# Patient Record
Sex: FEMALE | Race: WHITE | NOT HISPANIC OR LATINO | Employment: OTHER | URBAN - METROPOLITAN AREA
[De-identification: names, ages, dates, MRNs, and addresses within clinical notes are randomized per-mention and may not be internally consistent; named-entity substitution may affect disease eponyms.]

---

## 2017-02-01 DIAGNOSIS — I26.09 OTHER PULMONARY EMBOLISM WITH ACUTE COR PULMONALE, UNSPECIFIED CHRONICITY (HCC): ICD-10-CM

## 2017-02-24 ENCOUNTER — APPOINTMENT (OUTPATIENT)
Dept: VASCULAR LAB | Facility: MEDICAL CENTER | Age: 67
End: 2017-02-24
Attending: INTERNAL MEDICINE
Payer: MEDICARE

## 2017-03-01 DIAGNOSIS — Z86.711 HISTORY OF PULMONARY EMBOLISM: ICD-10-CM

## 2017-03-01 RX ORDER — WARFARIN SODIUM 5 MG/1
TABLET ORAL
Qty: 90 TAB | Refills: 1 | Status: SHIPPED | OUTPATIENT
Start: 2017-03-01 | End: 2017-09-05 | Stop reason: SDUPTHER

## 2017-03-03 ENCOUNTER — ANTICOAGULATION VISIT (OUTPATIENT)
Dept: VASCULAR LAB | Facility: MEDICAL CENTER | Age: 67
End: 2017-03-03
Attending: INTERNAL MEDICINE
Payer: MEDICARE

## 2017-03-03 DIAGNOSIS — I26.99 PULMONARY EMBOLISM, OTHER: ICD-10-CM

## 2017-03-03 LAB — INR PPP: 1.5 (ref 2–3.5)

## 2017-03-03 PROCEDURE — 85610 PROTHROMBIN TIME: CPT

## 2017-03-03 PROCEDURE — 99212 OFFICE O/P EST SF 10 MIN: CPT

## 2017-03-03 NOTE — PROGRESS NOTES
Anticoagulation Summary as of 3/3/2017     INR goal 2.0-3.0   Selected INR 1.5! (3/3/2017)   Maintenance plan 5 mg (5 mg x 1) every day   Weekly total 35 mg   Plan last modified Fabio Cabrera, PHARMD (5/15/2015)   Next INR check 3/24/2017   Target end date Indefinite    Indications   Pulmonary embolism (CMS-HCC) [I26.99]         Anticoagulation Episode Summary     [facility]    Preferred lab     Send INR reminders to     Comments indef per PCP       Anticoagulation Care Providers     Provider Role Specialty Phone number    Tom Vega D.O. Referring Internal Medicine [phone]    [facility] Responsible  [phone]        Anticoagulation Patient Findings    Patient's INR was SUB  therapeutic today in clinic.     States she recently missed a dose.  Pt denies any unusual s/s of bleeding, bruising, clotting or any changes to diet or medications.  Confirmed dosing regimen.  Bolus today then Pt is to continue with current warfarin dosing regimen.    Please extend f/u to 12 weeks if therapeutic at next visit.  Follow up in 3 weeks.    Beny Sheikh, PHARMD

## 2017-03-03 NOTE — MR AVS SNAPSHOT
Lawanda Eliza Latasha   3/3/2017 9:45 AM   Anticoagulation Visit   MRN: [id number]    Department:  Vascular Medicine   Dept Phone:  [phone]    Description:  Female : 1950   Provider:  [facility] EXAM 4           Allergies as of 3/3/2017     Allergen Noted Reactions    Castile [Soap] 2014       Rash      Vital Signs     Smoking Status                   Never Smoker            Basic Information     Date Of Birth Sex Race Ethnicity Preferred Language    1950 Female White Non- English      Your appointments     Mar 24, 2017 10:15 AM   Established Patient with [facility] EXAM 5   [facility]  (--)    [address] [facility] [address]   [phone]              Problem List              ICD-10-CM Priority Class Noted - Resolved    Pulmonary embolism (CMS-HCC) I26.99   2014 - Present    MEDICAL HOME    2014 - Present    Iron deficiency anemia due to chronic blood loss D50.0   8/10/2015 - Present    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   8/10/2015 - Present    Vitamin D deficiency E55.9   2015 - Present      Health Maintenance        Date Due Completion Dates    IMM DTaP/Tdap/Td Vaccine (1 - Tdap) 1967 ---    PAP SMEAR 1969 ---    IMM ZOSTER VACCINE 2008 ---    IMM PNEUMOCOCCAL 65+ (ADULT) LOW/MEDIUM RISK SERIES (2 of 2 - PCV13) 2015    MAMMOGRAM 2015    IMM INFLUENZA (1) 2016 ---    BONE DENSITY 2019    COLONOSCOPY 2/3/2025 2/3/2015            Results     POCT Protime      Component    INR    1.5                        Current Immunizations     Pneumococcal polysaccharide vaccine (PPSV-23) 2014  8:30 PM      Below and/or attached are the medications your provider expects you to take. Review all of your home medications and newly ordered medications with your provider and/or pharmacist. Follow medication instructions as directed by your provider and/or pharmacist. Please  keep your medication list with you and share with your provider. Update the information when medications are discontinued, doses are changed, or new medications (including over-the-counter products) are added; and carry medication information at all times in the event of emergency situations     Allergies:  CASTILE - (reactions not documented)               Medications  Valid as of: March 03, 2017 -  9:45 AM    Generic Name Brand Name Tablet Size Instructions for use    Cholecalciferol (Cap) Vitamin D3 33895 UNITS Take 1 Cap by mouth every 7 days.        Cholecalciferol (Cap) Vitamin D 1000 UNIT Take 2 Caps by mouth every day.        Omeprazole (Tablet Delayed Response) PRILOSEC 20 MG Take 20 mg by mouth every day.        Warfarin Sodium (Tab) COUMADIN 5 MG TAKE ONE TABLET BY MOUTH ONCE DAILY AS DIRECTED BY COUMADIN CLINIC        .                 Medicines prescribed today were sent to:     [facility] [address] [place] [address] (), [place] - [address]    [address] () [place] [address]    Phone: [phone] Fax: [phone]    Open 24 Hours?: No      Medication refill instructions:       If your prescription bottle indicates you have medication refills left, it is not necessary to call your provider’s office. Please contact your pharmacy and they will refill your medication.    If your prescription bottle indicates you do not have any refills left, you may request refills at any time through one of the following ways: The online SystematicBytes system (except Urgent Care), by calling your provider’s office, or by asking your pharmacy to contact your provider’s office with a refill request. Medication refills are processed only during regular business hours and may not be available until the next business day. Your provider may request additional information or to have a follow-up visit with you prior to refilling your medication.   *Please Note: Medication refills are assigned a new Rx number when refilled  electronically. Your pharmacy may indicate that no refills were authorized even though a new prescription for the same medication is available at the pharmacy. Please request the medicine by name with the pharmacy before contacting your provider for a refill.        Other Notes About Your Plan     Patient is enrolled in Medical Home with Dr. Vega        Warfarin Dosing Calendar   March 2017 Details    Sun Mon Tue Wed Thu Fri Sat        1               2               3   1.5   7.5 mg   See details      4      5 mg           5      5 mg         6      5 mg         7      5 mg         8      5 mg         9      5 mg         10      5 mg         11      5 mg           12      5 mg         13      5 mg         14      5 mg         15      5 mg         16      5 mg         17      5 mg         18      5 mg           19      5 mg         20      5 mg         21      5 mg         22      5 mg         23      5 mg         24      5 mg         25                 26               27               28               29               30               31                 Date Details   03/03 This INR check   INR: 1.5       Date of next INR:  3/24/2017         How to take your warfarin dose     To take:  5 mg Take 1 of the 5 mg tablets.    To take:  7.5 mg Take 1.5 of the 5 mg tablets.              MyVR Access Code: [email or web address]  Expires: 3/7/2017  9:58 AM    MyVR  A secure, online tool to manage your health information     [facility]’s MyVR® is a secure, online tool that connects you to your personalized health information from the privacy of your home -- day or night - making it very easy for you to manage your healthcare. Once the activation process is completed, you can even access your medical information using the MyVR dimitri, which is available for free in the Apple Dimitri store or Google Play store.     MyVR provides the following levels of access (as shown below):   My Chart Features   [facility]  Care Doctor [facility]  Specialists [facility]  Urgent  Care Non-[facility]  Primary Care  Doctor   Email your healthcare team securely and privately 24/7 X X X    Manage appointments: schedule your next appointment; view details of past/upcoming appointments X      Request prescription refills. X      View recent personal medical records, including lab and immunizations X X X X   View health record, including health history, allergies, medications X X X X   Read reports about your outpatient visits, procedures, consult and ER notes X X X X   See your discharge summary, which is a recap of your hospital and/or ER visit that includes your diagnosis, lab results, and care plan. X X       How to register for ISVS:  1. Go to  [email or web address].  2. Click on the Sign Up Now box, which takes you to the New Member Sign Up page. You will need to provide the following information:  a. Enter your ISVS Access Code exactly as it appears at the top of this page. (You will not need to use this code after you’ve completed the sign-up process. If you do not sign up before the expiration date, you must request a new code.)   b. Enter your date of birth.   c. Enter your home email address.   d. Click Submit, and follow the next screen’s instructions.  3. Create a ISVS ID. This will be your ISVS login ID and cannot be changed, so think of one that is secure and easy to remember.  4. Create a ISVS password. You can change your password at any time.  5. Enter your Password Reset Question and Answer. This can be used at a later time if you forget your password.   6. Enter your e-mail address. This allows you to receive e-mail notifications when new information is available in ISVS.  7. Click Sign Up. You can now view your health information.    For assistance activating your ISVS account, call [phone]

## 2017-03-07 LAB — INR BLD: 1.5 (ref 0.9–1.2)

## 2017-03-24 ENCOUNTER — ANTICOAGULATION VISIT (OUTPATIENT)
Dept: VASCULAR LAB | Facility: MEDICAL CENTER | Age: 67
End: 2017-03-24
Attending: INTERNAL MEDICINE
Payer: MEDICARE

## 2017-03-24 DIAGNOSIS — I26.99 PULMONARY EMBOLISM, OTHER: ICD-10-CM

## 2017-03-24 LAB
INR BLD: 3.3 (ref 0.9–1.2)
INR PPP: 3.3 (ref 2–3.5)

## 2017-03-24 PROCEDURE — 85610 PROTHROMBIN TIME: CPT

## 2017-03-24 PROCEDURE — 99212 OFFICE O/P EST SF 10 MIN: CPT

## 2017-03-24 NOTE — MR AVS SNAPSHOT
Lawanda Wymanchings   3/24/2017 10:15 AM   Anticoagulation Visit   MRN: [id number]    Department:  Vascular Medicine   Dept Phone:  [phone]    Description:  Female : 1950   Provider:  [facility]AM [facility]           Allergies as of 3/24/2017     Allergen Noted Reactions    Castile [Soap] 2014       Rash      You were diagnosed with     Pulmonary embolism, other (CMS-HCC)   [[id number]]         Vital Signs     Smoking Status                   Never Smoker            Basic Information     Date Of Birth Sex Race Ethnicity Preferred Language    1950 Female White Non- English      Your appointments     Mar 24, 2017 10:15 AM   Established Patient with [facility] EX[facility] 5   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   8/10/2015 - Present    Vitamin D deficiency E55.9   2015 - Present      Health Maintenance        Date Due Completion Dates    IMM DTaP/Tdap/Td Vaccine (1 - Tdap) 1967 ---    PAP SMEAR 1969 ---    IMM ZOSTER VACCINE 2008 ---    IMM PNEUMOCOCCAL 65+ (ADULT) LOW/MEDIUM RISK SERIES (2 of 2 - PCV13) 2015    MAMMOGRAM 2015    IMM INFLUENZA (1) 2016 ---    BONE DENSITY 2019    COLONOSCOPY 2/3/2025 2/3/2015            Results     POCT Protime      Component    INR    3.3                        Current Immunizations     Pneumococcal polysaccharide vaccine (PPSV-23)  7/14/2014  8:30 PM      Below and/or attached are the medications your provider expects you to take. Review all of your home medications and newly ordered medications with your provider and/or pharmacist. Follow medication instructions as directed by your provider and/or pharmacist. Please keep your medication list with you and share with your provider. Update the information when medications are discontinued, doses are changed, or new medications (including over-the-counter products) are added; and carry medication information at all times in the event of emergency situations     Allergies:  CASTILE - (reactions not documented)               Medications  Valid as of: March 24, 2017 - 10:10 AM    Generic Name Brand Name Tablet Size Instructions for use    Cholecalciferol (Cap) Vitamin D3 72003 UNITS Take 1 Cap by mouth every 7 days.        Cholecalciferol (Cap) Vitamin D 1000 UNIT Take 2 Caps by mouth every day.        Omeprazole (Tablet Delayed Response) PRILOSEC 20 MG Take 20 mg by mouth every day.        Warfarin Sodium (Tab) COUMADIN 5 MG TAKE ONE TABLET BY MOUTH ONCE DAILY AS DIRECTED BY COUMADIN CLINIC        .                 Medicines prescribed today were sent to:     [facility] [address] [place] [address] (), [place] - [id number] () [place] [address]    Phone: [phone] Fax: [phone]    Open 24 Hours?: No      Medication refill instructions:       If your prescription bottle indicates you have medication refills left, it is not necessary to call your provider’s office. Please contact your pharmacy and they will refill your medication.    If your prescription bottle indicates you do not have any refills left, you may request refills at any time through one of the following ways: The online Lif[employer] system (except Urgent Care), by calling your provider’s office, or by asking your pharmacy to contact your provider’s office with a refill request. Medication refills are processed only  during regular business hours and may not be available until the next business day. Your provider may request additional information or to have a follow-up visit with you prior to refilling your medication.   *Please Note: Medication refills are assigned a new Rx number when refilled electronically. Your pharmacy may indicate that no refills were authorized even though a new prescription for the same medication is available at the pharmacy. Please request the medicine by name with the pharmacy before contacting your provider for a refill.        Other Notes About Your Plan     Patient is enrolled in Medical Home with Dr. Vega        Warfarin Dosing Calendar   March 2017 Details    Sun Mon Tue Wed Thu Fri Sat        1               2               3               4                 5               6               7               8               9               10               11                 12               13               14               15               16               17               18                 19               20               21               22               23               24   3.3   2.5 mg   See details      25      5 mg           26      5 mg         27      5 mg         28      5 mg         29      5 mg         30      5 mg         31      5 mg           Date Details   03/24 This INR check   INR: 3.3               How to take your warfarin dose     To take:  2.5 mg Take 0.5 of a 5 mg tablet.    To take:  5 mg Take 1 of the 5 mg tablets.           Warfarin Dosing Calendar   April 2017 Details    Sun Mon Tue Wed Thu Fri Sat           1      5 mg           2      5 mg         3      5 mg         4      5 mg         5      5 mg         6      5 mg         7      5 mg         8      5 mg           9      5 mg         10      5 mg         11      5 mg         12      5 mg         13      5 mg         14      5 mg         15      5 mg           16      5 mg         17      5 mg         18        5 mg         19      5 mg         20      5 mg         21      5 mg         22                 23               24               25               26               27               28               29                 30                      Date Details   No additional details    Date of next INR:  4/21/2017         How to take your warfarin dose     To take:  5 mg Take 1 of the 5 mg tablets.              Wingz Access Code: [email or web address]  Expires: 4/4/2017 11:36 AM    Wingz  A secure, online tool to manage your health information     [employer]’s Wingz® is a secure, online tool that connects you to your personalized health information from the privacy of your home -- day or night - making it very easy for you to manage your healthcare. Once the activation process is completed, you can even access your medical information using the Wingz dimitri, which is available for free in the Apple Dimitri store or Google Play store.     Wingz provides the following levels of access (as shown below):   My Chart Features   [facility] Primary Care Doctor [facility]  Specialists [facility]  [facility]  [facility]  Primary Care  Doctor   Email your healthcare team securely and privately 24/7 X X X    Manage appointments: schedule your next appointment; view details of past/upcoming appointments X      Request prescription refills. X      View recent personal medical records, including lab and immunizations X X X X   View health record, including health history, allergies, medications X X X X   Read reports about your outpatient visits, procedures, consult and ER notes X X X X   See your discharge summary, which is a recap of your hospital and/or ER visit that includes your diagnosis, lab results, and care plan. X X       How to register for Wingz:  1. Go to  [email or web address].  2. Click on the Sign Up Now box, which takes you to the New Member Sign Up page. You will need to provide the following information:  a. Enter your  RMDMgroup Access Code exactly as it appears at the top of this page. (You will not need to use this code after you’ve completed the sign-up process. If you do not sign up before the expiration date, you must request a new code.)   b. Enter your date of birth.   c. Enter your home email address.   d. Click Submit, and follow the next screen’s instructions.  3. Create a RMDMgroup ID. This will be your RMDMgroup login ID and cannot be changed, so think of one that is secure and easy to remember.  4. Create a Unigot password. You can change your password at any time.  5. Enter your Password Reset Question and Answer. This can be used at a later time if you forget your password.   6. Enter your e-mail address. This allows you to receive e-mail notifications when new information is available in RMDMgroup.  7. Click Sign Up. You can now view your health information.    For assistance activating your RMDMgroup account, call [phone]

## 2017-03-24 NOTE — PROGRESS NOTES
Anticoagulation Summary as of 3/24/2017     INR goal 2.0-3.0   Selected INR 3.3! (3/24/2017)   Maintenance plan 5 mg (5 mg x 1) every day   Weekly total 35 mg   Plan last modified Fabio Cabrera, PHARMD (5/15/2015)   Next INR check 4/21/2017   Target end date Indefinite    Indications   Pulmonary embolism (CMS-HCC) [I26.99]         Anticoagulation Episode Summary     [facility]    Preferred lab     Send INR reminders to     Comments indef per PCP       Anticoagulation Care Providers     Provider Role Specialty Phone number    Tom Vega D.O. Referring Internal Medicine [phone]    [facility] Responsible  [phone]        Anticoagulation Patient Findings    Saw patient in clinic today regarding supratherapeutic INR of 3.3.  Patient denies taking any extra this week.  She also states her diet has been very consistent as she eats the same thing every day.    Will have patient take 2.5 mg today and then continue current dosing regimen of 5 mg daily.  She seems to be relatively sensitive to overt changes in her warfarin dosing, which is why I would not recommend her to hold her entire dose tonight.    Follow up in 4 weeks.  If she is therapeutic at her next INR visit, please increase her visit frequency to 12 weeks.    Augusta Garcia, PharmD.  PGY-1 Resident  [phone]

## 2017-04-21 ENCOUNTER — ANTICOAGULATION VISIT (OUTPATIENT)
Dept: VASCULAR LAB | Facility: MEDICAL CENTER | Age: 67
End: 2017-04-21
Attending: INTERNAL MEDICINE
Payer: MEDICARE

## 2017-04-21 DIAGNOSIS — I26.99 PULMONARY EMBOLISM, OTHER: ICD-10-CM

## 2017-04-21 LAB — INR PPP: 2.5 (ref 2–3.5)

## 2017-04-21 PROCEDURE — 99211 OFF/OP EST MAY X REQ PHY/QHP: CPT | Performed by: NURSE PRACTITIONER

## 2017-04-21 PROCEDURE — 85610 PROTHROMBIN TIME: CPT

## 2017-04-21 NOTE — MR AVS SNAPSHOT
Lawandanikia Kay Latasha   2017 10:15 AM   Anticoagulation Visit   MRN: [id number]    Department:  Vascular Medicine   Dept Phone:  [phone]    Description:  Female : 1950   Provider:  V EXAM 4           Allergies as of 2017     Allergen Noted Reactions    Castile [Soap] 2014       Rash      You were diagnosed with     Pulmonary embolism, other (CMS-HCC)   [[id number]]         Vital Signs     Smoking Status                   Never Smoker            Basic Information     Date Of Birth Sex Race Ethnicity Preferred Language    1950 Female White Non- English      Your appointments     2017 10:15 AM   Established Patient with [facility] EXAM 4   [facility]  (--)    [address] [id number] [phone]            May 26, 2017 10:15 AM   Established Patient with [facility] EXAM 5   [facility]  (--)    [address] [id number] [phone]              Problem List              ICD-10-CM Priority Class Noted - Resolved    Pulmonary embolism (CMS-[facility]) I26.99   2014 - Present    MEDICAL HOME    2014 - Present    Iron deficiency anemia due to chronic blood loss D50.0   8/10/2015 - Present    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   8/10/2015 - Present    Vitamin D deficiency E55.9   2015 - Present      Health Maintenance        Date Due Completion Dates    IMM DTaP/Tdap/Td Vaccine (1 - Tdap) 1967 ---    PAP SMEAR 1969 ---    IMM ZOSTER VACCINE 2008 ---    IMM PNEUMOCOCCAL 65+ (ADULT) LOW/MEDIUM RISK SERIES (2 of 2 - PCV13) 2015    MAMMOGRAM 2015    BONE DENSITY 2019    COLONOSCOPY 2/3/2025 2/3/2015            Results     POCT Protime      Component    INR    2.5                        Current Immunizations     Pneumococcal polysaccharide vaccine (PPSV-23) 2014  8:30 PM      Below and/or  attached are the medications your provider expects you to take. Review all of your home medications and newly ordered medications with your provider and/or pharmacist. Follow medication instructions as directed by your provider and/or pharmacist. Please keep your medication list with you and share with your provider. Update the information when medications are discontinued, doses are changed, or new medications (including over-the-counter products) are added; and carry medication information at all times in the event of emergency situations     Allergies:  CASTILE - (reactions not documented)               Medications  Valid as of: April 21, 2017 - 10:06 AM    Generic Name Brand Name Tablet Size Instructions for use    Cholecalciferol (Cap) Vitamin D3 03338 UNITS Take 1 Cap by mouth every 7 days.        Cholecalciferol (Cap) Vitamin D 1000 UNIT Take 2 Caps by mouth every day.        Omeprazole (Tablet Delayed Response) PRILOSEC 20 MG Take 20 mg by mouth every day.        Warfarin Sodium (Tab) COUMADIN 5 MG TAKE ONE TABLET BY MOUTH ONCE DAILY AS DIRECTED BY COUMADIN CLINIC        .                 Medicines prescribed today were sent to:     [facility] [address] [place] [address] (), [place] [id number] [address] () [place] [address]    Phone: [phone] Fax: [phone]    Open 24 Hours?: No      Medication refill instructions:       If your prescription bottle indicates you have medication refills left, it is not necessary to call your provider’s office. Please contact your pharmacy and they will refill your medication.    If your prescription bottle indicates you do not have any refills left, you may request refills at any time through one of the following ways: The online KAICORE system (except Urgent Care), by calling your provider’s office, or by asking your pharmacy to contact your provider’s office with a refill request. Medication refills are processed only during regular business hours and may  not be available until the next business day. Your provider may request additional information or to have a follow-up visit with you prior to refilling your medication.   *Please Note: Medication refills are assigned a new Rx number when refilled electronically. Your pharmacy may indicate that no refills were authorized even though a new prescription for the same medication is available at the pharmacy. Please request the medicine by name with the pharmacy before contacting your provider for a refill.        Other Notes About Your Plan     Patient is enrolled in Medical Home with Dr. Vega        Warfarin Dosing Calendar   April 2017 Details    Sun Mon Tue Wed Thu Fri Sat           1                 2               3               4               5               6               7               8                 9               10               11               12               13               14               15                 16               17               18               19               20               21   2.5   5 mg   See details      22      5 mg           23      5 mg         24      5 mg         25      5 mg         26      5 mg         27      5 mg         28      5 mg         29      5 mg           30      5 mg                Date Details   04/21 This INR check   INR: 2.5               How to take your warfarin dose     To take:  5 mg Take 1 of the 5 mg tablets.           Warfarin Dosing Calendar   May 2017 Details    Sun Mon Tue Wed Thu Fri Sat      1      5 mg         2      5 mg         3      5 mg         4      5 mg         5      5 mg         6      5 mg           7      5 mg         8      5 mg         9      5 mg         10      5 mg         11      5 mg         12      5 mg         13      5 mg           14      5 mg         15      5 mg         16      5 mg         17      5 mg         18      5 mg         19      5 mg         20      5 mg           21      5 mg         22      5 mg          23      5 mg         24      5 mg         25      5 mg         26      5 mg         27                 28               29               30               31                   Date Details   No additional details    Date of next INR:  5/26/2017         How to take your warfarin dose     To take:  5 mg Take 1 of the 5 mg tablets.              NanoPotential Access Code: [email or web address]  Expires: 5/5/2017 11:54 AM    NanoPotential  A secure, online tool to manage your health information     [employer]’s NanoPotential® is a secure, online tool that connects you to your personalized health information from the privacy of your home -- day or night - making it very easy for you to manage your healthcare. Once the activation process is completed, you can even access your medical information using the NanoPotential dimitri, which is available for free in the Apple Dimitri store or Google Play store.     NanoPotential provides the following levels of access (as shown below):   My Chart Features   [facility] Primary Care Doctor [facility]  Specialists [facility]  Urgent  Care Non-[facility]  Primary Care  Doctor   Email your healthcare team securely and privately 24/7 X X X    Manage appointments: schedule your next appointment; view details of past/upcoming appointments X      Request prescription refills. X      View recent personal medical records, including lab and immunizations X X X X   View health record, including health history, allergies, medications X X X X   Read reports about your outpatient visits, procedures, consult and ER notes X X X X   See your discharge summary, which is a recap of your hospital and/or ER visit that includes your diagnosis, lab results, and care plan. X X       How to register for NanoPotential:  1. Go to  [email or web address].  2. Click on the Sign Up Now box, which takes you to the New Member Sign Up page. You will need to provide the following information:  a. Enter your NanoPotential Access Code exactly as it appears at the top of this  page. (You will not need to use this code after you’ve completed the sign-up process. If you do not sign up before the expiration date, you must request a new code.)   b. Enter your date of birth.   c. Enter your home email address.   d. Click Submit, and follow the next screen’s instructions.  3. Create a Project WBS ID. This will be your Project WBS login ID and cannot be changed, so think of one that is secure and easy to remember.  4. Create a Project WBS password. You can change your password at any time.  5. Enter your Password Reset Question and Answer. This can be used at a later time if you forget your password.   6. Enter your e-mail address. This allows you to receive e-mail notifications when new information is available in Project WBS.  7. Click Sign Up. You can now view your health information.    For assistance activating your Project WBS account, call [phone]

## 2017-04-21 NOTE — PROGRESS NOTES
Anticoagulation Summary as of 4/21/2017     INR goal 2.0-3.0   Selected INR 2.5 (4/21/2017)   Maintenance plan 5 mg (5 mg x 1) every day   Weekly total 35 mg   Plan last modified Fabio Cabrera, PHARMD (5/15/2015)   Next INR check 5/26/2017   Target end date Indefinite    Indications   Pulmonary embolism (CMS-HCC) [I26.99]         Anticoagulation Episode Summary     [facility]    Preferred lab     Send INR reminders to     Comments indef per PCP       Anticoagulation Care Providers     Provider Role Specialty Phone number    Tom Vega D.O. Referring Internal Medicine [phone]    [facility] Responsible  [phone]        Patient is therapeutic today. Denies any medication or diet changes. No current symptoms of bleeding or thrombosis reported. Continue current regimen. Follow up in 5 weeks per pt's preference.    Next Appointment: Friday , May 26, 2017 at 10:15 am.     Deann ADAM

## 2017-04-27 LAB — INR BLD: 2.5 (ref 0.9–1.2)

## 2017-05-23 ENCOUNTER — PATIENT OUTREACH (OUTPATIENT)
Dept: HEALTH INFORMATION MANAGEMENT | Facility: OTHER | Age: 67
End: 2017-05-23

## 2017-05-23 NOTE — PROGRESS NOTES
Outcome: REQUESTED A CALL BACK AROUND 4:00 TODAY    WebIZ Checked & Epic Updated:  yes    HealthConnect Verified: no    Attempt # 1ST

## 2017-05-23 NOTE — PROGRESS NOTES
Attempt #:2    WebIZ Checked & Epic Updated: yes  HealthConnect Verified: no  Verify PCP: yes    Communication Preference Obtained: yes     Review Care Team: yes    Annual Wellness Visit Scheduling  1. Scheduling Status:Scheduled    Care Gap Scheduling      Health Maintenance Due   Topic Date Due   • IMM DTaP/Tdap/Td Vaccine (1 - Tdap) WILL HAVE DONE AT PHARMACY   • PAP SMEAR  DECLINED   • IMM ZOSTER VACCINE  WILL HAVE DONE AT PHARMACY   • IMM PNEUMOCOCCAL 65+ (ADULT) LOW/MEDIUM RISK SERIES (2 of 2 - PCV13) WILL HAVE DONE AT PHARMACY   • MAMMOGRAM  WILL CALL BACK TO SCHEDULE WHEN HER DAUGHTER HAS HERS   • Annual Wellness Visit  SCHEDULED         MyChart Activation: declined  MyChart Dimitri: no  Virtual Visits: no  Opt In to Text Messages: no

## 2017-05-26 ENCOUNTER — ANTICOAGULATION VISIT (OUTPATIENT)
Dept: VASCULAR LAB | Facility: MEDICAL CENTER | Age: 67
End: 2017-05-26
Attending: INTERNAL MEDICINE
Payer: MEDICARE

## 2017-05-26 DIAGNOSIS — I26.99 PULMONARY EMBOLISM, OTHER: ICD-10-CM

## 2017-05-26 LAB
INR BLD: 2.3 (ref 0.9–1.2)
INR PPP: 2.3 (ref 2–3.5)

## 2017-05-26 PROCEDURE — 99211 OFF/OP EST MAY X REQ PHY/QHP: CPT | Performed by: NURSE PRACTITIONER

## 2017-05-26 PROCEDURE — 85610 PROTHROMBIN TIME: CPT

## 2017-05-26 NOTE — MR AVS SNAPSHOT
Lawanda Wymanchings   2017 10:15 AM   Anticoagulation Visit   MRN: [id number]    Department:  Vascular Medicine   Dept Phone:  [phone]    Description:  Female : 1950   Provider:  [facility] EXAM 5           Allergies as of 2017     Allergen Noted Reactions    Castile [Soap] 2014       Rash      You were diagnosed with     Pulmonary embolism, other   [[id number]]         Vital Signs     Smoking Status                   Never Smoker            Basic Information     Date Of Birth Sex Race Ethnicity Preferred Language    1950 Female White Non- English      Your appointments     May 26, 2017 10:15 AM   Established Patient with [facility] EXAM 5   [facility]  (--)    [address]  [place] [address]   [phone]            2017  9:00 AM   ANNUAL EXAM PREVENTATIVE with ZARA Walker   [facility])    [address]  [place] [address]   [phone]            2017 10:00 AM   Established Patient with [facility] EXAM 5   [facility]  (--)    [address]  [place] [address]   [phone]              Problem List              ICD-10-CM Priority Class Noted - Resolved    Pulmonary embolism (CMS-HCC) I26.99   2014 - Present    MEDICAL HOME    2014 - Present    Iron deficiency anemia due to chronic blood loss D50.0   8/10/2015 - Present    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   8/10/2015 - Present    Vitamin D deficiency E55.9   2015 - Present      Health Maintenance        Date Due Completion Dates    IMM DTaP/Tdap/Td Vaccine (1 - Tdap) 1967 ---    PAP SMEAR 1969 ---    IMM ZOSTER VACCINE 2008 ---    IMM PNEUMOCOCCAL 65+ (ADULT) LOW/MEDIUM RISK SERIES (2 of 2 - PCV13) 2015    MAMMOGRAM 2015    BONE DENSITY 2019    COLONOSCOPY 2/3/2025 2/3/2015            Results     POCT Protime      Component    INR    2.3                        Current Immunizations     Pneumococcal polysaccharide vaccine (PPSV-23) 7/14/2014  8:30 PM      Below and/or attached are the medications your provider expects you to take. Review all of your home medications and newly ordered medications with your provider and/or pharmacist. Follow medication instructions as directed by your provider and/or pharmacist. Please keep your medication list with you and share with your provider. Update the information when medications are discontinued, doses are changed, or new medications (including over-the-counter products) are added; and carry medication information at all times in the event of emergency situations     Allergies:  CASTILE - (reactions not documented)               Medications  Valid as of: May 26, 2017 -  9:35 AM    Generic Name Brand Name Tablet Size Instructions for use    Cholecalciferol (Cap) Vitamin D3 87757 UNITS Take 1 Cap by mouth every 7 days.        Cholecalciferol (Cap) Vitamin D 1000 UNIT Take 2 Caps by mouth every day.        Omeprazole (Tablet Delayed Response) PRILOSEC 20 MG Take 20 mg by mouth every day.        Warfarin Sodium (Tab) COUMADIN 5 MG TAKE ONE TABLET BY MOUTH ONCE DAILY AS DIRECTED BY COUMADIN CLINIC        .                 Medicines prescribed today were sent to:     [facility] [address] [place] [address] (), [place] - [phone]    [address] () [place] [address]    Phone: [phone] Fax: [phone]    Open 24 Hours?: No      Medication refill instructions:       If your prescription bottle indicates you have medication refills left, it is not necessary to call your provider’s office. Please contact your pharmacy and they will refill your medication.    If your prescription bottle indicates you do not have any refills left, you may request refills at any time through one of the following ways: The online BiometryCloud system (except Urgent Care), by calling your  provider’s office, or by asking your pharmacy to contact your provider’s office with a refill request. Medication refills are processed only during regular business hours and may not be available until the next business day. Your provider may request additional information or to have a follow-up visit with you prior to refilling your medication.   *Please Note: Medication refills are assigned a new Rx number when refilled electronically. Your pharmacy may indicate that no refills were authorized even though a new prescription for the same medication is available at the pharmacy. Please request the medicine by name with the pharmacy before contacting your provider for a refill.        Other Notes About Your Plan     Patient is enrolled in Medical Home with Dr. Vega        Warfarin Dosing Calendar   May 2017 Details    Sun Mon Tue Wed Thu Fri Sat      1               2               3               4               5               6                 7               8               9               10               11               12               13                 14               15               16               17               18               19               20                 21               22               23               24               25               26   2.3   5 mg   See details      27      5 mg           28      5 mg         29      5 mg         30      5 mg         31      5 mg             Date Details   05/26 This INR check   INR: 2.3               How to take your warfarin dose     To take:  5 mg Take 1 of the 5 mg tablets.           Warfarin Dosing Calendar   June 2017 Details    Sun Mon Tue Wed Thu Fri Sat         1      5 mg         2      5 mg         3      5 mg           4      5 mg         5      5 mg         6      5 mg         7      5 mg         8      5 mg         9      5 mg         10      5 mg           11      5 mg         12      5 mg         13      5 mg         14      5 mg           15      5 mg         16      5 mg         17      5 mg           18      5 mg         19      5 mg         20      5 mg         21      5 mg         22      5 mg         23      5 mg         24      5 mg           25      5 mg         26      5 mg         27      5 mg         28      5 mg         29      5 mg         30      5 mg           Date Details   No additional details            How to take your warfarin dose     To take:  5 mg Take 1 of the 5 mg tablets.           Warfarin Dosing Calendar   July 2017 Details    Sun Mon Tue Wed Thu Fri Sat           1      5 mg           2      5 mg         3      5 mg         4      5 mg         5      5 mg         6      5 mg         7      5 mg         8                 9               10               11               12               13               14               15                 16               17               18               19               20               21               22                 23               24               25               26               27               28               29                 30               31                     Date Details   No additional details    Date of next INR:  7/7/2017         How to take your warfarin dose     To take:  5 mg Take 1 of the 5 mg tablets.              Wazoo Sports Access Code: [email or web address]  Expires: 6/4/2017 10:39 AM    Wazoo Sports  A secure, online tool to manage your health information     [employer]’s Wazoo Sports® is a secure, online tool that connects you to your personalized health information from the privacy of your home -- day or night - making it very easy for you to manage your healthcare. Once the activation process is completed, you can even access your medical information using the Wazoo Sports dimitri, which is available for free in the Apple Dimitri store or Google Play store.     Wazoo Sports provides the following levels of access (as shown below):   My Chart Features   [facility] Express [facility]  Doctor [facility]  Specialists [facility]  Urgent  Care Non-[facility]  Primary Care  Doctor   Email your healthcare team securely and privately 24/7 X X X    Manage appointments: schedule your next appointment; view details of past/upcoming appointments X      Request prescription refills. X      View recent personal medical records, including lab and immunizations X X X X   View health record, including health history, allergies, medications X X X X   Read reports about your outpatient visits, procedures, consult and ER notes X X X X   See your discharge summary, which is a recap of your hospital and/or ER visit that includes your diagnosis, lab results, and care plan. X X       How to register for Keen Home:  1. Go to  [email or web address].  2. Click on the Sign Up Now box, which takes you to the New Member Sign Up page. You will need to provide the following information:  a. Enter your Keen Home Access Code exactly as it appears at the top of this page. (You will not need to use this code after you’ve completed the sign-up process. If you do not sign up before the expiration date, you must request a new code.)   b. Enter your date of birth.   c. Enter your home email address.   d. Click Submit, and follow the next screen’s instructions.  3. Create a Keen Home ID. This will be your Keen Home login ID and cannot be changed, so think of one that is secure and easy to remember.  4. Create a Keen Home password. You can change your password at any time.  5. Enter your Password Reset Question and Answer. This can be used at a later time if you forget your password.   6. Enter your e-mail address. This allows you to receive e-mail notifications when new information is available in Keen Home.  7. Click Sign Up. You can now view your health information.    For assistance activating your Keen Home account, call [phone]

## 2017-05-26 NOTE — PROGRESS NOTES
Anticoagulation Summary as of 5/26/2017     INR goal 2.0-3.0   Selected INR 2.3 (5/26/2017)   Maintenance plan 5 mg (5 mg x 1) every day   Weekly total 35 mg   No change documented Deann Holliday, A.P.N.   Plan last modified Fabio Cabrera, PHARMD (5/15/2015)   Next INR check 7/7/2017   Target end date Indefinite    Indications   Pulmonary embolism (CMS-HCC) [I26.99]         Anticoagulation Episode Summary     [facility]    Preferred lab     Send INR reminders to     Comments indef per PCP       Anticoagulation Care Providers     Provider Role Specialty Phone number    Tom Vega D.O. Referring Internal Medicine [phone]    [facility] Responsible  [phone]        Patient is therapeutic today. Denies any medication or diet changes. No current symptoms of bleeding or thrombosis reported. Continue current regimen. Follow up in 6 weeks.    Next Appointment: Friday , July 7, 2017 at 10:00 am.    Deann ADAM

## 2017-06-23 ENCOUNTER — OFFICE VISIT (OUTPATIENT)
Dept: MEDICAL GROUP | Facility: CLINIC | Age: 67
End: 2017-06-23
Payer: MEDICARE

## 2017-06-23 VITALS
HEIGHT: 64 IN | WEIGHT: 241 LBS | HEART RATE: 76 BPM | TEMPERATURE: 97.5 F | DIASTOLIC BLOOD PRESSURE: 82 MMHG | OXYGEN SATURATION: 93 % | BODY MASS INDEX: 41.15 KG/M2 | SYSTOLIC BLOOD PRESSURE: 134 MMHG

## 2017-06-23 DIAGNOSIS — Z00.00 MEDICARE ANNUAL WELLNESS VISIT, SUBSEQUENT: Primary | ICD-10-CM

## 2017-06-23 DIAGNOSIS — Z12.31 ENCOUNTER FOR SCREENING MAMMOGRAM FOR BREAST CANCER: ICD-10-CM

## 2017-06-23 DIAGNOSIS — I26.99 PULMONARY EMBOLISM, OTHER: ICD-10-CM

## 2017-06-23 DIAGNOSIS — E55.9 VITAMIN D DEFICIENCY: ICD-10-CM

## 2017-06-23 DIAGNOSIS — D50.0 IRON DEFICIENCY ANEMIA DUE TO CHRONIC BLOOD LOSS: ICD-10-CM

## 2017-06-23 DIAGNOSIS — Z23 NEED FOR PROPHYLACTIC VACCINATION WITH STREPTOCOCCUS PNEUMONIAE (PNEUMOCOCCUS) AND INFLUENZA VACCINES: ICD-10-CM

## 2017-06-23 PROCEDURE — G0439 PPPS, SUBSEQ VISIT: HCPCS | Mod: 25 | Performed by: NURSE PRACTITIONER

## 2017-06-23 PROCEDURE — 90670 PCV13 VACCINE IM: CPT | Performed by: NURSE PRACTITIONER

## 2017-06-23 PROCEDURE — G0009 ADMIN PNEUMOCOCCAL VACCINE: HCPCS | Performed by: NURSE PRACTITIONER

## 2017-06-23 ASSESSMENT — PATIENT HEALTH QUESTIONNAIRE - PHQ9: CLINICAL INTERPRETATION OF PHQ2 SCORE: 0

## 2017-06-23 NOTE — ASSESSMENT & PLAN NOTE
Dx made 2 yrs ago, pt was seen for difficulty swallowing  Endoscopy was done, dx of Kumar's and started treatment with prilosec, sxs resolved  Pt will be contacting GI to determine was f/u needed   Continue with current meds  Followed by GI

## 2017-06-23 NOTE — MR AVS SNAPSHOT
"Lawanda Pagan   2017 9:00 AM   Office Visit   MRN: [id number]    Department:  [facility]   Dept Phone:  [phone]    Description:  Female : 1950   Provider:  ZARA Walker           Reason for Visit     Annual Exam subsequent      Allergies as of 2017     Allergen Noted Reactions    Castile [Soap] 2014       Rash      You were diagnosed with     Medicare annual wellness visit, subsequent   [970707]  -  Primary     Need for prophylactic vaccination with Streptococcus pneumoniae (Pneumococcus) and Influenza vaccines   [023161]       Iron deficiency anemia due to chronic blood loss   [942699]       Pulmonary embolism, other   [4358896]       Vitamin D deficiency   [5970868]       Encounter for screening mammogram for breast cancer   [3002654]         Vital Signs     Blood Pressure Pulse Temperature Height Weight Body Mass Index    134/82 mmHg 76 36.4 °C (97.5 °F) 1.626 m (5' 4.02\") 109.317 kg (241 lb) 41.35 kg/m2    Oxygen Saturation Smoking Status                93% Never Smoker           Basic Information     Date Of Birth Sex Race Ethnicity Preferred Language    1950 Female White Non- English      Your appointments     2017 10:00 AM   Established Patient with [facility] EXAM 5   [facility]  (--)    [address] [place] [id number]   [phone]              Problem List              ICD-10-CM Priority Class Noted - Resolved    Pulmonary embolism (CMS-HCC) I26.99   2014 - Present    MEDICAL HOME    2014 - Present    Iron deficiency anemia due to chronic blood loss D50.0   8/10/2015 - Present    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 Review all of your home medications and newly ordered medications with your provider and/or pharmacist. Follow medication instructions as directed by your provider and/or pharmacist. Please keep your medication list with you and share with your provider. Update the information when medications are discontinued, doses are changed, or new medications (including over-the-counter products) are added; and carry medication information at all times in the event of emergency situations     Allergies:  CASTILE - (reactions not documented)               Medications  Valid as of: June 23, 2017 -  9:38 AM    Generic Name Brand Name Tablet Size Instructions for use    B Complex Vitamins   Take  by mouth.        Cholecalciferol (Cap) Vitamin D 1000 UNIT Take 2 Caps by mouth every day.        Omeprazole (Tablet Delayed Response) PRILOSEC 20 MG Take 20 mg by mouth every day.        Warfarin Sodium (Tab) COUMADIN 5 MG TAKE ONE TABLET BY MOUTH ONCE DAILY AS DIRECTED BY COUMADIN CLINIC        .                 Medicines prescribed today were sent to:     [facility] [address] [place] [address] (), [place] - [id number] () [place] [address]    Phone: [phone] Fax: [phone]    Open 24 Hours?: No      Medication refill instructions:       If your prescription bottle indicates you have medication refills left, it is not necessary to call your provider’s office. Please contact your pharmacy and they will refill your medication.    If your prescription bottle indicates you do not have any refills left, you may request refills at any time through one of the following ways: The online Gigzolo system (except Urgent Care), by calling  your provider’s office, or by asking your pharmacy to contact your provider’s office with a refill request. Medication refills are processed only during regular business hours and may not be available until the next business day. Your provider may request additional information or to have a follow-up visit with you prior to refilling your medication.   *Please Note: Medication refills are assigned a new Rx number when refilled electronically. Your pharmacy may indicate that no refills were authorized even though a new prescription for the same medication is available at the pharmacy. Please request the medicine by name with the pharmacy before contacting your provider for a refill.        Your To Do List     Future Labs/Procedures Complete By Expires    MA-SCREEN MAMMO W/CAD-BILAT  As directed 6/23/2018      Instructions    Continue with care through Tom Vega D.O..  Next Medicare Annual Wellness Visit is due in one year.    Continue care with specialists you are seeing for your chronic problems.    Attached is some preventative information for you to read.          Fall Prevention and Home Safety  Falls cause injuries and can affect all age groups. It is possible to prevent falls.   HOW TO PREVENT FALLS  · Wear shoes with rubber soles that do not have an opening for your toes.   · Keep the inside and outside of your house well lit.   · Use night lights throughout your home.   · Remove clutter from floors.   · Clean up floor spills.   · Remove throw rugs or fasten them to the floor with carpet tape.   · Do not place electrical cords across pathways.   · Put grab bars by your tub, shower, and toilet. Do not use towel bars as grab bars.   · Put handrails on both sides of the stairway. Fix loose handrails.   · Do not climb on stools or stepladders, if possible.   · Do not wax your floors.   · Repair uneven or unsafe sidewalks, walkways, or stairs.   · Keep items you use a lot within reach.   · Be aware of  pets.   · Keep emergency numbers next to the telephone.   · Put smoke detectors in your home and near bedrooms.   Ask your doctor what other things you can do to prevent falls.  Document Released: 10/14/2010 Document Revised: 06/18/2013 Document Reviewed: 03/19/2013  ExitCare® Patient Information ©2013 [employer].    Exercise to Stay Healthy      Exercise helps you become and stay healthy.  EXERCISE IDEAS AND TIPS  Choose exercises that:  · You enjoy.   · Fit into your day.   You do not need to exercise really hard to be healthy. You can do exercises at a slow or medium level and stay healthy. You can:  · Stretch before and after working out.   · Try yoga, Pilates, or erma chi.   · Lift weights.   · Walk fast, swim, jog, run, climb stairs, bicycle, dance, or rollerskate.   · Take aerobic classes.   Exercises that burn about 150 calories:  · Running 1 ½ miles in 15 minutes.   · Playing volleyball for 45 to 60 minutes.   · Washing and waxing a car for 45 to 60 minutes.   · Playing touch football for 45 minutes.   · Walking 1 ¾ miles in 35 minutes.   · Pushing a stroller 1 ½ miles in 30 minutes.   · Playing basketball for 30 minutes.   · Raking leaves for 30 minutes.   · Bicycling 5 miles in 30 minutes.   · Walking 2 miles in 30 minutes.   · Dancing for 30 minutes.   · Shoveling snow for 15 minutes.   · Swimming laps for 20 minutes.   · Walking up stairs for 15 minutes.   · Bicycling 4 miles in 15 minutes.   · Gardening for 30 to 45 minutes.   · Jumping rope for 15 minutes.   · Washing windows or floors for 45 to 60 minutes.     One suggestion is to start walking for 10 minutes after each meal.  This will help with digestion and help you to metabolize your meal.       For Weight Loss -   Recommend portion sizes with more fruits/vegetables/high fiber foods.  Stay away from white bread/pastas/white rice/white potatoes.  Increase Fluid intake to 6-8 glasses of water daily.   Eliminate soda's (diet and regular) from your  fluid intake.      Document Released: 01/20/2012 Document Revised: 03/11/2013 Document Reviewed: 01/20/2012  CrowdmarkCare® Patient Information ©2013 [employer].    Fat and Cholesterol Control Diet  Cholesterol is a wax-like substance. It comes from your liver and is found in certain foods. There is good (HDL) and bad (LDL) cholesterol. Too much cholesterol in your blood can affect your heart. Certain foods can lower or raise your cholesterol. Eat foods that are low in cholesterol.  Saturated and trans fats are bad fats found in foods that will raise your cholesterol. Do not eat foods that are high in saturated and trans fats.  FOODS HIGHER IN SATURATED AND TRANS FATS  · Dairy products, such as whole milk, eggs, cheese, cream, and butter.   · Fatty meats, such as hot dogs, sausage, and salami.   · Fried foods.   · Trans fats which are found in margarine and pre-made cookies, crackers, and baked goods.   · Tropical oils, such as coconut and palm oils.   Read package labels at the store. Do not buy products that use saturated or trans fats or hydrogenated oils. Find foods labeled:  · Low-fat.   · Low-saturated fat.   · Trans-fat-free.   · Low-cholesterol.   FOODS LOWER IN CHOLESTEROL  ·  Fruit.   · Vegetables.   · Beans, peas, and lentils.   · Fish.   · Lean meat, such as chicken (without skin) or ground turkey.   · Grains, such as barley, rice, couscous, bulgur wheat, and pasta.   · Heart-healthy tub margarine.   PREPARING YOUR FOOD  · Broil, bake, steam, or roast foods. Do not tejada food.   · Use non-stick cooking sprays.   · Use lemon or herbs to flavor food instead of using butter or stick margarine.   · Use nonfat yogurt, salsa, or low-fat dressings for salads.   Document Released: 06/18/2013 Document Reviewed: 06/18/2013  CrowdmarkCare® Patient Information ©2013 [employer].    Recommend annual flu vaccine.  Next due in Fall, 2015.  If you decide not to have the flu vaccine, recommend good handwashing and staying out  roxanne gallo during flu season.               Other Notes About Your Plan     Patient is enrolled in Medical Home with Dr. Gary Escobar Access Code: [email or web address]  Expires: 7/5/2017  3:57 AM    ScreenMedix  A secure, online tool to manage your health information     [employer] ScreenMedix® is a secure, online tool that connects you to your personalized health information from the privacy of your home -- day or night - making it very easy for you to manage your healthcare. Once the activation process is completed, you can even access your medical information using the ScreenMedix dimitri, which is available for free in the Apple Dimitri store or Google Play store.     ScreenMedix provides the following levels of access (as shown below):   My Chart Features   [facility] Doctor [facility]  Urgent  Care Non-[facility]  Primary Care  Doctor   Email your healthcare team securely and privately 24/7 X X X    Manage appointments: schedule your next appointment; view details of past/upcoming appointments X      Request prescription refills. X      View recent personal medical records, including lab and immunizations X X X X   View health record, including health history, allergies, medications X X X X   Read reports about your outpatient visits, procedures, consult and ER notes X X X X   See your discharge summary, which is a recap of your hospital and/or ER visit that includes your diagnosis, lab results, and care plan. X X       How to register for ScreenMedix:  1. Go to  [email or web address].  2. Click on the Sign Up Now box, which takes you to the New Member Sign Up page. You will need to provide the following information:  a. Enter your ScreenMedix Access Code exactly as it appears at the top of this page. (You will not need to use this code after you’ve completed the sign-up process. If you do not sign up before the expiration date, you must request a new code.)   b. Enter your date of birth.   c. Enter  your home email address.   d. Click Submit, and follow the next screen’s instructions.  3. Create a Moblyt ID. This will be your Diaferon login ID and cannot be changed, so think of one that is secure and easy to remember.  4. Create a Moblyt password. You can change your password at any time.  5. Enter your Password Reset Question and Answer. This can be used at a later time if you forget your password.   6. Enter your e-mail address. This allows you to receive e-mail notifications when new information is available in Diaferon.  7. Click Sign Up. You can now view your health information.    For assistance activating your Diaferon account, call [phone]

## 2017-06-23 NOTE — PROGRESS NOTES
CC:   Medicare Annual Wellness Visit    HPI:  Lawanda is a 67 y.o. 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     • warfarin (COUMADIN) 5 MG Tab TAKE ONE TABLET BY MOUTH ONCE DAILY AS DIRECTED BY COUMADIN CLINIC 90 Tab 1   • Cholecalciferol (VITAMIN D) 1000 UNIT Cap Take 2 Caps by mouth every day. 60 Cap 6   • omeprazole (PRILOSEC) 20 MG TBEC delayed-release tablet Take 20 mg by mouth every day.       No current facility-administered medications for this visit.      Current supplements: no  Chronic narcotic pain medicines: no  Allergies: Castile  Exercise: yes  Current social contact/activities: Has support of family and friends      Screening:  Depression Screening    Little interest or pleasure in doing things?  0 - not at all  Feeling down, depressed , or hopeless? 0 - not at all  Trouble falling or staying asleep, or sleeping too much?     Feeling tired or having little energy?     Poor appetite or overeating?     Feeling bad about yourself - or that you are a failure or have let yourself or your family down?    Trouble concentrating on things, such as reading the newspaper or watching television?    Moving or speaking so slowly that other people could have noticed.  Or the opposite - being so fidgety or restless that you have been moving around a lot more than usual?     Thoughts that you would be better off dead, or of hurting yourself?     Patient Health Questionnaire Score:    If depressive symptoms identified deferred to follow up visit unless specifically addressed in assessment and plan.    Interpretation of PHQ-9 Total Score    Score Severity   1-4 Minimal Depression   5-9 Mild Depression   10-14 Moderate Depression   15-19 Moderately Severe Depression   20-27 Severe Depression    Screening for Cognitive Impairment    Three Minute Recall (banana, sunrise, fence)  3/3    Draw clock face with all 12 numbers set to the hand to show 10 minures past 11 o'clock  1 5  If cognitive concerns identified deferred to follow up visit unless specifically addressed in assessment and plan.    Fall Risk Assessment    Has the patient had two or more falls in the last year or any fall with injury in the last year?  No  If Fall Risk identified deferred to follow up visit unless specifically addressed in assessment and plan.    Safety Assessment    Throw rugs on floor.  Yes  Handrails on all stairs.  Yes  Good lighting in all hallways.  Yes  Difficulty hearing.  No  Patient counseled about all safety risks that were identified.    Functional Assessment ADLs    Are there any barriers preventing you from cooking for yourself or meeting nutritional needs?  No.    Are there any barriers preventing you from driving safely or obtaining transportation?  Yes. Pt does not have a vehicle.  Family provide transportation and the patient occasionally takes public transportation.   Are there any barriers preventing you from using a telephone or calling for help?  No.    Are there any barriers preventing you from shopping?  No.    Are there any barriers preventing you from taking care of your own finances?  No.    Are there any barriers preventing you from managing your medications?  No.    Are currently engaing any exercise or physical activity?  No.       Health Maintenance Summary                IMM DTaP/Tdap/Td Vaccine Overdue 6/7/1967     IMM ZOSTER VACCINE Overdue 6/7/2008     IMM PNEUMOCOCCAL 65+ (ADULT) LOW/MEDIUM RISK SERIES Overdue 7/14/2015      Done 7/14/2014 Imm Admin: Pneumococcal polysaccharide vaccine (PPSV-23)    MAMMOGRAM Overdue 8/27/2015      Done  "8/27/2014 MA-SCREENING MAMMOGRAM W/ CAD    Annual Wellness Visit Overdue 8/10/2016      Done 8/10/2015 Visit Dx: Welcome to Medicare preventive visit    BONE DENSITY Next Due 8/27/2019      Done 8/27/2014 DS-BONE DENSITY STUDY (DEXA)    COLONOSCOPY Next Due 2/3/2025      Done 2/3/2015 AMB REFERRAL TO GI FOR COLONOSCOPY          Patient Care Team:  Tom Vega D.O. as PCP - General (Internal Medicine)  Nuvia Ortiz R.N. as Medical Home Care Manager  [facility]  Ba Salinas M.D. as Consulting Physician (Gastroenterology)        Social History   Substance Use Topics   • Smoking status: Never Smoker    • Smokeless tobacco: Never Used   • Alcohol Use: No       Family History   Problem Relation Age of Onset   • Cancer Brother    • Stroke Brother    • Cancer Brother    • Other Mother      parkinsons   • Cancer Father      stomach   • Cancer Maternal Grandmother      stomach     She  has a past medical history of Measles; Fall; Hernia, hiatal; Arthritis; Kumar esophagus (8/10/2015); Allergy; and Blood transfusion without reported diagnosis.   Past Surgical History   Procedure Laterality Date   • Other abdominal surgery  2004     hernia repair   • Tonsillectomy         ROS:    Ostomy or other tubes or amputations: no  Chronic oxygen use no  Last eye exam 11/2016  : Denies incontinence.   Gait: Uses no assistive device   Hearing excellent.    Dentition poor    Exam: Blood pressure 134/82, pulse 76, temperature 36.4 °C (97.5 °F), height 1.626 m (5' 4.02\"), weight 109.317 kg (241 lb), SpO2 93 %. Body mass index is 41.35 kg/(m^2).  Alert, oriented in no acute distress.  Eye contact is good, speech goal directed, affect calm      Assessment and Plan. The following treatment and monitoring plan is recommended for all problems as listed below:   Kumar esophagus  Dx made 2 yrs ago, pt was seen for difficulty swallowing  Endoscopy was done, dx of Kumar's and started treatment with prilosec, " sxs resolved  Pt will be contacting GI to determine was f/u needed   Continue with current meds  Followed by GI        Iron deficiency anemia due to chronic blood loss  Followed by Tom Vega D.O..   Labs reviewed     MEDICAL HOME  Managed by med  home    Pulmonary embolism  No recurrence   Continue with current meds  INR q monthly    Vitamin D deficiency  Chronic condition managed with current medical regimen   Continue with current meds  Followed by Tom Vega D.O..            Health Care Screening recommendations reviewed with patient today: per Patient Instructions  DPA/Advanced directive: .has NO advanced directive - not interested in additional information    Next office visit for recheck of chronic medical conditions is due with Tom Vega D.O. in 4-6 months  mammo order placed

## 2017-06-23 NOTE — ASSESSMENT & PLAN NOTE
Chronic condition managed with current medical regimen   Continue with current meds  Followed by Tom Vega D.O..

## 2017-06-23 NOTE — PATIENT INSTRUCTIONS
Continue with care through Tom Vega D.O..  Next Medicare Annual Wellness Visit is due in one year.    Continue care with specialists you are seeing for your chronic problems.    Attached is some preventative information for you to read.          Fall Prevention and Home Safety  Falls cause injuries and can affect all age groups. It is possible to prevent falls.   HOW TO PREVENT FALLS  · Wear shoes with rubber soles that do not have an opening for your toes.   · Keep the inside and outside of your house well lit.   · Use night lights throughout your home.   · Remove clutter from floors.   · Clean up floor spills.   · Remove throw rugs or fasten them to the floor with carpet tape.   · Do not place electrical cords across pathways.   · Put grab bars by your tub, shower, and toilet. Do not use towel bars as grab bars.   · Put handrails on both sides of the stairway. Fix loose handrails.   · Do not climb on stools or stepladders, if possible.   · Do not wax your floors.   · Repair uneven or unsafe sidewalks, walkways, or stairs.   · Keep items you use a lot within reach.   · Be aware of pets.   · Keep emergency numbers next to the telephone.   · Put smoke detectors in your home and near bedrooms.   Ask your doctor what other things you can do to prevent falls.  Document Released: 10/14/2010 Document Revised: 06/18/2013 Document Reviewed: 03/19/2013  ExitCare® Patient Information ©2013 [employer].    Exercise to Stay Healthy      Exercise helps you become and stay healthy.  EXERCISE IDEAS AND TIPS  Choose exercises that:  · You enjoy.   · Fit into your day.   You do not need to exercise really hard to be healthy. You can do exercises at a slow or medium level and stay healthy. You can:  · Stretch before and after working out.   · Try yoga, Pilates, or erma chi.   · Lift weights.   · Walk fast, swim, jog, run, climb stairs, bicycle, dance, or rollerskate.   · Take aerobic classes.   Exercises that burn about  150 calories:  · Running 1 ½ miles in 15 minutes.   · Playing volleyball for 45 to 60 minutes.   · Washing and waxing a car for 45 to 60 minutes.   · Playing touch football for 45 minutes.   · Walking 1 ¾ miles in 35 minutes.   · Pushing a stroller 1 ½ miles in 30 minutes.   · Playing basketball for 30 minutes.   · Raking leaves for 30 minutes.   · Bicycling 5 miles in 30 minutes.   · Walking 2 miles in 30 minutes.   · Dancing for 30 minutes.   · Shoveling snow for 15 minutes.   · Swimming laps for 20 minutes.   · Walking up stairs for 15 minutes.   · Bicycling 4 miles in 15 minutes.   · Gardening for 30 to 45 minutes.   · Jumping rope for 15 minutes.   · Washing windows or floors for 45 to 60 minutes.     One suggestion is to start walking for 10 minutes after each meal.  This will help with digestion and help you to metabolize your meal.       For Weight Loss -   Recommend portion sizes with more fruits/vegetables/high fiber foods.  Stay away from white bread/pastas/white rice/white potatoes.  Increase Fluid intake to 6-8 glasses of water daily.   Eliminate soda's (diet and regular) from your fluid intake.      Document Released: 01/20/2012 Document Revised: 03/11/2013 Document Reviewed: 01/20/2012  ExitCare® Patient Information ©2013 [employer].    Fat and Cholesterol Control Diet  Cholesterol is a wax-like substance. It comes from your liver and is found in certain foods. There is good (HDL) and bad (LDL) cholesterol. Too much cholesterol in your blood can affect your heart. Certain foods can lower or raise your cholesterol. Eat foods that are low in cholesterol.  Saturated and trans fats are bad fats found in foods that will raise your cholesterol. Do not eat foods that are high in saturated and trans fats.  FOODS HIGHER IN SATURATED AND TRANS FATS  · Dairy products, such as whole milk, eggs, cheese, cream, and butter.   · Fatty meats, such as hot dogs, sausage, and salami.   · Fried foods.   · Trans fats  which are found in margarine and pre-made cookies, crackers, and baked goods.   · Tropical oils, such as coconut and palm oils.   Read package labels at the store. Do not buy products that use saturated or trans fats or hydrogenated oils. Find foods labeled:  · Low-fat.   · Low-saturated fat.   · Trans-fat-free.   · Low-cholesterol.   FOODS LOWER IN CHOLESTEROL  ·  Fruit.   · Vegetables.   · Beans, peas, and lentils.   · Fish.   · Lean meat, such as chicken (without skin) or ground turkey.   · Grains, such as barley, rice, couscous, bulgur wheat, and pasta.   · Heart-healthy tub margarine.   PREPARING YOUR FOOD  · Broil, bake, steam, or roast foods. Do not tejada food.   · Use non-stick cooking sprays.   · Use lemon or herbs to flavor food instead of using butter or stick margarine.   · Use nonfat yogurt, salsa, or low-fat dressings for salads.   Document Released: 06/18/2013 Document Reviewed: 06/18/2013  ExitCare® Patient Information ©2013 [employer].    Recommend annual flu vaccine.  Next due in Fall, 2015.  If you decide not to have the flu vaccine, recommend good handwashing and staying out of crowds during flu season.

## 2017-07-07 ENCOUNTER — ANTICOAGULATION VISIT (OUTPATIENT)
Dept: VASCULAR LAB | Facility: MEDICAL CENTER | Age: 67
End: 2017-07-07
Attending: INTERNAL MEDICINE
Payer: MEDICARE

## 2017-07-07 DIAGNOSIS — I26.99 PULMONARY EMBOLISM, OTHER: ICD-10-CM

## 2017-07-07 LAB
INR BLD: 3.3 (ref 0.9–1.2)
INR PPP: 3.3 (ref 2–3.5)

## 2017-07-07 PROCEDURE — 99212 OFFICE O/P EST SF 10 MIN: CPT | Performed by: NURSE PRACTITIONER

## 2017-07-07 PROCEDURE — 85610 PROTHROMBIN TIME: CPT

## 2017-07-07 NOTE — MR AVS SNAPSHOT
Lawanda Pagan   2017 10:00 AM   Anticoagulation Visit   MRN: [id number]    Department:  Vascular Medicine   Dept Phone:  [phone]    Description:  Female : 1950   Provider:  [facility] EXAM 5           Allergies as of 2017     Allergen Noted Reactions    Castile [Soap] 2014       Rash      You were diagnosed with     Pulmonary embolism, other   [[id number]]         Vital Signs     Smoking Status                   Never Smoker            Basic Information     Date Of Birth Sex Race Ethnicity Preferred Language    1950 Female White Non- English      Your appointments     2017 10:00 AM   Established Patient with [facility] EXAM 5   [facility]  (--)    [address] [facility] [place] [address] [phone]            Aug 18, 2017 10:00 AM   Established Patient with [facility] EXAM 5   [facility]  (--)    [address] [facility] [address]   [phone]              Problem List              ICD-10-CM Priority Class Noted - Resolved    Pulmonary embolism (CMS-HCC) I26.99   2014 - Present    MEDICAL HOME    2014 - Present    Iron deficiency anemia due to chronic blood loss D50.0   8/10/2015 - Present    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   8/10/2015 - Present    Vitamin D deficiency E55.9   2015 - Present      Health Maintenance        Date Due Completion Dates    IMM DTaP/Tdap/Td Vaccine (1 - Tdap) 1967 ---    IMM ZOSTER VACCINE 2008 ---    MAMMOGRAM 2015    IMM INFLUENZA (1) 2017 ---    BONE DENSITY 2019    COLONOSCOPY 2/3/2025 2/3/2015            Results     POCT Protime      Component    INR    3.3                        Current Immunizations     13-VALENT PCV PREVNAR 2017  9:10 AM    Pneumococcal polysaccharide vaccine (PPSV-23) 2014  8:30 PM      Below and/or attached are the medications your provider expects you  to take. Review all of your home medications and newly ordered medications with your provider and/or pharmacist. Follow medication instructions as directed by your provider and/or pharmacist. Please keep your medication list with you and share with your provider. Update the information when medications are discontinued, doses are changed, or new medications (including over-the-counter products) are added; and carry medication information at all times in the event of emergency situations     Allergies:  CASTILE - (reactions not documented)               Medications  Valid as of: July 07, 2017 -  9:48 AM    Generic Name Brand Name Tablet Size Instructions for use    B Complex Vitamins   Take  by mouth.        Cholecalciferol (Cap) Vitamin D 1000 UNIT Take 2 Caps by mouth every day.        Omeprazole (Tablet Delayed Response) PRILOSEC 20 MG Take 20 mg by mouth every day.        Warfarin Sodium (Tab) COUMADIN 5 MG TAKE ONE TABLET BY MOUTH ONCE DAILY AS DIRECTED BY COUMADIN CLINIC        .                 Medicines prescribed today were sent to:     [facility] [address] [place] [address] (), [place] - [id number] [address] () [place] [address]    Phone: [phone] Fax: [phone]    Open 24 Hours?: No      Medication refill instructions:       If your prescription bottle indicates you have medication refills left, it is not necessary to call your provider’s office. Please contact your pharmacy and they will refill your medication.    If your prescription bottle indicates you do not have any refills left, you may request refills at any time through one of the following ways: The online [employer] system (except Urgent Care), by calling your provider’s office, or by asking your pharmacy to contact your provider’s office with a refill request. Medication refills are processed only during regular business hours and may not be available until the next business day. Your provider may request additional information or  to have a follow-up visit with you prior to refilling your medication.   *Please Note: Medication refills are assigned a new Rx number when refilled electronically. Your pharmacy may indicate that no refills were authorized even though a new prescription for the same medication is available at the pharmacy. Please request the medicine by name with the pharmacy before contacting your provider for a refill.        Other Notes About Your Plan     Patient is enrolled in Medical Home with Dr. Vega        Warfarin Dosing Calendar   July 2017 Details    Sun Mon Tue Wed Thu Fri Sat           1                 2               3               4               5               6               7   3.3   2.5 mg   See details      8      5 mg           9      5 mg         10      5 mg         11      5 mg         12      5 mg         13      5 mg         14      5 mg         15      5 mg           16      5 mg         17      5 mg         18      5 mg         19      5 mg         20      5 mg         21      5 mg         22      5 mg           23      5 mg         24      5 mg         25      5 mg         26      5 mg         27      5 mg         28      5 mg         29      5 mg           30      5 mg         31      5 mg               Date Details   07/07 This INR check   INR: 3.3               How to take your warfarin dose     To take:  2.5 mg Take 0.5 of a 5 mg tablet.    To take:  5 mg Take 1 of the 5 mg tablets.           Warfarin Dosing Calendar   August 2017 Details    Sun Mon Tue Wed Thu Fri Sat       1      5 mg         2      5 mg         3      5 mg         4      5 mg         5      5 mg           6      5 mg         7      5 mg         8      5 mg         9      5 mg         10      5 mg         11      5 mg         12      5 mg           13      5 mg         14      5 mg         15      5 mg         16      5 mg         17      5 mg         18      5 mg         19                 20               21                22               23               24               25               26                 27               28               29               30               31                  Date Details   No additional details    Date of next INR:  8/18/2017         How to take your warfarin dose     To take:  5 mg Take 1 of the 5 mg tablets.              SmartDocs (Teknowmics) Access Code: [email or web address]  Expires: 8/4/2017  3:58 AM    SmartDocs (Teknowmics)  A secure, online tool to manage your health information     [employer]’s SmartDocs (Teknowmics)® is a secure, online tool that connects you to your personalized health information from the privacy of your home -- day or night - making it very easy for you to manage your healthcare. Once the activation process is completed, you can even access your medical information using the SmartDocs (Teknowmics) dimitri, which is available for free in the Apple Dimitri store or Google Play store.     SmartDocs (Teknowmics) provides the following levels of access (as shown below):   My Chart Features   [facility] Doctor [facility]  Specialists [facility]  Urgent  Care [facility]  Primary Care  Doctor   Email your healthcare team securely and privately 24/7 X X X    Manage appointments: schedule your next appointment; view details of past/upcoming appointments X      Request prescription refills. X      View recent personal medical records, including lab and immunizations X X X X   View health record, including health history, allergies, medications X X X X   Read reports about your outpatient visits, procedures, consult and ER notes X X X X   See your discharge summary, which is a recap of your hospital and/or ER visit that includes your diagnosis, lab results, and care plan. X X       How to register for SmartDocs (Teknowmics):  1. Go to  [email or web address].  2. Click on the Sign Up Now box, which takes you to the New Member Sign Up page. You will need to provide the following information:  a. Enter your SmartDocs (Teknowmics) Access Code exactly as it appears at the top of this  page. (You will not need to use this code after you’ve completed the sign-up process. If you do not sign up before the expiration date, you must request a new code.)   b. Enter your date of birth.   c. Enter your home email address.   d. Click Submit, and follow the next screen’s instructions.  3. Create a [employer] ID. This will be your [employer] login ID and cannot be changed, so think of one that is secure and easy to remember.  4. Create a [employer] password. You can change your password at any time.  5. Enter your Password Reset Question and Answer. This can be used at a later time if you forget your password.   6. Enter your e-mail address. This allows you to receive e-mail notifications when new information is available in [employer].  7. Click Sign Up. You can now view your health information.    For assistance activating your [employer] account, call [phone]

## 2017-07-07 NOTE — PROGRESS NOTES
Anticoagulation Summary as of 7/7/2017     INR goal 2.0-3.0   Selected INR 3.3! (7/7/2017)   Maintenance plan 5 mg (5 mg x 1) every day   Weekly total 35 mg   Plan last modified Fabio Cabrera, PHARMD (5/15/2015)   Next INR check 8/18/2017   Target end date Indefinite    Indications   Pulmonary embolism (CMS-HCC) [I26.99]         Anticoagulation Episode Summary     INR check location [facility]    Preferred lab     Send INR reminders to     Comments indef per PCP       Anticoagulation Care Providers     Provider Role Specialty Phone number    Tom Vega D.O. Referring Internal Medicine [phone]    [facility] Responsible  [phone]        Patient is supratherapeutic today. Denies any medication or diet changes. No current symptoms of bleeding or thrombosis reported. Decrease tonight then continue current regimen. Follow up in 6 weeks per pt's preference.    Next Appointment: Friday , August 18, 2017 at 10:00 am.    Deann ADAM

## 2017-08-21 ENCOUNTER — TELEPHONE (OUTPATIENT)
Dept: MEDICAL GROUP | Facility: CLINIC | Age: 67
End: 2017-08-21

## 2017-08-21 DIAGNOSIS — I26.99 PULMONARY EMBOLISM, OTHER: ICD-10-CM

## 2017-08-22 NOTE — TELEPHONE ENCOUNTER
Telephone call the patient, previously was able to stop warfarin and restart without any clotting issues without a Lovenox bridge. We'll cancel Lovenox patient's to stop warfarin 5 days prior to her endoscopy.

## 2017-08-22 NOTE — TELEPHONE ENCOUNTER
VOICEMAIL  1. Caller Name: Kashmir from [facility]                       Call Back Number: [phone] ext [phone]    2. Message: Requesting medical clearance to hold Coumadin 5 days prior to Colonoscopy 09/01/17. Please review and advise, thank you.     3. Patient approves office to leave a detailed voicemail/MyChart message: N\A

## 2017-08-22 NOTE — TELEPHONE ENCOUNTER
Spoke with patient and she is not comfortable with administering Lovenox injections for herself. Patient stated that the injections are also expensive and she does not think she can afford them. Patient also stated that she had a colonoscopy 3 years ago and was only taken off the pill and she did fine without injections. Patient would prefer to avoid injections if possible.     Please review and advise, thank you.

## 2017-08-22 NOTE — TELEPHONE ENCOUNTER
Please call patient, ask her if she is able to give herself shots with Lovenox, she is at risk with DVTs. She is to take her last shot on the night before her procedure. Is to restart her warfarin afterwards. Patient is to have Lovenox shots afterwards for 5 days.

## 2017-08-23 NOTE — TELEPHONE ENCOUNTER
Patient notified and agrees to stop warfarin 5 days prior to colonoscopy. Patient will stop taking medication on Sunday 08/27/17

## 2017-08-23 NOTE — TELEPHONE ENCOUNTER
Left voice message for Kashmir stating Dr Vega approved hold on coumadin 5 days prior to the colonoscopy.

## 2017-08-25 ENCOUNTER — APPOINTMENT (OUTPATIENT)
Dept: ADMISSIONS | Facility: MEDICAL CENTER | Age: 67
End: 2017-08-25
Attending: INTERNAL MEDICINE
Payer: MEDICARE

## 2017-08-25 RX ORDER — IBUPROFEN 200 MG
200 TABLET ORAL PRN
COMMUNITY
End: 2018-01-26

## 2017-09-01 ENCOUNTER — HOSPITAL ENCOUNTER (OUTPATIENT)
Facility: MEDICAL CENTER | Age: 67
End: 2017-09-01
Attending: INTERNAL MEDICINE | Admitting: INTERNAL MEDICINE
Payer: MEDICARE

## 2017-09-01 VITALS
HEIGHT: 64 IN | BODY MASS INDEX: 40.35 KG/M2 | SYSTOLIC BLOOD PRESSURE: 139 MMHG | RESPIRATION RATE: 14 BRPM | HEART RATE: 61 BPM | DIASTOLIC BLOOD PRESSURE: 88 MMHG | WEIGHT: 236.33 LBS | OXYGEN SATURATION: 91 % | TEMPERATURE: 97.2 F

## 2017-09-01 PROBLEM — K22.70 BARRETT'S ESOPHAGUS: Status: ACTIVE | Noted: 2017-09-01

## 2017-09-01 LAB
INR PPP: 1.14 (ref 0.87–1.13)
PROTHROMBIN TIME: 15 SEC (ref 12–14.6)

## 2017-09-01 PROCEDURE — 500066 HCHG BITE BLOCK, ECT: Performed by: INTERNAL MEDICINE

## 2017-09-01 PROCEDURE — 160203 HCHG ENDO MINUTES - 1ST 30 MINS LEVEL 4: Performed by: INTERNAL MEDICINE

## 2017-09-01 PROCEDURE — 160009 HCHG ANES TIME/MIN: Performed by: INTERNAL MEDICINE

## 2017-09-01 PROCEDURE — 160048 HCHG OR STATISTICAL LEVEL 1-5: Performed by: INTERNAL MEDICINE

## 2017-09-01 PROCEDURE — 700101 HCHG RX REV CODE 250

## 2017-09-01 PROCEDURE — 88305 TISSUE EXAM BY PATHOLOGIST: CPT | Mod: 59

## 2017-09-01 PROCEDURE — 160035 HCHG PACU - 1ST 60 MINS PHASE I: Performed by: INTERNAL MEDICINE

## 2017-09-01 PROCEDURE — 85610 PROTHROMBIN TIME: CPT

## 2017-09-01 PROCEDURE — 160002 HCHG RECOVERY MINUTES (STAT): Performed by: INTERNAL MEDICINE

## 2017-09-01 PROCEDURE — 700111 HCHG RX REV CODE 636 W/ 250 OVERRIDE (IP)

## 2017-09-01 PROCEDURE — 502240 HCHG MISC OR SUPPLY RC 0272: Performed by: INTERNAL MEDICINE

## 2017-09-01 RX ORDER — LIDOCAINE HYDROCHLORIDE 10 MG/ML
INJECTION, SOLUTION INFILTRATION; PERINEURAL
Status: COMPLETED
Start: 2017-09-01 | End: 2017-09-01

## 2017-09-01 RX ORDER — SODIUM CHLORIDE, SODIUM LACTATE, POTASSIUM CHLORIDE, CALCIUM CHLORIDE 600; 310; 30; 20 MG/100ML; MG/100ML; MG/100ML; MG/100ML
INJECTION, SOLUTION INTRAVENOUS CONTINUOUS
Status: DISCONTINUED | OUTPATIENT
Start: 2017-09-01 | End: 2017-09-01 | Stop reason: HOSPADM

## 2017-09-01 RX ADMIN — LIDOCAINE HYDROCHLORIDE 0.2 ML: 10 INJECTION, SOLUTION INFILTRATION; PERINEURAL at 11:30

## 2017-09-01 RX ADMIN — SODIUM CHLORIDE, SODIUM LACTATE, POTASSIUM CHLORIDE, CALCIUM CHLORIDE: 600; 310; 30; 20 INJECTION, SOLUTION INTRAVENOUS at 11:30

## 2017-09-01 ASSESSMENT — PAIN SCALES - GENERAL
PAINLEVEL_OUTOF10: 0

## 2017-09-01 NOTE — OR SURGEON
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    Anesthesiologist/Type of Anesthesia:  Anesthesiologist: Harsh Ewing M.D./General    Surgical Staff:  Circulator: Mignon Gaona R.N.  Endoscopy Technician: Dilan Cuba    Specimens:  Jar a 29 cm, jar b 27 cm, jar c 25 cm, jar d 23cm, jar e 21 cm    Estimated Blood Loss: 5-10cc  Findings:   Kumar's 21-29 cm, no nodules or ulcers noted  Hiatal herina 29-34 cm    Complications: none      9/1/2017 2:10 PM Stephanie Reyes

## 2017-09-01 NOTE — OR NURSING
1412 Received from OR, report from Dr. Ewing.      1419 Pt daughter brought to bedside.      1450 Dc instructions completed with Pt and her daughter.      1455 Dc to car via wheel chair.  Pt has all belongings.

## 2017-09-01 NOTE — PROCEDURES
DATE OF SERVICE:  09/01/2017    PROCEDURE PERFORMED:  This is a flexible esophagogastroduodenoscopy with   biopsies.    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    POSTPROCEDURE DIAGNOSES:  Kumar's esophagus, hiatal hernia.    Risks, benefits, alternatives explained to patient.  Patient gave consent to   proceed.  Monitored anesthesia care with Dr. Harsh Ewing.    DESCRIPTION OF PROCEDURE:  Patient was in the left lateral decubitus position.    Monitoring was in place.  The Olympus adult flexible endoscope was inserted   into the esophagus under direct visualization.  At 21 cm, the squamocolumnar   junction was appreciated.  The endoscope was advanced to 29 cm which is the   end of her proximal gastric folds giving an 8 cm long segment Kumar's   esophagus.  She had a hiatal hernia from 29-34 cm.  The endoscope was advanced   further into the stomach and air was insufflated.  There was minimal erythema   without erosions and without ulceration in the antrum.  Retroflexion was   performed and the hiatal hernia was appreciated.  Retroflexion was taken down   and the endoscope was advanced easily through the pylorus into the first and   second portions of the duodenum.  There were no mucosal abnormalities.  The   endoscope was then pulled back into the stomach.  Air was removed and was   pulled back to 29 cm.  Four quadrant biopsies were taken every 2 cm from 29-21   cm.  Air was removed and the endoscope was removed.  The patient tolerated   procedure well with no complications.       ____________________________________     MD AJAY MAURER / BEVERLY    DD:  09/01/2017 14:15:03  DT:  09/01/2017 14:22:58    D#:  [id number]  Job#:  [id number]

## 2017-09-01 NOTE — DISCHARGE INSTRUCTIONS
ACTIVITY: Rest and take it easy for the first 24 hours.  A responsible adult is recommended to remain with you during that time.  It is normal to feel sleepy.  We encourage you to not do anything that requires balance, judgment or coordination.    MILD FLU-LIKE SYMPTOMS ARE NORMAL. YOU MAY EXPERIENCE GENERALIZED MUSCLE ACHES, THROAT IRRITATION, HEADACHE AND/OR SOME NAUSEA.    FOR 24 HOURS DO NOT:  Drive, operate machinery or run household appliances.  Drink beer or alcoholic beverages.   Make important decisions or sign legal documents.    SPECIAL INSTRUCTIONS: May resume warfarin and all other medications this evening   We will contact her with pathology results in 7-10 days    DIET: To avoid nausea, slowly advance diet as tolerated, avoiding spicy or greasy foods for the first day.  Add more substantial food to your diet according to your physician's instructions.  Babies can be fed formula or breast milk as soon as they are hungry.  INCREASE FLUIDS AND FIBER TO AVOID CONSTIPATION.    SURGICAL DRESSING/BATHING: May shower tomorrow.      FOLLOW-UP APPOINTMENT:  A follow-up appointment should be arranged with your doctor,  call to schedule.    You should CALL YOUR PHYSICIAN if you develop:  Fever greater than 101 degrees F.  Pain not relieved by medication, or persistent nausea or vomiting.  Excessive bleeding (blood soaking through dressing) or unexpected drainage from the wound.  Extreme redness or swelling around the incision site, drainage of pus or foul smelling drainage.  Inability to urinate or empty your bladder within 8 hours.  Problems with breathing or chest pain.    You should call 911 if you develop problems with breathing or chest pain.  If you are unable to contact your doctor or surgical center, you should go to the nearest emergency room or urgent care center.  Physician's telephone #: Dr. Reyes [phone]    If any questions arise, call your doctor.  If your doctor is not available, please feel  free to call the Surgical Center at [phone].  The Center is open Monday through Friday from 7AM to 7PM.  You can also call the HEALTH HOTLINE open 24 hours/day, 7 days/week and speak to a nurse at [phone], or toll free at [phone].    A registered nurse may call you a few days after your surgery to see how you are doing after your procedure.    MEDICATIONS: Resume taking daily medication.  Take prescribed pain medication with food.  If no medication is prescribed, you may take non-aspirin pain medication if needed.  PAIN MEDICATION CAN BE VERY CONSTIPATING.  Take a stool softener or laxative such as senokot, pericolace, or milk of magnesia if needed.    Prescription given for NONE.  Last pain medication given at *NONE**.    If your physician has prescribed pain medication that includes Acetaminophen (Tylenol), do not take additional Acetaminophen (Tylenol) while taking the prescribed medication.    Depression / Suicide Risk    As you are discharged from this [facility] facility, it is important to learn how to keep safe from harming yourself.    Recognize the warning signs:  · Abrupt changes in personality, positive or negative- including increase in energy   · Giving away possessions  · Change in eating patterns- significant weight changes-  positive or negative  · Change in sleeping patterns- unable to sleep or sleeping all the time   · Unwillingness or inability to communicate  · Depression  · Unusual sadness, discouragement and loneliness  · Talk of wanting to die  · Neglect of personal appearance   · Rebelliousness- reckless behavior  · Withdrawal from people/activities they love  · Confusion- inability to concentrate     If you or a loved one observes any of these behaviors or has concerns about self-harm, here's what you can do:  · Talk about it- your feelings and reasons for harming yourself  · Remove any means that you might use to hurt yourself (examples: pills, rope, extension cords,  firearm)  · Get professional help from the community (Mental Health, Substance Abuse, psychological counseling)  · Do not be alone:Call your Safe Contact- someone whom you trust who will be there for you.  · Call your local CRISIS HOTLINE 092-9740 or 659-384-5450  · Call your local Children's Mobile Crisis Response Team [place] (855) 817-1504 or www.[employer]  · Call the toll free National Suicide Prevention Hotlines   · National Suicide Prevention Lifeline 084-511-TSWX (4130)  · National Hope Line Network 800-SUICIDE (842-1184)

## 2017-09-05 DIAGNOSIS — Z86.711 HISTORY OF PULMONARY EMBOLISM: ICD-10-CM

## 2017-09-05 RX ORDER — WARFARIN SODIUM 5 MG/1
TABLET ORAL
Qty: 30 TAB | Refills: 0 | Status: SHIPPED | OUTPATIENT
Start: 2017-09-05 | End: 2017-12-22

## 2017-09-15 ENCOUNTER — ANTICOAGULATION VISIT (OUTPATIENT)
Dept: VASCULAR LAB | Facility: MEDICAL CENTER | Age: 67
End: 2017-09-15
Attending: INTERNAL MEDICINE
Payer: MEDICARE

## 2017-09-15 VITALS — DIASTOLIC BLOOD PRESSURE: 83 MMHG | HEART RATE: 68 BPM | SYSTOLIC BLOOD PRESSURE: 132 MMHG

## 2017-09-15 DIAGNOSIS — I26.99 PULMONARY EMBOLISM, OTHER: ICD-10-CM

## 2017-09-15 LAB
INR BLD: 2.4 (ref 0.9–1.2)
INR PPP: 2.4 (ref 2–3.5)

## 2017-09-15 PROCEDURE — 85610 PROTHROMBIN TIME: CPT

## 2017-09-15 PROCEDURE — 99211 OFF/OP EST MAY X REQ PHY/QHP: CPT | Performed by: NURSE PRACTITIONER

## 2017-09-15 NOTE — PROGRESS NOTES
Anticoagulation Summary  As of 9/15/2017    INR goal:   2.0-3.0   TTR:   79.9 % (2.3 y)   Today's INR:   2.4   Maintenance plan:   5 mg (5 mg x 1) every day   Weekly total:   35 mg   Plan last modified:   Fabio Cabrera, PharmD (5/15/2015)   Next INR check:   10/27/2017   Target end date:   Indefinite    Indications    Pulmonary embolism (CMS-HCC) [I26.99]             Anticoagulation Episode Summary     INR check location:   [facility]    Preferred lab:       Send INR reminders to:       Comments:   indef per PCP       Anticoagulation Care Providers     Provider Role Specialty Phone number    Tom Vega D.O. Referring Internal Medicine [phone]    [facility] Responsible  [phone]        Anticoagulation Patient Findings      HPI:  Lawanda Wymanchings seen in clinic today for follow up on anticoagulation therapy in the presence of chronic anticoagulation. She was her warfarin 2 weeks ago for and esophagogastroduodenoscopy with biopsies. Denies any medication or diet changes. No current symptoms of bleeding or thrombosis reported.    A/P:   INR therapeutic. Continue current regimen. BP recorded in vitals.    Follow up appointment in 6 week(s) per pt's request.    Next Appointment: Thursday, October 26, 2017 at 10:00 am.    Deann ADAM

## 2017-10-13 ENCOUNTER — OFFICE VISIT (OUTPATIENT)
Dept: MEDICAL GROUP | Facility: CLINIC | Age: 67
End: 2017-10-13
Payer: MEDICARE

## 2017-10-13 VITALS
DIASTOLIC BLOOD PRESSURE: 80 MMHG | RESPIRATION RATE: 14 BRPM | TEMPERATURE: 97.5 F | HEART RATE: 79 BPM | SYSTOLIC BLOOD PRESSURE: 142 MMHG | BODY MASS INDEX: 40.63 KG/M2 | HEIGHT: 64 IN | OXYGEN SATURATION: 94 % | WEIGHT: 238 LBS

## 2017-10-13 DIAGNOSIS — Z23 NEED FOR DIPHTHERIA-TETANUS-PERTUSSIS (TDAP) VACCINE: ICD-10-CM

## 2017-10-13 DIAGNOSIS — E66.09 OBESITY DUE TO EXCESS CALORIES WITHOUT SERIOUS COMORBIDITY, UNSPECIFIED CLASSIFICATION: ICD-10-CM

## 2017-10-13 DIAGNOSIS — Z12.39 SCREENING FOR BREAST CANCER: ICD-10-CM

## 2017-10-13 DIAGNOSIS — Z23 NEED FOR IMMUNIZATION AGAINST INFLUENZA: ICD-10-CM

## 2017-10-13 PROCEDURE — G0008 ADMIN INFLUENZA VIRUS VAC: HCPCS | Performed by: INTERNAL MEDICINE

## 2017-10-13 PROCEDURE — 90715 TDAP VACCINE 7 YRS/> IM: CPT | Performed by: INTERNAL MEDICINE

## 2017-10-13 PROCEDURE — 99214 OFFICE O/P EST MOD 30 MIN: CPT | Mod: 25 | Performed by: INTERNAL MEDICINE

## 2017-10-13 PROCEDURE — 90662 IIV NO PRSV INCREASED AG IM: CPT | Performed by: INTERNAL MEDICINE

## 2017-10-13 PROCEDURE — 90472 IMMUNIZATION ADMIN EACH ADD: CPT | Performed by: INTERNAL MEDICINE

## 2017-10-13 ASSESSMENT — PAIN SCALES - GENERAL: PAINLEVEL: NO PAIN

## 2017-10-13 NOTE — PROGRESS NOTES
CC: Lawanda Pagan is a 67 y.o. female is suffering from   Chief Complaint   Patient presents with   • Follow-Up         SUBJECTIVE:  1. Need for immunization against influenza  Lawanda is here for follow-up is needed of immunization for influenza which was given in the office.    2. Screening for breast cancer  Patient's in need of a screening mammogram, orders written    3. Obesity due to excess calories without serious comorbidity, unspecified classification  Patient also suffers from obesity have discussed the importance for watching her diet and exercising with emphasis on diet    4. Need for diphtheria-tetanus-pertussis (Tdap) vaccine  Patient with need for repeat Vaccination which was given in the office        Past social, family, history: Single  Social History   Substance Use Topics   • Smoking status: Former Smoker     Packs/day: 0.20     Years: 1.00     Types: Cigarettes     Quit date: 8/25/1973   • Smokeless tobacco: Never Used   • Alcohol use No         MEDICATIONS:    Current Outpatient Prescriptions:   •  warfarin (COUMADIN) 5 MG Tab, Take 1-1.5 tabs po q day or as directed by clinic, Disp: 45 Tab, Rfl: 1  •  warfarin (COUMADIN) 5 MG Tab, TAKE ONE TABLET BY MOUTH ONCE DAILY AS DIRECTED BY [facility] CLINIC, Disp: 30 Tab, Rfl: 0  •  ibuprofen (MOTRIN) 200 MG Tab, Take 200 mg by mouth as needed., Disp: , Rfl:   •  B Complex Vitamins (VITAMIN B COMPLEX PO), Take  by mouth., Disp: , Rfl:   •  Cholecalciferol (VITAMIN D) 1000 UNIT Cap, Take 2 Caps by mouth every day., Disp: 60 Cap, Rfl: 6  •  omeprazole (PRILOSEC) 20 MG TBEC delayed-release tablet, Take 20 mg by mouth every day., Disp: , Rfl:     PROBLEMS:  Patient Active Problem List    Diagnosis Date Noted   • Kumar's esophagus 09/01/2017   • Vitamin D deficiency 11/12/2015   • Iron deficiency anemia due to chronic blood loss 08/10/2015   • Kumar esophagus 08/10/2015   • MEDICAL HOME 07/21/2014   • Pulmonary embolism (CMS-HCC) 07/14/2014  "      REVIEW OF SYSTEMS:  Gen.:  No Nausea, Vomiting, fever, Chills.  Heart: No chest pain.  Lungs:  No shortness of Breath.  Psychological: Figueroa unusual Anxiety depression     PHYSICAL EXAM   Constitutional: Alert, cooperative, not in acute distress.  Cardiovascular:  Rate Rhythm is regular without murmurs rubs clicks.     Thorax & Lungs: Clear to auscultation, no wheezing, rhonchi, or rales  HENT: Normocephalic, Atraumatic.  Eyes: PERRLA, EOMI, Conjunctiva normal.   Neck: Trachia is midline no swelling of the thyroid.   Lymphatic: No lymphadenopathy noted.   Neurologic: Alert & oriented x 3, cranial nerves II through XII are intact, Normal motor function, Normal sensory function, No focal deficits noted.   Psychiatric: Affect normal, Judgment normal, Mood normal.     VITAL SIGNS:/80   Pulse 79   Temp 36.4 °C (97.5 °F)   Resp 14   Ht 1.626 m (5' 4\")   Wt 108 kg (238 lb)   SpO2 94%   Breastfeeding? No   BMI 40.85 kg/m²     Labs: Reviewed    Assessment:                                                     Plan:    1. Need for immunization against influenza  Vaccination given  - INFLUENZA VACCINE, HIGH DOSE (65+ ONLY)    2. Screening for breast cancer  Orders written for screening mammogram  - MA-SCREEN MAMMO W/CAD-BILAT    3. Obesity due to excess calories without serious comorbidity, unspecified classification  Patient will work on diet and exercise will check her thyroid recheck comprehensive metabolic panel  - Patient identified as having weight management issue.  Appropriate orders and counseling given.  - TSH; Future  - COMP METABOLIC PANEL; Future  - CBC WITH DIFFERENTIAL; Future  - LIPID PROFILE; Future  - FREE THYROXINE; Future    4. Need for diphtheria-tetanus-pertussis (Tdap) vaccine  Vaccination given  - Tdap =>6yo IM        "

## 2017-10-26 ENCOUNTER — ANTICOAGULATION VISIT (OUTPATIENT)
Dept: VASCULAR LAB | Facility: MEDICAL CENTER | Age: 67
End: 2017-10-26
Attending: INTERNAL MEDICINE
Payer: MEDICARE

## 2017-10-26 VITALS — DIASTOLIC BLOOD PRESSURE: 97 MMHG | HEART RATE: 66 BPM | SYSTOLIC BLOOD PRESSURE: 145 MMHG

## 2017-10-26 DIAGNOSIS — I26.99 OTHER PULMONARY EMBOLISM WITHOUT ACUTE COR PULMONALE, UNSPECIFIED CHRONICITY (HCC): ICD-10-CM

## 2017-10-26 LAB — INR PPP: 2.4 (ref 2–3.5)

## 2017-10-26 PROCEDURE — 85610 PROTHROMBIN TIME: CPT

## 2017-10-26 PROCEDURE — 99211 OFF/OP EST MAY X REQ PHY/QHP: CPT | Performed by: NURSE PRACTITIONER

## 2017-10-26 NOTE — PROGRESS NOTES
Anticoagulation Summary  As of 10/26/2017    INR goal:   2.0-3.0   TTR:   80.9 % (2.4 y)   Today's INR:   2.4   Maintenance plan:   5 mg (5 mg x 1) every day   Weekly total:   35 mg   Plan last modified:   Fabio Cabrera, PharmD (5/15/2015)   Next INR check:   12/14/2017   Target end date:   Indefinite    Indications    Pulmonary embolism (CMS-HCC) [I26.99]             Anticoagulation Episode Summary     [facility]R check location:   [facility]    Preferred lab:       Send INR reminders to:       Comments:   indef per PCP       Anticoagulation Care Providers     Provider Role Specialty Phone number    Tom Vega D.O. Referring Internal Medicine [phone]    [facility] Responsible  [phone]        Anticoagulation Patient Findings      HPI:  Lawanda Pagan seen in clinic today for follow up on anticoagulation therapy in the presence of PE hx. Denies any changes to current medical/health status since last appointment. Denies any medication or diet changes. No current symptoms of bleeding or thrombosis reported.    A/P:   INR therapeutic. Continue current regimen. BP recorded in vitals.    Follow up appointment in 7 week(s).    Next Appointment: Thursday, December 14, 2017 at 10:00 am.      Deann ADAM

## 2017-10-31 LAB — INR BLD: 2.4 (ref 0.9–1.2)

## 2017-11-22 ENCOUNTER — HOSPITAL ENCOUNTER (OUTPATIENT)
Dept: LAB | Facility: MEDICAL CENTER | Age: 67
End: 2017-11-22
Attending: INTERNAL MEDICINE
Payer: MEDICARE

## 2017-11-22 DIAGNOSIS — E66.09 OBESITY DUE TO EXCESS CALORIES WITHOUT SERIOUS COMORBIDITY, UNSPECIFIED CLASSIFICATION: ICD-10-CM

## 2017-11-22 LAB
ALBUMIN SERPL BCP-MCNC: 4.1 G/DL (ref 3.2–4.9)
ALBUMIN/GLOB SERPL: 1.2 G/DL
ALP SERPL-CCNC: 124 U/L (ref 30–99)
ALT SERPL-CCNC: 51 U/L (ref 2–50)
ANION GAP SERPL CALC-SCNC: 8 MMOL/L (ref 0–11.9)
AST SERPL-CCNC: 54 U/L (ref 12–45)
BASOPHILS # BLD AUTO: 0.8 % (ref 0–1.8)
BASOPHILS # BLD: 0.06 K/UL (ref 0–0.12)
BILIRUB SERPL-MCNC: 0.6 MG/DL (ref 0.1–1.5)
BUN SERPL-MCNC: 16 MG/DL (ref 8–22)
CALCIUM SERPL-MCNC: 9.6 MG/DL (ref 8.5–10.5)
CHLORIDE SERPL-SCNC: 106 MMOL/L (ref 96–112)
CHOLEST SERPL-MCNC: 176 MG/DL (ref 100–199)
CO2 SERPL-SCNC: 23 MMOL/L (ref 20–33)
CREAT SERPL-MCNC: 0.71 MG/DL (ref 0.5–1.4)
EOSINOPHIL # BLD AUTO: 0.05 K/UL (ref 0–0.51)
EOSINOPHIL NFR BLD: 0.7 % (ref 0–6.9)
ERYTHROCYTE [DISTWIDTH] IN BLOOD BY AUTOMATED COUNT: 46.1 FL (ref 35.9–50)
GFR SERPL CREATININE-BSD FRML MDRD: >60 ML/MIN/1.73 M 2
GLOBULIN SER CALC-MCNC: 3.4 G/DL (ref 1.9–3.5)
GLUCOSE SERPL-MCNC: 88 MG/DL (ref 65–99)
HCT VFR BLD AUTO: 48.6 % (ref 37–47)
HDLC SERPL-MCNC: 36 MG/DL
HGB BLD-MCNC: 16.3 G/DL (ref 12–16)
IMM GRANULOCYTES # BLD AUTO: 0.03 K/UL (ref 0–0.11)
IMM GRANULOCYTES NFR BLD AUTO: 0.4 % (ref 0–0.9)
LDLC SERPL CALC-MCNC: 118 MG/DL
LYMPHOCYTES # BLD AUTO: 2.44 K/UL (ref 1–4.8)
LYMPHOCYTES NFR BLD: 34.5 % (ref 22–41)
MCH RBC QN AUTO: 30.7 PG (ref 27–33)
MCHC RBC AUTO-ENTMCNC: 33.5 G/DL (ref 33.6–35)
MCV RBC AUTO: 91.5 FL (ref 81.4–97.8)
MONOCYTES # BLD AUTO: 0.45 K/UL (ref 0–0.85)
MONOCYTES NFR BLD AUTO: 6.4 % (ref 0–13.4)
NEUTROPHILS # BLD AUTO: 4.04 K/UL (ref 2–7.15)
NEUTROPHILS NFR BLD: 57.2 % (ref 44–72)
NRBC # BLD AUTO: 0 K/UL
NRBC BLD AUTO-RTO: 0 /100 WBC
PLATELET # BLD AUTO: 209 K/UL (ref 164–446)
PMV BLD AUTO: 10.2 FL (ref 9–12.9)
POTASSIUM SERPL-SCNC: 4.1 MMOL/L (ref 3.6–5.5)
PROT SERPL-MCNC: 7.5 G/DL (ref 6–8.2)
RBC # BLD AUTO: 5.31 M/UL (ref 4.2–5.4)
SODIUM SERPL-SCNC: 137 MMOL/L (ref 135–145)
TRIGL SERPL-MCNC: 112 MG/DL (ref 0–149)
WBC # BLD AUTO: 7.1 K/UL (ref 4.8–10.8)

## 2017-11-22 PROCEDURE — 80061 LIPID PANEL: CPT

## 2017-11-22 PROCEDURE — 84439 ASSAY OF FREE THYROXINE: CPT | Mod: GZ

## 2017-11-22 PROCEDURE — 84443 ASSAY THYROID STIM HORMONE: CPT | Mod: GZ

## 2017-11-22 PROCEDURE — 80053 COMPREHEN METABOLIC PANEL: CPT

## 2017-11-22 PROCEDURE — 85025 COMPLETE CBC W/AUTO DIFF WBC: CPT

## 2017-11-22 PROCEDURE — 36415 COLL VENOUS BLD VENIPUNCTURE: CPT | Mod: GZ

## 2017-11-23 LAB
T4 FREE SERPL-MCNC: 0.78 NG/DL (ref 0.53–1.43)
TSH SERPL DL<=0.005 MIU/L-ACNC: 0.87 UIU/ML (ref 0.3–3.7)

## 2017-12-08 ENCOUNTER — HOSPITAL ENCOUNTER (OUTPATIENT)
Dept: RADIOLOGY | Facility: MEDICAL CENTER | Age: 67
End: 2017-12-08
Attending: INTERNAL MEDICINE
Payer: MEDICARE

## 2017-12-08 PROCEDURE — G0202 SCR MAMMO BI INCL CAD: HCPCS

## 2017-12-15 ENCOUNTER — ANTICOAGULATION VISIT (OUTPATIENT)
Dept: VASCULAR LAB | Facility: MEDICAL CENTER | Age: 67
End: 2017-12-15
Attending: INTERNAL MEDICINE
Payer: MEDICARE

## 2017-12-15 VITALS
SYSTOLIC BLOOD PRESSURE: 143 MMHG | WEIGHT: 238.1 LBS | HEIGHT: 64 IN | DIASTOLIC BLOOD PRESSURE: 60 MMHG | HEART RATE: 69 BPM | BODY MASS INDEX: 40.65 KG/M2

## 2017-12-15 DIAGNOSIS — I26.99 OTHER PULMONARY EMBOLISM WITHOUT ACUTE COR PULMONALE, UNSPECIFIED CHRONICITY (HCC): ICD-10-CM

## 2017-12-15 LAB — INR PPP: 2.4 (ref 2–3.5)

## 2017-12-15 PROCEDURE — 85610 PROTHROMBIN TIME: CPT

## 2017-12-15 PROCEDURE — 99211 OFF/OP EST MAY X REQ PHY/QHP: CPT

## 2017-12-15 NOTE — PROGRESS NOTES
Anticoagulation Summary  As of 12/15/2017    INR goal:   2.0-3.0   TTR:   81.9 % (2.6 y)   Today's INR:   2.4   Maintenance plan:   5 mg (5 mg x 1) every day   Weekly total:   35 mg   Plan last modified:   Fabio Cabrera, PharmD (5/15/2015)   Next INR check:   2/9/2018   Target end date:   Indefinite    Indications    Pulmonary embolism (CMS-HCC) [I26.99]             Anticoagulation Episode Summary     INR check location:   [facility]    Preferred lab:       Send INR reminders to:       Comments:   indef per PCP       Anticoagulation Care Providers     Provider Role Specialty Phone number    Tom Vega D.O. Referring Internal Medicine [phone]    [facility] Responsible  [phone]        Anticoagulation Patient Findings      HPI:  Lawanda Pagan seen in clinic today for follow up on anticoagulation therapy in the presence of PE. Denies any changes to current medical/health status since last appointment. Denies any medication or diet changes. No current symptoms of bleeding or thrombosis reported.    A/P:   INR is therapeutic at 2.4. Continue current regimen. BP recorded in vitals.    Follow up appointment in 8 week(s).    Next Appointment: Friday , 02/08/2017 at 100AM     Kirsten Thompson.D

## 2017-12-20 LAB — INR BLD: 2.4 (ref 0.9–1.2)

## 2017-12-22 DIAGNOSIS — Z86.711 HISTORY OF PULMONARY EMBOLISM: ICD-10-CM

## 2017-12-22 RX ORDER — WARFARIN SODIUM 5 MG/1
TABLET ORAL
Qty: 90 TAB | Refills: 1 | Status: SHIPPED | OUTPATIENT
Start: 2017-12-22 | End: 2018-07-13 | Stop reason: SDUPTHER

## 2018-01-19 DIAGNOSIS — Z79.01 CHRONIC ANTICOAGULATION: ICD-10-CM

## 2018-01-26 ENCOUNTER — OFFICE VISIT (OUTPATIENT)
Dept: MEDICAL GROUP | Facility: CLINIC | Age: 68
End: 2018-01-26
Payer: MEDICARE

## 2018-01-26 VITALS
TEMPERATURE: 98.3 F | RESPIRATION RATE: 16 BRPM | WEIGHT: 238.9 LBS | HEIGHT: 64 IN | DIASTOLIC BLOOD PRESSURE: 90 MMHG | BODY MASS INDEX: 40.78 KG/M2 | HEART RATE: 75 BPM | SYSTOLIC BLOOD PRESSURE: 130 MMHG | OXYGEN SATURATION: 98 %

## 2018-01-26 DIAGNOSIS — M16.10 ARTHRITIS, HIP: ICD-10-CM

## 2018-01-26 DIAGNOSIS — R79.89 ELEVATED LFTS: ICD-10-CM

## 2018-01-26 PROCEDURE — 99213 OFFICE O/P EST LOW 20 MIN: CPT | Performed by: INTERNAL MEDICINE

## 2018-01-26 RX ORDER — CELECOXIB 200 MG/1
200 CAPSULE ORAL 2 TIMES DAILY
Qty: 60 CAP | Refills: 3 | Status: SHIPPED | OUTPATIENT
Start: 2018-01-26 | End: 2023-10-26

## 2018-01-26 ASSESSMENT — PAIN SCALES - GENERAL: PAINLEVEL: NO PAIN

## 2018-01-26 NOTE — PROGRESS NOTES
CC: Lawanda Pagan is a 67 y.o. female is suffering from   Chief Complaint   Patient presents with   • Results     lab results         SUBJECTIVE:  1. Elevated LFTs  Lawanda is here for follow-up, continues to have problems with elevated liver function tests. Denies any history of alcoholism or alcohol usage, denies any exposure to hepatitis.     2. Arthritis, hip  Patient with arthritis associated with her hips, I recommend she start Celebrex        Past social, family, history: Single  Social History   Substance Use Topics   • Smoking status: Former Smoker     Packs/day: 0.20     Years: 1.00     Types: Cigarettes     Quit date: 8/25/1973   • Smokeless tobacco: Never Used   • Alcohol use No         MEDICATIONS:    Current Outpatient Prescriptions:   •  celecoxib (CELEBREX) 200 MG Cap, Take 1 Cap by mouth 2 times a day., Disp: 60 Cap, Rfl: 3  •  warfarin (COUMADIN) 5 MG Tab, Take one tablet daily as directed by [facility], Disp: 90 Tab, Rfl: 1  •  B Complex Vitamins (VITAMIN B COMPLEX PO), Take  by mouth., Disp: , Rfl:   •  Cholecalciferol (VITAMIN D) 1000 UNIT Cap, Take 2 Caps by mouth every day., Disp: 60 Cap, Rfl: 6  •  omeprazole (PRILOSEC) 20 MG TBEC delayed-release tablet, Take 20 mg by mouth every day., Disp: , Rfl:     PROBLEMS:  Patient Active Problem List    Diagnosis Date Noted   • Elevated LFTs 01/26/2018   • Kumar's esophagus 09/01/2017   • Vitamin D deficiency 11/12/2015   • Iron deficiency anemia due to chronic blood loss 08/10/2015   • Kumar esophagus 08/10/2015   • MEDICAL HOME 07/21/2014   • Pulmonary embolism (CMS-HCC) 07/14/2014       REVIEW OF SYSTEMS:  Gen.:  No Nausea, Vomiting, fever, Chills.  Heart: No chest pain.  Lungs:  No shortness of Breath.  Psychological: Figueroa unusual Anxiety depression     PHYSICAL EXAM   Constitutional: Alert, cooperative, not in acute distress.  Cardiovascular:  Rate Rhythm is regular without murmurs rubs clicks.     Thorax & Lungs:  "Clear to auscultation, no wheezing, rhonchi, or rales  HENT: Normocephalic, Atraumatic.  Eyes: PERRLA, EOMI, Conjunctiva normal.   Neck: Trachia is midline no swelling of the thyroid.   Lymphatic: No lymphadenopathy noted.   Neurologic: Alert & oriented x 3, cranial nerves II through XII are intact, Normal motor function, Normal sensory function, No focal deficits noted.   Psychiatric: Affect normal, Judgment normal, Mood normal.     VITAL SIGNS:/90   Pulse 75   Temp 36.8 °C (98.3 °F)   Resp 16   Ht 1.626 m (5' 4\")   Wt 108.4 kg (238 lb 14.4 oz)   SpO2 98%   BMI 41.01 kg/m²     Labs: Reviewed    Assessment:                                                     Plan:    1. Elevated LFTs  Elevated liver function tests I have asked the patient to complete a hepatitis panel today recheck liver function tests in 2 months  - HEPATITIS PANEL ACUTE(4 COMPONENTS); Future  - COMP METABOLIC PANEL; Future    2. Arthritis, hip  Arthritis hip of I have asked the patient start Celebrex twice a day to see if this is effective in treating her pain  - celecoxib (CELEBREX) 200 MG Cap; Take 1 Cap by mouth 2 times a day.  Dispense: 60 Cap; Refill: 3        "

## 2018-02-09 ENCOUNTER — ANTICOAGULATION VISIT (OUTPATIENT)
Dept: VASCULAR LAB | Facility: MEDICAL CENTER | Age: 68
End: 2018-02-09
Attending: INTERNAL MEDICINE
Payer: MEDICARE

## 2018-02-09 VITALS
DIASTOLIC BLOOD PRESSURE: 55 MMHG | SYSTOLIC BLOOD PRESSURE: 149 MMHG | WEIGHT: 238.98 LBS | BODY MASS INDEX: 40.8 KG/M2 | HEIGHT: 64 IN | HEART RATE: 65 BPM

## 2018-02-09 DIAGNOSIS — Z79.01 CHRONIC ANTICOAGULATION: ICD-10-CM

## 2018-02-09 LAB
INR BLD: 3.4 (ref 0.9–1.2)
INR PPP: 3.4 (ref 2–3.5)

## 2018-02-09 PROCEDURE — 99212 OFFICE O/P EST SF 10 MIN: CPT

## 2018-02-09 PROCEDURE — 85610 PROTHROMBIN TIME: CPT

## 2018-02-09 NOTE — PROGRESS NOTES
Anticoagulation Summary  As of 2/9/2018    INR goal:   2.0-3.0   TTR:   80.6 % (2.7 y)   Today's INR:   3.4!   Maintenance plan:   5 mg (5 mg x 1) every day   Weekly total:   35 mg   Plan last modified:   Fabio Cabrera, PharmD (5/15/2015)   Next INR check:   4/6/2018   Target end date:   Indefinite    Indications    Pulmonary embolism (CMS-HCC) [I26.99]             Anticoagulation Episode Summary     [facility]R check location:   [facility]    Preferred lab:       Send INR reminders to:       Comments:   indef per PCP       Anticoagulation Care Providers     Provider Role Specialty Phone number    Tom Vega D.O. Referring Internal Medicine [phone]    [facility] Responsible  [phone]        Anticoagulation Patient Findings      HPI:  Lawanda Pagan seen in clinic today for follow up on anticoagulation therapy in the presence of PE. Denies any changes to current medical/health status since last appointment. Patient was recently prescribed celebrex for arthritis, she only takes this as needed  . No current symptoms of bleeding or thrombosis reported.    A/P:   INR is therapeutic at 3.4. Continue current regimen. BP recorded in vitals.    Follow up appointment in 8 week(s). Per patient preference    Next Appointment: Friday , 04/06/2018 at 10am     Kirsten Thompson.D

## 2018-03-06 ENCOUNTER — HOSPITAL ENCOUNTER (OUTPATIENT)
Dept: LAB | Facility: MEDICAL CENTER | Age: 68
End: 2018-03-06
Attending: INTERNAL MEDICINE
Payer: MEDICARE

## 2018-03-06 DIAGNOSIS — R79.89 ELEVATED LFTS: ICD-10-CM

## 2018-03-06 LAB
HAV IGM SERPL QL IA: NEGATIVE
HBV CORE IGM SER QL: NEGATIVE
HBV SURFACE AG SER QL: NEGATIVE
HCV AB SER QL: NEGATIVE

## 2018-03-06 PROCEDURE — 80074 ACUTE HEPATITIS PANEL: CPT | Mod: GA

## 2018-03-06 PROCEDURE — 36415 COLL VENOUS BLD VENIPUNCTURE: CPT | Mod: GA

## 2018-04-06 ENCOUNTER — ANTICOAGULATION VISIT (OUTPATIENT)
Dept: VASCULAR LAB | Facility: MEDICAL CENTER | Age: 68
End: 2018-04-06
Attending: INTERNAL MEDICINE
Payer: MEDICARE

## 2018-04-06 VITALS — HEART RATE: 78 BPM | DIASTOLIC BLOOD PRESSURE: 81 MMHG | HEIGHT: 64 IN | SYSTOLIC BLOOD PRESSURE: 130 MMHG

## 2018-04-06 DIAGNOSIS — Z86.711 HISTORY OF PULMONARY EMBOLUS (PE): ICD-10-CM

## 2018-04-06 LAB
INR BLD: 3.1 (ref 0.9–1.2)
INR PPP: 3.1 (ref 2–3.5)

## 2018-04-06 PROCEDURE — 99212 OFFICE O/P EST SF 10 MIN: CPT | Performed by: PHARMACIST

## 2018-04-06 PROCEDURE — 85610 PROTHROMBIN TIME: CPT

## 2018-04-06 NOTE — PROGRESS NOTES
Anticoagulation Summary  As of 4/6/2018    INR goal:   2.0-3.0   TTR:   76.3 % (2.9 y)   Today's INR:   3.1!   Maintenance plan:   2.5 mg (5 mg x 0.5) on Fri; 5 mg (5 mg x 1) all other days   Weekly total:   32.5 mg   Plan last modified:   Kari Bergeron, Kitty (4/6/2018)   Next INR check:   5/4/2018   Target end date:   Indefinite    Indications    Pulmonary embolism (CMS-HCC) [I26.99]             Anticoagulation Episode Summary     INR check location:   [facility]    Preferred lab:       Send INR reminders to:       Comments:   indef per PCP       Anticoagulation Care Providers     Provider Role Specialty Phone number    Tom Vega D.O. Referring Internal Medicine [phone]    [facility] Responsible  [phone]        Anticoagulation Patient Findings      HPI:  Lawanda Elizatiffanie Pagan seen in clinic today, on anticoagulation therapy with warfarin for PE  Changes to current medical/health status since last appt: only thing different except Celebrex  Denies signs/symptoms of bleeding and/or thrombosis since the last appt.    Denies any interval changes to diet  Denies any interval changes to medications since last appt.   Denies any complications or cost restrictions with current therapy.   BP recorded in vitals.      A/P   INR  is supra-therapeutic.   Will have pt start a decreased weekly warfarin dose.     Follow up appointment in 4 week(s).    Kari Bergeron, Kitty

## 2018-05-04 ENCOUNTER — ANTICOAGULATION VISIT (OUTPATIENT)
Dept: VASCULAR LAB | Facility: MEDICAL CENTER | Age: 68
End: 2018-05-04
Attending: INTERNAL MEDICINE
Payer: MEDICARE

## 2018-05-04 VITALS — HEIGHT: 64 IN | DIASTOLIC BLOOD PRESSURE: 79 MMHG | SYSTOLIC BLOOD PRESSURE: 124 MMHG | HEART RATE: 87 BPM

## 2018-05-04 DIAGNOSIS — Z86.711 HISTORY OF PULMONARY EMBOLUS (PE): ICD-10-CM

## 2018-05-04 LAB
INR BLD: 2.2 (ref 0.9–1.2)
INR PPP: 2.2 (ref 2–3.5)

## 2018-05-04 PROCEDURE — 85610 PROTHROMBIN TIME: CPT

## 2018-05-04 PROCEDURE — 99211 OFF/OP EST MAY X REQ PHY/QHP: CPT | Performed by: PHARMACIST

## 2018-05-04 NOTE — PROGRESS NOTES
Anticoagulation Summary  As of 5/4/2018    INR goal:   2.0-3.0   TTR:   76.7 % (2.9 y)   Today's INR:   2.2   Warfarin maintenance plan:   2.5 mg (5 mg x 0.5) on Fri; 5 mg (5 mg x 1) all other days   Weekly warfarin total:   32.5 mg   Plan last modified:   Kari Bergeron PharmD (4/6/2018)   Next INR check:   6/15/2018   Target end date:   Indefinite    Indications    Pulmonary embolism (HCC) [I26.99]             Anticoagulation Episode Summary     INR check location:   [facility]    Preferred lab:       Send INR reminders to:       Comments:   indef per PCP       Anticoagulation Care Providers     Provider Role Specialty Phone number    Tom Vega D.O. Referring Internal Medicine [phone]    [facility] Responsible  [phone]        Anticoagulation Patient Findings      HPI:  Lawanda Pagan seen in clinic today, on anticoagulation therapy with warfarin for Hx PE  Changes to current medical/health status since last appt: denies  Denies signs/symptoms of bleeding and/or thrombosis since the last appt.    Denies any interval changes to diet  Denies any interval changes to medications since last appt.   Denies any complications or cost restrictions with current therapy.   BP recorded in vitals.      A/P   INR  is-therapeutic.   Will continue with the same warfarin dosing.     Follow up appointment in 6 week(s).    Kari Bergeron, Kitty

## 2018-06-29 ENCOUNTER — ANTICOAGULATION VISIT (OUTPATIENT)
Dept: VASCULAR LAB | Facility: MEDICAL CENTER | Age: 68
End: 2018-06-29
Attending: INTERNAL MEDICINE
Payer: MEDICARE

## 2018-06-29 DIAGNOSIS — Z86.711 HISTORY OF PULMONARY EMBOLUS (PE): ICD-10-CM

## 2018-06-29 LAB
INR BLD: 3.6 (ref 0.9–1.2)
INR PPP: 3.6 (ref 2–3.5)

## 2018-06-29 PROCEDURE — 85610 PROTHROMBIN TIME: CPT

## 2018-06-29 PROCEDURE — 99212 OFFICE O/P EST SF 10 MIN: CPT

## 2018-06-29 NOTE — PROGRESS NOTES
Anticoagulation Summary  As of 6/29/2018    INR goal:   2.0-3.0   TTR:   75.7 % (3.1 y)   Today's INR:   3.6!   Warfarin maintenance plan:   2.5 mg (5 mg x 0.5) on Fri; 5 mg (5 mg x 1) all other days   Weekly warfarin total:   32.5 mg   Plan last modified:   Kirsten DeleonD (4/6/2018)   Next INR check:   7/13/2018   Target end date:   Indefinite    Indications    Pulmonary embolism (HCC) [I26.99]             Anticoagulation Episode Summary     INR check location:   [facility]    Preferred lab:       Send INR reminders to:       Comments:   indef per PCP       Anticoagulation Care Providers     Provider Role Specialty Phone number    Tom Vega D.O. Referring Internal Medicine [phone]    [facility] Responsible  [phone]        Anticoagulation Patient Findings      HPI:  Lawanda Pagan seen in clinic today for follow up on anticoagulation therapy in the presence of PE. Denies any changes to current medical/health status since last appointment. Denies any medication or diet changes. No current symptoms of bleeding or thrombosis reported.    A/P:   INR is supra-therapeutic at 3.6, patient instructed to HOLD warfarin tonight, then continue on current regimen.     Follow up appointment in 2 week(s).    Next Appointment: Friday , 07/13/2018  At 11.45AM     Kirsten Thompson.D

## 2018-07-13 ENCOUNTER — ANTICOAGULATION VISIT (OUTPATIENT)
Dept: VASCULAR LAB | Facility: MEDICAL CENTER | Age: 68
End: 2018-07-13
Attending: INTERNAL MEDICINE
Payer: MEDICARE

## 2018-07-13 VITALS — HEIGHT: 64 IN | SYSTOLIC BLOOD PRESSURE: 124 MMHG | HEART RATE: 69 BPM | DIASTOLIC BLOOD PRESSURE: 81 MMHG

## 2018-07-13 DIAGNOSIS — Z86.711 HISTORY OF PULMONARY EMBOLISM: ICD-10-CM

## 2018-07-13 LAB
INR BLD: 1.9 (ref 0.9–1.2)
INR PPP: 1.9 (ref 2–3.5)

## 2018-07-13 PROCEDURE — 85610 PROTHROMBIN TIME: CPT

## 2018-07-13 PROCEDURE — 99212 OFFICE O/P EST SF 10 MIN: CPT | Performed by: PHARMACIST

## 2018-07-13 RX ORDER — WARFARIN SODIUM 5 MG/1
TABLET ORAL
Qty: 90 TAB | Refills: 3 | Status: SHIPPED | OUTPATIENT
Start: 2018-07-13 | End: 2019-08-07 | Stop reason: SDUPTHER

## 2018-07-13 NOTE — PROGRESS NOTES
Anticoagulation Summary  As of 7/13/2018    INR goal:   2.0-3.0   TTR:   75.5 % (3.1 y)   Today's INR:   1.9!   Warfarin maintenance plan:   2.5 mg (5 mg x 0.5) on Fri; 5 mg (5 mg x 1) all other days   Weekly warfarin total:   32.5 mg   Plan last modified:   Kari Bergeron, Kitty (4/6/2018)   Next INR check:      Target end date:   Indefinite    Indications    Pulmonary embolism (HCC) [I26.99]             Anticoagulation Episode Summary     [facility]R check location:   [facility]    Preferred lab:       Send INR reminders to:       Comments:   indef per PCP       Anticoagulation Care Providers     Provider Role Specialty Phone number    Tom Vega D.O. Referring Internal Medicine [phone]    [facility] Responsible  [phone]        Anticoagulation Patient Findings      HPI:  Lawandanikia Kay Latasha seen in clinic today, on anticoagulation therapy with warfarin for Hx PE  Changes to current medical/health status since last appt: denies  Denies signs/symptoms of bleeding and/or thrombosis since the last appt.    Denies any interval changes to diet  Denies any interval changes to medications since last appt.   Denies any complications or cost restrictions with current therapy.   BP recorded in vitals.      A/P   INR  is slightly SUB-therapeutic.   Will have pt resume back to 5mg daily    Follow up appointment in 3 week(s).    Kari Bergeron, Kitty

## 2018-08-03 ENCOUNTER — ANTICOAGULATION VISIT (OUTPATIENT)
Dept: VASCULAR LAB | Facility: MEDICAL CENTER | Age: 68
End: 2018-08-03
Attending: INTERNAL MEDICINE
Payer: MEDICARE

## 2018-08-03 DIAGNOSIS — Z86.711 HISTORY OF PULMONARY EMBOLISM: ICD-10-CM

## 2018-08-03 LAB
INR BLD: 4.7 (ref 0.9–1.2)
INR PPP: 4.7 (ref 2–3.5)

## 2018-08-03 PROCEDURE — 85610 PROTHROMBIN TIME: CPT

## 2018-08-03 PROCEDURE — 99212 OFFICE O/P EST SF 10 MIN: CPT

## 2018-08-03 NOTE — PROGRESS NOTES
Anticoagulation Summary  As of 8/3/2018    INR goal:   2.0-3.0   TTR:   74.8 % (3.2 y)   Today's INR:   4.7!   Warfarin maintenance plan:   2.5 mg (5 mg x 0.5) on Sat; 5 mg (5 mg x 1) all other days   Weekly warfarin total:   32.5 mg   Plan last modified:   Fabio Cabrera, Kitty (8/3/2018)   Next INR check:   8/10/2018   Target end date:   Indefinite    Indications    Pulmonary embolism (HCC) [I26.99]             Anticoagulation Episode Summary     INR check location:   [facility]    Preferred lab:       Send INR reminders to:       Comments:   indef per PCP       Anticoagulation Care Providers     Provider Role Specialty Phone number    Tom Vega D.O. Referring Internal Medicine [phone]    [facility] Responsible  [phone]        Anticoagulation Patient Findings    INR  supra-therapeutic.   Denies signs/symptoms of bleeding and/or thrombosis.   Started Celebrex   Follow up appointment in 1 week(s).  Declined vitals today     Hold today then decrease weekly warfarin dose as noted      Fabio Cabrera, PharmD

## 2018-08-10 ENCOUNTER — ANTICOAGULATION VISIT (OUTPATIENT)
Dept: VASCULAR LAB | Facility: MEDICAL CENTER | Age: 68
End: 2018-08-10
Attending: INTERNAL MEDICINE
Payer: MEDICARE

## 2018-08-10 VITALS — HEART RATE: 70 BPM | SYSTOLIC BLOOD PRESSURE: 138 MMHG | DIASTOLIC BLOOD PRESSURE: 85 MMHG

## 2018-08-10 DIAGNOSIS — Z86.711 HISTORY OF PULMONARY EMBOLISM: ICD-10-CM

## 2018-08-10 LAB — INR PPP: 2.9 (ref 2–3.5)

## 2018-08-10 PROCEDURE — 99211 OFF/OP EST MAY X REQ PHY/QHP: CPT | Performed by: PHARMACIST

## 2018-08-10 PROCEDURE — 85610 PROTHROMBIN TIME: CPT

## 2018-08-10 NOTE — PROGRESS NOTES
Anticoagulation Summary  As of 8/10/2018    INR goal:   2.0-3.0   TTR:   74.4 % (3.2 y)   Today's INR:   2.9   Warfarin maintenance plan:   2.5 mg (5 mg x 0.5) on Fri; 5 mg (5 mg x 1) all other days   Weekly warfarin total:   32.5 mg   Plan last modified:   Kari Bergeron PharmD (8/10/2018)   Next INR check:   8/24/2018   Target end date:   Indefinite    Indications    Pulmonary embolism (HCC) [I26.99]             Anticoagulation Episode Summary     INR check location:   [facility]    Preferred lab:       Send INR reminders to:       Comments:   indef per PCP       Anticoagulation Care Providers     Provider Role Specialty Phone number    Tom Vega D.O. Referring Internal Medicine [phone]    [facility] Responsible  [phone]        Anticoagulation Patient Findings      HPI:  Lawanda Pagan seen in clinic today, on anticoagulation therapy with warfarin for Hx PE  Changes to current medical/health status since last appt: denies  Denies signs/symptoms of bleeding and/or thrombosis since the last appt.    Denies any interval changes to diet  Denies any interval changes to medications since last appt.   Denies any complications or cost restrictions with current therapy.   BP recorded in vitals.      A/P   INR  is-therapeutic.   Will continue with the same warfarin dosing.     Follow up appointment in 2 week(s).    Kari Bergeron, Kitty

## 2018-08-15 LAB — INR BLD: 2.9 (ref 0.9–1.2)

## 2018-08-31 ENCOUNTER — OFFICE VISIT (OUTPATIENT)
Dept: MEDICAL GROUP | Facility: MEDICAL CENTER | Age: 68
End: 2018-08-31
Payer: MEDICARE

## 2018-08-31 ENCOUNTER — ANTICOAGULATION VISIT (OUTPATIENT)
Dept: VASCULAR LAB | Facility: MEDICAL CENTER | Age: 68
End: 2018-08-31
Attending: INTERNAL MEDICINE
Payer: MEDICARE

## 2018-08-31 VITALS
WEIGHT: 239.2 LBS | OXYGEN SATURATION: 93 % | DIASTOLIC BLOOD PRESSURE: 86 MMHG | TEMPERATURE: 97.6 F | HEART RATE: 75 BPM | BODY MASS INDEX: 40.84 KG/M2 | RESPIRATION RATE: 16 BRPM | HEIGHT: 64 IN | SYSTOLIC BLOOD PRESSURE: 140 MMHG

## 2018-08-31 DIAGNOSIS — E78.2 MIXED HYPERLIPIDEMIA: ICD-10-CM

## 2018-08-31 DIAGNOSIS — Z86.711 HISTORY OF PULMONARY EMBOLISM: ICD-10-CM

## 2018-08-31 DIAGNOSIS — Z00.00 HEALTHCARE MAINTENANCE: ICD-10-CM

## 2018-08-31 DIAGNOSIS — R79.89 ELEVATED LFTS: ICD-10-CM

## 2018-08-31 DIAGNOSIS — K22.70 BARRETT'S ESOPHAGUS WITHOUT DYSPLASIA: ICD-10-CM

## 2018-08-31 DIAGNOSIS — E66.01 MORBID OBESITY WITH BMI OF 40.0-44.9, ADULT (HCC): ICD-10-CM

## 2018-08-31 LAB — INR PPP: 2.9 (ref 2–3.5)

## 2018-08-31 PROCEDURE — 85610 PROTHROMBIN TIME: CPT

## 2018-08-31 PROCEDURE — 99211 OFF/OP EST MAY X REQ PHY/QHP: CPT

## 2018-08-31 PROCEDURE — 99204 OFFICE O/P NEW MOD 45 MIN: CPT | Performed by: FAMILY MEDICINE

## 2018-08-31 ASSESSMENT — PATIENT HEALTH QUESTIONNAIRE - PHQ9: CLINICAL INTERPRETATION OF PHQ2 SCORE: 0

## 2018-08-31 NOTE — PROGRESS NOTES
Anticoagulation Summary  As of 8/31/2018    INR goal:   2.0-3.0   TTR:   74.8 % (3.3 y)   Today's INR:   2.9   Warfarin maintenance plan:   2.5 mg (5 mg x 0.5) on Fri; 5 mg (5 mg x 1) all other days   Weekly warfarin total:   32.5 mg   Plan last modified:   Kirsten DeleonD (8/10/2018)   Next INR check:   10/12/2018   Target end date:   Indefinite    Indications    Pulmonary embolism (HCC) [I26.99]             Anticoagulation Episode Summary     INR check location:   [facility]    Preferred lab:       Send INR reminders to:       Comments:   indef per PCP       Anticoagulation Care Providers     Provider Role Specialty Phone number    Tom Vega D.O. Referring Internal Medicine [phone]    [facility] Responsible  [phone]        Anticoagulation Patient Findings    INR  therapeutic.   Denies signs/symptoms of bleeding and/or thrombosis.   Denies changes to diet or medications.   Follow up appointment in 6 week(s).    Continue weekly warfarin dose as noted      Fabio Cabrera, PharmD

## 2018-08-31 NOTE — PROGRESS NOTES
CC: New patient ( h/o pulmonary embolism, GERD, h/o high LFTs, HLD)    HPI:  Lawanda presents today to establish a new PCP.    Patient has been active, and independent. She has the following medical issues    History of pulmonary embolism  She has h/o bilateral PE.However she has been asymptomatic. Currently on Coumadin, and she follows up with coumadin clinic..    Kumar's esophagus without dysplasia  Patient has been suffering from periodic heart burn and epigastric pain. She has had EGD, and was found to have Kumar's esophagaus. However she is recently asymptomatic. She has been on Proilosec.She follows up with GI , and EGD every 2 yrs.    Elevated LFTs  She has h/o high liver enzymes in 11/2017, was not rechecked .However she has been asymptomatic.    Mixed hyperlipidemia  Her last lipid panel showed high LDL.However she has been on diet control. No h/o CAD, DM, or stroke.  Patient is counseled about life style modification.    Morbid obesity with BMI of 40.0-44.9, adult ([facility])  BMI is 41,Patient is counseled about the medical rationale for weight loss in obese individuals is that obesity is associated with a significant increase in mortality, and many health risks including type 2 diabetes mellitus, hypertension, dyslipidemia, and coronary heart disease. The benefits of weight loss include a reduction in the rate of progression from impaired glucose tolerance to diabetes, blood pressure in hypertensive patients, and lipid levels in higher risk patients. Other noncardiac benefits of weight loss include reductions in urinary incontinence, sleep apnea, and depression, and improvements in quality of life, physical functioning, and mobility.Recommend lifestyle modification: exercise 30 minutes per day 5 days per week. Recommend also portion control.    Due for Shingrix.Pneumonia vaccine and colonoscopy are UTD.          Patient Active Problem List    Diagnosis Date Noted   • Morbid obesity with BMI of 40.0-44.9,  adult (HCC) 08/31/2018   • Elevated LFTs 01/26/2018   • Kumar's esophagus 09/01/2017   • Kumar esophagus 08/10/2015   • MEDICAL HOME 07/21/2014   • Pulmonary embolism (HCC) 07/14/2014       Current Outpatient Prescriptions   Medication Sig Dispense Refill   • Zoster Vac Recomb Adjuvanted (SHINGRIX) 50 MCG Recon Susp 0.5 mL by Intramuscular route Once for 1 dose. 0.5 mL 0   • warfarin (COUMADIN) 5 MG Tab Take one tablet daily as directed by [facility] 90 Tab 3   • celecoxib (CELEBREX) 200 MG Cap Take 1 Cap by mouth 2 times a day. 60 Cap 3   • B Complex Vitamins (VITAMIN B COMPLEX PO) Take  by mouth.     • Cholecalciferol (VITAMIN D) 1000 UNIT Cap Take 2 Caps by mouth every day. 60 Cap 6   • omeprazole (PRILOSEC) 20 MG TBEC delayed-release tablet Take 20 mg by mouth every day.       No current facility-administered medications for this visit.          Allergies as of 08/31/2018 - Reviewed 08/31/2018   Allergen Reaction Noted   • Other environmental  08/25/2017        Social History     Social History   • Marital status: Single     Spouse name: N/A   • Number of children: N/A   • Years of education: N/A     Occupational History   • Not on file.     Social History Main Topics   • Smoking status: Former Smoker     Packs/day: 0.20     Years: 1.00     Types: Cigarettes     Quit date: 8/25/1973   • Smokeless tobacco: Never Used   • Alcohol use No   • Drug use: No   • Sexual activity: No     Other Topics Concern   • Not on file     Social History Narrative   • No narrative on file       Family History   Problem Relation Age of Onset   • Cancer Brother    • Stroke Brother    • Cancer Brother    • Other Mother         parkinsons   • Cancer Father         stomach   • Cancer Maternal Grandmother         stomach       Past Surgical History:   Procedure Laterality Date   • GASTROSCOPY N/A 9/1/2017    Procedure: GASTROSCOPY;  Surgeon: Stephanie Reyes M.D.;  Location: SURGERY SAME DAY [facility];   "Service: Gastroenterology   • OTHER ABDOMINAL SURGERY  2004    hernia repair   • GYN SURGERY  1980    c sec lap tubal   • TONSILLECTOMY         ROS:  Denies any Headache, Blurred Vision, Confusion Chest pain,  Shortness of breath,  Abdominal pain, Changes of bowel or bladder, Lower ext edema, Fevers, Nights sweats, Weight Changes, Focal weakness or numbness.  All other systems are negative.    /86   Pulse 75   Temp 36.4 °C (97.6 °F)   Resp 16   Ht 1.626 m (5' 4\")   Wt 108.5 kg (239 lb 3.2 oz)   SpO2 93%   BMI 41.06 kg/m²     Physical Exam:  Gen:         Alert and oriented, No apparent distress.  HEENT:   Perrla, TM clear,  Oralpharynx no erythema or exudates.  Neck:       No Jugular venous distension, Lymphadenopathy, Thyromegaly, Bruits.  Lungs:     Clear to auscultation bilaterally  CV:          Regular rate and rhythm. No murmurs, rubs or gallops.  Abd:         Soft non tender, non distended. Normal active bowel sounds. No                                        Hepatosplenomegaly, No pulsatile masses.  Ext:          No clubbing, cyanosis, edema.      Assessment and Plan.   68 y.o. female     1. History of pulmonary embolism  Asymptomatic.Has h/o bilateral PE.  Continue on Coumadin.    2. Kumar's esophagus without dysplasia  Has been doing fine on Proilosec.  Continue follow up with GI , and EGD every 2 yrs.    - CBC WITH DIFFERENTIAL; Future    3. Elevated LFTs  Has h/o high liver enzymes in 11/2017, was not rechecked .However she has been asymptomatic.    - COMP METABOLIC PANEL; Future    4. Healthcare maintenance  Due for Shingrix.  Pneumonia vaccine and colonoscopy are UTD.    - Zoster Vac Recomb Adjuvanted (SHINGRIX) 50 MCG Recon Susp; 0.5 mL by Intramuscular route Once for 1 dose.  Dispense: 0.5 mL; Refill: 0    5. Mixed hyperlipidemia  Last lipid panel showed high LDL.  Patient is counseled about life style modification.    - LIPID PANEL  - TSH; Future    6. Morbid obesity with BMI of " 40.0-44.9, adult (HCC)  BMI is 41,  Patient is counseled about life style modification.    - Patient identified as having weight management issue.  Appropriate orders and counseling given.

## 2018-09-04 LAB — INR BLD: 2.9 (ref 0.9–1.2)

## 2018-10-02 ENCOUNTER — HOSPITAL ENCOUNTER (OUTPATIENT)
Dept: LAB | Facility: MEDICAL CENTER | Age: 68
End: 2018-10-02
Attending: FAMILY MEDICINE
Payer: MEDICARE

## 2018-10-02 DIAGNOSIS — E78.2 MIXED HYPERLIPIDEMIA: ICD-10-CM

## 2018-10-02 DIAGNOSIS — R79.89 ELEVATED LFTS: ICD-10-CM

## 2018-10-02 DIAGNOSIS — K22.70 BARRETT'S ESOPHAGUS WITHOUT DYSPLASIA: ICD-10-CM

## 2018-10-02 LAB
ALBUMIN SERPL BCP-MCNC: 4 G/DL (ref 3.2–4.9)
ALBUMIN/GLOB SERPL: 1.1 G/DL
ALP SERPL-CCNC: 133 U/L (ref 30–99)
ALT SERPL-CCNC: 42 U/L (ref 2–50)
ANION GAP SERPL CALC-SCNC: 9 MMOL/L (ref 0–11.9)
AST SERPL-CCNC: 39 U/L (ref 12–45)
BASOPHILS # BLD AUTO: 0.8 % (ref 0–1.8)
BASOPHILS # BLD: 0.05 K/UL (ref 0–0.12)
BILIRUB SERPL-MCNC: 0.7 MG/DL (ref 0.1–1.5)
BUN SERPL-MCNC: 16 MG/DL (ref 8–22)
CALCIUM SERPL-MCNC: 9.3 MG/DL (ref 8.5–10.5)
CHLORIDE SERPL-SCNC: 108 MMOL/L (ref 96–112)
CHOLEST SERPL-MCNC: 182 MG/DL (ref 100–199)
CO2 SERPL-SCNC: 26 MMOL/L (ref 20–33)
CREAT SERPL-MCNC: 0.79 MG/DL (ref 0.5–1.4)
EOSINOPHIL # BLD AUTO: 0.07 K/UL (ref 0–0.51)
EOSINOPHIL NFR BLD: 1.1 % (ref 0–6.9)
ERYTHROCYTE [DISTWIDTH] IN BLOOD BY AUTOMATED COUNT: 48.8 FL (ref 35.9–50)
FASTING STATUS PATIENT QL REPORTED: NORMAL
GLOBULIN SER CALC-MCNC: 3.6 G/DL (ref 1.9–3.5)
GLUCOSE SERPL-MCNC: 125 MG/DL (ref 65–99)
HCT VFR BLD AUTO: 48.2 % (ref 37–47)
HDLC SERPL-MCNC: 38 MG/DL
HGB BLD-MCNC: 16.3 G/DL (ref 12–16)
IMM GRANULOCYTES # BLD AUTO: 0.04 K/UL (ref 0–0.11)
IMM GRANULOCYTES NFR BLD AUTO: 0.6 % (ref 0–0.9)
LDLC SERPL CALC-MCNC: 117 MG/DL
LYMPHOCYTES # BLD AUTO: 2.14 K/UL (ref 1–4.8)
LYMPHOCYTES NFR BLD: 33.4 % (ref 22–41)
MCH RBC QN AUTO: 31.4 PG (ref 27–33)
MCHC RBC AUTO-ENTMCNC: 33.8 G/DL (ref 33.6–35)
MCV RBC AUTO: 92.9 FL (ref 81.4–97.8)
MONOCYTES # BLD AUTO: 0.46 K/UL (ref 0–0.85)
MONOCYTES NFR BLD AUTO: 7.2 % (ref 0–13.4)
NEUTROPHILS # BLD AUTO: 3.64 K/UL (ref 2–7.15)
NEUTROPHILS NFR BLD: 56.9 % (ref 44–72)
NRBC # BLD AUTO: 0 K/UL
NRBC BLD-RTO: 0 /100 WBC
PLATELET # BLD AUTO: 204 K/UL (ref 164–446)
PMV BLD AUTO: 10.5 FL (ref 9–12.9)
POTASSIUM SERPL-SCNC: 4 MMOL/L (ref 3.6–5.5)
PROT SERPL-MCNC: 7.6 G/DL (ref 6–8.2)
RBC # BLD AUTO: 5.19 M/UL (ref 4.2–5.4)
SODIUM SERPL-SCNC: 143 MMOL/L (ref 135–145)
TRIGL SERPL-MCNC: 135 MG/DL (ref 0–149)
TSH SERPL DL<=0.005 MIU/L-ACNC: 1.84 UIU/ML (ref 0.38–5.33)
WBC # BLD AUTO: 6.4 K/UL (ref 4.8–10.8)

## 2018-10-02 PROCEDURE — 80053 COMPREHEN METABOLIC PANEL: CPT

## 2018-10-02 PROCEDURE — 84443 ASSAY THYROID STIM HORMONE: CPT

## 2018-10-02 PROCEDURE — 36415 COLL VENOUS BLD VENIPUNCTURE: CPT

## 2018-10-02 PROCEDURE — 80061 LIPID PANEL: CPT

## 2018-10-02 PROCEDURE — 85025 COMPLETE CBC W/AUTO DIFF WBC: CPT

## 2018-10-12 ENCOUNTER — ANTICOAGULATION VISIT (OUTPATIENT)
Dept: VASCULAR LAB | Facility: MEDICAL CENTER | Age: 68
End: 2018-10-12
Attending: INTERNAL MEDICINE
Payer: MEDICARE

## 2018-10-12 DIAGNOSIS — Z86.711 HISTORY OF PULMONARY EMBOLISM: ICD-10-CM

## 2018-10-12 LAB
INR BLD: 3.4 (ref 0.9–1.2)
INR PPP: 3.4 (ref 2–3.5)

## 2018-10-12 PROCEDURE — 85610 PROTHROMBIN TIME: CPT

## 2018-10-12 PROCEDURE — 99212 OFFICE O/P EST SF 10 MIN: CPT

## 2018-10-12 NOTE — PROGRESS NOTES
Anticoagulation Summary  As of 10/12/2018    INR goal:   2.0-3.0   TTR:   72.9 % (3.4 y)   Today's INR:   3.4!   Warfarin maintenance plan:   2.5 mg (5 mg x 0.5) on Fri; 5 mg (5 mg x 1) all other days   Weekly warfarin total:   32.5 mg   Plan last modified:   Kirsten DeleonD (8/10/2018)   Next INR check:   11/2/2018   Target end date:   Indefinite    Indications    Pulmonary embolism (HCC) [I26.99]             Anticoagulation Episode Summary     [facility]   [facility]    Preferred lab:       Send INR reminders to:       Comments:   indef per PCP       Anticoagulation Care Providers     Provider Role Specialty Phone number    Tom Vega D.O. Referring Internal Medicine [phone]    [facility] Responsible  [phone]        Anticoagulation Patient Findings      HPI:  Lawanda Pagan seen in clinic today for follow up on anticoagulation therapy in the presence of PE. Denies any changes to current medical/health status since last appointment. Denies any medication or diet changes. No current symptoms of bleeding or thrombosis reported.    A/P:   INR is supra-therapeutic at 3.4, pt to HOLD 2.5 mg dose tonight, then continue current regimen.     Follow up appointment in 3 week(s).    Next Appointment: Friday , 11/02/2018 at 09.45AM     Kirsten Thompson.D

## 2018-10-23 DIAGNOSIS — Z86.711 HISTORY OF PULMONARY EMBOLISM: ICD-10-CM

## 2018-10-23 RX ORDER — WARFARIN SODIUM 5 MG/1
TABLET ORAL
Qty: 90 TAB | Refills: 3 | OUTPATIENT
Start: 2018-10-23

## 2018-11-02 ENCOUNTER — ANTICOAGULATION VISIT (OUTPATIENT)
Dept: VASCULAR LAB | Facility: MEDICAL CENTER | Age: 68
End: 2018-11-02
Attending: INTERNAL MEDICINE
Payer: MEDICARE

## 2018-11-02 DIAGNOSIS — I26.99 OTHER PULMONARY EMBOLISM WITHOUT ACUTE COR PULMONALE, UNSPECIFIED CHRONICITY (HCC): ICD-10-CM

## 2018-11-02 LAB — INR PPP: 2.2 (ref 2–3.5)

## 2018-11-02 PROCEDURE — 85610 PROTHROMBIN TIME: CPT

## 2018-11-02 PROCEDURE — 99211 OFF/OP EST MAY X REQ PHY/QHP: CPT

## 2018-11-02 NOTE — PROGRESS NOTES
Anticoagulation Summary  As of 11/2/2018    INR goal:   2.0-3.0   TTR:   72.8 % (3.4 y)   Today's INR:   2.2   Warfarin maintenance plan:   2.5 mg (5 mg x 0.5) on Fri; 5 mg (5 mg x 1) all other days   Weekly warfarin total:   32.5 mg   Plan last modified:   Kari Bergeron, PharmD (8/10/2018)   Next INR check:   11/30/2018   Target end date:   Indefinite    Indications    Pulmonary embolism (HCC) [I26.99]             Anticoagulation Episode Summary     INR [facility]   [facility]    Preferred lab:       Send INR reminders to:       Comments:   indef per PCP       Anticoagulation Care Providers     Provider Role Specialty Phone number    Tom Vega D.O. Referring Internal Medicine [phone]    [facility] Responsible  [phone]        Anticoagulation Patient Findings  Patient Findings     Negatives:   Signs/symptoms of thrombosis, Signs/symptoms of bleeding, Laboratory test error suspected, Change in health, Change in alcohol use, Change in activity, Upcoming invasive procedure, Emergency department visit, Upcoming dental procedure, Missed doses, Extra doses, Change in medications, Change in diet/appetite, Hospital admission, Bruising, Other complaints          HPI:  Lawanda Pagan seen in clinic today, on anticoagulation therapy with warfarin for hx of PE.  Changes to current medical/health status since last appt: none  Denies signs/symptoms of bleeding and/or thrombosis since the last appt.    Denies any interval changes to diet  Denies any interval changes to medications since last appt.   Denies any complications or cost restrictions with current therapy.   BP declined today.    Patient's previous INR was supratherapeutic at 3.4 on 10/12/18, at which time patient was instructed to HOLD, then continue with current warfarin regimen. She returns to clinic today to recheck INR to ensure it is therapeutic and thus preventing possible clotting and/or bleeding/bruising  complications.    A/P   INR is therapeutic today at 2.2.  Patient instructed to continue with the current warfarin dosing regimen, and asked to follow up again in 4 weeks (per pt preference).    Next Appt: Friday Nov 30, 2018 10:30am    Sarath Dooley PharmD

## 2018-11-05 LAB — INR BLD: 2.2 (ref 0.9–1.2)

## 2018-12-03 ENCOUNTER — ANTICOAGULATION VISIT (OUTPATIENT)
Dept: VASCULAR LAB | Facility: MEDICAL CENTER | Age: 68
End: 2018-12-03
Attending: INTERNAL MEDICINE
Payer: MEDICARE

## 2018-12-03 DIAGNOSIS — I26.99 OTHER PULMONARY EMBOLISM WITHOUT ACUTE COR PULMONALE, UNSPECIFIED CHRONICITY (HCC): ICD-10-CM

## 2018-12-03 LAB
INR BLD: 2.9 (ref 0.9–1.2)
INR PPP: 2.9 (ref 2–3.5)

## 2018-12-03 PROCEDURE — 85610 PROTHROMBIN TIME: CPT

## 2018-12-03 PROCEDURE — 99211 OFF/OP EST MAY X REQ PHY/QHP: CPT | Performed by: NURSE PRACTITIONER

## 2018-12-03 NOTE — PROGRESS NOTES
Anticoagulation Summary  As of 12/3/2018    INR goal:   2.0-3.0   TTR:   73.5 % (3.5 y)   Today's INR:   2.9   Warfarin maintenance plan:   2.5 mg (5 mg x 0.5) on Fri; 5 mg (5 mg x 1) all other days   Weekly warfarin total:   32.5 mg   Plan last modified:   Kari Bergeron, PharmD (8/10/2018)   Next INR check:   1/14/2019   Target end date:   Indefinite    Indications    Pulmonary embolism (HCC) [I26.99]             Anticoagulation Episode Summary     INR check [facility]   [facility]    Preferred lab:       Send INR reminders to:       Comments:   indef per PCP       Anticoagulation Care Providers     Provider Role Specialty Phone number    Tom Vega D.O. Referring Internal Medicine [phone]    [facility] Responsible  [phone]        Anticoagulation Patient Findings    Pt is therapeutic today. Denies any changes in medications or diet. Med rec completed and reviewed. Patient denies any s/sx of bleeding or clotting. Will continue dosing as outlined in calendar. Will follow-up with pt in 6 weeks.    Yael ADAM  [place] [facility]

## 2018-12-24 ENCOUNTER — OFFICE VISIT (OUTPATIENT)
Dept: MEDICAL GROUP | Facility: MEDICAL CENTER | Age: 68
End: 2018-12-24
Payer: MEDICARE

## 2018-12-24 VITALS
HEART RATE: 71 BPM | BODY MASS INDEX: 41.15 KG/M2 | DIASTOLIC BLOOD PRESSURE: 80 MMHG | TEMPERATURE: 98.2 F | RESPIRATION RATE: 16 BRPM | SYSTOLIC BLOOD PRESSURE: 138 MMHG | OXYGEN SATURATION: 96 % | HEIGHT: 64 IN | WEIGHT: 241 LBS

## 2018-12-24 DIAGNOSIS — K22.70 BARRETT'S ESOPHAGUS WITHOUT DYSPLASIA: ICD-10-CM

## 2018-12-24 DIAGNOSIS — I27.20 PULMONARY HYPERTENSION (HCC): ICD-10-CM

## 2018-12-24 DIAGNOSIS — R79.89 ELEVATED LFTS: ICD-10-CM

## 2018-12-24 DIAGNOSIS — Z86.711 HISTORY OF PULMONARY EMBOLISM: ICD-10-CM

## 2018-12-24 DIAGNOSIS — E78.2 MIXED HYPERLIPIDEMIA: ICD-10-CM

## 2018-12-24 PROCEDURE — 99214 OFFICE O/P EST MOD 30 MIN: CPT | Performed by: FAMILY MEDICINE

## 2018-12-24 NOTE — PROGRESS NOTES
CC: Kumar's esophagus, history of pulmonary embolism, elevated LFTs, hyperlipidemia.    HPI:   Lawanda presents today to discuss the following medical issues:    Kumar's esophagus without dysplasia  Has been mostly asymptomatic, denies heartburn, epigastric pain, nausea, vomiting.  Prilosec 20 mg daily has been working for him, no side effects.  He follows up with GI for EGD every 2 years.    History of pulmonary embolism  She has h/o bilateral PE.However she has been asymptomatic. Currently on Coumadin, and she follows up with coumadin clinic..     Elevated LFTs  AST and ALT are back to normal, has slightly high alk phos.  However patient has been asymptomatic.    Mixed hyperlipidemia  Her most recent lipid panel showed high LDL(117).However she has been on diet control. No h/o CAD, DM, or stroke.       Patient Active Problem List    Diagnosis Date Noted   • Morbid obesity with BMI of 40.0-44.9, adult ([facility]) 08/31/2018   • Elevated LFTs 01/26/2018   • Kumar's esophagus 09/01/2017   • Kumar esophagus 08/10/2015   • MEDICAL HOME 07/21/2014   • Pulmonary embolism ([facility]) 07/14/2014       Current Outpatient Prescriptions   Medication Sig Dispense Refill   • warfarin (COUMADIN) 5 MG Tab Take one tablet daily as directed by [facility] 90 Tab 3   • celecoxib (CELEBREX) 200 MG Cap Take 1 Cap by mouth 2 times a day. 60 Cap 3   • B Complex Vitamins (VITAMIN B COMPLEX PO) Take  by mouth.     • Cholecalciferol (VITAMIN D) 1000 UNIT Cap Take 2 Caps by mouth every day. 60 Cap 6   • omeprazole (PRILOSEC) 20 MG TBEC delayed-release tablet Take 20 mg by mouth every day.       No current facility-administered medications for this visit.          Allergies as of 12/24/2018 - Reviewed 12/24/2018   Allergen Reaction Noted   • Other environmental  08/25/2017        ROS: Denies any chest pain, Shortness of breath, Changes bowel or bladder, Lower extremity edema.    Physical Exam:  /80 (BP Location: Right  "arm, Patient Position: Sitting, BP Cuff Size: Adult)   Pulse 71   Temp 36.8 °C (98.2 °F) (Temporal)   Resp 16   Ht 1.626 m (5' 4\")   Wt 109.3 kg (241 lb)   SpO2 96%   BMI 41.37 kg/m²   Gen.: Well-developed, well-nourished, no apparent distress,pleasant and cooperative with the examination  Skin:  Warm and dry with good turgor. No rashes or suspicious lesions in visible areas  HEENT:Sinuses nontender with palpation, TMs clear, nares patent with pink mucosa and clear rhinorrhea,no septal deviation ,polyps or lesions. lips without lesions, oropharynx clear.  Neck: Trachea midline,no masses or adenopathy. No JVD.  Cor: Regular rate and rhythm without murmur, gallop or rub.  Lungs: Respirations unlabored.Clear to auscultation with equal breath sounds bilaterally. No wheezes, rhonchi.  Extremities: No cyanosis, clubbing or edema.        Assessment and Plan.   68 y.o. female     1. Kumar's esophagus without dysplasia  Asymptomatic.  Continue on omeprazole.  Continue follow-up with GI for EGD every 2 years.    2. History of pulmonary embolism  Asymptomatic.  Continue on Coumadin for life.  Continue follow-up with Coumadin clinic.  Last INR was 2.9.    3. Elevated LFTs  Almost resolved.  However has slightly high alk phos.  We will continue watch.    4. Mixed hyperlipidemia  Most recent lipid panel showed a slightly high LDL, 117.  Patient is counseled about lifestyle modification.      "

## 2019-01-10 ENCOUNTER — TELEPHONE (OUTPATIENT)
Dept: VASCULAR LAB | Facility: MEDICAL CENTER | Age: 69
End: 2019-01-10

## 2019-01-10 DIAGNOSIS — Z79.01 CHRONIC ANTICOAGULATION: ICD-10-CM

## 2019-01-10 NOTE — TELEPHONE ENCOUNTER
Called patient to inform that insurance is out of network and we would like to cancel their appointment and have them go to a lab to have INR drawn and we can call her with results. LM instructing patient to call back to inform of what lab they would like to go to.    Andrzej Ring, Med. [facility]  [place] [facility]

## 2019-01-14 ENCOUNTER — TELEPHONE (OUTPATIENT)
Dept: VASCULAR LAB | Facility: MEDICAL CENTER | Age: 69
End: 2019-01-14

## 2019-01-14 NOTE — TELEPHONE ENCOUNTER
Called patient regarding appointment in coumadin clinic this morning. LM informing that their insurance is out of network and would like to them to go to a lab to get INR drawn today and instructed to call back with the name and location of lab they would like to go to. Also informed of the possibility of getting an authorization from insurance to come into the clinic for future visits.     Andrzej Ring, Med. Ass'  [place] [facility]

## 2019-01-15 DIAGNOSIS — Z86.711 HISTORY OF PULMONARY EMBOLISM: ICD-10-CM

## 2019-02-21 ENCOUNTER — TELEPHONE (OUTPATIENT)
Dept: VASCULAR LAB | Facility: MEDICAL CENTER | Age: 69
End: 2019-02-21

## 2019-02-21 NOTE — TELEPHONE ENCOUNTER
Called pt regarding INR.   Pt states that she hasn't been able to go to lab.   She is having difficultly with [employer] and getting things covered.   She watches her young grandchildren M-F and every other Saturday.   She is going to look into going to lab on a Saturday.     Kari Bergeron, PharmD

## 2019-03-13 ENCOUNTER — TELEPHONE (OUTPATIENT)
Dept: VASCULAR LAB | Facility: MEDICAL CENTER | Age: 69
End: 2019-03-13

## 2019-04-09 ENCOUNTER — TELEPHONE (OUTPATIENT)
Dept: VASCULAR LAB | Facility: MEDICAL CENTER | Age: 69
End: 2019-04-09

## 2019-04-09 DIAGNOSIS — I26.99 OTHER PULMONARY EMBOLISM WITHOUT ACUTE COR PULMONALE, UNSPECIFIED CHRONICITY (HCC): ICD-10-CM

## 2019-04-09 NOTE — TELEPHONE ENCOUNTER
[facility] [place] [facility]    Patient returned call to clinic in regards to overdue INR.  Patient claims that her insurance changed and clinic visits are no longer covered via her insurance. I explained that we will still need to somehow monitor her INRs while she is on warfarin, and suggested she try a local lab near her as the patient has medicare & medicaid (limited). Sent SO to [facility] on [address] (off [address]) and patient will call after she goes there to let us know if she was able to get an INR draw.    Sarath Dooley  PharmD

## 2019-04-11 ENCOUNTER — ANTICOAGULATION MONITORING (OUTPATIENT)
Dept: VASCULAR LAB | Facility: MEDICAL CENTER | Age: 69
End: 2019-04-11

## 2019-04-11 LAB
INR PPP: 2 (ref 0.8–1.2)
PROTHROMBIN TIME: 21.4 SEC (ref 9.1–12)

## 2019-04-11 NOTE — PROGRESS NOTES
Anticoagulation Summary  As of 2019    INR goal:   2.0-3.0   TTR:   75.9 % (3.9 y)   INR used for dosin.0 (4/10/2019)   Warfarin maintenance plan:   5 mg (5 mg x 1) every day   Weekly warfarin total:   35 mg   Plan last modified:   Kari Bergeron, PharmD (2019)   Next INR check:   2019   Target end date:   Indefinite    Indications    Pulmonary embolism (HCC) [I26.99]             Anticoagulation Episode Summary     INR check location[facility]   [facility]    Preferred lab:       Send INR reminders to:       Comments:   indef per PCP       Anticoagulation Care Providers     Provider Role Specialty Phone number    Tom Vega D.O. Referring Internal Medicine [phone]    [facility] Responsible  [phone]        Anticoagulation Patient Findings      Spoke with patient.  INR is therapeutic.   Pt denies any unusual s/s of bleeding, bruising, clotting or any changes to diet or medications. Denies any etoh, cranberries, supplements, or illness.   Pt verifies warfarin weekly dosing.     Pt feels umcomfortable with her INR in the low end of INR range, as such she would like to go back to 5mg warfarin daily.     Repeat INR in 3 week(s).     Kari Bergeron, PharmD

## 2019-05-04 LAB
INR PPP: 2.3 (ref 0.8–1.2)
PROTHROMBIN TIME: 24 SEC (ref 9.1–12)

## 2019-05-07 ENCOUNTER — ANTICOAGULATION MONITORING (OUTPATIENT)
Dept: VASCULAR LAB | Facility: MEDICAL CENTER | Age: 69
End: 2019-05-07

## 2019-05-07 DIAGNOSIS — I26.99 OTHER PULMONARY EMBOLISM WITHOUT ACUTE COR PULMONALE, UNSPECIFIED CHRONICITY (HCC): ICD-10-CM

## 2019-05-07 NOTE — PROGRESS NOTES
OP Anticoagulation Service Note    Date: 2019  Anticoagulation Summary  As of 2019    INR goal:   2.0-3.0   TTR:   76.3 % (3.9 y)   INR used for dosin.3 (5/3/2019)   Warfarin maintenance plan:   5 mg (5 mg x 1) every day   Weekly warfarin total:   35 mg   No change documented:   Олег Matos Ass't   Plan last modified:   Kari Bergeron, PharmD (2019)   Next INR check:   2019   Target end date:   Indefinite    Indications    Pulmonary embolism (HCC) [I26.99]             Anticoagulation Episode Summary     INR check location:   [facility]    Preferred lab:       Send INR reminders to:       Comments:   indef per PCP       Anticoagulation Care Providers     Provider Role Specialty Phone number    Tom Vega D.O. Referring Internal Medicine [phone]    [facility] Responsible  [phone]        Anticoagulation Patient Findings  Patient Findings     Negatives:   Signs/symptoms of thrombosis, Signs/symptoms of bleeding, Laboratory test error suspected, Change in health, Change in alcohol use, Change in activity, Upcoming invasive procedure, Emergency department visit, Upcoming dental procedure, Missed doses, Extra doses, Change in medications, Change in diet/appetite, Hospital admission, Bruising, Other complaints        Plan: Left patient a message. Patient is therapeutic and will remain on the same dose. Patient was instructed to call back if needed to report any unusual bleeding or bruising or any changes to medication or diet. Patient is to be checked again in 6 weeks.    Олег Matos. Ass't  [place] [facility]    I have reviewed and am in agreement with the above stated plan on 19.  Torres Ramos, KirstenD

## 2019-06-29 LAB
INR PPP: 1.6 (ref 0.8–1.2)
PROTHROMBIN TIME: 16.8 SEC (ref 9.1–12)

## 2019-07-01 ENCOUNTER — ANTICOAGULATION MONITORING (OUTPATIENT)
Dept: VASCULAR LAB | Facility: MEDICAL CENTER | Age: 69
End: 2019-07-01

## 2019-07-01 DIAGNOSIS — I26.99 OTHER PULMONARY EMBOLISM WITHOUT ACUTE COR PULMONALE, UNSPECIFIED CHRONICITY (HCC): ICD-10-CM

## 2019-07-01 NOTE — PROGRESS NOTES
Anticoagulation Summary  As of 2019    INR goal:   2.0-3.0   TTR:   75.0 % (4.1 y)   INR used for dosin.6! (2019)   Warfarin maintenance plan:   5 mg (5 mg x 1) every day   Weekly warfarin total:   35 mg   Plan last modified:   Kari Bergeron, PharmD (2019)   Next INR check:      Target end date:   Indefinite    Indications    Pulmonary embolism (HCC) [I26.99]             Anticoagulation Episode Summary     INR check location:   [facility]    Preferred lab:       Send INR reminders to:       Comments:   indef per PCP       Anticoagulation Care Providers     Provider Role Specialty Phone number    Tom Vega D.O. Referring Internal Medicine [phone]    [facility] Responsible  [phone]        Anticoagulation Patient Findings    Spoke with patient.  INR is SUB therapeutic.   Pt has been taking a lower weekly dose of 5mg daily except 2.5mg on .  Pt denies any unusual s/s of bleeding, bruising, clotting or any changes to diet or medications. Denies any etoh, cranberries, supplements, or illness.   Pt verifies warfarin weekly dosing.       Will have pt increase back to warfarin 5mg daily    Repeat INR in 2 week(s).     Kari Bergeron, PharmD

## 2019-07-19 LAB
INR PPP: 2.2 (ref 0.8–1.2)
PROTHROMBIN TIME: 22.8 SEC (ref 9.1–12)

## 2019-07-24 ENCOUNTER — ANTICOAGULATION MONITORING (OUTPATIENT)
Dept: VASCULAR LAB | Facility: MEDICAL CENTER | Age: 69
End: 2019-07-24

## 2019-07-24 DIAGNOSIS — I26.99 OTHER PULMONARY EMBOLISM WITHOUT ACUTE COR PULMONALE, UNSPECIFIED CHRONICITY (HCC): ICD-10-CM

## 2019-07-24 NOTE — PROGRESS NOTES
OP Anticoagulation Service Note    Date: 2019  Anticoagulation Summary  As of 2019    INR goal:   2.0-3.0   TTR:   74.5 % (4.2 y)   INR used for dosin.2 (2019)   Warfarin maintenance plan:   5 mg (5 mg x 1) every day   Weekly warfarin total:   35 mg   No change documented:   Andrzej iRng Med Ass't   Plan last modified:   Kirsten DeleonD (2019)   Next INR check:   2019   Target end date:   Indefinite    Indications    Pulmonary embolism (HCC) [I26.99]             Anticoagulation Episode Summary     INR check location:   [facility]    Preferred lab:       Send INR reminders to:       Comments:   indef per PCP       Anticoagulation Care Providers     Provider Role Specialty Phone number    Tom Vega D.O. Referring Internal Medicine [phone]    [facility] Responsible  [phone]        Anticoagulation Patient Findings  Patient Findings     Negatives:   Signs/symptoms of thrombosis, Signs/symptoms of bleeding, Laboratory test error suspected, Change in health, Change in alcohol use, Change in activity, Upcoming invasive procedure, Emergency department visit, Upcoming dental procedure, Missed doses, Extra doses, Change in medications, Change in diet/appetite, Hospital admission, Bruising, Other complaints        Plan: Spoke to patient. Patient is therapeutic and will remain on the same dose. Patient reports no unusual bleeding or bruising and no changes to medication or diet. Patient is to be checked again in 4 weeks.    Andrzej Ring Med. Ass't  [place] [facility]    I have reviewed and am in agreement with the above stated plan on 19.  Torres Ramos, PharmD, BCACP

## 2019-08-07 DIAGNOSIS — Z86.711 HISTORY OF PULMONARY EMBOLISM: ICD-10-CM

## 2019-08-07 RX ORDER — WARFARIN SODIUM 5 MG/1
TABLET ORAL
Qty: 90 TAB | Refills: 1 | Status: SHIPPED | OUTPATIENT
Start: 2019-08-07 | End: 2020-02-17

## 2019-08-28 ENCOUNTER — ANTICOAGULATION MONITORING (OUTPATIENT)
Dept: VASCULAR LAB | Facility: MEDICAL CENTER | Age: 69
End: 2019-08-28

## 2019-08-28 DIAGNOSIS — I26.99 OTHER PULMONARY EMBOLISM WITHOUT ACUTE COR PULMONALE, UNSPECIFIED CHRONICITY (HCC): ICD-10-CM

## 2019-08-28 LAB
INR PPP: 3.8 (ref 0.8–1.2)
PROTHROMBIN TIME: 34.5 SEC (ref 9.1–12)

## 2019-08-28 NOTE — PROGRESS NOTES
Anticoagulation Summary  As of 8/28/2019    INR goal:   2.0-3.0   TTR:   73.9 % (4.3 y)   INR used for dosing:   3.8! (8/27/2019)   Warfarin maintenance plan:   5 mg (5 mg x 1) every day   Weekly warfarin total:   35 mg   Plan last modified:   Kari Bergeron, PharmD (4/11/2019)   Next INR check:   9/11/2019   Target end date:   Indefinite    Indications    Pulmonary embolism (HCC) [I26.99]             Anticoagulation Episode Summary     INR check location:   [facility]    Preferred lab:       Send INR reminders to:       Comments:   indef per PCP       Anticoagulation Care Providers     Provider Role Specialty Phone number    Tom Vega D.O. Referring Internal Medicine [phone]    [facility] Responsible  [phone]        Anticoagulation Patient Findings    Spoke to patient on the phone to report a Supratherapeutic INR of 3.8.  Pt to hold tonights dose then resume current regimen. Pt reports that she has been sick for 2 weeks and has been taking Nyquil which could elevate INR but she is no longer taking Nyquil. Reports no change in diet. Reports no S/S of bleeding or bruising. Follow up in 2 weeks.    Discussed plan with Kari Bergeron.     Archie Cui, Pharmacy Intern

## 2019-09-13 ENCOUNTER — ANTICOAGULATION MONITORING (OUTPATIENT)
Dept: VASCULAR LAB | Facility: MEDICAL CENTER | Age: 69
End: 2019-09-13

## 2019-09-13 DIAGNOSIS — I26.99 OTHER PULMONARY EMBOLISM WITHOUT ACUTE COR PULMONALE, UNSPECIFIED CHRONICITY (HCC): ICD-10-CM

## 2019-09-13 LAB
INR PPP: 3.3 (ref 0.8–1.2)
PROTHROMBIN TIME: 30.9 SEC (ref 9.1–12)

## 2019-09-13 NOTE — PROGRESS NOTES
Anticoagulation Summary  As of 9/13/2019    INR goal:   2.0-3.0   TTR:   73.1 % (4.3 y)   INR used for dosing:   3.3! (9/12/2019)   Warfarin maintenance plan:   2.5 mg (5 mg x 0.5) every Fri; 5 mg (5 mg x 1) all other days   Weekly warfarin total:   32.5 mg   Plan last modified:   Gloria Luz (9/13/2019)   Next INR check:   9/26/2019   Target end date:   Indefinite    Indications    Pulmonary embolism (HCC) [I26.99]             Anticoagulation Episode Summary     INR check location:   Anticoagulation Clinic    Preferred lab:       Send INR reminders to:       Comments:   indef per PCP       Anticoagulation Care Providers     Provider Role Specialty Phone number    Tom Vega D.O. Referring Internal Medicine [phone]    [facility] Anticoagulation Services Responsible  [phone]        Anticoagulation Patient Findings          Spoke with Lawanda to report a supra therapeutic INR of 3.3.  Will reduce weekly dose by 7%. Follow up in 2 weeks, to reduce the risk of adverse events related to this high risk medication, warfarin.    Gloria Luz, Clinical Pharmacist

## 2019-09-26 ENCOUNTER — ANTICOAGULATION MONITORING (OUTPATIENT)
Dept: VASCULAR LAB | Facility: MEDICAL CENTER | Age: 69
End: 2019-09-26

## 2019-09-26 DIAGNOSIS — I26.99 OTHER PULMONARY EMBOLISM WITHOUT ACUTE COR PULMONALE, UNSPECIFIED CHRONICITY (HCC): ICD-10-CM

## 2019-09-26 LAB
INR PPP: 2.5 (ref 0.8–1.2)
PROTHROMBIN TIME: 23.8 SEC (ref 9.1–12)

## 2019-09-26 NOTE — PROGRESS NOTES
OP Telephone Anticoagulation Service Note    Date: 2019      Anticoagulation Summary  As of 2019    INR goal:   2.0-3.0   TTR:   73.0 % (4.3 y)   INR used for dosin.5 (2019)   Warfarin maintenance plan:   2.5 mg (5 mg x 0.5) every Fri; 5 mg (5 mg x 1) all other days   Weekly warfarin total:   32.5 mg   Plan last modified:   Gloria Luz (2019)   Next INR check:   10/10/2019   Target end date:   Indefinite    Indications    Pulmonary embolism (HCC) [I26.99]             Anticoagulation Episode Summary     INR check location:   Anticoagulation Clinic    Preferred lab:       Send INR reminders to:       Comments:   indef per PCP       Anticoagulation Care Providers     Provider Role Specialty Phone number    Tom Vega D.O. Referring Internal Medicine [phone]    [facility] Anticoagulation Services Responsible  [phone]        Anticoagulation Patient Findings        Plan: Spoke with patient on the phone. Patient is  therapeutic today. Confirmed dosing. No missed tablets in the last week. Patient denies any changes in medications or diet. Patient denies any signs or symptoms of bleeding or clotting. Instructed patient to call clinic if any unusual bleeding or bruising occurs. Will continue dosing as outlined. Will follow-up with patient in 2 week(s).        Yuliana Meneses, KirstenD

## 2019-10-22 DIAGNOSIS — Z79.01 CHRONIC ANTICOAGULATION: ICD-10-CM

## 2019-10-24 ENCOUNTER — ANTICOAGULATION MONITORING (OUTPATIENT)
Dept: VASCULAR LAB | Facility: MEDICAL CENTER | Age: 69
End: 2019-10-24

## 2019-10-24 DIAGNOSIS — I26.99 OTHER PULMONARY EMBOLISM WITHOUT ACUTE COR PULMONALE, UNSPECIFIED CHRONICITY (HCC): ICD-10-CM

## 2019-10-24 LAB
INR PPP: 2.6 (ref 0.8–1.2)
PROTHROMBIN TIME: 24.6 SEC (ref 9.1–12)

## 2019-10-24 NOTE — PROGRESS NOTES
Anticoagulation Summary  As of 10/24/2019    INR goal:   2.0-3.0   TTR:   73.5 % (4.4 y)   INR used for dosin.6 (10/23/2019)   Warfarin maintenance plan:   2.5 mg (5 mg x 0.5) every Fri; 5 mg (5 mg x 1) all other days   Weekly warfarin total:   32.5 mg   No change documented:   Олег Kim Ass't   Plan last modified:   Gloria Luz (2019)   Next INR check:   2019   Target end date:   Indefinite    Indications    Pulmonary embolism (HCC) [I26.99]             Anticoagulation Episode Summary     INR check location:   Anticoagulation Clinic    Preferred lab:       Send INR reminders to:       Comments:   indef per PCP       Anticoagulation Care Providers     Provider Role Specialty Phone number    Tom Vega D.O. Referring Internal Medicine [phone]    [facility] Anticoagulation Services Responsible  [phone]        Anticoagulation Patient Findings  Patient Findings     Negatives:   Signs/symptoms of thrombosis, Signs/symptoms of bleeding, Laboratory test error suspected, Change in health, Change in alcohol use, Change in activity, Upcoming invasive procedure, Emergency department visit, Upcoming dental procedure, Missed doses, Extra doses, Change in medications, Change in diet/appetite, Hospital admission, Bruising, Other complaints      Left voicemail message to report 2.6 therapeutic INR of 2.0-3.0.  Patient to continue with current warfarin dosing regimen. Requested pt contact the clinic for any change to diet or medication, and to report any signs or symptoms of bleeding, bruising or clotting.  Pt to follow up in 4 weeks.  Олег Kim Ass't      I have reviewed and concur with the above plan on 10/24/2019.  Gloria Luz, Clinical Pharmacist, CDE, CACP

## 2019-11-21 ENCOUNTER — ANTICOAGULATION MONITORING (OUTPATIENT)
Dept: VASCULAR LAB | Facility: MEDICAL CENTER | Age: 69
End: 2019-11-21

## 2019-11-21 DIAGNOSIS — I26.99 OTHER PULMONARY EMBOLISM WITHOUT ACUTE COR PULMONALE, UNSPECIFIED CHRONICITY (HCC): ICD-10-CM

## 2019-11-21 LAB
INR PPP: 2.2 (ref 0.8–1.2)
PROTHROMBIN TIME: 21.4 SEC (ref 9.1–12)

## 2019-11-21 NOTE — PROGRESS NOTES
OP Anticoagulation Telephone Note    Date: 2019       Plan:  Spoke with pt on the phone. Pt is therapeutic today. Denies any changes in medications or diet. Denies any s/sx of bleeding or clotting. Will continue warfarin dosing as outlined below and follow-up with pt in 6wks.      Anticoagulation Summary  As of 2019    INR goal:   2.0-3.0   TTR:   73.9 % (4.5 y)   INR used for dosin.2 (2019)   Warfarin maintenance plan:   2.5 mg (5 mg x 0.5) every Fri; 5 mg (5 mg x 1) all other days   Weekly warfarin total:   32.5 mg   Plan last modified:   Gloria Luz (2019)   Next INR check:   2020   Target end date:   Indefinite    Indications    Pulmonary embolism (HCC) [I26.99]             Anticoagulation Episode Summary     INR check location:   Anticoagulation Clinic    Preferred lab:       Send INR reminders to:       Comments:   indef per PCP       Anticoagulation Care Providers     Provider Role Specialty Phone number    Tom Vega D.O. Referring Internal Medicine [phone]    [facility] Anticoagulation Services Responsible  [phone]            JAIR Stringer  [facility]

## 2020-01-04 LAB
INR PPP: 2.1 (ref 0.8–1.2)
PROTHROMBIN TIME: 21.6 SEC (ref 9.1–12)

## 2020-01-06 ENCOUNTER — ANTICOAGULATION MONITORING (OUTPATIENT)
Dept: VASCULAR LAB | Facility: MEDICAL CENTER | Age: 70
End: 2020-01-06

## 2020-01-06 DIAGNOSIS — I26.99 OTHER PULMONARY EMBOLISM WITHOUT ACUTE COR PULMONALE, UNSPECIFIED CHRONICITY (HCC): ICD-10-CM

## 2020-01-06 NOTE — PROGRESS NOTES
OP Telephone Anticoagulation Service Note    Date: 2020      Anticoagulation Summary  As of 2020    INR goal:   2.0-3.0   TTR:   74.6 % (4.6 y)   INR used for dosin.1 (1/3/2020)   Warfarin maintenance plan:   2.5 mg (5 mg x 0.5) every Fri; 5 mg (5 mg x 1) all other days   Weekly warfarin total:   32.5 mg   Plan last modified:   Gloria Luz (2019)   Next INR check:   2020   Target end date:   Indefinite    Indications    Pulmonary embolism (HCC) [I26.99]             Anticoagulation Episode Summary     INR check location:   Anticoagulation Clinic    Preferred lab:       Send INR reminders to:       Comments:   indef per PCP       Anticoagulation Care Providers     Provider Role Specialty Phone number    Tom Vega D.O. Referring Internal Medicine [phone]    [facility] Anticoagulation Services Responsible  [phone]        Anticoagulation Patient Findings        Plan: Spoke with patient on the phone. Patient is  therapeutic today. Confirmed dosing. No missed tablets in the last week. Patient denies any changes in medications or diet. Patient denies any signs or symptoms of bleeding or clotting. Instructed patient to call clinic if any unusual bleeding or bruising occurs. Will continue dosing as outlined. Will follow-up with patient in 6 week(s).      Yuliana Meneses, PharmD      Addendum:  Address celebrex with warfarin combination at next visit.     Beny Sheikh, PharmD

## 2020-03-03 ENCOUNTER — ANTICOAGULATION MONITORING (OUTPATIENT)
Dept: VASCULAR LAB | Facility: MEDICAL CENTER | Age: 70
End: 2020-03-03

## 2020-03-03 DIAGNOSIS — I26.99 OTHER PULMONARY EMBOLISM WITHOUT ACUTE COR PULMONALE, UNSPECIFIED CHRONICITY (HCC): ICD-10-CM

## 2020-03-03 LAB
INR PPP: 2.6 (ref 0.8–1.2)
PROTHROMBIN TIME: 26.1 SEC (ref 9.1–12)

## 2020-03-03 NOTE — PROGRESS NOTES
OP Telephone Anticoagulation Service Note    Date: 3/3/2020      Anticoagulation Summary  As of 3/3/2020    INR goal:   2.0-3.0   TTR:   75.5 % (4.8 y)   INR used for dosin.6 (3/2/2020)   Warfarin maintenance plan:   2.5 mg (5 mg x 0.5) every Fri; 5 mg (5 mg x 1) all other days   Weekly warfarin total:   32.5 mg   Plan last modified:   Gloria Luz (2019)   Next INR check:   2020   Target end date:   Indefinite    Indications    Pulmonary embolism (HCC) [I26.99]             Anticoagulation Episode Summary     INR check location:   Anticoagulation Clinic    Preferred lab:       Send INR reminders to:       Comments:   indef per PCP       Anticoagulation Care Providers     Provider Role Specialty Phone number    Tom Vega D.O. Referring Internal Medicine [phone]    [facility] Anticoagulation Services Responsible  [phone]        Anticoagulation Patient Findings  Patient Findings     Negatives:   Signs/symptoms of thrombosis, Signs/symptoms of bleeding, Laboratory test error suspected, Change in health, Change in alcohol use, Change in activity, Upcoming invasive procedure, Emergency department visit, Upcoming dental procedure, Missed doses, Extra doses, Change in medications, Change in diet/appetite, Hospital admission, Bruising, Other complaints          Plan: Patient is therapeutic today. Confirmed dosing regimen. Patient denies any changes in medications or diet. Patient denies any signs or symptoms of bleeding or clotting. Instructed patient to call clinic if any unusual bleeding or bruising occurs. Will continue dosing as outlined. Will follow-up with patient in 6 week(s).      Eli Lopez, Pharmacy Intern

## 2020-04-28 ENCOUNTER — TELEPHONE (OUTPATIENT)
Dept: VASCULAR LAB | Facility: MEDICAL CENTER | Age: 70
End: 2020-04-28

## 2020-04-28 DIAGNOSIS — Z79.01 CHRONIC ANTICOAGULATION: ICD-10-CM

## 2020-04-28 NOTE — TELEPHONE ENCOUNTER
Pt no showed her last anticoagulation services appointment.   Left message for pt to have INR checked  Gloria Luz, Clinical Pharmacist, CDE, CACP

## 2020-04-28 NOTE — TELEPHONE ENCOUNTER
Called and spoke to patient to advise I faxed her INR Order to [facility] [address] per her request

## 2020-05-01 ENCOUNTER — ANTICOAGULATION MONITORING (OUTPATIENT)
Dept: VASCULAR LAB | Facility: MEDICAL CENTER | Age: 70
End: 2020-05-01

## 2020-05-01 DIAGNOSIS — I26.99 OTHER PULMONARY EMBOLISM WITHOUT ACUTE COR PULMONALE, UNSPECIFIED CHRONICITY (HCC): ICD-10-CM

## 2020-05-01 LAB
INR PPP: 1.9 (ref 0.8–1.2)
PROTHROMBIN TIME: 19.3 SEC (ref 9.1–12)

## 2020-05-01 NOTE — PROGRESS NOTES
Anticoagulation Summary  As of 2020    INR goal:   2.0-3.0   TTR:   75.8 % (4.9 y)   INR used for dosin.9! (2020)   Warfarin maintenance plan:   2.5 mg (5 mg x 0.5) every Fri; 5 mg (5 mg x 1) all other days   Weekly warfarin total:   32.5 mg   Plan last modified:   Gloria VICTOR Filter (2019)   Next INR check:   2020   Target end date:   Indefinite    Indications    Pulmonary embolism (HCC) [I26.99]             Anticoagulation Episode Summary     INR check location:   Anticoagulation Clinic    Preferred lab:       Send INR reminders to:       Comments:   indef per PCP       Anticoagulation Care Providers     Provider Role Specialty Phone number    Tom Vgea D.O. Referring Internal Medicine [phone]    [facility] Anticoagulation Services Responsible  [phone]        Anticoagulation Patient Findings  Patient Findings     Negatives:   Signs/symptoms of thrombosis, Signs/symptoms of bleeding, Laboratory test error suspected, Change in health, Change in alcohol use, Change in activity, Upcoming invasive procedure, Emergency department visit, Upcoming dental procedure, Missed doses, Extra doses, Change in medications, Change in diet/appetite, Hospital admission, Bruising, Other complaints        Spoke with patient today regarding subtherapeutic INR of 1.9.  Patient denies any signs/symptoms of bruising or bleeding or any changes in diet and medications.  Instructed patient to call clinic with any questions or concerns.  Instructed patient to bolus with 5mg X 1 then resume current warfarin regimen.  Follow up in 4 weeks, to reduce risk of adverse events related to this high risk medication,  Warfarin.    Torres Ramos, PharmD, BCACP

## 2020-06-04 ENCOUNTER — ANTICOAGULATION MONITORING (OUTPATIENT)
Dept: VASCULAR LAB | Facility: MEDICAL CENTER | Age: 70
End: 2020-06-04

## 2020-06-04 DIAGNOSIS — I26.99 OTHER PULMONARY EMBOLISM WITHOUT ACUTE COR PULMONALE, UNSPECIFIED CHRONICITY (HCC): ICD-10-CM

## 2020-06-04 LAB
INR PPP: 3.2 (ref 0.8–1.2)
PROTHROMBIN TIME: 31.8 SEC (ref 9.1–12)

## 2020-06-04 NOTE — PROGRESS NOTES
OP   Telephone Anticoagulation Service Note      Anticoagulation Summary  As of 6/4/2020    INR goal:   2.0-3.0   TTR:   75.8 % (5 y)   INR used for dosing:   3.2! (6/3/2020)   Warfarin maintenance plan:   2.5 mg (5 mg x 0.5) every Fri; 5 mg (5 mg x 1) all other days   Weekly warfarin total:   32.5 mg   Plan last modified:   Gloria VICTOR Filter (9/13/2019)   Next INR check:   7/2/2020   Target end date:   Indefinite    Indications    Pulmonary embolism (HCC) [I26.99]             Anticoagulation Episode Summary     INR check location:   Anticoagulation Clinic    Preferred lab:       Send INR reminders to:       Comments:   indef per PCP       Anticoagulation Care Providers     Provider Role Specialty Phone number    Tom Vega D.O. Referring Internal Medicine [phone]    [facility] Anticoagulation Services Responsible  [phone]        Anticoagulation Patient Findings  Patient Findings     Negatives:   Signs/symptoms of thrombosis, Signs/symptoms of bleeding, Laboratory test error suspected, Change in health, Change in alcohol use, Change in activity, Upcoming invasive procedure, Emergency department visit, Upcoming dental procedure, Missed doses, Extra doses, Change in medications, Change in diet/appetite, Hospital admission, Bruising, Other complaints         Spoke with the patient on the phone today, reporting a SUPRAd-therapeutic INR of 3.2. Confirmed the current warfarin dosing regimen and patient compliance. Patient denies any cranberries, EtOH or illness. Patient denies any interval changes to diet as she hardly eats greens, and denies any medications changes. Patient denies any signs/symptoms of bleeding or clotting.  Patient instructed to reduce dose to 2.5mg TONIGHT, as a one time reduction, then to resume her current dosing regimen. Patient asked to retest again in 2 weeks, but pt prefers 4 weeks.     Sarath Dooley  PharmD

## 2020-07-17 ENCOUNTER — TELEPHONE (OUTPATIENT)
Dept: VASCULAR LAB | Facility: MEDICAL CENTER | Age: 70
End: 2020-07-17

## 2020-07-21 ENCOUNTER — ANTICOAGULATION MONITORING (OUTPATIENT)
Dept: VASCULAR LAB | Facility: MEDICAL CENTER | Age: 70
End: 2020-07-21

## 2020-07-21 DIAGNOSIS — I26.99 OTHER PULMONARY EMBOLISM WITHOUT ACUTE COR PULMONALE, UNSPECIFIED CHRONICITY (HCC): ICD-10-CM

## 2020-07-21 LAB
INR PPP: 3.2 (ref 0.8–1.2)
PROTHROMBIN TIME: 33.3 SEC (ref 9.1–12)

## 2020-07-21 NOTE — PROGRESS NOTES
Anticoagulation Summary  As of 7/21/2020    INR goal:   2.0-3.0   TTR:   73.9 % (5.2 y)   INR used for dosing:   3.2! (7/21/2020)   Warfarin maintenance plan:   2.5 mg (5 mg x 0.5) every Fri; 5 mg (5 mg x 1) all other days   Weekly warfarin total:   32.5 mg   Plan last modified:   Gloria Luz (9/13/2019)   Next INR check:   8/4/2020   Target end date:   Indefinite    Indications    Pulmonary embolism (HCC) [I26.99]             Anticoagulation Episode Summary     INR check location:   Anticoagulation Clinic    Preferred lab:       Send INR reminders to:       Comments:   indef per PCP       Anticoagulation Care Providers     Provider Role Specialty Phone number    Tom Vega D.O. Referring Internal Medicine [phone]    [facility] Anticoagulation Services Responsible  [phone]        Anticoagulation Patient Findings      Left voicemail message to report a supra-therapeutic INR of 3.2.    Will have pt take 2.5 mg (one-half tablet) of warfarin today and then continue with regular weekly regimen. Requested pt contact the clinic for any s/s of unusual bleeding, bruising, clotting or any changes to diet or medication.    FU INR in 2 week(s).    Junior Granados, Pharmacy Intern

## 2020-08-10 ENCOUNTER — ANTICOAGULATION MONITORING (OUTPATIENT)
Dept: MEDICAL GROUP | Facility: PHYSICIAN GROUP | Age: 70
End: 2020-08-10

## 2020-08-10 DIAGNOSIS — I26.99 OTHER PULMONARY EMBOLISM WITHOUT ACUTE COR PULMONALE, UNSPECIFIED CHRONICITY (HCC): ICD-10-CM

## 2020-08-10 LAB
INR PPP: 1.7 (ref 0.8–1.2)
PLEASE NOTE   199999: NORMAL
PROTHROMBIN TIME: 17.2 SEC (ref 9.1–12)

## 2020-08-10 NOTE — PROGRESS NOTES
Anticoagulation Summary  As of 8/10/2020    INR goal:  2.0-3.0   TTR:  73.8 % (5.2 y)   INR used for dosin.7 (2020)   Warfarin maintenance plan:  2.5 mg (5 mg x 0.5) every Fri; 5 mg (5 mg x 1) all other days   Weekly warfarin total:  32.5 mg   Plan last modified:  Gloria VICTOR Filter (2019)   Next INR check:  2020   Target end date:  Indefinite    Indications    Pulmonary embolism (HCC) [I26.99]             Anticoagulation Episode Summary     INR check location:  Anticoagulation Clinic    Preferred lab:      Send INR reminders to:      Comments:  indef per PCP       Anticoagulation Care Providers     Provider Role Specialty Phone number    Tom Vega D.O. Referring Internal Medicine [phone]    [facility] Anticoagulation Services Responsible  [phone]        Anticoagulation Patient Findings    Spoke to patient on the phone.   INR  sub-therapeutic.   Denies signs/symptoms of bleeding and/or thrombosis.   Denies changes to diet or medications.   Follow up appointment in 3 week(s) per pt     7.5mg today then continue weekly warfarin dose as noted      Fabio Cabrera, PharmD, MS, BCACP, LCC    This note was created using voice recognition software (Dragon). The accuracy of the dictation is limited by the abilities of the software. I have reviewed the note prior to signing, however some errors in grammar and context are still possible. If you have any questions related to this note please do not hesitate to contact our office.

## 2020-08-18 DIAGNOSIS — Z86.711 HISTORY OF PULMONARY EMBOLISM: ICD-10-CM

## 2020-08-18 RX ORDER — WARFARIN SODIUM 5 MG/1
2.5-5 TABLET ORAL DAILY
Qty: 90 TAB | Refills: 1 | Status: SHIPPED | OUTPATIENT
Start: 2020-08-18 | End: 2021-03-03

## 2020-08-28 ENCOUNTER — ANTICOAGULATION MONITORING (OUTPATIENT)
Dept: VASCULAR LAB | Facility: MEDICAL CENTER | Age: 70
End: 2020-08-28

## 2020-08-28 DIAGNOSIS — I26.99 OTHER PULMONARY EMBOLISM WITHOUT ACUTE COR PULMONALE, UNSPECIFIED CHRONICITY (HCC): ICD-10-CM

## 2020-08-28 LAB
INR PPP: 2 (ref 0.8–1.2)
PROTHROMBIN TIME: 20.3 SEC (ref 9.1–12)

## 2020-08-28 NOTE — PROGRESS NOTES
Anticoagulation Summary  As of 2020    INR goal:  2.0-3.0   TTR:  73.1 % (5.3 y)   INR used for dosin.0 (2020)   Warfarin maintenance plan:  2.5 mg (5 mg x 0.5) every Fri; 5 mg (5 mg x 1) all other days   Weekly warfarin total:  32.5 mg   Plan last modified:  Gloria VICTOR Filter (2019)   Next INR check:  2020   Target end date:  Indefinite    Indications    Pulmonary embolism (HCC) [I26.99]             Anticoagulation Episode Summary     INR check location:  Anticoagulation Clinic    Preferred lab:      Send INR reminders to:      Comments:  indef per PCP       Anticoagulation Care Providers     Provider Role Specialty Phone number    Tom Vega D.O. Referring Internal Medicine [phone]    [facility] Anticoagulation Services Responsible  [phone]        Anticoagulation Patient Findings      INR  therapeutic.   Left a voice message for the patient, asked patient to please call the anticoagulation clinic if they have any signs/symptoms of bleeding and/or thrombosis or any changes to diet or medications.    Follow up appointment in 4 week(s)    Continue weekly warfarin dose as noted      Fabio Cabrera, PharmD, MS, BCACP, LCC    This note was created using voice recognition software (Dragon). The accuracy of the dictation is limited by the abilities of the software. I have reviewed the note prior to signing, however some errors in grammar and context are still possible. If you have any questions related to this note please do not hesitate to contact our office.

## 2020-09-21 ENCOUNTER — ANTICOAGULATION MONITORING (OUTPATIENT)
Dept: VASCULAR LAB | Facility: MEDICAL CENTER | Age: 70
End: 2020-09-21

## 2020-09-21 DIAGNOSIS — I26.99 OTHER PULMONARY EMBOLISM WITHOUT ACUTE COR PULMONALE, UNSPECIFIED CHRONICITY (HCC): ICD-10-CM

## 2020-09-21 LAB
INR PPP: 3.5 (ref 0.8–1.2)
PROTHROMBIN TIME: 36.4 SEC (ref 9.1–12)

## 2020-09-21 NOTE — PROGRESS NOTES
OP   Telephone Anticoagulation Service Note      Anticoagulation Summary  As of 9/21/2020    INR goal:  2.0-3.0   TTR:  73.0 % (5.3 y)   INR used for dosing:  3.5 (9/21/2020)   Warfarin maintenance plan:  2.5 mg (5 mg x 0.5) every Fri; 5 mg (5 mg x 1) all other days   Weekly warfarin total:  32.5 mg   Plan last modified:  Kirsten GormanD (8/28/2020)   Next INR check:  10/12/2020   Target end date:  Indefinite    Indications    Pulmonary embolism (HCC) [I26.99]             Anticoagulation Episode Summary     INR check location:  Anticoagulation Clinic    Preferred lab:      Send INR reminders to:      Comments:  indef per PCP       Anticoagulation Care Providers     Provider Role Specialty Phone number    Tom Vega D.O. Referring Internal Medicine [phone]    [facility] Responsible  [phone]        Anticoagulation Patient Findings  Patient Findings     Negatives:  Signs/symptoms of thrombosis, Signs/symptoms of bleeding, Laboratory test error suspected, Change in health, Change in alcohol use, Change in activity, Upcoming invasive procedure, Emergency department visit, Upcoming dental procedure, Missed doses, Extra doses, Change in medications, Change in diet/appetite, Hospital admission, Bruising, Other complaints        Spoke with the patient on the phone today, reporting a SUPRA-therapeutic INR of 3.5. Confirmed the current warfarin dosing regimen and patient compliance. No know reason for elevated INR today.  Patient denies any interval changes to diet and/or medications. Patient denies any signs/symptoms of bleeding or clotting.  Patient instructed to reduce dose to 2.5mg TONIGHT, then to resume her current dosing regimen. Patient will retest again in 3 weeks (per pt preference as she will be out of town).     Sarath CurtisD

## 2020-10-16 ENCOUNTER — ANTICOAGULATION MONITORING (OUTPATIENT)
Dept: VASCULAR LAB | Facility: MEDICAL CENTER | Age: 70
End: 2020-10-16

## 2020-10-16 DIAGNOSIS — I26.99 OTHER PULMONARY EMBOLISM WITHOUT ACUTE COR PULMONALE, UNSPECIFIED CHRONICITY (HCC): ICD-10-CM

## 2020-10-16 LAB
INR PPP: 2.1 (ref 0.9–1.2)
PROTHROMBIN TIME: 21.7 SEC (ref 9.1–12)

## 2020-11-09 ENCOUNTER — TELEPHONE (OUTPATIENT)
Dept: MEDICAL GROUP | Facility: MEDICAL CENTER | Age: 70
End: 2020-11-09

## 2020-11-09 DIAGNOSIS — I26.99 OTHER PULMONARY EMBOLISM WITHOUT ACUTE COR PULMONALE, UNSPECIFIED CHRONICITY (HCC): ICD-10-CM

## 2020-11-09 NOTE — TELEPHONE ENCOUNTER
[facility].   ----- Message from Sheri Shah sent at 11/9/2020  2:01 PM PST -----  Regarding: Patient Call - Standing Order  Pt needs standing order put in and [facility].

## 2020-11-17 ENCOUNTER — ANTICOAGULATION MONITORING (OUTPATIENT)
Dept: VASCULAR LAB | Facility: MEDICAL CENTER | Age: 70
End: 2020-11-17

## 2020-11-17 LAB
INR PPP: 2.6 (ref 0.9–1.2)
PROTHROMBIN TIME: 26.7 SEC (ref 9.1–12)

## 2020-11-18 NOTE — PROGRESS NOTES
Anticoagulation Summary  As of 2020    INR goal:  2.0-3.0   TTR:  73.3 % (5.5 y)   INR used for dosin.6 (2020)   Warfarin maintenance plan:  2.5 mg (5 mg x 0.5) every Fri; 5 mg (5 mg x 1) all other days   Weekly warfarin total:  32.5 mg   Plan last modified:  Fabio Cabrera, PharmD (2020)   Next INR check:  2020   Target end date:  Indefinite    Indications    Pulmonary embolism (HCC) [I26.99]             Anticoagulation Episode Summary     INR check location:  Anticoagulation Clinic    Preferred lab:      Send INR reminders to:      Comments:  indef per PCP       Anticoagulation Care Providers     Provider Role Specialty Phone number    Tom Vega D.O. Referring Internal Medicine [phone]    [facility] Anticoagulation Services Responsible  [phone]        Anticoagulation Patient Findings      Spoke with patient to report a therapeutic INR. Pt confirms dosing, and denies bleeding, or any changes to medication or diet. Pt instructed to continue current dosing regimen.     Will follow up in 5 week(s).     Lane Curtis, Pharmacy Intern

## 2020-12-22 ENCOUNTER — ANTICOAGULATION MONITORING (OUTPATIENT)
Dept: VASCULAR LAB | Facility: MEDICAL CENTER | Age: 70
End: 2020-12-22

## 2020-12-22 LAB
INR PPP: 2.8 (ref 0.9–1.2)
PROTHROMBIN TIME: 29.1 SEC (ref 9.1–12)

## 2020-12-22 NOTE — PROGRESS NOTES
Anticoagulation Summary  As of 2020    INR goal:  2.0-3.0   TTR:  73.8 % (5.6 y)   INR used for dosin.8 (2020)   Warfarin maintenance plan:  2.5 mg (5 mg x 0.5) every Fri; 5 mg (5 mg x 1) all other days   Weekly warfarin total:  32.5 mg   Plan last modified:  Kirsten GormanD (2020)   Next INR check:  2021   Target end date:  Indefinite    Indications    Pulmonary embolism (HCC) [I26.99]             Anticoagulation Episode Summary     INR check location:  Anticoagulation Clinic    Preferred lab:      Send INR reminders to:      Comments:  indef per PCP       Anticoagulation Care Providers     Provider Role Specialty Phone number    Tom Vega D.O. Referring Internal Medicine [phone]    [facility] Anticoagulation Services Responsible  [phone]        Anticoagulation Patient Findings  Patient Findings     Negatives:  Signs/symptoms of thrombosis, Signs/symptoms of bleeding, Laboratory test error suspected, Change in health, Change in alcohol use, Change in activity, Upcoming invasive procedure, Emergency department visit, Upcoming dental procedure, Missed doses, Extra doses, Change in medications, Change in diet/appetite, Hospital admission, Bruising, Other complaints         Spoke with patient today regarding therapeutic INR of 2.8.  Patient denies any signs/symptoms of bruising or bleeding or any changes in diet and medications.  Instructed patient to call clinic with any questions or concerns.  Pt is to continue with current warfarin dosing regimen.  Follow up in 7 weeks, to reduce risk of adverse events related to this high risk medication,  Warfarin.    Torres Ramos, PharmD, BCACP

## 2021-01-15 DIAGNOSIS — Z23 NEED FOR VACCINATION: ICD-10-CM

## 2021-02-08 LAB
INR PPP: 2 (ref 0.9–1.2)
PROTHROMBIN TIME: 21 SEC (ref 9.1–12)

## 2021-02-09 ENCOUNTER — ANTICOAGULATION MONITORING (OUTPATIENT)
Dept: MEDICAL GROUP | Facility: PHYSICIAN GROUP | Age: 71
End: 2021-02-09

## 2021-02-09 NOTE — PROGRESS NOTES
Anticoagulation Summary  As of 2021    INR goal:  2.0-3.0   TTR:  74.4 % (5.7 y)   INR used for dosin.0 (2021)   Warfarin maintenance plan:  2.5 mg (5 mg x 0.5) every Fri; 5 mg (5 mg x 1) all other days   Weekly warfarin total:  32.5 mg   Plan last modified:  Fabio Cabrera, PharmD (2020)   Next INR check:  3/2/2021   Target end date:  Indefinite    Indications    Pulmonary embolism (HCC) [I26.99]             Anticoagulation Episode Summary     INR check location:  Anticoagulation Clinic    Preferred lab:      Send INR reminders to:      Comments:  indef per PCP       Anticoagulation Care Providers     Provider Role Specialty Phone number    Tom Vega D.O. Referring Internal Medicine [phone]    [facility] Anticoagulation Services Responsible  [phone]        Anticoagulation Patient Findings      Spoke with patient on the phone.  Patient is therapeutic today.  Denies any changes in medications or diet.  Patient denies any signs or symptoms of bleeding or clotting.  Will continue dosing as outlined above.  Will follow-up with patient in 4 weeks.      Kiarra Lara, Pharmacy Intern

## 2021-03-03 DIAGNOSIS — Z86.711 HISTORY OF PULMONARY EMBOLISM: ICD-10-CM

## 2021-03-03 RX ORDER — WARFARIN SODIUM 5 MG/1
TABLET ORAL
Qty: 90 TABLET | Refills: 1 | Status: SHIPPED
Start: 2021-03-03 | End: 2021-09-20 | Stop reason: SDUPTHER

## 2021-03-08 ENCOUNTER — ANTICOAGULATION MONITORING (OUTPATIENT)
Dept: VASCULAR LAB | Facility: MEDICAL CENTER | Age: 71
End: 2021-03-08

## 2021-03-08 DIAGNOSIS — I26.99 PULMONARY EMBOLISM, UNSPECIFIED CHRONICITY, UNSPECIFIED PULMONARY EMBOLISM TYPE, UNSPECIFIED WHETHER ACUTE COR PULMONALE PRESENT (HCC): ICD-10-CM

## 2021-03-08 LAB
INR PPP: 3.2 (ref 0.9–1.2)
INR PPP: 3.2 (ref 2–3.5)
PROTHROMBIN TIME: 32.7 SEC (ref 9.1–12)

## 2021-04-07 ENCOUNTER — ANTICOAGULATION MONITORING (OUTPATIENT)
Dept: VASCULAR LAB | Facility: MEDICAL CENTER | Age: 71
End: 2021-04-07

## 2021-04-07 DIAGNOSIS — I26.99 PULMONARY EMBOLISM, UNSPECIFIED CHRONICITY, UNSPECIFIED PULMONARY EMBOLISM TYPE, UNSPECIFIED WHETHER ACUTE COR PULMONALE PRESENT (HCC): ICD-10-CM

## 2021-04-07 LAB
INR PPP: 2.4 (ref 0.9–1.2)
PROTHROMBIN TIME: 25.2 SEC (ref 9.1–12)

## 2021-04-08 NOTE — PROGRESS NOTES
Anticoagulation Summary  As of 2021    INR goal:  2.0-3.0   TTR:  74.5 % (5.9 y)   INR used for dosin.4 (2021)   Warfarin maintenance plan:  2.5 mg (5 mg x 0.5) every Fri; 5 mg (5 mg x 1) all other days   Weekly warfarin total:  32.5 mg   Plan last modified:  Kirsten GormanD (2020)   Next INR check:  2021   Target end date:  Indefinite    Indications    Pulmonary embolism (HCC) [I26.99]             Anticoagulation Episode Summary     INR check location:  Anticoagulation Clinic    Preferred lab:      Send INR reminders to:      Comments:  indef per PCP       Anticoagulation Care Providers     Provider Role Specialty Phone number    Tom Vega D.O. Referring Internal Medicine [phone]    [facility] Anticoagulation Services Responsible  [phone]        Anticoagulation Patient Findings      Left voicemail message to report a therapeutic INR of 2.4.      Pt to continue with current warfarin dosing regimen. Requested pt contact the clinic for any s/s of unusual bleeding, bruising, clotting or any changes to diet or medication.    FU INR in 4 week(s).    Mallorie Barriga, PharmD

## 2021-05-03 ENCOUNTER — ANTICOAGULATION MONITORING (OUTPATIENT)
Dept: VASCULAR LAB | Facility: MEDICAL CENTER | Age: 71
End: 2021-05-03

## 2021-05-03 DIAGNOSIS — I26.99 PULMONARY EMBOLISM, UNSPECIFIED CHRONICITY, UNSPECIFIED PULMONARY EMBOLISM TYPE, UNSPECIFIED WHETHER ACUTE COR PULMONALE PRESENT (HCC): ICD-10-CM

## 2021-05-03 LAB
INR PPP: 3.5 (ref 0.9–1.2)
PROTHROMBIN TIME: 36.8 SEC (ref 9.1–12)

## 2021-05-03 NOTE — PROGRESS NOTES
OP Telephone Anticoagulation Service Note    Date: 5/3/2021      Anticoagulation Summary  As of 5/3/2021    INR goal:  2.0-3.0   TTR:  74.3 % (5.9 y)   INR used for dosing:  3.5 (5/3/2021)   Warfarin maintenance plan:  2.5 mg (5 mg x 0.5) every Fri; 5 mg (5 mg x 1) all other days   Weekly warfarin total:  32.5 mg   Plan last modified:  Fabio Cabrera, PharmD (8/28/2020)   Next INR check:  5/17/2021   Target end date:  Indefinite    Indications    Pulmonary embolism (HCC) [I26.99]             Anticoagulation Episode Summary     INR check location:  Anticoagulation Clinic    Preferred lab:      Send INR reminders to:      Comments:  indef per PCP       Anticoagulation Care Providers     Provider Role Specialty Phone number    Tom Vega D.O. Referring Internal Medicine [phone]    [facility] Anticoagulation Services Responsible  [phone]        Anticoagulation Patient Findings      INR SUPRA-therapeutic at 3.5.  Spoke w/ pt on phone.  Verified regimen w/ pt.  Instructed pt to hold x 1 dose and to then continue on w/ her current regimen.  NO s/s bleeding reported per pt.  NO changes in diet reported per pt.  NO changes in medications reported per pt.  Check INR in 2 week(s).  Instructed pt to call clinic at [phone] if there are any questions.  Pt stated understanding.    Pt is not on antiplatelet therapy.    David Pedro, KirstenD

## 2021-05-07 DIAGNOSIS — Z79.01 CHRONIC ANTICOAGULATION: ICD-10-CM

## 2021-06-22 ENCOUNTER — TELEPHONE (OUTPATIENT)
Dept: VASCULAR LAB | Facility: MEDICAL CENTER | Age: 71
End: 2021-06-22

## 2021-06-24 ENCOUNTER — ANTICOAGULATION MONITORING (OUTPATIENT)
Dept: VASCULAR LAB | Facility: MEDICAL CENTER | Age: 71
End: 2021-06-24

## 2021-06-24 LAB
INR PPP: 2.1 (ref 0.9–1.2)
PROTHROMBIN TIME: 22.3 SEC (ref 9.1–12)

## 2021-06-24 NOTE — PROGRESS NOTES
OP Anticoagulation Service Note    Date: 2021    Anticoagulation Summary  As of 2021    INR goal:  2.0-3.0   TTR:  74.0 % (6.1 y)   INR used for dosin.1 (2021)   Warfarin maintenance plan:  2.5 mg (5 mg x 0.5) every Fri; 5 mg (5 mg x 1) all other days   Weekly warfarin total:  32.5 mg   Plan last modified:  Fabio Cabrera, PharmD (2020)   Next INR check:  2021   Target end date:  Indefinite    Indications    Pulmonary embolism (HCC) [I26.99]             Anticoagulation Episode Summary     INR check location:  Anticoagulation Clinic    Preferred lab:      Send INR reminders to:      Comments:  indef per PCP       Anticoagulation Care Providers     Provider Role Specialty Phone number    Tom Vega D.O. Referring Internal Medicine [phone]    [facility] Anticoagulation Services Responsible  [phone]        Anticoagulation Patient Findings      Voice message for patient regarding their anticoagulant.     HPI:   The reason for today's call is to prevent morbidity and mortality from a blood clot and/or stroke and to reduce the risk of bleeding while on a anticoagulant.     PCP:  Gala Ward M.D.  [address] [place] [phone]    Assessment:     • INR  therapeutic.       Current Outpatient Medications:   •  warfarin, Take one-half to one tablet by mouth daily or as directed by anticoagulation clinic  •  celecoxib, 200 mg, Oral, BID  •  B Complex Vitamins (VITAMIN B COMPLEX PO), Take  by mouth.  •  Vitamin D, 2,000 Units, Oral, DAILY  •  omeprazole, 20 mg, Oral, DAILY      Plan:     • Continue the same warfarin dose, as noted above.       Follow-up:     • Our protocol suggests we test in 8 weeks.        Additional information discussed with patient:     • Asked patient to please call the anticoagulation clinic if they have any signs/symptoms of bleeding and/or thrombosis or any changes to diet or medications.      National recommendations regarding  anticoagulation therapy:     The CHEST guidelines recommends frequent INR monitoring at regular intervals (a few days up to a max of 12 weeks) to ensure patients are on the proper dose of warfarin, and patients are not having any complications from therapy.  INRs can dramatically change over a short time period due to diet, medications, and medical conditions.       Fabio Cabrera, PharmD, MS, BCACP, [facility]  Phone [phone] fax [phone]    This note was created using voice recognition software (Dragon). The accuracy of the dictation is limited by the abilities of the software. I have reviewed the note prior to signing, however some errors in grammar and context are still possible. If you have any questions related to this note please do not hesitate to contact our office.

## 2021-08-23 LAB — INR PPP: 2.2 (ref 2–3.5)

## 2021-08-26 ENCOUNTER — ANTICOAGULATION MONITORING (OUTPATIENT)
Dept: VASCULAR LAB | Facility: MEDICAL CENTER | Age: 71
End: 2021-08-26

## 2021-08-26 ENCOUNTER — DOCUMENTATION (OUTPATIENT)
Dept: VASCULAR LAB | Facility: MEDICAL CENTER | Age: 71
End: 2021-08-26

## 2021-08-26 DIAGNOSIS — I26.99 PULMONARY EMBOLISM, UNSPECIFIED CHRONICITY, UNSPECIFIED PULMONARY EMBOLISM TYPE, UNSPECIFIED WHETHER ACUTE COR PULMONALE PRESENT (HCC): ICD-10-CM

## 2021-08-26 NOTE — PROGRESS NOTES
OP   Telephone Anticoagulation Service Note      Anticoagulation Summary  As of 2021    INR goal:  2.0-3.0   TTR:  74.7 % (6.3 y)   INR used for dosin.20 (2021)   Warfarin maintenance plan:  2.5 mg (5 mg x 0.5) every Fri; 5 mg (5 mg x 1) all other days   Weekly warfarin total:  32.5 mg   Plan last modified:  Fabio Cabrera PharmD (2020)   Next INR check:  10/21/2021   Target end date:  Indefinite    Indications    Pulmonary embolism (HCC) [I26.99]             Anticoagulation Episode Summary     INR check location:  Anticoagulation Clinic    Preferred lab:      Send INR reminders to:      Comments:  indef per PCP       Anticoagulation Care Providers     Provider Role Specialty Phone number    Tom Vega D.O. Referring Internal Medicine [phone]    [facility] Anticoagulation Services Responsible  [phone]        Anticoagulation Patient Findings  Patient Findings     Negatives:  Signs/symptoms of thrombosis, Signs/symptoms of bleeding, Laboratory test error suspected, Change in health, Change in alcohol use, Change in activity, Upcoming invasive procedure, Emergency department visit, Upcoming dental procedure, Missed doses, Extra doses, Change in medications, Change in diet/appetite, Hospital admission, Bruising, Other complaints         Spoke with the patient on the phone today, reporting a therapeutic INR of 2.2.   Confirmed the current warfarin dosing regimen and patient compliance.  Patient denies any interval changes to diet and/or medications. Patient denies any signs/symptoms of bleeding or clotting.  Patient instructed to continue with the current warfarin dosing regimen, and asked to follow up again in 8 weeks.       Sarath CurtisD

## 2021-08-26 NOTE — PROGRESS NOTES
[facility]    Patient called the clinic this morning to inquire as to why she has not received a call regarding her INR drawn this past Monday, 8/23/21 @ [facility] [address].   Does not appear that we have received the results yet. Called [facility] and requested they send the results.   Will call patient back once we receive it.       Sarath CurtisD

## 2021-09-20 DIAGNOSIS — Z86.711 HISTORY OF PULMONARY EMBOLISM: ICD-10-CM

## 2021-09-20 RX ORDER — WARFARIN SODIUM 5 MG/1
TABLET ORAL
Qty: 90 TABLET | Refills: 1 | Status: SHIPPED | OUTPATIENT
Start: 2021-09-20 | End: 2021-09-21 | Stop reason: SDUPTHER

## 2021-09-21 ENCOUNTER — ANTICOAGULATION MONITORING (OUTPATIENT)
Dept: VASCULAR LAB | Facility: MEDICAL CENTER | Age: 71
End: 2021-09-21

## 2021-09-21 DIAGNOSIS — Z86.711 HISTORY OF PULMONARY EMBOLISM: ICD-10-CM

## 2021-09-21 RX ORDER — WARFARIN SODIUM 5 MG/1
TABLET ORAL
Qty: 90 TABLET | Refills: 1 | Status: SHIPPED | OUTPATIENT
Start: 2021-09-21 | End: 2022-10-06 | Stop reason: SDUPTHER

## 2021-10-20 LAB — INR PPP: 2.2 (ref 2–3.5)

## 2021-10-21 ENCOUNTER — ANTICOAGULATION MONITORING (OUTPATIENT)
Dept: VASCULAR LAB | Facility: MEDICAL CENTER | Age: 71
End: 2021-10-21

## 2021-10-21 LAB
INR PPP: 2.2 (ref 0.9–1.2)
PROTHROMBIN TIME: 22 SEC (ref 9.1–12)

## 2021-10-21 NOTE — PROGRESS NOTES
Anticoagulation Summary  As of 10/21/2021    INR goal:  2.0-3.0   TTR:  75.3 % (6.4 y)   INR used for dosin.20 (10/20/2021)   Warfarin maintenance plan:  2.5 mg (5 mg x 0.5) every Fri; 5 mg (5 mg x 1) all other days   Weekly warfarin total:  32.5 mg   Plan last modified:  Fabio Cabrera, PharmD (2020)   Next INR check:  2021   Target end date:  Indefinite    Indications    Pulmonary embolism (HCC) [I26.99]             Anticoagulation Episode Summary     INR check location:  Anticoagulation Clinic    Preferred lab:      Send INR reminders to:      Comments:  indef per PCP       Anticoagulation Care Providers     Provider Role Specialty Phone number    Tom Vega D.O. Referring Internal Medicine [phone]    [facility] Anticoagulation Services Responsible  [phone]        Anticoagulation Patient Findings          Spoke with Lawanda to report a therapeutic INR of 2.2. Continue current dosing regimen.  Follow up in 8 weeks, to reduce the risk of adverse events related to this high risk medication, warfarin.    Gloria Luz, Clinical Pharmacist

## 2021-12-17 ENCOUNTER — ANTICOAGULATION MONITORING (OUTPATIENT)
Dept: MEDICAL GROUP | Facility: PHYSICIAN GROUP | Age: 71
End: 2021-12-17

## 2021-12-17 DIAGNOSIS — Z79.01 CHRONIC ANTICOAGULATION: ICD-10-CM

## 2021-12-17 DIAGNOSIS — Z86.711 HISTORY OF PULMONARY EMBOLISM: ICD-10-CM

## 2021-12-17 LAB
INR PPP: 3.5 (ref 0.9–1.2)
PROTHROMBIN TIME: 34 SEC (ref 9.1–12)

## 2021-12-18 NOTE — PROGRESS NOTES
Anticoagulation Summary  As of 12/17/2021    INR goal:  2.0-3.0   TTR:  75.0 % (6.6 y)   INR used for dosing:  3.5 (12/17/2021)   Warfarin maintenance plan:  2.5 mg (5 mg x 0.5) every Fri; 5 mg (5 mg x 1) all other days   Weekly warfarin total:  32.5 mg   Plan last modified:  Kirsten GormanD (8/28/2020)   Next INR check:  12/30/2021   Target end date:  Indefinite    Indications    Pulmonary embolism (HCC) [I26.99]             Anticoagulation Episode Summary     INR check location:  Anticoagulation Clinic    Preferred lab:      Send INR reminders to:      Comments:  indef per PCP       Anticoagulation Care Providers     Provider Role Specialty Phone number    Tom Vega D.O. Referring Internal Medicine [phone]    [facility] Anticoagulation Services Responsible  [phone]          Refer to Anticoagulation Patient Findings for HPI  Patient Findings     Positives:  Change in diet/appetite (Pt eating more cranberry and spinach lately)    Negatives:  Signs/symptoms of thrombosis, Signs/symptoms of bleeding, Laboratory test error suspected, Change in health, Change in alcohol use, Change in activity, Upcoming invasive procedure, Emergency department visit, Upcoming dental procedure, Missed doses, Extra doses, Change in medications, Hospital admission, Bruising, Other complaints          Spoke with pt.  INR is SUPRA therapeutic.     Pt verifies warfarin weekly dosing.     Pt is NOT on antiplatelet therapy.    Will have pt HOLD x 1 dose and then continue on w/ her current regimen.    Repeat INR in 2 week(s).     David Pedro, PharmD, BCACP

## 2022-01-03 ENCOUNTER — DOCUMENTATION (OUTPATIENT)
Dept: VASCULAR LAB | Facility: MEDICAL CENTER | Age: 72
End: 2022-01-03

## 2022-01-03 NOTE — PROGRESS NOTES
[facility]    Received a call from Lawanda stating that [facility] did not receive INR order. Refaxed order to [phone]    José Manuel Mukherjee, KirstenD

## 2022-01-04 ENCOUNTER — ANTICOAGULATION MONITORING (OUTPATIENT)
Dept: CARDIOLOGY | Facility: MEDICAL CENTER | Age: 72
End: 2022-01-04

## 2022-01-04 LAB
INR PPP: 2.9 (ref 0.9–1.2)
PROTHROMBIN TIME: 28.2 SEC (ref 9.1–12)

## 2022-01-05 NOTE — PROGRESS NOTES
Anticoagulation Summary  As of 2022    INR goal:  2.0-3.0   TTR:  74.6 % (6.6 y)   INR used for dosin.9 (2022)   Warfarin maintenance plan:  2.5 mg (5 mg x 0.5) every Fri; 5 mg (5 mg x 1) all other days   Weekly warfarin total:  32.5 mg   Plan last modified:  Fabio Cabrera, PharmD (2020)   Next INR check:  2022   Target end date:  Indefinite    Indications    Pulmonary embolism (HCC) [I26.99]             Anticoagulation Episode Summary     INR check location:  Anticoagulation Clinic    Preferred lab:      Send INR reminders to:      Comments:  indef per PCP       Anticoagulation Care Providers     Provider Role Specialty Phone number    Tom Vega D.O. Referring Internal Medicine [phone]    [facility] Anticoagulation Services Responsible  [phone]          Refer to Anticoagulation Patient Findings for HPI  Patient Findings     Negatives:  Signs/symptoms of thrombosis, Signs/symptoms of bleeding, Laboratory test error suspected, Change in health, Change in alcohol use, Change in activity, Upcoming invasive procedure, Emergency department visit, Upcoming dental procedure, Missed doses, Extra doses, Change in medications, Change in diet/appetite (cut back on cranberries), Hospital admission, Bruising, Other complaints          Spoke with patient on the phone to report a therapeutic INR.      Pt is NOT on antiplatelet therapy.    Pt instructed to continue with current warfarin dosing regimen, confirms dosing.   Will follow up in 2 week(s).    Chelsea Bell, Pharmacy Intern

## 2022-01-24 ENCOUNTER — ANTICOAGULATION MONITORING (OUTPATIENT)
Dept: VASCULAR LAB | Facility: MEDICAL CENTER | Age: 72
End: 2022-01-24

## 2022-01-24 LAB
INR PPP: 2.4 (ref 0.9–1.2)
PROTHROMBIN TIME: 24 SEC (ref 9.1–12)

## 2022-01-25 NOTE — PROGRESS NOTES
ADDENDUM 1/25/22: Received vm asking about dosing regimen. S/w pt - she received our vm with dosing instructions and has no additional questions.    F/u in 4 weeks    Mallorie Galloway, KirstenD

## 2022-01-25 NOTE — PROGRESS NOTES
Anticoagulation Summary  As of 2022    INR goal:  2.0-3.0   TTR:  74.8 % (6.7 y)   INR used for dosin.4 (2022)   Warfarin maintenance plan:  2.5 mg (5 mg x 0.5) every Fri; 5 mg (5 mg x 1) all other days   Weekly warfarin total:  32.5 mg   Plan last modified:  Fabio Cabrera PharmD (2020)   Next INR check:  2022   Target end date:  Indefinite    Indications    Pulmonary embolism (HCC) [I26.99]             Anticoagulation Episode Summary     INR check location:  Anticoagulation Clinic    Preferred lab:      Send INR reminders to:      Comments:  indef per PCP       Anticoagulation Care Providers     Provider Role Specialty Phone number    Tom Vega D.O. Referring Internal Medicine [phone]    [facility] Anticoagulation Services Responsible  [phone]        Anticoagulation Patient Findings      Left voicemail message to report a therapeutic INR of 2.4.    Will have pt continue on with current warfarin dosing regimen.   Requested pt contact the clinic for any s/s of unusual bleeding, bruising, clotting or any changes to diet or medication.    FU INR in 4 week(s).    David Pedro, PharmD, BCACP

## 2022-02-24 LAB
INR PPP: 3 (ref 0.9–1.2)
PROTHROMBIN TIME: 29 SEC (ref 9.1–12)

## 2022-02-25 ENCOUNTER — ANTICOAGULATION MONITORING (OUTPATIENT)
Dept: MEDICAL GROUP | Facility: PHYSICIAN GROUP | Age: 72
End: 2022-02-25
Payer: MEDICARE

## 2022-02-26 NOTE — PROGRESS NOTES
Anticoagulation Summary  As of 2/25/2022    INR goal:  2.0-3.0   TTR:  75.1 % (6.8 y)   INR used for dosing:  3.0 (2/24/2022)   Warfarin maintenance plan:  2.5 mg (5 mg x 0.5) every Fri; 5 mg (5 mg x 1) all other days   Weekly warfarin total:  32.5 mg   Plan last modified:  Fabio Cabrera, PharmD (8/28/2020)   Next INR check:  3/24/2022   Target end date:  Indefinite    Indications    Pulmonary embolism (HCC) [I26.99]             Anticoagulation Episode Summary     INR check location:  Anticoagulation Clinic    Preferred lab:      Send INR reminders to:      Comments:  indef per PCP       Anticoagulation Care Providers     Provider Role Specialty Phone number    Tom Vega D.O. Referring Internal Medicine [phone]    [facility] Anticoagulation Services Responsible  [phone]        Anticoagulation Patient Findings      Left voicemail message to report a therapeutic INR of 3.0.        Pt to continue with current warfarin dosing regimen.     Requested pt contact the clinic for any s/s of unusual bleeding, bruising, clotting or any changes to diet or medication.    FU INR in 4 week(s).    Jose Crouch, Pharmacy Intern

## 2022-02-28 ENCOUNTER — TELEPHONE (OUTPATIENT)
Dept: VASCULAR LAB | Facility: MEDICAL CENTER | Age: 72
End: 2022-02-28
Payer: MEDICARE

## 2022-02-28 NOTE — TELEPHONE ENCOUNTER
Received VM from pt that she needed help resetting her MyChart acct    LM that pt can call [phone] for MyChart support    Mallorie Galloway, KirstenD

## 2022-03-23 ENCOUNTER — ANTICOAGULATION MONITORING (OUTPATIENT)
Dept: VASCULAR LAB | Facility: MEDICAL CENTER | Age: 72
End: 2022-03-23
Payer: MEDICARE

## 2022-03-23 LAB
INR PPP: 2 (ref 0.9–1.2)
PROTHROMBIN TIME: 20 SEC (ref 9.1–12)

## 2022-03-23 NOTE — PROGRESS NOTES
Anticoagulation Summary  As of 3/23/2022    INR goal:  2.0-3.0   TTR:  75.3 % (6.8 y)   INR used for dosin.0 (3/22/2022)   Warfarin maintenance plan:  2.5 mg (5 mg x 0.5) every Fri; 5 mg (5 mg x 1) all other days   Weekly warfarin total:  32.5 mg   Plan last modified:  Fabio Cabrera, PharmD (2020)   Next INR check:  2022   Target end date:  Indefinite    Indications    Pulmonary embolism (HCC) [I26.99]             Anticoagulation Episode Summary     INR check location:  Anticoagulation Clinic    Preferred lab:      Send INR reminders to:      Comments:  indef per PCP       Anticoagulation Care Providers     Provider Role Specialty Phone number    Tom Vega D.O. Referring Internal Medicine [phone]    [facility] Anticoagulation Services Responsible  [phone]        Anticoagulation Patient Findings  Patient Findings     Negatives:  Signs/symptoms of thrombosis, Signs/symptoms of bleeding, Laboratory test error suspected, Change in health, Change in alcohol use, Change in activity, Upcoming invasive procedure, Emergency department visit, Upcoming dental procedure, Missed doses, Extra doses, Change in medications, Change in diet/appetite, Hospital admission, Bruising, Other complaints        Spoke with patient today regarding therapeutic INR of 2.0.  Patient denies any signs/symptoms of bruising or bleeding or any changes in diet and medications.  Instructed patient to call clinic with any questions or concerns.    Pt is not on antiplatelet therapy    Pt is to continue with current warfarin dosing regimen.  Follow up in 4 weeks, to reduce risk of adverse events related to this high risk medication,  Warfarin.    Tommie Denise, Pharmacy Intern

## 2022-04-26 ENCOUNTER — ANTICOAGULATION MONITORING (OUTPATIENT)
Dept: VASCULAR LAB | Facility: MEDICAL CENTER | Age: 72
End: 2022-04-26
Payer: MEDICARE

## 2022-04-26 LAB
INR PPP: 1.9 (ref 0.9–1.2)
PROTHROMBIN TIME: 18.4 SEC (ref 9.1–12)

## 2022-04-26 NOTE — PROGRESS NOTES
OP Anticoagulation Service Note    Date: 2022    Anticoagulation Summary  As of 2022    INR goal:  2.0-3.0   TTR:  74.3 % (6.9 y)   INR used for dosin.9 (2022)   Warfarin maintenance plan:  5 mg (5 mg x 1) every day   Weekly warfarin total:  35 mg   Plan last modified:  Fabio Cabrera, PharmD (2022)   Next INR check:  2022   Target end date:  Indefinite    Indications    Pulmonary embolism (HCC) [I26.99]             Anticoagulation Episode Summary     INR check location:  Anticoagulation Clinic    Preferred lab:      Send INR reminders to:      Comments:  indef per PCP       Anticoagulation Care Providers     Provider Role Specialty Phone number    Tom Vega D.O. Referring Internal Medicine [phone]    [facility] Anticoagulation Services Responsible  [phone]        Anticoagulation Patient Findings        HPI:   The reason for today's call is to prevent morbidity and mortality from a blood clot and/or stroke and to reduce the risk of bleeding while on a anticoagulant.     PCP:  Gala Ward M.D.  [address] [place] [phone]    Assessment:     • INR  sub-therapeutic.       Current Outpatient Medications:   •  warfarin, Take one-half to one tablet by mouth daily or as directed by anticoagulation clinic  •  celecoxib, 200 mg, Oral, BID  •  B Complex Vitamins (VITAMIN B COMPLEX PO), Take  by mouth.  •  Vitamin D, 2,000 Units, Oral, DAILY  •  omeprazole, 20 mg, Oral, DAILY      Plan:     • Increase weekly warfarin dose as noted      Follow-up:     • Our protocol suggests we test in 3 weeks.        Additional information discussed with patient:     • Asked patient to please call the anticoagulation clinic if they have any signs/symptoms of bleeding and/or thrombosis or any changes to diet or medications.      National recommendations regarding anticoagulation therapy:     The CHEST guidelines recommends frequent INR monitoring at regular intervals  (a few days up to a max of 12 weeks) to ensure patients are on the proper dose of warfarin, and patients are not having any complications from therapy.  INRs can dramatically change over a short time period due to diet, medications, and medical conditions.     [facility]  Phone [phone] fax [phone]    This note was created using voice recognition software (Dragon). The accuracy of the dictation is limited by the abilities of the software. I have reviewed the note prior to signing, however some errors in grammar and context are still possible. If you have any questions related to this note please do not hesitate to contact our office.

## 2022-05-26 ENCOUNTER — ANTICOAGULATION MONITORING (OUTPATIENT)
Dept: VASCULAR LAB | Facility: MEDICAL CENTER | Age: 72
End: 2022-05-26
Payer: MEDICARE

## 2022-05-26 LAB
INR PPP: 2.3 (ref 0.9–1.2)
PROTHROMBIN TIME: 23.4 SEC (ref 9.1–12)

## 2022-05-26 NOTE — PROGRESS NOTES
Anticoagulation Summary  As of 2022    INR goal:  2.0-3.0   TTR:  74.2 % (7 y)   INR used for dosin.3 (2022)   Warfarin maintenance plan:  2.5 mg (5 mg x 0.5) every Fri; 5 mg (5 mg x 1) all other days   Weekly warfarin total:  32.5 mg   Plan last modified:  David Pedro PharmD (2022)   Next INR check:  2022   Target end date:  Indefinite    Indications    Pulmonary embolism (HCC) [I26.99]             Anticoagulation Episode Summary     INR check location:  Anticoagulation Clinic    Preferred lab:      Send INR reminders to:      Comments:  indef per PCP       Anticoagulation Care Providers     Provider Role Specialty Phone number    Tom Vega D.O. Referring Internal Medicine [phone]    [facility] Anticoagulation Services Responsible  [phone]          Refer to Anticoagulation Patient Findings for HPI  Patient Findings     Negatives:  Signs/symptoms of thrombosis, Signs/symptoms of bleeding, Laboratory test error suspected, Change in health, Change in alcohol use, Change in activity, Upcoming invasive procedure, Emergency department visit, Upcoming dental procedure, Missed doses, Extra doses, Change in medications, Change in diet/appetite, Hospital admission, Bruising, Other complaints          Spoke with patient to report a therapeutic INR.      Pt instructed to continue with current warfarin dosing regimen, confirms dosing - pt's been taking 2.5 mg Fri and 5 mg ROW. Updated maintenance plan accordingly.     Will follow up in 4 week(s).     David Pedro, PharmD, BCACP

## 2022-06-24 ENCOUNTER — ANTICOAGULATION MONITORING (OUTPATIENT)
Dept: MEDICAL GROUP | Facility: PHYSICIAN GROUP | Age: 72
End: 2022-06-24
Payer: MEDICAID

## 2022-06-24 LAB
INR PPP: 2.5 (ref 0.9–1.2)
PROTHROMBIN TIME: 24.6 SEC (ref 9.1–12)

## 2022-06-24 NOTE — PROGRESS NOTES
OP Anticoagulation Service Note    Date: 2022    Anticoagulation Summary  As of 2022    INR goal:  2.0-3.0   TTR:  74.5 % (7.1 y)   INR used for dosin.5 (2022)   Warfarin maintenance plan:  2.5 mg (5 mg x 0.5) every Fri; 5 mg (5 mg x 1) all other days   Weekly warfarin total:  32.5 mg   Plan last modified:  David Pedro, PharmD (2022)   Next INR check:  2022   Target end date:  Indefinite    Indications    Pulmonary embolism (HCC) [I26.99]             Anticoagulation Episode Summary     INR check location:  Anticoagulation Clinic    Preferred lab:      Send INR reminders to:      Comments:  indef per PCP       Anticoagulation Care Providers     Provider Role Specialty Phone number    Tom Vega D.O. Referring Internal Medicine [phone]    [facility] Anticoagulation Services Responsible  [phone]        Anticoagulation Patient Findings            HPI:   The reason for today's call is to prevent morbidity and mortality from a blood clot and/or stroke and to reduce the risk of bleeding while on a anticoagulant.     PCP:  Gala Ward M.D.  [address] [place] [phone]    Assessment:     • INR  therapeutic.     Lab Results   Component Value Date/Time    BUN 16 10/02/2018 08:55 AM    CREATININE 0.79 10/02/2018 08:55 AM     Lab Results   Component Value Date/Time    HEMOGLOBIN 16.3 (H) 10/02/2018 08:55 AM    HEMATOCRIT 48.2 (H) 10/02/2018 08:55 AM    PLATELETCT 204 10/02/2018 08:55 AM    ALKPHOSPHAT 133 (H) 10/02/2018 08:55 AM    ASTSGOT 39 10/02/2018 08:55 AM    ALTSGPT 42 10/02/2018 08:55 AM          Current Outpatient Medications:   •  warfarin, Take one-half to one tablet by mouth daily or as directed by anticoagulation clinic  •  celecoxib, 200 mg, Oral, BID  •  B Complex Vitamins (VITAMIN B COMPLEX PO), Take  by mouth.  •  Vitamin D, 2,000 Units, Oral, DAILY  •  omeprazole, 20 mg, Oral, DAILY      Plan:     • Continue the same warfarin dose, as  noted above.       Follow-up:     • test in 4-6 weeks.        Additional information discussed with patient:     • Asked patient to please call the anticoagulation clinic if they have any signs/symptoms of bleeding and/or thrombosis or any changes to diet or medications.      National recommendations regarding anticoagulation therapy:     The CHEST guidelines recommends frequent INR monitoring at regular intervals (a few days up to a max of 12 weeks) to ensure patients are on the proper dose of warfarin, and patients are not having any complications from therapy.  INRs can dramatically change over a short time period due to diet, medications, and medical conditions.     [facility]  Phone [phone] fax [phone]    This note was created using voice recognition software (Dragon). The accuracy of the dictation is limited by the abilities of the software. I have reviewed the note prior to signing, however some errors in grammar and context are still possible. If you have any questions related to this note please do not hesitate to contact our office.

## 2022-08-04 ENCOUNTER — ANTICOAGULATION MONITORING (OUTPATIENT)
Dept: VASCULAR LAB | Facility: MEDICAL CENTER | Age: 72
End: 2022-08-04
Payer: MEDICARE

## 2022-08-04 LAB
INR PPP: 2.6 (ref 0.9–1.2)
PROTHROMBIN TIME: 26.1 SEC (ref 9.1–12)

## 2022-08-04 NOTE — PROGRESS NOTES
OP Anticoagulation Service Note    Date: 2022    Anticoagulation Summary  As of 2022    INR goal:  2.0-3.0   TTR:  74.9 % (7.2 y)   INR used for dosin.6 (8/3/2022)   Warfarin maintenance plan:  2.5 mg (5 mg x 0.5) every Fri; 5 mg (5 mg x 1) all other days   Weekly warfarin total:  32.5 mg   Plan last modified:  David Pedro, PharmD (2022)   Next INR check:  9/15/2022   Target end date:  Indefinite    Indications    Pulmonary embolism (HCC) [I26.99]             Anticoagulation Episode Summary     INR check location:  Anticoagulation Clinic    Preferred lab:      Send INR reminders to:      Comments:  indef per PCP       Anticoagulation Care Providers     Provider Role Specialty Phone number    Tom Vega D.O. Referring Internal Medicine [phone]    [facility] Anticoagulation Services Responsible  [phone]        Anticoagulation Patient Findings        Voice message for patient regarding their anticoagulant.     HPI:   The reason for today's call is to prevent morbidity and mortality from a blood clot and/or stroke and to reduce the risk of bleeding while on a anticoagulant.     PCP:  Gala Ward M.D.  [address] [place] [id number]    Assessment:     • INR  therapeutic.     Lab Results   Component Value Date/Time    BUN 16 10/02/2018 08:55 AM    CREATININE 0.79 10/02/2018 08:55 AM     Lab Results   Component Value Date/Time    HEMOGLOBIN 16.3 (H) 10/02/2018 08:55 AM    HEMATOCRIT 48.2 (H) 10/02/2018 08:55 AM    PLATELETCT 204 10/02/2018 08:55 AM    ALKPHOSPHAT 133 (H) 10/02/2018 08:55 AM    ASTSGOT 39 10/02/2018 08:55 AM    ALTSGPT 42 10/02/2018 08:55 AM          Current Outpatient Medications:   •  warfarin, Take one-half to one tablet by mouth daily or as directed by anticoagulation clinic  •  celecoxib, 200 mg, Oral, BID  •  B Complex Vitamins (VITAMIN B COMPLEX PO), Take  by mouth.  •  Vitamin D, 2,000 Units, Oral, DAILY  •  omeprazole, 20 mg, Oral,  DAILY      Plan:     • Continue the same warfarin dose, as noted above.       Follow-up:     • test in 6 weeks.        Additional information discussed with patient:     • Asked patient to please call the anticoagulation clinic if they have any signs/symptoms of bleeding and/or thrombosis or any changes to diet or medications.      National recommendations regarding anticoagulation therapy:     The CHEST guidelines recommends frequent INR monitoring at regular intervals (a few days up to a max of 12 weeks) to ensure patients are on the proper dose of warfarin, and patients are not having any complications from therapy.  INRs can dramatically change over a short time period due to diet, medications, and medical conditions.       Fabio Cabrera, PharmD, MS, BCACP, C  [facility]  Phone: [phone]  Fax: [phone]  On call: [phone]  General scheduling/information [phone]  For emergencies please dial [phone]  Please do not use [facility] for urgent matters, call the phone numbers listed above.    This note was created using voice recognition software (Dragon). The accuracy of the dictation is limited by the abilities of the software. I have reviewed the note prior to signing, however some errors in grammar and context are still possible. If you have any questions related to this note please do not hesitate to contact our office.

## 2022-09-15 ENCOUNTER — ANTICOAGULATION MONITORING (OUTPATIENT)
Dept: VASCULAR LAB | Facility: MEDICAL CENTER | Age: 72
End: 2022-09-15
Payer: MEDICARE

## 2022-09-15 DIAGNOSIS — I26.99 PULMONARY EMBOLISM, UNSPECIFIED CHRONICITY, UNSPECIFIED PULMONARY EMBOLISM TYPE, UNSPECIFIED WHETHER ACUTE COR PULMONALE PRESENT (HCC): ICD-10-CM

## 2022-09-15 LAB
INR PPP: 3.1 (ref 0.9–1.2)
PROTHROMBIN TIME: 30.2 SEC (ref 9.1–12)

## 2022-10-06 DIAGNOSIS — Z86.711 HISTORY OF PULMONARY EMBOLISM: ICD-10-CM

## 2022-10-06 RX ORDER — WARFARIN SODIUM 5 MG/1
TABLET ORAL
Qty: 90 TABLET | Refills: 1 | Status: SHIPPED | OUTPATIENT
Start: 2022-10-06 | End: 2023-05-11

## 2022-10-28 LAB
INR PPP: 2.8 (ref 0.9–1.2)
PROTHROMBIN TIME: 27.9 SEC (ref 9.1–12)

## 2022-10-31 ENCOUNTER — ANTICOAGULATION MONITORING (OUTPATIENT)
Dept: VASCULAR LAB | Facility: MEDICAL CENTER | Age: 72
End: 2022-10-31
Payer: MEDICARE

## 2022-10-31 DIAGNOSIS — I26.99 PULMONARY EMBOLISM, UNSPECIFIED CHRONICITY, UNSPECIFIED PULMONARY EMBOLISM TYPE, UNSPECIFIED WHETHER ACUTE COR PULMONALE PRESENT (HCC): ICD-10-CM

## 2022-10-31 NOTE — PROGRESS NOTES
OP   Telephone Anticoagulation Service Note      Anticoagulation Summary  As of 10/31/2022      INR goal:  2.0-3.0   TTR:  74.9 % (7.4 y)   INR used for dosin.8 (10/27/2022)   Warfarin maintenance plan:  2.5 mg (5 mg x 0.5) every Fri; 5 mg (5 mg x 1) all other days   Weekly warfarin total:  32.5 mg   Plan last modified:  David Pedro PharmD (2022)   Next INR check:  11/10/2022   Target end date:  Indefinite    Indications    Pulmonary embolism (HCC) [I26.99]                 Anticoagulation Episode Summary       INR check location:  Anticoagulation Clinic    Preferred lab:      Send INR reminders to:      Comments:  indef per PCP           Anticoagulation Care Providers       Provider Role Specialty Phone number    Tom Vega D.O. Referring Internal Medicine [phone]    [facility] Anticoagulation Services Responsible  [phone]          Anticoagulation Patient Findings    Left a message on the patient's voicemail today, informing the patient of a therapeutic INR of 2.8.  Instructed the patient to call the clinic with any changes to diet or medications, with any signs/sx of bleeding or clotting or with any questions or concerns.     Patient instructed to continue with the current warfarin dosing regimen, and asked to follow up again in 2 weeks.     Sarath CurtsiD

## 2022-11-17 LAB
INR PPP: 2.1 (ref 0.9–1.2)
PROTHROMBIN TIME: 20.9 SEC (ref 9.1–12)

## 2022-11-18 ENCOUNTER — ANTICOAGULATION MONITORING (OUTPATIENT)
Dept: VASCULAR LAB | Facility: MEDICAL CENTER | Age: 72
End: 2022-11-18
Payer: MEDICARE

## 2022-11-18 DIAGNOSIS — I26.99 PULMONARY EMBOLISM, UNSPECIFIED CHRONICITY, UNSPECIFIED PULMONARY EMBOLISM TYPE, UNSPECIFIED WHETHER ACUTE COR PULMONALE PRESENT (HCC): ICD-10-CM

## 2022-11-18 NOTE — PROGRESS NOTES
Anticoagulation Summary  As of 2022      INR goal:  2.0-3.0   TTR:  75.0 % (7.5 y)   INR used for dosin.1 (2022)   Warfarin maintenance plan:  2.5 mg (5 mg x 0.5) every Fri; 5 mg (5 mg x 1) all other days; Starting 2022   Weekly warfarin total:  32.5 mg   Plan last modified:  David Pedro PharmD (2022)   Next INR check:  2022   Target end date:  Indefinite    Indications    Pulmonary embolism (HCC) [I26.99]                 Anticoagulation Episode Summary       INR check location:  Anticoagulation Clinic    Preferred lab:      Send INR reminders to:      Comments:  indef per PCP           Anticoagulation Care Providers       Provider Role Specialty Phone number    Tom Vega D.O. Referring Internal Medicine [phone]    [facility] Anticoagulation Services Responsible  [phone]            Refer to Anticoagulation Patient Findings for HPI  Patient Findings       Negatives:  Signs/symptoms of thrombosis, Signs/symptoms of bleeding, Laboratory test error suspected, Change in health, Change in alcohol use, Change in activity, Upcoming invasive procedure, Emergency department visit, Upcoming dental procedure, Missed doses, Extra doses, Change in medications, Change in diet/appetite, Hospital admission, Bruising, Other complaints            Spoke with patient to report a therapeutic INR.      Pt is NOT on antiplatelet therapy     Pt instructed to continue with current warfarin dosing regimen, confirms dosing.   Will follow up in 4 week(s).     Boni Edward

## 2022-12-16 ENCOUNTER — ANTICOAGULATION MONITORING (OUTPATIENT)
Dept: VASCULAR LAB | Facility: MEDICAL CENTER | Age: 72
End: 2022-12-16
Payer: MEDICARE

## 2022-12-16 DIAGNOSIS — I26.99 PULMONARY EMBOLISM, UNSPECIFIED CHRONICITY, UNSPECIFIED PULMONARY EMBOLISM TYPE, UNSPECIFIED WHETHER ACUTE COR PULMONALE PRESENT (HCC): ICD-10-CM

## 2022-12-16 LAB
INR PPP: 3.3 (ref 0.9–1.2)
PROTHROMBIN TIME: 32.1 SEC (ref 9.1–12)

## 2022-12-17 NOTE — PROGRESS NOTES
Anticoagulation Summary  As of 12/16/2022      INR goal:  2.0-3.0   TTR:  75.0 % (7.6 y)   INR used for dosing:  3.3 (12/16/2022)   Warfarin maintenance plan:  2.5 mg (5 mg x 0.5) every Fri; 5 mg (5 mg x 1) all other days; Starting 12/16/2022   Weekly warfarin total:  32.5 mg   Plan last modified:  David Pedro PharmD (5/26/2022)   Next INR check:  12/28/2022   Target end date:  Indefinite    Indications    Pulmonary embolism (HCC) [I26.99]                 Anticoagulation Episode Summary       INR check location:  Anticoagulation Clinic    Preferred lab:      Send INR reminders to:      Comments:  indef per PCP           Anticoagulation Care Providers       Provider Role Specialty Phone number    Tom Vega D.O. Referring Internal Medicine [phone]    [facility] Anticoagulation Services Responsible  [phone]            Refer to Anticoagulation Patient Findings for HPI  Patient Findings       Positives:  Change in diet/appetite (Patient had cranberry and change of diet w/ thanksgiving holiday)    Negatives:  Signs/symptoms of thrombosis, Signs/symptoms of bleeding, Laboratory test error suspected, Change in health, Change in alcohol use, Change in activity, Upcoming invasive procedure, Emergency department visit, Upcoming dental procedure, Missed doses, Extra doses, Change in medications, Hospital admission, Bruising, Other complaints            Spoke with patient.  INR is SUPRA-therapeutic. Patient plans to return normal diet.      Pt verifies warfarin weekly dosing.     Pt is NOT on antiplatelet therapy     Will have pt continue current warfarin regimen.     Repeat INR in 1.5 week(s).     Rin Amado, KirstenD

## 2023-01-04 ENCOUNTER — ANTICOAGULATION MONITORING (OUTPATIENT)
Dept: VASCULAR LAB | Facility: MEDICAL CENTER | Age: 73
End: 2023-01-04
Payer: MEDICARE

## 2023-01-04 DIAGNOSIS — I26.99 PULMONARY EMBOLISM, UNSPECIFIED CHRONICITY, UNSPECIFIED PULMONARY EMBOLISM TYPE, UNSPECIFIED WHETHER ACUTE COR PULMONALE PRESENT (HCC): ICD-10-CM

## 2023-01-06 LAB
INR PPP: 2.8 (ref 0.9–1.2)
PROTHROMBIN TIME: 28 SEC (ref 9.1–12)

## 2023-02-16 LAB
INR PPP: 2.1 (ref 0.9–1.2)
PROTHROMBIN TIME: 20.7 SEC (ref 9.1–12)

## 2023-02-17 ENCOUNTER — ANTICOAGULATION MONITORING (OUTPATIENT)
Dept: VASCULAR LAB | Facility: MEDICAL CENTER | Age: 73
End: 2023-02-17
Payer: MEDICARE

## 2023-02-17 DIAGNOSIS — I26.99 PULMONARY EMBOLISM, UNSPECIFIED CHRONICITY, UNSPECIFIED PULMONARY EMBOLISM TYPE, UNSPECIFIED WHETHER ACUTE COR PULMONALE PRESENT (HCC): ICD-10-CM

## 2023-02-18 NOTE — PROGRESS NOTES
OP   Telephone Anticoagulation Service Note      Anticoagulation Summary  As of 2023      INR goal:  2.0-3.0   TTR:  75.1 % (7.7 y)   INR used for dosin.1 (2023)   Warfarin maintenance plan:  2.5 mg (5 mg x 0.5) every Fri; 5 mg (5 mg x 1) all other days   Weekly warfarin total:  32.5 mg   No change documented:  Savanna Dooley   Plan last modified:  David Pedro PharmD (2022)   Next INR check:  3/17/2023   Target end date:  Indefinite    Indications    Pulmonary embolism (HCC) [I26.99]                 Anticoagulation Episode Summary       INR check location:  Anticoagulation Clinic    Preferred lab:      Send INR reminders to:      Comments:  indef per PCP           Anticoagulation Care Providers       Provider Role Specialty Phone number    Tom Vega D.O. Referring Internal Medicine [phone]    [facility] Anticoagulation Services Responsible  [phone]          Anticoagulation Patient Findings  Patient Findings       Negatives:  Signs/symptoms of thrombosis, Signs/symptoms of bleeding, Laboratory test error suspected, Change in health, Change in alcohol use, Change in activity, Upcoming invasive procedure, Emergency department visit, Upcoming dental procedure, Missed doses, Extra doses, Change in medications, Change in diet/appetite, Hospital admission, Bruising, Other complaints          Spoke with the patient on the phone today, reporting a therapeutic INR of 2.1.   Confirmed the current warfarin dosing regimen and patient compliance.  Patient denies any interval changes to diet and/or medications. Patient denies any signs/symptoms of bleeding or clotting.  Patient continue with the current dosing regimen and will follow up again in 4 weeks.     Sarath CurtisD

## 2023-03-16 ENCOUNTER — ANTICOAGULATION MONITORING (OUTPATIENT)
Dept: VASCULAR LAB | Facility: MEDICAL CENTER | Age: 73
End: 2023-03-16
Payer: MEDICARE

## 2023-03-16 DIAGNOSIS — I26.99 PULMONARY EMBOLISM, UNSPECIFIED CHRONICITY, UNSPECIFIED PULMONARY EMBOLISM TYPE, UNSPECIFIED WHETHER ACUTE COR PULMONALE PRESENT (HCC): ICD-10-CM

## 2023-03-16 LAB
INR PPP: 2.6 (ref 0.9–1.2)
PROTHROMBIN TIME: 26.2 SEC (ref 9.1–12)

## 2023-04-21 ENCOUNTER — ANTICOAGULATION MONITORING (OUTPATIENT)
Dept: MEDICAL GROUP | Facility: PHYSICIAN GROUP | Age: 73
End: 2023-04-21
Payer: MEDICARE

## 2023-04-21 DIAGNOSIS — I26.99 PULMONARY EMBOLISM, UNSPECIFIED CHRONICITY, UNSPECIFIED PULMONARY EMBOLISM TYPE, UNSPECIFIED WHETHER ACUTE COR PULMONALE PRESENT (HCC): ICD-10-CM

## 2023-04-21 LAB
INR PPP: 3.3 (ref 0.9–1.2)
PROTHROMBIN TIME: 33.6 SEC (ref 9.1–12)

## 2023-04-21 NOTE — PROGRESS NOTES
OP Anticoagulation Service Note    Date: 4/21/2023    Anticoagulation Summary  As of 4/21/2023      INR goal:  2.0-3.0   TTR:  75.2 % (7.9 y)   INR used for dosing:  3.3 (4/20/2023)   Warfarin maintenance plan:  2.5 mg (5 mg x 0.5) every Fri; 5 mg (5 mg x 1) all other days   Weekly warfarin total:  32.5 mg   Plan last modified:  David Pedro, PharmD (5/26/2022)   Next INR check:  5/19/2023   Target end date:  Indefinite    Indications    Pulmonary embolism (HCC) [I26.99]                 Anticoagulation Episode Summary       INR check location:  Anticoagulation Clinic    Preferred lab:      Send INR reminders to:      Comments:  indef per PCP           Anticoagulation Care Providers       Provider Role Specialty Phone number    Tom Vega D.O. Referring Internal Medicine [phone]    [facility] Anticoagulation Services Responsible  [phone]          Anticoagulation Patient Findings        Patient's preferred phone number:  [phone]        HPI:   The reason for today's call is to prevent morbidity and mortality from a blood clot and/or stroke and to reduce the risk of bleeding while on a anticoagulant.     PCP:  Gala Ward M.D.  [address] [place] [phone]    Assessment:     INR supra-therapeutic.     Lab Results   Component Value Date/Time    BUN 16 10/02/2018 08:55 AM    CREATININE 0.79 10/02/2018 08:55 AM     Lab Results   Component Value Date/Time    HEMOGLOBIN 16.3 (H) 10/02/2018 08:55 AM    HEMATOCRIT 48.2 (H) 10/02/2018 08:55 AM    PLATELETCT 204 10/02/2018 08:55 AM    ALKPHOSPHAT 133 (H) 10/02/2018 08:55 AM    ASTSGOT 39 10/02/2018 08:55 AM    ALTSGPT 42 10/02/2018 08:55 AM          Current Outpatient Medications:     warfarin, Take one-half to one tablet by mouth daily or as directed by anticoagulation clinic    celecoxib, 200 mg, Oral, BID    B Complex Vitamins (VITAMIN B COMPLEX PO), Take  by mouth.    Vitamin D, 2,000 Units, Oral, DAILY    omeprazole, 20 mg,  Oral, DAILY      Plan:     Hold warfarin today then resume    Follow-up:     On date seen above    Additional information discussed with patient:     Asked patient to please call the anticoagulation clinic if they have any signs/symptoms of bleeding and/or thrombosis or any changes to diet or medications.      National recommendations regarding anticoagulation therapy:     The CHEST guidelines recommends frequent INR monitoring at regular intervals (a few days up to a max of 12 weeks) to ensure patients are on the proper dose of warfarin, and patients are not having any complications from therapy.  INRs can dramatically change over a short time period due to diet, medications, and medical conditions.       [facility]  Phone: [phone]  Fax: [phone]  On call: [phone]  General scheduling/information [phone]  For emergencies please dial [phone]  Please do not use [facility] for urgent matters, call the phone numbers listed above.    This note was created using voice recognition software (Dragon). The accuracy of the dictation is limited by the abilities of the software. I have reviewed the note prior to signing, however some errors in grammar and context are still possible. If you have any questions related to this note please do not hesitate to contact our office.

## 2023-05-25 ENCOUNTER — ANTICOAGULATION MONITORING (OUTPATIENT)
Dept: MEDICAL GROUP | Facility: PHYSICIAN GROUP | Age: 73
End: 2023-05-25
Payer: MEDICARE

## 2023-05-25 DIAGNOSIS — I26.99 PULMONARY EMBOLISM, UNSPECIFIED CHRONICITY, UNSPECIFIED PULMONARY EMBOLISM TYPE, UNSPECIFIED WHETHER ACUTE COR PULMONALE PRESENT (HCC): ICD-10-CM

## 2023-05-25 LAB
INR PPP: 4.3 (ref 0.9–1.2)
PROTHROMBIN TIME: 41.4 SEC (ref 9.1–12)

## 2023-05-25 NOTE — PROGRESS NOTES
Anticoagulation Summary  As of 2023      INR goal:  2.0-3.0   TTR:  74.3 % (8 y)   INR used for dosin.3 (2023)   Warfarin maintenance plan:  2.5 mg (5 mg x 0.5) every Fri; 5 mg (5 mg x 1) all other days   Weekly warfarin total:  32.5 mg   Plan last modified:  Kirsten DobbinsD (2022)   Next INR check:  2023   Target end date:  Indefinite    Indications    Pulmonary embolism (HCC) [I26.99]                 Anticoagulation Episode Summary       INR check location:  Anticoagulation Clinic    Preferred lab:      Send INR reminders to:      Comments:  indef per PCP          Anticoagulation Care Providers       Provider Role Specialty Phone number    Tom Vega D.O. Referring Internal Medicine [phone]    [facility] Anticoagulation Services Responsible  [phone]          Anticoagulation Patient Findings          HPI:  Lawadna Hernándezaida Pagan, on anticoagulation therapy with warfarin for PE.   Changes to current medical/health status since last appt: none  Denies signs/symptoms of bleeding and/or thrombosis since the last appt.    Denies any interval changes to diet  Denies any interval changes to medications since last appt.   Denies any complications or cost restrictions with current therapy.     A/P   INR  SUPRA-therapeutic.   Hold warfarin x2 days then Pt is to continue with current warfarin dosing regimen.     Next INR in 1 week(s).    Beny Sheikh, PharmD

## 2023-06-06 ENCOUNTER — ANTICOAGULATION MONITORING (OUTPATIENT)
Dept: VASCULAR LAB | Facility: MEDICAL CENTER | Age: 73
End: 2023-06-06
Payer: MEDICARE

## 2023-06-06 DIAGNOSIS — I26.99 PULMONARY EMBOLISM, UNSPECIFIED CHRONICITY, UNSPECIFIED PULMONARY EMBOLISM TYPE, UNSPECIFIED WHETHER ACUTE COR PULMONALE PRESENT (HCC): ICD-10-CM

## 2023-06-06 LAB
INR PPP: 2.3 (ref 0.9–1.2)
PROTHROMBIN TIME: 22.6 SEC (ref 9.1–12)

## 2023-06-06 NOTE — PROGRESS NOTES
Anticoagulation Summary  As of 2023      INR goal:  2.0-3.0   TTR:  74.1 % (8 y)   INR used for dosin.3 (2023)   Warfarin maintenance plan:  2.5 mg (5 mg x 0.5) every Fri; 5 mg (5 mg x 1) all other days   Weekly warfarin total:  32.5 mg   Plan last modified:  David Pedro PharmD (2022)   Next INR check:  2023   Target end date:  Indefinite    Indications    Pulmonary embolism (HCC) [I26.99]                 Anticoagulation Episode Summary       INR check location:  Anticoagulation Clinic    Preferred lab:      Send INR reminders to:      Comments:  indef per PCP          Anticoagulation Care Providers       Provider Role Specialty Phone number    Tom Vega D.O. Referring Internal Medicine [phone]    [facility] Anticoagulation Services Responsible  [phone]            Refer to Anticoagulation Patient Findings for HPI  Patient Findings       Negatives:  Signs/symptoms of thrombosis, Signs/symptoms of bleeding, Laboratory test error suspected, Change in health, Change in alcohol use, Change in activity, Upcoming invasive procedure, Emergency department visit, Upcoming dental procedure, Missed doses, Extra doses, Change in medications, Change in diet/appetite, Hospital admission, Bruising, Other complaints            Spoke with patient to report a therapeutic INR.      Pt is NOT on antiplatelet therapy.    Pt instructed to continue with current warfarin dosing regimen, confirms dosing.   Will follow up in 2 week(s).    Treatment regimen was discussed with Mallorie Galloway, KirstenD.    Lovely Ayoub PhT

## 2023-06-20 ENCOUNTER — ANTICOAGULATION MONITORING (OUTPATIENT)
Dept: VASCULAR LAB | Facility: MEDICAL CENTER | Age: 73
End: 2023-06-20
Payer: MEDICARE

## 2023-06-20 DIAGNOSIS — I26.99 PULMONARY EMBOLISM, UNSPECIFIED CHRONICITY, UNSPECIFIED PULMONARY EMBOLISM TYPE, UNSPECIFIED WHETHER ACUTE COR PULMONALE PRESENT (HCC): ICD-10-CM

## 2023-06-20 LAB
INR PPP: 3.5 (ref 0.9–1.2)
PROTHROMBIN TIME: 34.5 SEC (ref 9.1–12)

## 2023-06-20 NOTE — PROGRESS NOTES
Anticoagulation Summary  As of 6/20/2023      INR goal:  2.0-3.0   TTR:  74.0 % (8.1 y)   INR used for dosing:  3.5 (6/20/2023)   Warfarin maintenance plan:  2.5 mg (5 mg x 0.5) every Fri; 5 mg (5 mg x 1) all other days   Weekly warfarin total:  32.5 mg   Plan last modified:  David Pedro PharmD (5/26/2022)   Next INR check:  6/27/2023   Target end date:  Indefinite    Indications    Pulmonary embolism (HCC) [I26.99]                 Anticoagulation Episode Summary       INR check location:  Anticoagulation Clinic    Preferred lab:      Send INR reminders to:      Comments:  indef per PCP          Anticoagulation Care Providers       Provider Role Specialty Phone number    Tom Vega D.O. Referring Internal Medicine [phone]    [facility] Anticoagulation Services Responsible  [phone]          Anticoagulation Patient Findings  HPI:  Lawanda Pagan, on anticoagulation therapy with warfarin for PE  Changes to current medical/health status since last appt: none  Denies signs/symptoms of bleeding and/or thrombosis since the last appt.    Denies any interval changes to diet  Denies any interval changes to medications since last appt.   Denies any complications or cost restrictions with current therapy.     A/P   INR  SUPRA-therapeutic.   Hold today then Pt is to continue with current warfarin dosing regimen.  She is interested in switching to DOAC.     Next INR in 1 week(s).    Beny Sheikh, PharmD

## 2023-06-26 ENCOUNTER — DOCUMENTATION (OUTPATIENT)
Dept: VASCULAR LAB | Facility: MEDICAL CENTER | Age: 73
End: 2023-06-26
Payer: MEDICARE

## 2023-06-26 NOTE — PROGRESS NOTES
[facility]      Was informed that the patient has insurance that is OON and cannot be seen for her appt in anticoagulation clinic tomorrow.   This appt was made to try and switch her to Pradaxa due to labile INRs but she is unable to be seen.   Encouraged the patient to establish care with new PCP that is in network to address this moving forward.   She will continue with her current dosing regimen and reports she is unable to get to the lab to retest INR again until next week 7/5/23.   Will follow up with the patient at that time.       Sarath CurtisD

## 2023-06-27 ENCOUNTER — APPOINTMENT (OUTPATIENT)
Dept: VASCULAR LAB | Facility: MEDICAL CENTER | Age: 73
End: 2023-06-27
Payer: MEDICARE

## 2023-07-07 ENCOUNTER — ANTICOAGULATION MONITORING (OUTPATIENT)
Dept: VASCULAR LAB | Facility: MEDICAL CENTER | Age: 73
End: 2023-07-07
Payer: MEDICARE

## 2023-07-07 DIAGNOSIS — I26.99 PULMONARY EMBOLISM, UNSPECIFIED CHRONICITY, UNSPECIFIED PULMONARY EMBOLISM TYPE, UNSPECIFIED WHETHER ACUTE COR PULMONALE PRESENT (HCC): ICD-10-CM

## 2023-07-07 DIAGNOSIS — Z79.01 CHRONIC ANTICOAGULATION: ICD-10-CM

## 2023-07-07 LAB
INR PPP: 2.3 (ref 0.9–1.2)
PROTHROMBIN TIME: 23.4 SEC (ref 9.1–12)

## 2023-07-08 NOTE — PROGRESS NOTES
Anticoagulation Summary  As of 2023      INR goal:  2.0-3.0   TTR:  73.9 % (8.1 y)   INR used for dosin.3 (2023)   Warfarin maintenance plan:  2.5 mg (5 mg x 0.5) every Fri; 5 mg (5 mg x 1) all other days   Weekly warfarin total:  32.5 mg   Plan last modified:  David Pedro PharmD (2022)   Next INR check:  2023   Target end date:  Indefinite    Indications    Pulmonary embolism (HCC) [I26.99]                 Anticoagulation Episode Summary       INR check location:  Anticoagulation Clinic    Preferred lab:      Send INR reminders to:      Comments:  indef per PCP          Anticoagulation Care Providers       Provider Role Specialty Phone number    Tom Vega D.O. Referring Internal Medicine [phone]    [facility] Anticoagulation Services Responsible  [phone]            Refer to Anticoagulation Patient Findings for HPI  Patient Findings       Negatives:  Signs/symptoms of thrombosis, Signs/symptoms of bleeding, Laboratory test error suspected, Change in health, Change in alcohol use, Change in activity, Upcoming invasive procedure, Emergency department visit, Upcoming dental procedure, Missed doses, Extra doses, Change in medications, Change in diet/appetite, Hospital admission, Bruising, Other complaints            Spoke with patient to report a therapeutic INR.      Pt is NOT on antiplatelet therapy     Pt instructed to continue with current warfarin dosing regimen, confirms dosing.   Will follow up in 1.5 week(s).     Rin Amado PharmD

## 2023-07-20 LAB
INR PPP: 3.1 (ref 0.9–1.2)
PROTHROMBIN TIME: 30.3 SEC (ref 9.1–12)

## 2023-07-21 ENCOUNTER — ANTICOAGULATION MONITORING (OUTPATIENT)
Dept: VASCULAR LAB | Facility: MEDICAL CENTER | Age: 73
End: 2023-07-21
Payer: MEDICARE

## 2023-07-21 DIAGNOSIS — I26.99 PULMONARY EMBOLISM, UNSPECIFIED CHRONICITY, UNSPECIFIED PULMONARY EMBOLISM TYPE, UNSPECIFIED WHETHER ACUTE COR PULMONALE PRESENT (HCC): ICD-10-CM

## 2023-07-21 NOTE — PROGRESS NOTES
OP   Telephone Anticoagulation Service Note      Anticoagulation Summary  As of 7/21/2023      INR goal:  2.0-3.0   TTR:  74.1 % (8.2 y)   INR used for dosing:  3.1 (7/20/2023)   Warfarin maintenance plan:  2.5 mg (5 mg x 0.5) every Fri; 5 mg (5 mg x 1) all other days   Weekly warfarin total:  32.5 mg   Plan last modified:  David Pedro PharmD (5/26/2022)   Next INR check:  8/3/2023   Target end date:  Indefinite    Indications    Pulmonary embolism (HCC) [I26.99]                 Anticoagulation Episode Summary       INR check location:  Anticoagulation Clinic    Preferred lab:      Send INR reminders to:      Comments:  indef per PCP          Anticoagulation Care Providers       Provider Role Specialty Phone number    Tom Vega D.O. Referring Internal Medicine [phone]    [facility] Anticoagulation Services Responsible  [phone]          Anticoagulation Patient Findings  Patient Findings       Negatives:  Signs/symptoms of thrombosis, Signs/symptoms of bleeding, Laboratory test error suspected, Change in health, Change in alcohol use, Change in activity, Upcoming invasive procedure, Emergency department visit, Upcoming dental procedure, Missed doses, Extra doses, Change in medications, Change in diet/appetite, Hospital admission, Bruising, Other complaints          Spoke with the patient on the phone today, reporting a slightly SUPRA-therapeutic INR of 3.1.   Confirmed the current warfarin dosing regimen and patient compliance.  Patient denies any interval changes to diet and/or medications. Patient denies any signs/symptoms of bleeding or clotting.  Patient instructed to HOLD warfarin dose TONIGHT ONLY, as a one time adjustment, then to resume her current dosing regimen.   Patient asked to retest again in 2 weeks.     Sarath CurtisD

## 2023-08-02 ENCOUNTER — ANTICOAGULATION MONITORING (OUTPATIENT)
Dept: MEDICAL GROUP | Facility: PHYSICIAN GROUP | Age: 73
End: 2023-08-02
Payer: MEDICARE

## 2023-08-02 DIAGNOSIS — I26.99 PULMONARY EMBOLISM, UNSPECIFIED CHRONICITY, UNSPECIFIED PULMONARY EMBOLISM TYPE, UNSPECIFIED WHETHER ACUTE COR PULMONALE PRESENT (HCC): ICD-10-CM

## 2023-08-02 LAB
INR PPP: 3.1 (ref 0.9–1.2)
PROTHROMBIN TIME: 30.1 SEC (ref 9.1–12)

## 2023-08-03 NOTE — PROGRESS NOTES
Anticoagulation Summary  As of 8/2/2023      INR goal:  2.0-3.0   TTR:  73.8 % (8.2 y)   INR used for dosing:  3.1 (8/2/2023)   Warfarin maintenance plan:  2.5 mg (5 mg x 0.5) every Wed, Sat; 5 mg (5 mg x 1) all other days   Weekly warfarin total:  30 mg   Plan last modified:  Rin Amado PharmD (8/2/2023)   Next INR check:  8/9/2023   Target end date:  Indefinite    Indications    Pulmonary embolism (HCC) [I26.99]                 Anticoagulation Episode Summary       INR check location:  Anticoagulation Clinic    Preferred lab:      Send INR reminders to:      Comments:  indef per PCP          Anticoagulation Care Providers       Provider Role Specialty Phone number    Tom Vega D.O. Referring Internal Medicine [phone]    [facility] Anticoagulation Services Responsible  [phone]            Refer to Anticoagulation Patient Findings for HPI  Patient Findings       Negatives:  Signs/symptoms of thrombosis, Signs/symptoms of bleeding, Laboratory test error suspected, Change in health, Change in alcohol use, Change in activity, Upcoming invasive procedure, Emergency department visit, Upcoming dental procedure, Missed doses, Extra doses, Change in medications, Change in diet/appetite, Hospital admission, Bruising, Other complaints            Spoke with patient.  INR is therapeutic.     Pt verifies warfarin weekly dosing.     Pt is NOT on antiplatelet therapy   Will have pt decrease warfarin regimen to Wed/Sat 2.5 mg and 5 mg AOD.     Repeat INR in 1 week(s).     Rin Amado, KirstenD

## 2023-08-15 ENCOUNTER — ANTICOAGULATION MONITORING (OUTPATIENT)
Dept: CARDIOLOGY | Facility: MEDICAL CENTER | Age: 73
End: 2023-08-15
Payer: MEDICARE

## 2023-08-15 DIAGNOSIS — I26.99 PULMONARY EMBOLISM, UNSPECIFIED CHRONICITY, UNSPECIFIED PULMONARY EMBOLISM TYPE, UNSPECIFIED WHETHER ACUTE COR PULMONALE PRESENT (HCC): ICD-10-CM

## 2023-08-15 LAB
INR PPP: 3 (ref 0.9–1.2)
PROTHROMBIN TIME: 28.9 SEC (ref 9.1–12)

## 2023-08-15 NOTE — PROGRESS NOTES
OP Anticoagulation Service Note    Date: 8/15/2023    Anticoagulation Summary  As of 8/15/2023      INR goal:  2.0-3.0   TTR:  73.5 % (8.2 y)   INR used for dosing:  3.0 (8/15/2023)   Warfarin maintenance plan:  2.5 mg (5 mg x 0.5) every Wed, Sat; 5 mg (5 mg x 1) all other days   Weekly warfarin total:  30 mg   Plan last modified:  Kirsten SosaD (8/2/2023)   Next INR check:  9/12/2023   Target end date:  Indefinite    Indications    Pulmonary embolism (HCC) [I26.99]                 Anticoagulation Episode Summary       INR check location:  Anticoagulation Clinic    Preferred lab:      Send INR reminders to:      Comments:  indef per PCP          Anticoagulation Care Providers       Provider Role Specialty Phone number    Tom Vega D.O. Referring Internal Medicine [phone]    [facility] Anticoagulation Services Responsible  [phone]          Anticoagulation Patient Findings        Patient's preferred phone number:  [phone]        HPI:   The reason for today's call is to prevent morbidity and mortality from a blood clot and/or stroke and to reduce the risk of bleeding while on a anticoagulant.     PCP:  Gala Ward M.D.  [address] [place] [phone]    Assessment:     INR  therapeutic.     Lab Results   Component Value Date/Time    BUN 16 10/02/2018 08:55 AM    CREATININE 0.79 10/02/2018 08:55 AM     Lab Results   Component Value Date/Time    HEMOGLOBIN 16.3 (H) 10/02/2018 08:55 AM    HEMATOCRIT 48.2 (H) 10/02/2018 08:55 AM    PLATELETCT 204 10/02/2018 08:55 AM    ALKPHOSPHAT 133 (H) 10/02/2018 08:55 AM    ASTSGOT 39 10/02/2018 08:55 AM    ALTSGPT 42 10/02/2018 08:55 AM          Current Outpatient Medications:     warfarin, TAKE 1/2 TO 1 (ONE-HALF TO ONE) TABLET BY MOUTH ONCE DAILY OR AS DIRECTED BY ANTICOAGULATION CLINIC    celecoxib, 200 mg, Oral, BID    B Complex Vitamins (VITAMIN B COMPLEX PO), Take  by mouth.    Vitamin D, 2,000 Units, Oral, DAILY     omeprazole, 20 mg, Oral, DAILY      Plan:     Continue the same warfarin dose, as noted above.       Follow-up:     As seen above      Additional information discussed with patient:     Asked patient to please call the anticoagulation clinic if they have any signs/symptoms of bleeding and/or thrombosis or any changes to diet or medications.      National recommendations regarding anticoagulation therapy:     The CHEST guidelines recommends frequent INR monitoring at regular intervals (a few days up to a max of 12 weeks) to ensure patients are on the proper dose of warfarin, and patients are not having any complications from therapy.  INRs can dramatically change over a short time period due to diet, medications, and medical conditions.         [facility]  Phone: [phone]  Fax: [phone]  On call: [phone]  General scheduling/information [phone]  For emergencies please dial 911  Please do not use [employer] for urgent matters, call the phone numbers listed above.    This note was created using voice recognition software (Dragon). The accuracy of the dictation is limited by the abilities of the software. I have reviewed the note prior to signing, however some errors in grammar and context are still possible. If you have any questions related to this note please do not hesitate to contact our office.

## 2023-09-14 LAB
INR PPP: 1.9 (ref 0.9–1.2)
PROTHROMBIN TIME: 20.3 SEC (ref 9.1–12)

## 2023-09-18 ENCOUNTER — ANTICOAGULATION MONITORING (OUTPATIENT)
Dept: VASCULAR LAB | Facility: MEDICAL CENTER | Age: 73
End: 2023-09-18
Payer: COMMERCIAL

## 2023-09-18 DIAGNOSIS — I26.99 PULMONARY EMBOLISM, UNSPECIFIED CHRONICITY, UNSPECIFIED PULMONARY EMBOLISM TYPE, UNSPECIFIED WHETHER ACUTE COR PULMONALE PRESENT (HCC): ICD-10-CM

## 2023-09-18 NOTE — PROGRESS NOTES
Anticoagulation Summary  As of 2023      INR goal:  2.0-3.0   TTR:  73.7 % (8.3 y)   INR used for dosin.9 (2023)   Warfarin maintenance plan:  2.5 mg (5 mg x 0.5) every Fri; 5 mg (5 mg x 1) all other days   Weekly warfarin total:  32.5 mg   Plan last modified:  Mallorie Galloway PharmD (2023)   Next INR check:  2023   Target end date:  Indefinite    Indications    Pulmonary embolism (HCC) [I26.99]                 Anticoagulation Episode Summary       INR check location:  Anticoagulation Clinic    Preferred lab:      Send INR reminders to:      Comments:  indef per PCP          Anticoagulation Care Providers       Provider Role Specialty Phone number    Tom Vega D.O. Referring Internal Medicine [phone]    [facility] Anticoagulation Services Responsible  [phone]          Anticoagulation Patient Findings  Patient Findings       Positives:  Extra doses (has been taking 2.5 mg Fri, 5 mg AOD)    Negatives:  Signs/symptoms of thrombosis, Signs/symptoms of bleeding, Laboratory test error suspected, Change in health, Change in alcohol use, Change in activity, Upcoming invasive procedure, Emergency department visit, Upcoming dental procedure, Missed doses, Change in medications, Change in diet/appetite, Hospital admission, Bruising, Other complaints            INR slightly subtherapeutic    Called and spoke to patient.    Warfarin Plan: Continue regimen as listed above as INR is only 0.1 out of range.    Next INR in 1 week(s).    Mallorie Galloway, Kitty

## 2023-09-28 LAB — INR PPP: 3 (ref 2–3.5)

## 2023-09-29 ENCOUNTER — ANTICOAGULATION MONITORING (OUTPATIENT)
Dept: VASCULAR LAB | Facility: MEDICAL CENTER | Age: 73
End: 2023-09-29
Payer: COMMERCIAL

## 2023-09-29 DIAGNOSIS — I26.99 PULMONARY EMBOLISM, UNSPECIFIED CHRONICITY, UNSPECIFIED PULMONARY EMBOLISM TYPE, UNSPECIFIED WHETHER ACUTE COR PULMONALE PRESENT (HCC): ICD-10-CM

## 2023-09-29 LAB
INR PPP: 3 (ref 0.9–1.2)
PROTHROMBIN TIME: 30.8 SEC (ref 9.1–12)

## 2023-09-29 NOTE — PROGRESS NOTES
Anticoagulation Summary  As of 9/29/2023      INR goal:  2.0-3.0   TTR:  73.7 % (8.4 y)   INR used for dosing:  3.00 (9/28/2023)   Warfarin maintenance plan:  2.5 mg (5 mg x 0.5) every Fri; 5 mg (5 mg x 1) all other days   Weekly warfarin total:  32.5 mg   Plan last modified:  Mallorie Galloway PharmD (9/18/2023)   Next INR check:  10/5/2023   Target end date:  Indefinite    Indications    Pulmonary embolism (HCC) [I26.99]                 Anticoagulation Episode Summary       INR check location:  Anticoagulation Clinic    Preferred lab:      Send INR reminders to:      Comments:  indef per PCP          Anticoagulation Care Providers       Provider Role Specialty Phone number    Tom Vega D.O. Referring Internal Medicine [phone]    [facility] Anticoagulation Services Responsible  [phone]            Refer to Anticoagulation Patient Findings for HPI  Patient Findings       Negatives:  Signs/symptoms of thrombosis, Signs/symptoms of bleeding, Laboratory test error suspected, Change in health, Change in alcohol use, Change in activity, Upcoming invasive procedure, Emergency department visit, Upcoming dental procedure, Missed doses, Extra doses, Change in medications, Change in diet/appetite, Hospital admission, Bruising, Other complaints            Spoke with patient to report a therapeutic INR.      Pt is NOT on antiplatelet therapy   Pt instructed to continue with current warfarin dosing regimen, confirms dosing.   Will follow up in 1 week(s).     Kirsten SosaD

## 2023-10-10 LAB
INR PPP: 2 (ref 0.9–1.2)
PROTHROMBIN TIME: 20.9 SEC (ref 9.1–12)

## 2023-10-18 ENCOUNTER — ANTICOAGULATION MONITORING (OUTPATIENT)
Dept: VASCULAR LAB | Facility: MEDICAL CENTER | Age: 73
End: 2023-10-18
Payer: COMMERCIAL

## 2023-10-18 DIAGNOSIS — I26.99 PULMONARY EMBOLISM, UNSPECIFIED CHRONICITY, UNSPECIFIED PULMONARY EMBOLISM TYPE, UNSPECIFIED WHETHER ACUTE COR PULMONALE PRESENT (HCC): ICD-10-CM

## 2023-10-18 NOTE — PROGRESS NOTES
Anticoagulation Summary  As of 10/18/2023      INR goal:  2.0-3.0   TTR:  73.8 % (8.4 y)   INR used for dosin.0 (10/9/2023)   Warfarin maintenance plan:  2.5 mg (5 mg x 0.5) every Fri; 5 mg (5 mg x 1) all other days   Weekly warfarin total:  32.5 mg   Plan last modified:  Mallorie Galloway PharmD (2023)   Next INR check:  10/23/2023   Target end date:  Indefinite    Indications    Pulmonary embolism (HCC) [I26.99]                 Anticoagulation Episode Summary       INR check location:  Anticoagulation Clinic    Preferred lab:      Send INR reminders to:      Comments:  indef per PCP          Anticoagulation Care Providers       Provider Role Specialty Phone number    Tom Vega D.O. Referring Internal Medicine [phone]    [facility] Anticoagulation Services Responsible  [phone]            Refer to Anticoagulation Patient Findings for HPI  Patient Findings       Negatives:  Signs/symptoms of thrombosis, Signs/symptoms of bleeding, Laboratory test error suspected, Change in health, Change in alcohol use, Change in activity, Upcoming invasive procedure, Emergency department visit, Upcoming dental procedure, Missed doses, Extra doses, Change in medications, Change in diet/appetite, Hospital admission, Bruising, Other complaints            Spoke with patient to report a therapeutic INR.      Pt is NOT on antiplatelet therapy     Pt instructed to continue with current warfarin dosing regimen, confirms dosing.   Will follow up in 5 day(s) via outpatient lab ([facility]).     Rin Amado PharmD

## 2023-10-25 ENCOUNTER — TELEPHONE (OUTPATIENT)
Dept: VASCULAR LAB | Facility: MEDICAL CENTER | Age: 73
End: 2023-10-25
Payer: COMMERCIAL

## 2023-10-25 NOTE — TELEPHONE ENCOUNTER
----- Message from Mallorie Galloway PharmD sent at 10/24/2023  3:29 PM PDT -----  Regarding: FW: Look for INR from [facility] (has been resulting in epic)  S/w pt - she will get it drawn 10/25  Will await results    ----- Message -----  From: Rin Amado PharmD  Sent: 10/24/2023   8:00 AM PDT  To: Amb Anticoag Pool  Subject: Look for INR from [facility] (has been resultin#

## 2023-10-25 NOTE — TELEPHONE ENCOUNTER
INR was expected to be drawn today 10/25  We have not received result  S/w pt - she does not have transportation to go to the lab until tomorrow 10/26    Mallorie Galloway, PharmD

## 2023-10-26 ENCOUNTER — OFFICE VISIT (OUTPATIENT)
Dept: MEDICAL GROUP | Facility: MEDICAL CENTER | Age: 73
End: 2023-10-26

## 2023-10-26 VITALS
DIASTOLIC BLOOD PRESSURE: 76 MMHG | HEIGHT: 64 IN | WEIGHT: 198.4 LBS | OXYGEN SATURATION: 98 % | RESPIRATION RATE: 16 BRPM | BODY MASS INDEX: 33.87 KG/M2 | SYSTOLIC BLOOD PRESSURE: 126 MMHG | TEMPERATURE: 97.4 F | HEART RATE: 88 BPM

## 2023-10-26 DIAGNOSIS — K21.9 GASTROESOPHAGEAL REFLUX DISEASE WITHOUT ESOPHAGITIS: ICD-10-CM

## 2023-10-26 DIAGNOSIS — N63.0 LUMP IN FEMALE BREAST: ICD-10-CM

## 2023-10-26 DIAGNOSIS — E78.5 HYPERLIPIDEMIA, UNSPECIFIED HYPERLIPIDEMIA TYPE: ICD-10-CM

## 2023-10-26 DIAGNOSIS — Z12.11 SCREEN FOR COLON CANCER: ICD-10-CM

## 2023-10-26 DIAGNOSIS — N95.1 MENOPAUSAL STATE: ICD-10-CM

## 2023-10-26 DIAGNOSIS — E55.9 VITAMIN D DEFICIENCY: ICD-10-CM

## 2023-10-26 DIAGNOSIS — Z78.0 POSTMENOPAUSAL STATUS (AGE-RELATED) (NATURAL): ICD-10-CM

## 2023-10-26 DIAGNOSIS — E53.8 VITAMIN B 12 DEFICIENCY: ICD-10-CM

## 2023-10-26 DIAGNOSIS — I26.99 PULMONARY EMBOLISM, UNSPECIFIED CHRONICITY, UNSPECIFIED PULMONARY EMBOLISM TYPE, UNSPECIFIED WHETHER ACUTE COR PULMONALE PRESENT (HCC): ICD-10-CM

## 2023-10-26 PROCEDURE — 3074F SYST BP LT 130 MM HG: CPT | Performed by: NURSE PRACTITIONER

## 2023-10-26 PROCEDURE — 3078F DIAST BP <80 MM HG: CPT | Performed by: NURSE PRACTITIONER

## 2023-10-26 PROCEDURE — 99214 OFFICE O/P EST MOD 30 MIN: CPT | Performed by: NURSE PRACTITIONER

## 2023-10-26 ASSESSMENT — PATIENT HEALTH QUESTIONNAIRE - PHQ9: CLINICAL INTERPRETATION OF PHQ2 SCORE: 0

## 2023-10-26 NOTE — PROGRESS NOTES
Chief Complaint   Patient presents with    Establish Care    Breast Mass     On left breast        HISTORY OF PRESENT ILLNESS: Patient is a 73 y.o. female, new  patient who presents today to discuss medical problems as listed below:    Health Maintenance:  COMPLETED       Allergies: Other environmental    Current Outpatient Medications   Medication Sig Dispense Refill    warfarin (COUMADIN) 5 MG Tab TAKE 1/2 TO 1 (ONE-HALF TO ONE) TABLET BY MOUTH ONCE DAILY OR AS DIRECTED BY ANTICOAGULATION CLINIC 90 Tablet 1    B Complex Vitamins (VITAMIN B COMPLEX PO) Take  by mouth.      Cholecalciferol (VITAMIN D) 1000 UNIT Cap Take 2 Caps by mouth every day. 60 Cap 6    omeprazole (PRILOSEC) 20 MG TBEC delayed-release tablet Take 20 mg by mouth every day.       No current facility-administered medications for this visit.       Allergies, past medical history, past surgical history, family history, social history reviewed and updated.    Review of Systems:     - Constitutional: Negative for fever, chills, unexpected weight change, and fatigue/generalized weakness.     - HEENT: Negative for headaches, vision changes, hearing changes, ear pain, ear discharge, rhinorrhea, sinus congestion, sore throat, and neck pain.      - Respiratory: Negative for cough, sputum production, chest congestion, dyspnea, wheezing, and crackles.      - Cardiovascular: Negative for chest pain, palpitations, orthopnea, and bilateral lower extremity edema.     - Gastrointestinal: Negative for heartburn, nausea, vomiting, abdominal pain, hematochezia, melena, diarrhea, constipation, and greasy/foul-smelling stools.     - Genitourinary: Negative for dysuria, polyuria, hematuria, pyuria, urinary urgency, and urinary incontinence.    - Musculoskeletal: Negative for myalgias, back pain, and joint pain.     - Skin: lump in left breast. Negative for rash, itching, cyanotic skin color change.     - Neurological: Negative for dizziness, tingling, tremors, focal  "sensory deficit, focal weakness and headaches.     - Endo/Heme/Allergies: Does not bruise/bleed easily.     - Psychiatric/Behavioral: Negative for depression, suicidal/homicidal ideation and memory loss.      All other systems reviewed and are negative    Exam:    /76   Pulse 88   Temp 36.3 °C (97.4 °F) (Temporal)   Resp 16   Ht 1.626 m (5' 4\")   Wt 90 kg (198 lb 6.4 oz)   SpO2 98%   BMI 34.06 kg/m²  Body mass index is 34.06 kg/m².    Physical Exam:  Constitutional: Well-developed and well-nourished. Not diaphoretic. No distress.   Skin: Skin is warm and dry. No rash noted.  Head: Atraumatic without lesions.  Eyes: Conjunctivae and extraocular motions are normal. Pupils are equal, round, and reactive to light. No scleral icterus.   Ears:  External ears unremarkable. Tympanic membranes clear and intact.  Nose: Nares patent. Septum midline. Turbinates without erythema nor edema. No discharge.   Mouth/Throat: Dentition is normal. Tongue normal. Oropharynx is clear and moist. Posterior pharynx without erythema or exudates.  Neck: Supple, trachea midline. Normal range of motion. No thyromegaly present. No lymphadenopathy--cervical or supraclavicular.  Cardiovascular: Regular rate and rhythm, S1 and S2 without murmur, rubs, or gallops.    Chest: lump in Left breast. Effort normal. Clear to auscultation throughout. No adventitious sounds. No CVA tenderness.  Abdomen: Soft, non tender, and without distention. Active bowel sounds in all four quadrants. No rebound, guarding, masses or HSM.  : Negative for dysuria, polyuria, hematuria, pyuria, urinary urgency, and urinary incontinence.  Extremities: No cyanosis, clubbing, erythema, nor edema. Distal pulses intact and symmetric.   Musculoskeletal: All major joints AROM full in all directions without pain.  Neurological: Alert and oriented x 3. DTRs 2+/3 and symmetric. No cranial nerve deficit. 5/5 myotomes. Sensation intact. Negative Rhomberg.  Psychiatric:  " Behavior, mood, and affect are appropriate.  MA/nursing note and vitals reviewed.    Assessment/Plan:  Lump in female breast  New to me. New problem. Lump in left top lateral breast x 2 mos.  Denies hx of br ca, last mammogram 2017 and normal per pt.   Denies fever, unintentional wt loss.    Gastroesophageal reflux disease without esophagitis  New to me. New problem. Taking OTC Prilosec 20 mg. denies nausea or blood in stool     Pulmonary embolism (HCC)  New to me.   PE in 2017,  Now in Coumadin   Followed by coumadin clinic  Denies bleeding or bruising    1. Lump in female breast  - MA-DIAGNOSTIC MAMMO BILAT W/TOMOSYNTHESIS W/CAD; Future  - US-BREAST LIMITED-LEFT; Future    2. Gastroesophageal reflux disease without esophagitis  Stable on current regimen.  Continue.  Declines refills.    3. Pulmonary embolism, unspecified chronicity, unspecified pulmonary embolism type, unspecified whether acute cor pulmonale present (HCC)  Stable on coumadin, followed by coagulation clinic    4. Hyperlipidemia, unspecified hyperlipidemia type  - CBC WITHOUT DIFFERENTIAL; Future  - Comp Metabolic Panel; Future  - Lipid Profile; Future    5. Postmenopausal status (age-related) (natural)  - DS-BONE DENSITY STUDY (DEXA); Future    6. Menopausal state  - DS-BONE DENSITY STUDY (DEXA); Future    7. Screen for colon cancer  - COLOGUARD (FIT DNA)    8. Vitamin B 12 deficiency  - VITAMIN B12; Future    9. Vitamin D deficiency  - VITAMIN D,25 HYDROXY (DEFICIENCY); Future      Discussed with patient possible alternative diagnoses, patient is to take all medications as prescribed.      If symptoms persist FU w/PCP, if symptoms worsen go to emergency room.      If experiencing any side effects from prescribed medications report to the office immediately or go to emergency room.     Reviewed indication, dosage, usage and potential adverse effects of prescribed medications.      Reviewed risks and benefits of treatment plan. Patient verbalizes  understanding of all instruction and verbally agrees to plan.     Discussed plan with the patient, and patient agrees to the above.      I personally reviewed prior external notes and test results pertinent to today's visit.      No follow-ups on file. Next wk for b/l ear lavage, 2-3 wks for lab review and 3 mos for medicare annual

## 2023-10-26 NOTE — ASSESSMENT & PLAN NOTE
New to me.   PE in 2017,  Now in Coumadin   Followed by coumadin clinic  Denies bleeding or bruising

## 2023-10-26 NOTE — ASSESSMENT & PLAN NOTE
New to me. New problem. Lump in left top lateral breast x 2 mos.  Denies hx of br ca, last mammogram 2017 and normal per pt.   Denies fever, unintentional wt loss.

## 2023-10-27 LAB
INR PPP: 3.7 (ref 0.9–1.2)
PROTHROMBIN TIME: 37.1 SEC (ref 9.1–12)

## 2023-10-30 ENCOUNTER — ANTICOAGULATION MONITORING (OUTPATIENT)
Dept: VASCULAR LAB | Facility: MEDICAL CENTER | Age: 73
End: 2023-10-30
Payer: COMMERCIAL

## 2023-10-30 DIAGNOSIS — I26.99 PULMONARY EMBOLISM, UNSPECIFIED CHRONICITY, UNSPECIFIED PULMONARY EMBOLISM TYPE, UNSPECIFIED WHETHER ACUTE COR PULMONALE PRESENT (HCC): ICD-10-CM

## 2023-11-02 ENCOUNTER — OFFICE VISIT (OUTPATIENT)
Dept: MEDICAL GROUP | Facility: MEDICAL CENTER | Age: 73
End: 2023-11-02
Payer: COMMERCIAL

## 2023-11-02 VITALS
OXYGEN SATURATION: 96 % | RESPIRATION RATE: 16 BRPM | SYSTOLIC BLOOD PRESSURE: 130 MMHG | BODY MASS INDEX: 33.73 KG/M2 | HEIGHT: 64 IN | HEART RATE: 114 BPM | TEMPERATURE: 98 F | WEIGHT: 197.6 LBS | DIASTOLIC BLOOD PRESSURE: 60 MMHG

## 2023-11-02 DIAGNOSIS — H61.23 BILATERAL IMPACTED CERUMEN: ICD-10-CM

## 2023-11-02 DIAGNOSIS — I26.99 PULMONARY EMBOLISM, UNSPECIFIED CHRONICITY, UNSPECIFIED PULMONARY EMBOLISM TYPE, UNSPECIFIED WHETHER ACUTE COR PULMONALE PRESENT (HCC): ICD-10-CM

## 2023-11-02 DIAGNOSIS — H91.91 HEARING DECREASED, RIGHT: ICD-10-CM

## 2023-11-02 PROCEDURE — 69210 REMOVE IMPACTED EAR WAX UNI: CPT | Performed by: NURSE PRACTITIONER

## 2023-11-02 PROCEDURE — 3075F SYST BP GE 130 - 139MM HG: CPT | Performed by: NURSE PRACTITIONER

## 2023-11-02 PROCEDURE — 99213 OFFICE O/P EST LOW 20 MIN: CPT | Mod: 25 | Performed by: NURSE PRACTITIONER

## 2023-11-02 PROCEDURE — 3078F DIAST BP <80 MM HG: CPT | Performed by: NURSE PRACTITIONER

## 2023-11-02 NOTE — ASSESSMENT & PLAN NOTE
Chronic problem.   Well controlled on Warfarin, followed by coumadin clinic.  Denies bleeding or bruising

## 2023-11-02 NOTE — PROGRESS NOTES
"Chief Complaint   Patient presents with    Ear Fullness       HISTORY OF PRESENT ILLNESS: Patient is a 73 y.o. female, established patient who presents today to discuss medical problems as listed below:    Health Maintenance:  COMPLETED       Allergies: Other environmental    Current Outpatient Medications   Medication Sig Dispense Refill    warfarin (COUMADIN) 5 MG Tab TAKE 1/2 TO 1 (ONE-HALF TO ONE) TABLET BY MOUTH ONCE DAILY OR AS DIRECTED BY ANTICOAGULATION CLINIC 90 Tablet 1    B Complex Vitamins (VITAMIN B COMPLEX PO) Take  by mouth.      Cholecalciferol (VITAMIN D) 1000 UNIT Cap Take 2 Caps by mouth every day. 60 Cap 6    omeprazole (PRILOSEC) 20 MG TBEC delayed-release tablet Take 20 mg by mouth every day.       No current facility-administered medications for this visit.       Allergies, past medical history, past surgical history, family history, social history reviewed and updated.    Review of Systems:     - Constitutional: Negative for fever, chills, unexpected weight change, and fatigue/generalized weakness.     - HEENT: decrease in hearing b/l. Negative for headaches, vision changes, , ear pain, ear discharge, rhinorrhea, sinus congestion, sore throat, and neck pain.      - Respiratory: Negative for cough, sputum production, chest congestion, dyspnea, wheezing, and crackles.      - Cardiovascular: Negative for chest pain, palpitations, orthopnea, and bilateral lower extremity edema.     - Psychiatric/Behavioral: Negative for depression, suicidal/homicidal ideation and memory loss.      All other systems reviewed and are negative    Exam:    /60   Pulse (!) 114   Temp 36.7 °C (98 °F) (Temporal)   Resp 16   Ht 1.626 m (5' 4\")   Wt 89.6 kg (197 lb 9.6 oz)   SpO2 96%   BMI 33.92 kg/m²  Body mass index is 33.92 kg/m².    Physical Exam:  Constitutional: Well-developed and well-nourished. Not diaphoretic. No distress.   Ears:  External ears unremarkable. Unable to visualize TMs d/t cerumen " impaction.   Cardiovascular: Regular rate and rhythm, S1 and S2 without murmur, rubs, or gallops.    Chest: Effort normal. Clear to auscultation throughout. No adventitious sounds. Neurological: Alert and oriented x 3.   Psychiatric:  Behavior, mood, and affect are appropriate.  MA/nursing note and vitals reviewed.    Assessment/Plan:  No problem-specific Assessment & Plan notes found for this encounter.      1. Hearing decreased, right  Resolved after b/l ear lavage    2. Bilateral impacted cerumen  Procedure: Cerumen Removal  Risks and benefits of procedure discussed with patient.  Cerumen removed from left ear with lavage after softening agent was instilled.  Right ear: attempted to utilize curette in Right ear, unsuccessful.   Patient tolerated the procedure well.  Pt educated on proper care of ear canal. Q-tip cleaning discouraged.  Post lavage curette performed by provider, Post procedure exam with clear canal and normal TM in left ear, blocked in Right ear.    Pt to return in 2 wks,OTC oil based ear drops.       Procedure note    Procedure: Cerumen removal  Indications: Impacted cerumen    Patient was prepped and bilateral ears were lavaged by medical assistant.  Residual wax was curetted by provider with resolution of impaction. TM visualized with otoscope.    Code 27537     3. Pulmonary embolism, unspecified chronicity, unspecified pulmonary embolism type, unspecified whether acute cor pulmonale present (HCC)  Stable on current regimen.  Continue.        Discussed with patient possible alternative diagnoses, patient is to take all medications as prescribed.      If symptoms persist FU w/PCP, if symptoms worsen go to emergency room.      If experiencing any side effects from prescribed medications report to the office immediately or go to emergency room.     Reviewed indication, dosage, usage and potential adverse effects of prescribed medications.      Reviewed risks and benefits of treatment plan. Patient  verbalizes understanding of all instruction and verbally agrees to plan.     Discussed plan with the patient, and patient agrees to the above.      I personally reviewed prior external notes and test results pertinent to today's visit.      No follow-ups on file. 2 wks

## 2023-11-09 ENCOUNTER — ANTICOAGULATION MONITORING (OUTPATIENT)
Dept: VASCULAR LAB | Facility: MEDICAL CENTER | Age: 73
End: 2023-11-09
Payer: COMMERCIAL

## 2023-11-09 DIAGNOSIS — I26.99 PULMONARY EMBOLISM, UNSPECIFIED CHRONICITY, UNSPECIFIED PULMONARY EMBOLISM TYPE, UNSPECIFIED WHETHER ACUTE COR PULMONALE PRESENT (HCC): ICD-10-CM

## 2023-11-09 LAB
INR PPP: 2.3 (ref 0.9–1.2)
PROTHROMBIN TIME: 21.9 SEC (ref 9.1–12)

## 2023-11-10 LAB
25(OH)D3+25(OH)D2 SERPL-MCNC: 38.2 NG/ML (ref 30–100)
ALBUMIN SERPL-MCNC: 4.2 G/DL (ref 3.8–4.8)
ALBUMIN/GLOB SERPL: 1.5 {RATIO} (ref 1.2–2.2)
ALP SERPL-CCNC: 129 IU/L (ref 44–121)
ALT SERPL-CCNC: 12 IU/L (ref 0–32)
AST SERPL-CCNC: 16 IU/L (ref 0–40)
BILIRUB SERPL-MCNC: 0.4 MG/DL (ref 0–1.2)
BUN SERPL-MCNC: 18 MG/DL (ref 8–27)
BUN/CREAT SERPL: 24 (ref 12–28)
CALCIUM SERPL-MCNC: 9.4 MG/DL (ref 8.7–10.3)
CHLORIDE SERPL-SCNC: 105 MMOL/L (ref 96–106)
CHOLEST SERPL-MCNC: 166 MG/DL (ref 100–199)
CO2 SERPL-SCNC: 20 MMOL/L (ref 20–29)
CREAT SERPL-MCNC: 0.76 MG/DL (ref 0.57–1)
EGFRCR SERPLBLD CKD-EPI 2021: 82 ML/MIN/1.73
ERYTHROCYTE [DISTWIDTH] IN BLOOD BY AUTOMATED COUNT: 21.6 % (ref 11.7–15.4)
GLOBULIN SER CALC-MCNC: 2.8 G/DL (ref 1.5–4.5)
GLUCOSE SERPL-MCNC: 208 MG/DL (ref 70–99)
HCT VFR BLD AUTO: 25.8 % (ref 34–46.6)
HDLC SERPL-MCNC: 41 MG/DL
HGB BLD-MCNC: 6.2 G/DL (ref 11.1–15.9)
LABORATORY COMMENT REPORT: ABNORMAL
LDLC SERPL CALC-MCNC: 103 MG/DL (ref 0–99)
MCH RBC QN AUTO: 14.4 PG (ref 26.6–33)
MCHC RBC AUTO-ENTMCNC: 24 G/DL (ref 31.5–35.7)
MCV RBC AUTO: 60 FL (ref 79–97)
MORPHOLOGY BLD-IMP: ABNORMAL
NRBC BLD AUTO-RTO: 1 % (ref 0–0)
PLATELET # BLD AUTO: 427 X10E3/UL (ref 150–450)
POTASSIUM SERPL-SCNC: 4.2 MMOL/L (ref 3.5–5.2)
PROT SERPL-MCNC: 7 G/DL (ref 6–8.5)
RBC # BLD AUTO: 4.31 X10E6/UL (ref 3.77–5.28)
SODIUM SERPL-SCNC: 138 MMOL/L (ref 134–144)
TRIGL SERPL-MCNC: 120 MG/DL (ref 0–149)
VIT B12 SERPL-MCNC: 1125 PG/ML (ref 232–1245)
VLDLC SERPL CALC-MCNC: 22 MG/DL (ref 5–40)
WBC # BLD AUTO: 8.7 X10E3/UL (ref 3.4–10.8)

## 2023-11-10 NOTE — PROGRESS NOTES
Anticoagulation Summary  As of 2023      INR goal:  2.0-3.0   TTR:  73.6 % (8.5 y)   INR used for dosin.3 (2023)   Warfarin maintenance plan:  2.5 mg (5 mg x 0.5) every Fri; 5 mg (5 mg x 1) all other days   Weekly warfarin total:  32.5 mg   Plan last modified:  Mallorie Galloway, Kitty (2023)   Next INR check:  2023   Target end date:  Indefinite    Indications    Pulmonary embolism (HCC) [I26.99]                 Anticoagulation Episode Summary       INR check location:  Anticoagulation Clinic    Preferred lab:      Send INR reminders to:      Comments:  indef per PCP          Anticoagulation Care Providers       Provider Role Specialty Phone number    Tom Vega D.O. Referring Internal Medicine [phone]    [facility] Anticoagulation Services Responsible  [phone]          Anticoagulation Patient Findings          HPI:  Lawanda De Paztiffanie Pagan, on anticoagulation therapy with warfarin for PE.   Changes to current medical/health status since last appt: none  Denies signs/symptoms of bleeding and/or thrombosis since the last appt.    Denies any interval changes to diet  Denies any interval changes to medications since last appt.   Denies any complications or cost restrictions with current therapy.     A/P   INR  therapeutic.   Pt is to continue with current warfarin dosing regimen.     Next INR in 2 week(s).    Beny Sheikh, PharmD

## 2023-11-20 ENCOUNTER — ANTICOAGULATION MONITORING (OUTPATIENT)
Dept: VASCULAR LAB | Facility: MEDICAL CENTER | Age: 73
End: 2023-11-20
Payer: COMMERCIAL

## 2023-11-20 DIAGNOSIS — I26.99 PULMONARY EMBOLISM, UNSPECIFIED CHRONICITY, UNSPECIFIED PULMONARY EMBOLISM TYPE, UNSPECIFIED WHETHER ACUTE COR PULMONALE PRESENT (HCC): ICD-10-CM

## 2023-11-20 LAB
INR PPP: 3 (ref 0.9–1.2)
INR PPP: 3 (ref 2–3.5)
PROTHROMBIN TIME: 28.7 SEC (ref 9.1–12)

## 2023-11-20 NOTE — PROGRESS NOTES
OP Anticoagulation Service Note    Date: 11/20/2023    Anticoagulation Summary  As of 11/20/2023      INR goal:  2.0-3.0   TTR:  73.7 % (8.5 y)   INR used for dosing:  3.00 (11/20/2023)   Warfarin maintenance plan:  2.5 mg (5 mg x 0.5) every Fri; 5 mg (5 mg x 1) all other days   Weekly warfarin total:  32.5 mg   Plan last modified:  Mallorie Galloway, PharmD (9/18/2023)   Next INR check:  12/18/2023   Target end date:  Indefinite    Indications    Pulmonary embolism (HCC) [I26.99]                 Anticoagulation Episode Summary       INR check location:  Anticoagulation Clinic    Preferred lab:      Send INR reminders to:      Comments:  indef per PCP          Anticoagulation Care Providers       Provider Role Specialty Phone number    Tom Vega D.O. Referring Internal Medicine [phone]    [facility] Responsible  [phone]          Anticoagulation Patient Findings        Patient's preferred phone number:  [phone]        HPI:   The reason for today's call is to prevent morbidity and mortality from a blood clot and/or stroke and to reduce the risk of bleeding while on a anticoagulant.     PCP:  IMANI ZambranoRMelvinNMelvin  [address] [address]    Assessment:     INR  therapeutic.     Lab Results   Component Value Date/Time    BUN 18 11/09/2023 06:01 AM    BUN 16 10/02/2018 08:55 AM    CREATININE 0.76 11/09/2023 06:01 AM    CREATININE 0.79 10/02/2018 08:55 AM    BUNCREATRAT 24 11/09/2023 06:01 AM     Lab Results   Component Value Date/Time    HEMOGLOBIN 6.2 (LL) 11/09/2023 06:01 AM    HEMOGLOBIN 16.3 (H) 10/02/2018 08:55 AM    HEMATOCRIT 25.8 (L) 11/09/2023 06:01 AM    HEMATOCRIT 48.2 (H) 10/02/2018 08:55 AM    PLATELETCT 427 11/09/2023 06:01 AM    PLATELETCT 204 10/02/2018 08:55 AM    ALKPHOSPHAT 129 (H) 11/09/2023 06:01 AM    ALKPHOSPHAT 133 (H) 10/02/2018 08:55 AM    ASTSGOT 16 11/09/2023 06:01 AM    ASTSGOT 39 10/02/2018 08:55 AM    ALTSGPT 12  11/09/2023 06:01 AM    ALTSGPT 42 10/02/2018 08:55 AM          Current Outpatient Medications:     warfarin, TAKE 1/2 TO 1 (ONE-HALF TO ONE) TABLET BY MOUTH ONCE DAILY OR AS DIRECTED BY ANTICOAGULATION CLINIC    B Complex Vitamins (VITAMIN B COMPLEX PO), Take  by mouth.    Vitamin D, 2,000 Units, Oral, DAILY    omeprazole, 20 mg, Oral, DAILY      Plan:     Continue the same warfarin dose, as noted above.       Follow-up:     As seen above      Additional information discussed with patient:     Asked patient to please call the anticoagulation clinic if they have any signs/symptoms of bleeding and/or thrombosis or any changes to diet or medications.      National recommendations regarding anticoagulation therapy:     The CHEST guidelines recommends frequent INR monitoring at regular intervals (a few days up to a max of 12 weeks) to ensure patients are on the proper dose of warfarin, and patients are not having any complications from therapy.  INRs can dramatically change over a short time period due to diet, medications, and medical conditions.         [facility]  Phone: [phone]  Fax: [phone]  On call: [phone]  General scheduling/information [phone]  For emergencies please dial 911  Please do not use Nextpeer for urgent matters, call the phone numbers listed above.    This note was created using voice recognition software (Dragon). The accuracy of the dictation is limited by the abilities of the software. I have reviewed the note prior to signing, however some errors in grammar and context are still possible. If you have any questions related to this note please do not hesitate to contact our office.

## 2023-11-22 ENCOUNTER — APPOINTMENT (OUTPATIENT)
Dept: MEDICAL GROUP | Facility: MEDICAL CENTER | Age: 73
End: 2023-11-22
Payer: COMMERCIAL

## 2023-11-29 ENCOUNTER — PATIENT MESSAGE (OUTPATIENT)
Dept: HEALTH INFORMATION MANAGEMENT | Facility: OTHER | Age: 73
End: 2023-11-29

## 2023-12-03 DIAGNOSIS — Z86.711 HISTORY OF PULMONARY EMBOLISM: ICD-10-CM

## 2023-12-04 DIAGNOSIS — Z79.01 CHRONIC ANTICOAGULATION: ICD-10-CM

## 2023-12-04 RX ORDER — WARFARIN SODIUM 5 MG/1
TABLET ORAL
Qty: 90 TABLET | Refills: 1 | Status: SHIPPED | OUTPATIENT
Start: 2023-12-04 | End: 2023-12-04

## 2023-12-04 RX ORDER — WARFARIN SODIUM 5 MG/1
TABLET ORAL
Qty: 90 TABLET | Refills: 1 | Status: SHIPPED | OUTPATIENT
Start: 2023-12-04 | End: 2024-02-06 | Stop reason: SDUPTHER

## 2023-12-15 DIAGNOSIS — Z12.31 ENCOUNTER FOR SCREENING MAMMOGRAM FOR MALIGNANT NEOPLASM OF BREAST: ICD-10-CM

## 2023-12-19 ENCOUNTER — ANTICOAGULATION MONITORING (OUTPATIENT)
Dept: VASCULAR LAB | Facility: MEDICAL CENTER | Age: 73
End: 2023-12-19
Payer: COMMERCIAL

## 2023-12-19 DIAGNOSIS — I26.99 PULMONARY EMBOLISM, UNSPECIFIED CHRONICITY, UNSPECIFIED PULMONARY EMBOLISM TYPE, UNSPECIFIED WHETHER ACUTE COR PULMONALE PRESENT (HCC): ICD-10-CM

## 2023-12-19 LAB
INR PPP: 2.4 (ref 0.9–1.2)
PROTHROMBIN TIME: 25 SEC (ref 9.1–12)

## 2023-12-20 ENCOUNTER — APPOINTMENT (OUTPATIENT)
Dept: MEDICAL GROUP | Facility: MEDICAL CENTER | Age: 73
End: 2023-12-20
Payer: COMMERCIAL

## 2023-12-20 DIAGNOSIS — Z59.89 HAS HEALTH INSURANCE WITH INADEQUATE COVERAGE OF HEALTH EXPENSES: ICD-10-CM

## 2023-12-20 SDOH — ECONOMIC STABILITY - INCOME SECURITY: OTHER PROBLEMS RELATED TO HOUSING AND ECONOMIC CIRCUMSTANCES: Z59.89

## 2023-12-20 NOTE — PROGRESS NOTES
Anticoagulation Summary  As of 2023      INR goal:  2.0-3.0   TTR:  74.0 % (8.6 y)   INR used for dosin.4 (2023)   Warfarin maintenance plan:  2.5 mg (5 mg x 0.5) every Fri; 5 mg (5 mg x 1) all other days   Weekly warfarin total:  32.5 mg   Plan last modified:  Mallorie Galloway PharmD (2023)   Next INR check:  2024   Target end date:  Indefinite    Indications    Pulmonary embolism (HCC) [I26.99]                 Anticoagulation Episode Summary       INR check location:  Anticoagulation Clinic    Preferred lab:      Send INR reminders to:      Comments:  indef per PCP          Anticoagulation Care Providers       Provider Role Specialty Phone number    Tom Vega D.O. Referring Internal Medicine [phone]    [facility] Responsible  [phone]          Anticoagulation Patient Findings      INR is therapeutic    Called and left VM for patient.    Requested patient to contact the clinic for any s/sx of unusual bleeding, bruising, clotting or any changes to diet or medications. Unless patient reports any changes that would warrant an adjustment to the plan:  Warfarin Plan: Continue regimen as listed above.    Next INR in 6 week(s).    Mallorie Galloway, KirstenD

## 2023-12-21 ENCOUNTER — PATIENT OUTREACH (OUTPATIENT)
Dept: HEALTH INFORMATION MANAGEMENT | Facility: OTHER | Age: 73
End: 2023-12-21
Payer: COMMERCIAL

## 2023-12-21 NOTE — PROGRESS NOTES
CHW received a referral for pt's PCP to assist with insurance questions. Pt states that she has things figured out as her insurance will switch over to medicare/Medicaid after the beginning of the year. Pt states that she has been having a had time because she has had to cancel appointments for her mammogram due to insurance. Pt has an appt scheduled for her mammogram on 1/8/23 and will be speaking with SCP to discus insurance options. CHW gave pt the contact information for SCP as she had insurance questions. Pt has the contact information to reach CHW as needed.

## 2024-01-08 ENCOUNTER — HOSPITAL ENCOUNTER (OUTPATIENT)
Dept: RADIOLOGY | Facility: MEDICAL CENTER | Age: 74
End: 2024-01-08
Attending: NURSE PRACTITIONER
Payer: MEDICARE

## 2024-01-08 ENCOUNTER — DOCUMENTATION (OUTPATIENT)
Dept: VASCULAR LAB | Facility: MEDICAL CENTER | Age: 74
End: 2024-01-08
Payer: MEDICARE

## 2024-01-08 ENCOUNTER — HOSPITAL ENCOUNTER (OUTPATIENT)
Dept: RADIOLOGY | Facility: MEDICAL CENTER | Age: 74
End: 2024-01-08
Attending: NURSE PRACTITIONER

## 2024-01-08 DIAGNOSIS — Z12.31 ENCOUNTER FOR SCREENING MAMMOGRAM FOR MALIGNANT NEOPLASM OF BREAST: ICD-10-CM

## 2024-01-08 DIAGNOSIS — N63.0 LUMP IN FEMALE BREAST: ICD-10-CM

## 2024-01-08 PROCEDURE — 76642 ULTRASOUND BREAST LIMITED: CPT | Mod: LT

## 2024-01-08 PROCEDURE — G0279 TOMOSYNTHESIS, MAMMO: HCPCS

## 2024-01-08 NOTE — PROGRESS NOTES
[facility]    Received anticoagulation clearance from [facility] regarding upcoming biopsy.    Procedure date 2/2    Pt is on warfarin for PE.    Per current CHEST guidelines, pt is at low risk of VTE/stroke given PE was over 12 months ago.             IF PT ON WARFARIN  Given pt's hx, will hold warfarin x 5 days prior to procedure and will not bridge w/ Lovenox.        Faxed clearance to [phone]; (will send to [place] to scan in media)    Called and spoke to patient. Instructed her to hold warfarin 5 days prior to procedure. Pt will check INR on 2/2 pre-op.    Mallorie Galloway, KirstenD

## 2024-01-25 ENCOUNTER — APPOINTMENT (OUTPATIENT)
Dept: MEDICAL GROUP | Facility: MEDICAL CENTER | Age: 74
End: 2024-01-25
Payer: MEDICARE

## 2024-01-30 ENCOUNTER — TELEPHONE (OUTPATIENT)
Dept: VASCULAR LAB | Facility: MEDICAL CENTER | Age: 74
End: 2024-01-30
Payer: MEDICARE

## 2024-01-30 DIAGNOSIS — Z79.01 CHRONIC ANTICOAGULATION: ICD-10-CM

## 2024-01-30 NOTE — TELEPHONE ENCOUNTER
LAB REQUEST    Caller: Lawanda Pagan    Topic/issue: MEDICAL ADVICE    Lawanda states that she needs to have her lab order renewed, so that she can get her INR done on 2/1. Please advise.    Thank you,  Aleksander CARRASCO    Callback Number: [phone] (home)

## 2024-02-01 ENCOUNTER — TELEPHONE (OUTPATIENT)
Dept: VASCULAR LAB | Facility: MEDICAL CENTER | Age: 74
End: 2024-02-01
Payer: MEDICARE

## 2024-02-01 ENCOUNTER — HOSPITAL ENCOUNTER (OUTPATIENT)
Dept: RADIOLOGY | Facility: MEDICAL CENTER | Age: 74
End: 2024-02-01
Attending: NURSE PRACTITIONER
Payer: MEDICARE

## 2024-02-01 DIAGNOSIS — N95.1 SYMPTOMATIC MENOPAUSAL OR FEMALE CLIMACTERIC STATES: ICD-10-CM

## 2024-02-01 DIAGNOSIS — Z78.0 MENOPAUSE: ICD-10-CM

## 2024-02-01 PROCEDURE — 77080 DXA BONE DENSITY AXIAL: CPT

## 2024-02-01 NOTE — TELEPHONE ENCOUNTER
Called [facility] @ 2:26pm today to check on status of lab draw today  They state it will not be finalized until tomorrow early AM    [facility]ed [facility] x [phone] and notified INR result won't be finalized until the morning    Will have our clinic look for INR in the morning  Mallorie Galloway, PharmD

## 2024-02-01 NOTE — TELEPHONE ENCOUNTER
Received call from Sejal [facility]  Pt had INR drawn today but no results available yet  Called pt and she had INR drawn today at [facility] on [address] at 1030  We likely will not have it resulted until later this PM    Sejal requested we call with INR results at [phone], attn: Latanya Galloway, PharmD

## 2024-02-02 ENCOUNTER — HOSPITAL ENCOUNTER (OUTPATIENT)
Dept: RADIOLOGY | Facility: MEDICAL CENTER | Age: 74
End: 2024-02-02
Attending: NURSE PRACTITIONER
Payer: MEDICARE

## 2024-02-02 ENCOUNTER — ANTICOAGULATION MONITORING (OUTPATIENT)
Dept: VASCULAR LAB | Facility: MEDICAL CENTER | Age: 74
End: 2024-02-02
Payer: MEDICARE

## 2024-02-02 ENCOUNTER — TELEPHONE (OUTPATIENT)
Dept: VASCULAR LAB | Facility: MEDICAL CENTER | Age: 74
End: 2024-02-02

## 2024-02-02 ENCOUNTER — DOCUMENTATION (OUTPATIENT)
Dept: VASCULAR LAB | Facility: MEDICAL CENTER | Age: 74
End: 2024-02-02
Payer: MEDICARE

## 2024-02-02 DIAGNOSIS — R92.8 ABNORMAL FINDING ON BREAST IMAGING: ICD-10-CM

## 2024-02-02 DIAGNOSIS — I26.99 PULMONARY EMBOLISM, UNSPECIFIED CHRONICITY, UNSPECIFIED PULMONARY EMBOLISM TYPE, UNSPECIFIED WHETHER ACUTE COR PULMONALE PRESENT (HCC): ICD-10-CM

## 2024-02-02 LAB
INR PPP: 1.4 (ref 0.9–1.2)
PATHOLOGY CONSULT NOTE: NORMAL
PROTHROMBIN TIME: 14.6 SEC (ref 9.1–12)

## 2024-02-02 PROCEDURE — 76942 ECHO GUIDE FOR BIOPSY: CPT

## 2024-02-02 PROCEDURE — 19083 BX BREAST 1ST LESION US IMAG: CPT | Mod: LT

## 2024-02-02 PROCEDURE — 88360 TUMOR IMMUNOHISTOCHEM/MANUAL: CPT | Mod: 91

## 2024-02-02 PROCEDURE — 10035 PLMT SFT TISS LOCLZJ DEV 1ST: CPT

## 2024-02-02 PROCEDURE — 88305 TISSUE EXAM BY PATHOLOGIST: CPT | Mod: 59

## 2024-02-02 NOTE — TELEPHONE ENCOUNTER
Caller: Lawanda Pagan      Topic/issue: Patient was calling with post biopsy questions regarding her warfarin (COUMADIN) 5 MG Tab medication. She was told to be off it prior to her procedure and she wanted to know if she could restart it now or she had to wait a few more days before she could take it and she was asking for a call back      Callback Number: [phone]      Thank you    -Paulo AWAD

## 2024-02-02 NOTE — PROGRESS NOTES
"[facility]'      Called patient back regarding message left below:         \"Topic/issue: Patient was calling with post biopsy questions regarding her warfarin (COUMADIN) 5 MG Tab medication. She was told to be off it prior to her procedure and she wanted to know if she could restart it now or she had to wait a few more days before she could take it and she was asking for a call back  Callback Number: [phone]\"      Called the patient back, but unfortunately, there was no answer.   Left a VM for the patient explaining that unless her attending provider told her otherwise, she is to restart her warfarin tonight. She should resume her current dosing regimen and retest her INR again in 5 days. Further dosing information will be given at that time based on her INR results.     Requested she call the clinic back with any further questions or concerns.       Sarath Dooley  PharmD    "

## 2024-02-05 ENCOUNTER — TELEPHONE (OUTPATIENT)
Dept: RADIOLOGY | Facility: MEDICAL CENTER | Age: 74
End: 2024-02-05
Payer: MEDICARE

## 2024-02-06 ENCOUNTER — OFFICE VISIT (OUTPATIENT)
Dept: MEDICAL GROUP | Facility: MEDICAL CENTER | Age: 74
End: 2024-02-06
Payer: MEDICARE

## 2024-02-06 VITALS
WEIGHT: 187.8 LBS | OXYGEN SATURATION: 97 % | DIASTOLIC BLOOD PRESSURE: 74 MMHG | BODY MASS INDEX: 32.06 KG/M2 | TEMPERATURE: 98 F | SYSTOLIC BLOOD PRESSURE: 132 MMHG | HEART RATE: 108 BPM | HEIGHT: 64 IN

## 2024-02-06 DIAGNOSIS — R01.1 CARDIAC MURMUR: ICD-10-CM

## 2024-02-06 DIAGNOSIS — R06.02 SOB (SHORTNESS OF BREATH) ON EXERTION: ICD-10-CM

## 2024-02-06 DIAGNOSIS — N63.0 BREAST MASS IN FEMALE: ICD-10-CM

## 2024-02-06 DIAGNOSIS — N63.0 LUMP IN FEMALE BREAST: ICD-10-CM

## 2024-02-06 DIAGNOSIS — E78.49 OTHER HYPERLIPIDEMIA: ICD-10-CM

## 2024-02-06 DIAGNOSIS — R92.8 ABNORMAL MAMMOGRAM: ICD-10-CM

## 2024-02-06 DIAGNOSIS — Z79.01 CHRONIC ANTICOAGULATION: ICD-10-CM

## 2024-02-06 PROCEDURE — 3078F DIAST BP <80 MM HG: CPT | Performed by: NURSE PRACTITIONER

## 2024-02-06 PROCEDURE — 3075F SYST BP GE 130 - 139MM HG: CPT | Performed by: NURSE PRACTITIONER

## 2024-02-06 PROCEDURE — 99214 OFFICE O/P EST MOD 30 MIN: CPT | Performed by: NURSE PRACTITIONER

## 2024-02-06 RX ORDER — WARFARIN SODIUM 5 MG/1
TABLET ORAL
Qty: 90 TABLET | Refills: 1 | Status: ON HOLD | OUTPATIENT
Start: 2024-02-06 | End: 2024-02-18

## 2024-02-06 ASSESSMENT — PATIENT HEALTH QUESTIONNAIRE - PHQ9: CLINICAL INTERPRETATION OF PHQ2 SCORE: 0

## 2024-02-06 ASSESSMENT — FIBROSIS 4 INDEX: FIB4 SCORE: 0.81

## 2024-02-06 NOTE — ASSESSMENT & PLAN NOTE
Latest Reference Range & Units 10/02/18 08:55 11/09/23 06:01   Cholesterol,Tot 100 - 199 mg/dL 182 166   Triglycerides 0 - 149 mg/dL 135 120   HDL >39 mg/dL 38 ! 41   LDL <100 mg/dL 117 (H)    LDL Chol Calc (NIH) 0 - 99 mg/dL  103 (H)   VLDL Cholesterol Calc 5 - 40 mg/dL  22   !: Data is abnormal  (H): Data is abnormally high  Chronic condition. Eating healthy, not interested in statins, aware of the risks.

## 2024-02-06 NOTE — ASSESSMENT & PLAN NOTE
New problem. Chronic SOB on exertion since August. Associated hx cardiac murmur. On Warfarin for hx of PE in 2014. Denies CP, no ankle swelling, no cough.

## 2024-02-06 NOTE — PROGRESS NOTES
"Chief Complaint   Patient presents with    Referral Needed     Surgery        HISTORY OF PRESENT ILLNESS: Patient is a 73 y.o. female, established patient who presents today to discuss medical problems as listed below:    Health Maintenance:  COMPLETED       Allergies: Other environmental    Current Outpatient Medications   Medication Sig Dispense Refill    warfarin (COUMADIN) 5 MG Tab Take one-half to one tablet by mouth daily or as directed by anticoagulation clinic 90 Tablet 1    B Complex Vitamins (VITAMIN B COMPLEX PO) Take  by mouth.      Cholecalciferol (VITAMIN D) 1000 UNIT Cap Take 2 Caps by mouth every day. 60 Cap 6    omeprazole (PRILOSEC) 20 MG TBEC delayed-release tablet Take 20 mg by mouth every day.       No current facility-administered medications for this visit.       Allergies, past medical history, past surgical history, family history, social history reviewed and updated.    Review of Systems:     - Constitutional: Negative for fever, chills, unexpected weight change, and fatigue/generalized weakness.     - Respiratory: SOB. Negative for cough, sputum production, chest congestion,  wheezing, and crackles.      - Cardiovascular: Negative for chest pain, palpitations, orthopnea, and bilateral lower extremity edema.     - Psychiatric/Behavioral: Negative for depression, suicidal/homicidal ideation and memory loss.      All other systems reviewed and are negative    Exam:    /74 (BP Location: Left arm, Patient Position: Sitting, BP Cuff Size: Adult)   Pulse (!) 108   Temp 36.7 °C (98 °F) (Temporal)   Ht 1.626 m (5' 4\")   Wt 85.2 kg (187 lb 12.8 oz)   SpO2 97%   BMI 32.24 kg/m²  Body mass index is 32.24 kg/m².    Physical Exam:  Constitutional: Well-developed and well-nourished. Not diaphoretic. No distress.   Cardiovascular: Regular rate and rhythm, S1 and S2 with noted murmur, rubs, or gallops.    Chest: Effort normal. Clear to auscultation throughout. No adventitious sounds. " Neurological: Alert and oriented x 3.   Psychiatric:  Behavior, mood, and affect are appropriate.  MA/nursing note and vitals reviewed.    Mammogram   results reviewed and discussed with the patient, questions answered.    Assessment/Plan:  Abnormal mammogram  IMPRESSION:     1.  Ultrasound-guided vacuum-assisted needle core biopsy of highly suspicious mass in the left breast 1:00 position.     2.  Ultrasound guided 14-gauge needle core biopsy of suspicious lymph node in the left axilla with UMESH clip localization. The UMESH reflector probe was used to audibly detect the UMESH clip.              Exam Ended: 02/02/24  2:26 PM             Breast mass in female  IMPRESSION:     1.  Ultrasound-guided vacuum-assisted needle core biopsy of highly suspicious mass in the left breast 1:00 position.     2.  Ultrasound guided 14-gauge needle core biopsy of suspicious lymph node in the left axilla with UMESH clip localization. The UMESH reflector probe was used to audibly detect the UMESH clip.              Exam Ended: 02/02/24  2:26 PM         New problem .denies fever, CP, SOB.   Denies depression and uncontrolled anxiety. Admits to good support systems in family, kids in the area and other states. Denies the need for ref to therapy.     Other hyperlipidemia   Latest Reference Range & Units 10/02/18 08:55 11/09/23 06:01   Cholesterol,Tot 100 - 199 mg/dL 182 166   Triglycerides 0 - 149 mg/dL 135 120   HDL >39 mg/dL 38 ! 41   LDL <100 mg/dL 117 (H)    LDL Chol Calc (NIH) 0 - 99 mg/dL  103 (H)   VLDL Cholesterol Calc 5 - 40 mg/dL  22   !: Data is abnormal  (H): Data is abnormally high  Chronic condition. Eating healthy, not interested in statins, aware of the risks.     SOB (shortness of breath) on exertion  New problem. Chronic SOB on exertion since August. Associated hx cardiac murmur. On Warfarin for hx of PE in 2014. Denies CP, no ankle swelling, no cough.    1. Abnormal mammogram  - Referral to Breast Surgery Oncology    2. Lump  in female breast  - Referral to Breast Surgery Oncology    3. Breast mass in female  - Referral to Breast Surgery Oncology    4. Other hyperlipidemia  - CBC WITHOUT DIFFERENTIAL; Future  - Comp Metabolic Panel; Future  - Lipid Profile; Future    5. SOB (shortness of breath) on exertion  Uncontrolled, stable.  Will obtain cardiac ultrasound and would appreciate evaluation by cardiologist  - EC-ECHOCARDIOGRAM COMPLETE W/O CONT; Future  - REFERRAL TO CARDIOLOGY    6. Chronic anticoagulation  - warfarin (COUMADIN) 5 MG Tab; Take one-half to one tablet by mouth daily or as directed by anticoagulation clinic  Dispense: 90 Tablet; Refill: 1    7. Cardiac murmur  - EC-ECHOCARDIOGRAM COMPLETE W/O CONT; Future  - REFERRAL TO CARDIOLOGY      Discussed with patient possible alternative diagnoses, patient is to take all medications as prescribed.      If symptoms persist FU w/PCP, if symptoms worsen go to emergency room.      If experiencing any side effects from prescribed medications report to the office immediately or go to emergency room.     Reviewed indication, dosage, usage and potential adverse effects of prescribed medications.      Reviewed risks and benefits of treatment plan. Patient verbalizes understanding of all instruction and verbally agrees to plan.     Discussed plan with the patient, and patient agrees to the above.      I personally reviewed prior external notes and test results pertinent to today's visit.      No follow-ups on file. 6 wks

## 2024-02-06 NOTE — ASSESSMENT & PLAN NOTE
IMPRESSION:     1.  Ultrasound-guided vacuum-assisted needle core biopsy of highly suspicious mass in the left breast 1:00 position.     2.  Ultrasound guided 14-gauge needle core biopsy of suspicious lymph node in the left axilla with UMESH clip localization. The UMESH reflector probe was used to audibly detect the UMESH clip.              Exam Ended: 02/02/24  2:26 PM

## 2024-02-08 ENCOUNTER — DOCUMENTATION (OUTPATIENT)
Dept: RADIATION ONCOLOGY | Facility: MEDICAL CENTER | Age: 74
End: 2024-02-08

## 2024-02-08 ENCOUNTER — OFFICE VISIT (OUTPATIENT)
Dept: SURGERY | Facility: MEDICAL CENTER | Age: 74
End: 2024-02-08
Payer: MEDICARE

## 2024-02-08 VITALS
SYSTOLIC BLOOD PRESSURE: 142 MMHG | OXYGEN SATURATION: 96 % | TEMPERATURE: 97.3 F | HEART RATE: 93 BPM | HEIGHT: 64 IN | BODY MASS INDEX: 33.8 KG/M2 | WEIGHT: 198 LBS | DIASTOLIC BLOOD PRESSURE: 62 MMHG

## 2024-02-08 DIAGNOSIS — C50.412 CARCINOMA OF UPPER-OUTER QUADRANT OF FEMALE BREAST, LEFT (HCC): ICD-10-CM

## 2024-02-08 PROCEDURE — G2212 PROLONG OUTPT/OFFICE VIS: HCPCS | Performed by: SURGERY

## 2024-02-08 PROCEDURE — 99205 OFFICE O/P NEW HI 60 MIN: CPT | Performed by: SURGERY

## 2024-02-08 ASSESSMENT — ENCOUNTER SYMPTOMS: SHORTNESS OF BREATH: 1

## 2024-02-08 ASSESSMENT — FIBROSIS 4 INDEX: FIB4 SCORE: 0.81

## 2024-02-08 NOTE — PROGRESS NOTES
Nutrition Services: [facility] Referral  Lawanda Pagan is a 73 y.o. 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 Referral received for nutrition services. Due to high volume of referrals, please allow for up to 14 days for initial RD consultation per policy. RD will attempt to see at next oncology appointment or will contact per policy for nutrition assessment.    Please contact as needed.  [phone]
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
## 2024-02-09 ENCOUNTER — HOSPITAL ENCOUNTER (OUTPATIENT)
Dept: RADIOLOGY | Facility: MEDICAL CENTER | Age: 74
End: 2024-02-09
Attending: SURGERY
Payer: MEDICARE

## 2024-02-09 DIAGNOSIS — C50.412 CARCINOMA OF UPPER-OUTER QUADRANT OF FEMALE BREAST, LEFT (HCC): ICD-10-CM

## 2024-02-09 PROCEDURE — A9552 F18 FDG: HCPCS

## 2024-02-12 ENCOUNTER — ANTICOAGULATION VISIT (OUTPATIENT)
Dept: VASCULAR LAB | Facility: MEDICAL CENTER | Age: 74
End: 2024-02-12
Attending: INTERNAL MEDICINE
Payer: MEDICARE

## 2024-02-12 DIAGNOSIS — I26.99 PULMONARY EMBOLISM, UNSPECIFIED CHRONICITY, UNSPECIFIED PULMONARY EMBOLISM TYPE, UNSPECIFIED WHETHER ACUTE COR PULMONALE PRESENT (HCC): ICD-10-CM

## 2024-02-12 LAB — INR PPP: 3.9 (ref 2–3.5)

## 2024-02-12 PROCEDURE — 85610 PROTHROMBIN TIME: CPT

## 2024-02-12 PROCEDURE — 99212 OFFICE O/P EST SF 10 MIN: CPT

## 2024-02-13 NOTE — PROGRESS NOTES
Anticoagulation Summary  As of 2/12/2024      INR goal:  2.0-3.0   TTR:  73.4 % (8.7 y)   INR used for dosing:  3.90 (2/12/2024)   Warfarin maintenance plan:  2.5 mg (5 mg x 0.5) every Fri; 5 mg (5 mg x 1) all other days   Weekly warfarin total:  32.5 mg   Plan last modified:  Mallorie Galloway PharmD (9/18/2023)   Next INR check:  2/16/2024   Target end date:  Indefinite    Indications    Pulmonary embolism (HCC) [I26.99]                 Anticoagulation Episode Summary       INR check location:  Anticoagulation Clinic    Preferred lab:      Send INR reminders to:      Comments:  indef per PCP          Anticoagulation Care Providers       Provider Role Specialty Phone number    Tom Vega D.O. Referring Internal Medicine [phone]    [facility]ticoagulation [facility] Responsible  [phone]                  Refer to Patient Findings for HPI:  Patient Findings       Negatives:  Signs/symptoms of thrombosis, Signs/symptoms of bleeding, Laboratory test error suspected, Change in health, Change in alcohol use, Change in activity, Upcoming invasive procedure, Emergency department visit, Upcoming dental procedure, Missed doses, Extra doses, Change in medications, Change in diet/appetite, Hospital admission, Bruising, Other complaints            There were no vitals filed for this visit.  Pt declined vitals    Verified current warfarin dosing schedule.    Medications reconciled: No  Pt is not on antiplatelet therapy.      A/P   INR is supratherapeutic    Warfarin dosing recommendation: Hold dose today, then continue current regimen    Pt educated to contact our clinic with any changes in medications or s/s of bleeding or thrombosis. Pt is aware to seek immediate medical attention for falls, head injury or deep cuts.    Request pt to return in 4 day(s). Pt agrees.    Gary Ayala, KirstenD

## 2024-02-14 ENCOUNTER — TELEPHONE (OUTPATIENT)
Dept: HEALTH INFORMATION MANAGEMENT | Facility: OTHER | Age: 74
End: 2024-02-14
Payer: MEDICARE

## 2024-02-14 ENCOUNTER — TELEPHONE (OUTPATIENT)
Dept: RADIATION ONCOLOGY | Facility: MEDICAL CENTER | Age: 74
End: 2024-02-14
Payer: MEDICARE

## 2024-02-15 NOTE — TELEPHONE ENCOUNTER
"Nutrition Services: RD Consultation/ New   Lawanda Pagan is a 73 y.o. female with diagnosis of carcinoma of upper-outer quadrant of female breast, left    RD called pt to assess current intake, appetite, and nutritional status. Per pt, she has been eating healthier for the past ~1 year  by eating more fruits and vegetables and less salt and sugar. She reports she has a good appetite and eating normal. 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 She reports a weight loss of ~45 lbs in the past 1.5 years. Per pt, she plans to meet with her oncologist this week. Pt did not express any further nutrition-related questions or concerns at this time.     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    Blood clotting disorder (HCC)     lungs bilat     Blood transfusion without reported diagnosis     Breath shortness 2014 2017 denies now    Elevated LFTs 1/26/2018    Fall     Hernia, hiatal     Measles        Past Surgical History:   Procedure Laterality Date    GASTROSCOPY N/A 9/1/2017    Procedure: GASTROSCOPY;  Surgeon: Stephanie Reyes M.D.;  Location: SURGERY SAME DAY [facility];  Service: Gastroenterology    OTHER ABDOMINAL SURGERY  2004    hernia repair    GYN SURGERY  1980    c sec lap tubal    TONSILLECTOMY       Biochemical/ Anthropometric Assessment:  Pertinent Labs: glucose 208  Pertinent Meds: Warfarin, vitamin D, omeprazole, B complex vitamins  Wt Readings from Last 1 Encounters:   02/08/24 89.8 kg (198 lb)      Ht Readings from Last 1 Encounters:   02/08/24 1.626 m (5' 4\")      BMI Readings from Last 1 Encounters:   02/08/24 33.99 kg/m²   BMI Classification: Obesity Class I    Weight History:  Wt Readings from Last 6 Encounters:   02/08/24 89.8 kg (198 lb)   02/06/24 85.2 kg (187 lb 12.8 oz)   11/02/23 89.6 kg (197 lb 9.6 oz)   10/26/23 90 kg (198 lb 6.4 " oz)   12/24/18 109 kg (241 lb)   08/31/18 109 kg (239 lb 3.2 oz)     Weight Change/Malnutrition Risk: Weight appears stable since 10/26/23.     Interventions:  Introduced self and discussed role of dietitian throughout treatment process     Pt reports understanding and was receptive to information provided.   RD provided contact information. Encouraged pt to reach out as questions/concerns arise.   RD available PRN   [phone]

## 2024-02-16 ENCOUNTER — TELEPHONE (OUTPATIENT)
Dept: CARDIOLOGY | Facility: MEDICAL CENTER | Age: 74
End: 2024-02-16

## 2024-02-16 ENCOUNTER — OFFICE VISIT (OUTPATIENT)
Dept: CARDIOLOGY | Facility: MEDICAL CENTER | Age: 74
DRG: 812 | End: 2024-02-16
Attending: NURSE PRACTITIONER
Payer: MEDICARE

## 2024-02-16 ENCOUNTER — HOSPITAL ENCOUNTER (INPATIENT)
Facility: MEDICAL CENTER | Age: 74
LOS: 2 days | DRG: 812 | End: 2024-02-18
Attending: EMERGENCY MEDICINE | Admitting: STUDENT IN AN ORGANIZED HEALTH CARE EDUCATION/TRAINING PROGRAM
Payer: MEDICARE

## 2024-02-16 ENCOUNTER — HOSPITAL ENCOUNTER (OUTPATIENT)
Dept: LAB | Facility: MEDICAL CENTER | Age: 74
DRG: 812 | End: 2024-02-16
Attending: INTERNAL MEDICINE
Payer: MEDICARE

## 2024-02-16 ENCOUNTER — ANTICOAGULATION VISIT (OUTPATIENT)
Dept: VASCULAR LAB | Facility: MEDICAL CENTER | Age: 74
End: 2024-02-16
Attending: INTERNAL MEDICINE
Payer: MEDICARE

## 2024-02-16 ENCOUNTER — APPOINTMENT (OUTPATIENT)
Dept: RADIOLOGY | Facility: MEDICAL CENTER | Age: 74
DRG: 812 | End: 2024-02-16
Attending: EMERGENCY MEDICINE
Payer: MEDICARE

## 2024-02-16 VITALS
DIASTOLIC BLOOD PRESSURE: 52 MMHG | OXYGEN SATURATION: 97 % | BODY MASS INDEX: 33.63 KG/M2 | HEIGHT: 64 IN | RESPIRATION RATE: 16 BRPM | SYSTOLIC BLOOD PRESSURE: 90 MMHG | HEART RATE: 119 BPM | WEIGHT: 197 LBS

## 2024-02-16 DIAGNOSIS — E87.6 HYPOKALEMIA: ICD-10-CM

## 2024-02-16 DIAGNOSIS — R93.1 ABNORMAL FINDINGS ON DIAGNOSTIC IMAGING OF HEART AND CORONARY CIRCULATION: ICD-10-CM

## 2024-02-16 DIAGNOSIS — R06.02 SOB (SHORTNESS OF BREATH) ON EXERTION: ICD-10-CM

## 2024-02-16 DIAGNOSIS — C77.3 BREAST CANCER METASTASIZED TO AXILLARY LYMPH NODE, LEFT (HCC): ICD-10-CM

## 2024-02-16 DIAGNOSIS — D64.9 SYMPTOMATIC ANEMIA: ICD-10-CM

## 2024-02-16 DIAGNOSIS — K21.9 GASTROESOPHAGEAL REFLUX DISEASE WITHOUT ESOPHAGITIS: ICD-10-CM

## 2024-02-16 DIAGNOSIS — I26.99 PULMONARY EMBOLISM, UNSPECIFIED CHRONICITY, UNSPECIFIED PULMONARY EMBOLISM TYPE, UNSPECIFIED WHETHER ACUTE COR PULMONALE PRESENT (HCC): ICD-10-CM

## 2024-02-16 DIAGNOSIS — K22.719 BARRETT'S ESOPHAGUS WITH DYSPLASIA: ICD-10-CM

## 2024-02-16 DIAGNOSIS — C50.912 BREAST CANCER METASTASIZED TO AXILLARY LYMPH NODE, LEFT (HCC): ICD-10-CM

## 2024-02-16 DIAGNOSIS — D64.9 ANEMIA, UNSPECIFIED TYPE: ICD-10-CM

## 2024-02-16 PROBLEM — Z71.89 ACP (ADVANCE CARE PLANNING): Status: ACTIVE | Noted: 2024-02-16

## 2024-02-16 LAB
ABO GROUP BLD: NORMAL
ALBUMIN SERPL BCP-MCNC: 4.3 G/DL (ref 3.2–4.9)
ALBUMIN/GLOB SERPL: 1.3 G/DL
ALP SERPL-CCNC: 122 U/L (ref 30–99)
ALT SERPL-CCNC: 14 U/L (ref 2–50)
ANION GAP SERPL CALC-SCNC: 14 MMOL/L (ref 7–16)
ANION GAP SERPL CALC-SCNC: 16 MMOL/L (ref 7–16)
ANISOCYTOSIS BLD QL SMEAR: ABNORMAL
AST SERPL-CCNC: 24 U/L (ref 12–45)
BARCODED ABORH UBTYP: 6200
BARCODED PRD CODE UBPRD: NORMAL
BARCODED UNIT NUM UBUNT: NORMAL
BASOPHILS # BLD AUTO: 1.1 % (ref 0–1.8)
BASOPHILS # BLD: 0.04 K/UL (ref 0–0.12)
BILIRUB SERPL-MCNC: 0.8 MG/DL (ref 0.1–1.5)
BLD GP AB SCN SERPL QL: NORMAL
BUN SERPL-MCNC: 12 MG/DL (ref 8–22)
BUN SERPL-MCNC: 12 MG/DL (ref 8–22)
CALCIUM ALBUM COR SERPL-MCNC: 8.8 MG/DL (ref 8.5–10.5)
CALCIUM SERPL-MCNC: 8.8 MG/DL (ref 8.5–10.5)
CALCIUM SERPL-MCNC: 9 MG/DL (ref 8.5–10.5)
CHLORIDE SERPL-SCNC: 100 MMOL/L (ref 96–112)
CHLORIDE SERPL-SCNC: 101 MMOL/L (ref 96–112)
CO2 SERPL-SCNC: 19 MMOL/L (ref 20–33)
CO2 SERPL-SCNC: 20 MMOL/L (ref 20–33)
COMMENT 1642: NORMAL
COMPONENT R 8504R: NORMAL
CREAT SERPL-MCNC: 0.77 MG/DL (ref 0.5–1.4)
CREAT SERPL-MCNC: 0.84 MG/DL (ref 0.5–1.4)
EKG IMPRESSION: NORMAL
EOSINOPHIL # BLD AUTO: 0.01 K/UL (ref 0–0.51)
EOSINOPHIL NFR BLD: 0.3 % (ref 0–6.9)
ERYTHROCYTE [DISTWIDTH] IN BLOOD BY AUTOMATED COUNT: 46.5 FL (ref 35.9–50)
ERYTHROCYTE [DISTWIDTH] IN BLOOD BY AUTOMATED COUNT: 47.3 FL (ref 35.9–50)
GFR SERPLBLD CREATININE-BSD FMLA CKD-EPI: 72 ML/MIN/1.73 M 2
GFR SERPLBLD CREATININE-BSD FMLA CKD-EPI: 80 ML/MIN/1.73 M 2
GLOBULIN SER CALC-MCNC: 3.4 G/DL (ref 1.9–3.5)
GLUCOSE SERPL-MCNC: 139 MG/DL (ref 65–99)
GLUCOSE SERPL-MCNC: 171 MG/DL (ref 65–99)
HCT VFR BLD AUTO: 19.8 % (ref 37–47)
HCT VFR BLD AUTO: 22.5 % (ref 37–47)
HGB BLD-MCNC: 4.8 G/DL (ref 12–16)
HGB BLD-MCNC: 5.2 G/DL (ref 12–16)
HYPOCHROMIA BLD QL SMEAR: ABNORMAL
IMM GRANULOCYTES # BLD AUTO: 0.01 K/UL (ref 0–0.11)
IMM GRANULOCYTES NFR BLD AUTO: 0.3 % (ref 0–0.9)
INR PPP: 1.8 (ref 2–3.5)
LYMPHOCYTES # BLD AUTO: 1.02 K/UL (ref 1–4.8)
LYMPHOCYTES NFR BLD: 26.8 % (ref 22–41)
MCH RBC QN AUTO: 13.2 PG (ref 27–33)
MCH RBC QN AUTO: 13.7 PG (ref 27–33)
MCHC RBC AUTO-ENTMCNC: 23.1 G/DL (ref 32.2–35.5)
MCHC RBC AUTO-ENTMCNC: 24.2 G/DL (ref 32.2–35.5)
MCV RBC AUTO: 56.6 FL (ref 81.4–97.8)
MCV RBC AUTO: 57.3 FL (ref 81.4–97.8)
MICROCYTES BLD QL SMEAR: ABNORMAL
MONOCYTES # BLD AUTO: 0.54 K/UL (ref 0–0.85)
MONOCYTES NFR BLD AUTO: 14.2 % (ref 0–13.4)
MORPHOLOGY BLD-IMP: NORMAL
NEUTROPHILS # BLD AUTO: 2.18 K/UL (ref 1.82–7.42)
NEUTROPHILS NFR BLD: 57.3 % (ref 44–72)
NRBC # BLD AUTO: 0.02 K/UL
NRBC BLD-RTO: 0.5 /100 WBC (ref 0–0.2)
NT-PROBNP SERPL IA-MCNC: 346 PG/ML (ref 0–125)
PLATELET # BLD AUTO: 286 K/UL (ref 164–446)
PLATELET # BLD AUTO: 336 K/UL (ref 164–446)
PLATELET BLD QL SMEAR: NORMAL
POIKILOCYTOSIS BLD QL SMEAR: NORMAL
POLYCHROMASIA BLD QL SMEAR: NORMAL
POTASSIUM SERPL-SCNC: 3.4 MMOL/L (ref 3.6–5.5)
POTASSIUM SERPL-SCNC: 3.7 MMOL/L (ref 3.6–5.5)
PRODUCT TYPE UPROD: NORMAL
PROT SERPL-MCNC: 7.7 G/DL (ref 6–8.2)
RBC # BLD AUTO: 3.5 M/UL (ref 4.2–5.4)
RBC # BLD AUTO: 3.93 M/UL (ref 4.2–5.4)
RBC BLD AUTO: PRESENT
RH BLD: NORMAL
SODIUM SERPL-SCNC: 135 MMOL/L (ref 135–145)
SODIUM SERPL-SCNC: 135 MMOL/L (ref 135–145)
UNIT STATUS USTAT: NORMAL
WBC # BLD AUTO: 3.6 K/UL (ref 4.8–10.8)
WBC # BLD AUTO: 3.8 K/UL (ref 4.8–10.8)

## 2024-02-16 PROCEDURE — 80048 BASIC METABOLIC PNL TOTAL CA: CPT

## 2024-02-16 PROCEDURE — 86900 BLOOD TYPING SEROLOGIC ABO: CPT

## 2024-02-16 PROCEDURE — 94760 N-INVAS EAR/PLS OXIMETRY 1: CPT

## 2024-02-16 PROCEDURE — 3074F SYST BP LT 130 MM HG: CPT | Performed by: INTERNAL MEDICINE

## 2024-02-16 PROCEDURE — 770020 HCHG ROOM/CARE - TELE (206)

## 2024-02-16 PROCEDURE — 93005 ELECTROCARDIOGRAM TRACING: CPT | Performed by: INTERNAL MEDICINE

## 2024-02-16 PROCEDURE — 30233N1 TRANSFUSION OF NONAUTOLOGOUS RED BLOOD CELLS INTO PERIPHERAL VEIN, PERCUTANEOUS APPROACH: ICD-10-PCS | Performed by: STUDENT IN AN ORGANIZED HEALTH CARE EDUCATION/TRAINING PROGRAM

## 2024-02-16 PROCEDURE — 85610 PROTHROMBIN TIME: CPT

## 2024-02-16 PROCEDURE — 99223 1ST HOSP IP/OBS HIGH 75: CPT | Mod: AI,25 | Performed by: STUDENT IN AN ORGANIZED HEALTH CARE EDUCATION/TRAINING PROGRAM

## 2024-02-16 PROCEDURE — A9270 NON-COVERED ITEM OR SERVICE: HCPCS | Mod: JZ | Performed by: STUDENT IN AN ORGANIZED HEALTH CARE EDUCATION/TRAINING PROGRAM

## 2024-02-16 PROCEDURE — 93005 ELECTROCARDIOGRAM TRACING: CPT | Performed by: EMERGENCY MEDICINE

## 2024-02-16 PROCEDURE — 86901 BLOOD TYPING SEROLOGIC RH(D): CPT

## 2024-02-16 PROCEDURE — 99204 OFFICE O/P NEW MOD 45 MIN: CPT | Mod: 25 | Performed by: INTERNAL MEDICINE

## 2024-02-16 PROCEDURE — 36430 TRANSFUSION BLD/BLD COMPNT: CPT

## 2024-02-16 PROCEDURE — 700102 HCHG RX REV CODE 250 W/ 637 OVERRIDE(OP): Mod: JZ | Performed by: STUDENT IN AN ORGANIZED HEALTH CARE EDUCATION/TRAINING PROGRAM

## 2024-02-16 PROCEDURE — 93005 ELECTROCARDIOGRAM TRACING: CPT

## 2024-02-16 PROCEDURE — P9016 RBC LEUKOCYTES REDUCED: HCPCS

## 2024-02-16 PROCEDURE — 85025 COMPLETE CBC W/AUTO DIFF WBC: CPT

## 2024-02-16 PROCEDURE — 83880 ASSAY OF NATRIURETIC PEPTIDE: CPT

## 2024-02-16 PROCEDURE — 85027 COMPLETE CBC AUTOMATED: CPT

## 2024-02-16 PROCEDURE — 700111 HCHG RX REV CODE 636 W/ 250 OVERRIDE (IP): Performed by: STUDENT IN AN ORGANIZED HEALTH CARE EDUCATION/TRAINING PROGRAM

## 2024-02-16 PROCEDURE — 36415 COLL VENOUS BLD VENIPUNCTURE: CPT

## 2024-02-16 PROCEDURE — 99285 EMERGENCY DEPT VISIT HI MDM: CPT

## 2024-02-16 PROCEDURE — 86923 COMPATIBILITY TEST ELECTRIC: CPT | Mod: 91

## 2024-02-16 PROCEDURE — 86850 RBC ANTIBODY SCREEN: CPT

## 2024-02-16 PROCEDURE — 71045 X-RAY EXAM CHEST 1 VIEW: CPT

## 2024-02-16 PROCEDURE — 99211 OFF/OP EST MAY X REQ PHY/QHP: CPT | Performed by: INTERNAL MEDICINE

## 2024-02-16 PROCEDURE — 99497 ADVNCD CARE PLAN 30 MIN: CPT | Performed by: STUDENT IN AN ORGANIZED HEALTH CARE EDUCATION/TRAINING PROGRAM

## 2024-02-16 PROCEDURE — 3078F DIAST BP <80 MM HG: CPT | Performed by: INTERNAL MEDICINE

## 2024-02-16 PROCEDURE — 80053 COMPREHEN METABOLIC PANEL: CPT

## 2024-02-16 PROCEDURE — C9113 INJ PANTOPRAZOLE SODIUM, VIA: HCPCS | Performed by: STUDENT IN AN ORGANIZED HEALTH CARE EDUCATION/TRAINING PROGRAM

## 2024-02-16 PROCEDURE — 99212 OFFICE O/P EST SF 10 MIN: CPT

## 2024-02-16 RX ORDER — ACETAMINOPHEN 325 MG/1
650 TABLET ORAL EVERY 6 HOURS PRN
Status: DISCONTINUED | OUTPATIENT
Start: 2024-02-16 | End: 2024-02-18 | Stop reason: HOSPADM

## 2024-02-16 RX ORDER — POTASSIUM CHLORIDE 20 MEQ/1
40 TABLET, EXTENDED RELEASE ORAL ONCE
Status: COMPLETED | OUTPATIENT
Start: 2024-02-16 | End: 2024-02-16

## 2024-02-16 RX ORDER — CYANOCOBALAMIN (VITAMIN B-12) 1000 MCG
1000 TABLET ORAL DAILY
COMMUNITY

## 2024-02-16 RX ORDER — SODIUM CHLORIDE 9 MG/ML
INJECTION, SOLUTION INTRAVENOUS CONTINUOUS
Status: DISCONTINUED | OUTPATIENT
Start: 2024-02-16 | End: 2024-02-16

## 2024-02-16 RX ORDER — MELATONIN
2000 DAILY
COMMUNITY

## 2024-02-16 RX ORDER — PANTOPRAZOLE SODIUM 40 MG/10ML
40 INJECTION, POWDER, LYOPHILIZED, FOR SOLUTION INTRAVENOUS 2 TIMES DAILY
Status: DISCONTINUED | OUTPATIENT
Start: 2024-02-16 | End: 2024-02-18 | Stop reason: HOSPADM

## 2024-02-16 RX ORDER — IBUPROFEN 200 MG
200 TABLET ORAL
Status: ON HOLD | COMMUNITY
End: 2024-02-18

## 2024-02-16 RX ORDER — NICOTINE POLACRILEX 4 MG/1
20 GUM, CHEWING ORAL
Status: ON HOLD | COMMUNITY
End: 2024-02-18

## 2024-02-16 RX ORDER — SODIUM CHLORIDE 9 MG/ML
INJECTION, SOLUTION INTRAVENOUS CONTINUOUS
Status: DISCONTINUED | OUTPATIENT
Start: 2024-02-16 | End: 2024-02-18 | Stop reason: HOSPADM

## 2024-02-16 RX ADMIN — POTASSIUM CHLORIDE 40 MEQ: 1500 TABLET, EXTENDED RELEASE ORAL at 22:50

## 2024-02-16 RX ADMIN — PANTOPRAZOLE SODIUM 40 MG: 40 INJECTION, POWDER, FOR SOLUTION INTRAVENOUS at 22:52

## 2024-02-16 ASSESSMENT — ENCOUNTER SYMPTOMS
WEIGHT GAIN: 0
DIZZINESS: 0
FEVER: 0
NAUSEA: 0
ORTHOPNEA: 0
DYSPNEA ON EXERTION: 0
EYES NEGATIVE: 1
SYNCOPE: 0
VOMITING: 0
DECREASED APPETITE: 0
BACK PAIN: 0
CLAUDICATION: 0
CARDIOVASCULAR NEGATIVE: 1
NEUROLOGICAL NEGATIVE: 1
SHORTNESS OF BREATH: 1
SHORTNESS OF BREATH: 1
ALTERED MENTAL STATUS: 0
MUSCULOSKELETAL NEGATIVE: 1
IRREGULAR HEARTBEAT: 0
FLANK PAIN: 0
PALPITATIONS: 0
CONSTIPATION: 0
DIARRHEA: 0
COUGH: 0
BLURRED VISION: 0
PND: 0
WEIGHT LOSS: 0
GASTROINTESTINAL NEGATIVE: 1
DEPRESSION: 0
ABDOMINAL PAIN: 0
NEAR-SYNCOPE: 0
PSYCHIATRIC NEGATIVE: 1
HEARTBURN: 0

## 2024-02-16 ASSESSMENT — FIBROSIS 4 INDEX
FIB4 SCORE: 1.68
FIB4 SCORE: 0.81
FIB4 SCORE: 1.030982623552903151

## 2024-02-16 NOTE — TELEPHONE ENCOUNTER
MARIA R  Caller: Archie - Shakira    Topic/issue: Critical lab result. Please call back ASAP to discuss.    Callback Number: [phone] OPT 3      Thank you,  Teodora SANDERS

## 2024-02-16 NOTE — ED TRIAGE NOTES
".  Chief Complaint   Patient presents with    Sent by MD     Pt sent by pcp for low hemoglobin, dx with breast cancer last week, dizziness and sob with ambulation        ./64   Pulse (!) 103   Temp 36.5 °C (97.7 °F) (Temporal)   Resp 16   Ht 1.626 m (5' 4\")   Wt 89.8 kg (198 lb)   LMP 02/08/2000 (Approximate) Comment: Age of first period: 11, No post-menopausal HRT.  SpO2 100%   BMI 33.99 kg/m²       .Pt is alert and oriented, speaking in full sentences, follows commands and responds appropriately to questions Resp are even and unlabored.  Skin pink warm and dry     Pt placed in family waiting room after EKG and labs Pt educated on triage process. Pt encouraged to alert staff for any changes.    "

## 2024-02-16 NOTE — PROGRESS NOTES
Anticoagulation Summary  As of 2024      INR goal:  2.0-3.0   TTR:  73.3% (8.7 y)   INR used for dosin.80 (2024)   Warfarin maintenance plan:  2.5 mg (5 mg x 0.5) every Fri; 5 mg (5 mg x 1) all other days   Weekly warfarin total:  32.5 mg   Plan last modified:  Mallorie Galloway, PharmD (2023)   Next INR check:  2024   Target end date:  Indefinite    Indications    Pulmonary embolism (HCC) [I26.99]                 Anticoagulation Episode Summary       INR check location:  Anticoagulation Clinic    Preferred lab:      Send INR reminders to:      Comments:  indef per PCP          Anticoagulation Care Providers       Provider Role Specialty Phone number    Tom Vega D.O. Referring Internal Medicine [phone]    [facility] Anticoagulation [facility] Responsible  [phone]          Refer to Patient Findings for HPI:  Patient Findings       Negatives:  Signs/symptoms of thrombosis, Signs/symptoms of bleeding, Laboratory test error suspected, Change in health, Change in alcohol use, Change in activity, Upcoming invasive procedure, Emergency department visit, Upcoming dental procedure, Missed doses, Extra doses, Change in medications, Change in diet/appetite, Hospital admission, Bruising, Other complaints          There were no vitals filed for this visit.  The pt declined vitals today     Patient seen in clinic today for follow up on anticoagulation therapy with warfarin (a high risk medication) for hx of PE  Verified current warfarin dosing schedule.  Patient denies any missed doses of warfarin.    Medications reconciled   Pt is not on antiplatelet therapy      A/P   INR is SUB-therapeutic today at 1.8.     Warfarin dosing recommendation: Patient will bolus with 5mg TONIGHT, as a one time boost dose, then will resume her current dosing regimen.   Patient will follow up again in 1 week.     Pt educated to contact our clinic with any changes in medications or s/s of bleeding or thrombosis. Pt  is aware to seek immediate medical attention for falls, head injury or deep cuts.    Follow up appointment in 1 week(s).    Next appt: Friday, Feb 23 @ 9:15am     Sarath Dooley PharmD

## 2024-02-16 NOTE — PROGRESS NOTES
Cardiology Note    Chief Complaint   Patient presents with    Shortness of Breath    Heart Murmur       History of Present Illness: Lawanda Pagan is a 73 y.o. female PMH obesity, hx PE who presents for initial visit. Referred by Priscilla ADAM for shortness of breath and cardiac murmur.     Describes progressive shortness of breath past 6 months. No other cardiac complaints. Light headed on standing. Denies syncope. Cannot exert very far only short walks. Compliant with medications. No toxic social habits. Father with coronary disease. Pending breast surgery she describes and needs preoperative assessment.     Review of Systems   Constitutional: Negative for decreased appetite, fever, malaise/fatigue, weight gain and weight loss.   HENT:  Negative for congestion and nosebleeds.    Eyes:  Negative for blurred vision.   Cardiovascular:  Negative for chest pain, claudication, dyspnea on exertion, irregular heartbeat, leg swelling, near-syncope, orthopnea, palpitations, paroxysmal nocturnal dyspnea and syncope.   Respiratory:  Positive for shortness of breath. Negative for cough.    Endocrine: Negative for cold intolerance and heat intolerance.   Skin:  Negative for rash.   Musculoskeletal:  Negative for back pain.   Gastrointestinal:  Negative for abdominal pain, constipation, diarrhea, heartburn, melena, nausea and vomiting.   Genitourinary:  Negative for dysuria, flank pain and hematuria.   Neurological:  Negative for dizziness.   Psychiatric/Behavioral:  Negative for altered mental status and depression.          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;  Service: Gastroenterology    OTHER ABDOMINAL SURGERY      hernia repair    GYN SURGERY      c sec lap tubal    TONSILLECTOMY           Current Outpatient Medications   Medication Sig Dispense Refill    warfarin (COUMADIN) 5 MG Tab Take one-half to one tablet by mouth daily or as directed by anticoagulation clinic 90 Tablet 1    B Complex Vitamins (VITAMIN B COMPLEX PO) Take  by mouth.      Cholecalciferol (VITAMIN D) 1000 UNIT Cap Take 2 Caps by mouth every day. 60 Cap 6    omeprazole (PRILOSEC) 20 MG TBEC delayed-release tablet Take 20 mg by mouth every day. (Patient not taking: Reported on 2024)       No current facility-administered medications for this visit.         Allergies   Allergen Reactions    Other Environmental      Grass and pollens         Family History   Problem Relation Age of Onset    Other Mother         parkinsons    Melanoma Mother     Cancer Father         stomach    Cancer Brother     Stroke Brother     Cancer Brother     Cancer Maternal Grandmother         stomach         Social History     Socioeconomic History    Marital status: Single     Spouse name: Not on file    Number of children: Not on file    Years of education: Not on file    Highest education level: Not on file   Occupational History    Not on file   Tobacco Use    Smoking status: Former     Current packs/day: 0.00     Average packs/day: 0.2 packs/day for 1 year (0.2 ttl pk-yrs)     Types: Cigarettes     Start date: 1972     Quit date: 1973     Years since quittin.5    Smokeless tobacco: Never   Vaping Use    Vaping Use: Never used   Substance and Sexual Activity    Alcohol use: No     Comment: Stopped about 50 years ago.    Drug use: No    Sexual activity: Not Currently     Partners: Male     Birth control/protection: Post-Menopausal   Other Topics Concern    Not on file   Social  "History Narrative    Not on file     Social Determinants of Health     Financial Resource Strain: Low Risk  (2/6/2024)    Overall Financial Resource Strain (CARDIA)     Difficulty of Paying Living Expenses: Not hard at all   Food Insecurity: No Food Insecurity (2/6/2024)    Hunger Vital Sign     Worried About Running Out of Food in the Last Year: Never true     Ran Out of Food in the Last Year: Never true   Transportation Needs: No Transportation Needs (2/6/2024)    PRAPARE - Transportation     Lack of Transportation (Medical): No     Lack of Transportation (Non-Medical): No   Physical Activity: Insufficiently Active (2/6/2024)    Exercise Vital Sign     Days of Exercise per Week: 4 days     Minutes of Exercise per Session: 10 min   Stress: No Stress Concern Present (2/6/2024)    [employer] [place] [employer] - Occupational Stress Questionnaire     Feeling of Stress : Only a little   Social Connections: Socially Isolated (2/6/2024)    Social Connection and Isolation Panel [NHANES]     Frequency of Communication with Friends and Family: More than three times a week     Frequency of Social Gatherings with Friends and Family: More than three times a week     Attends Jehovah's witness Services: Never     Active Member of Clubs or Organizations: No     Attends Club or Organization Meetings: Never     Marital Status:    Intimate Partner Violence: Not At Risk (2/6/2024)    Humiliation, Afraid, Rape, and Kick questionnaire     Fear of Current or Ex-Partner: No     Emotionally Abused: No     Physically Abused: No     Sexually Abused: No   Housing Stability: Low Risk  (2/6/2024)    Housing Stability Vital Sign     Unable to Pay for Housing in the Last Year: No     Number of Places Lived in the Last Year: 1     Unstable Housing in the Last Year: No         Physical Exam:  Ambulatory Vitals  /54 (BP Location: Left arm, Patient Position: Sitting, BP Cuff Size: Adult)   Pulse 93   Resp 16   Ht 1.626 m (5' 4\")  " " Wt 89.4 kg (197 lb)   SpO2 96%    BP Readings from Last 4 Encounters:   02/16/24 104/54   02/08/24 (!) 142/62   02/06/24 132/74   11/02/23 130/60     Weight/BMI:   Vitals:    02/16/24 1005   BP: 104/54   Weight: 89.4 kg (197 lb)   Height: 1.626 m (5' 4\")    Body mass index is 33.81 kg/m².  Wt Readings from Last 4 Encounters:   02/16/24 89.4 kg (197 lb)   02/08/24 89.8 kg (198 lb)   02/06/24 85.2 kg (187 lb 12.8 oz)   11/02/23 89.6 kg (197 lb 9.6 oz)       Physical Exam  Constitutional:       General: She is not in acute distress.  HENT:      Head: Normocephalic and atraumatic.   Eyes:      Conjunctiva/sclera: Conjunctivae normal.      Pupils: Pupils are equal, round, and reactive to light.   Neck:      Vascular: No JVD.   Cardiovascular:      Rate and Rhythm: Normal rate and regular rhythm.      Heart sounds: Normal heart sounds. No murmur heard.     No friction rub. No gallop.   Pulmonary:      Effort: Pulmonary effort is normal. No respiratory distress.      Breath sounds: Normal breath sounds. No wheezing or rales.   Chest:      Chest wall: No tenderness.   Abdominal:      General: Bowel sounds are normal. There is no distension.      Palpations: Abdomen is soft.   Musculoskeletal:      Cervical back: Normal range of motion and neck supple.   Skin:     General: Skin is warm and dry.   Neurological:      Mental Status: She is alert and oriented to person, place, and time.   Psychiatric:         Mood and Affect: Affect normal.         Judgment: Judgment normal.         Lab Data Review:  Lab Results   Component Value Date/Time    CHOLSTRLTOT 166 11/09/2023 06:01 AM    CHOLSTRLTOT 182 10/02/2018 08:55 AM     (H) 10/02/2018 08:55 AM    HDL 41 11/09/2023 06:01 AM    HDL 38 (A) 10/02/2018 08:55 AM    TRIGLYCERIDE 120 11/09/2023 06:01 AM    TRIGLYCERIDE 135 10/02/2018 08:55 AM       Lab Results   Component Value Date/Time    SODIUM 138 11/09/2023 06:01 AM    SODIUM 143 10/02/2018 08:55 AM    POTASSIUM 4.2 " "11/09/2023 06:01 AM    POTASSIUM 4.0 10/02/2018 08:55 AM    CHLORIDE 105 11/09/2023 06:01 AM    CHLORIDE 108 10/02/2018 08:55 AM    CO2 20 11/09/2023 06:01 AM    CO2 26 10/02/2018 08:55 AM    GLUCOSE 208 (H) 11/09/2023 06:01 AM    GLUCOSE 125 (H) 10/02/2018 08:55 AM    BUN 18 11/09/2023 06:01 AM    BUN 16 10/02/2018 08:55 AM    CREATININE 0.76 11/09/2023 06:01 AM    CREATININE 0.79 10/02/2018 08:55 AM    BUNCREATRAT 24 11/09/2023 06:01 AM     CrCl cannot be calculated (Patient's most recent lab result is older than the maximum 7 days allowed.).  Lab Results   Component Value Date/Time    ALKPHOSPHAT 129 (H) 11/09/2023 06:01 AM    ALKPHOSPHAT 133 (H) 10/02/2018 08:55 AM    ASTSGOT 16 11/09/2023 06:01 AM    ASTSGOT 39 10/02/2018 08:55 AM    ALTSGPT 12 11/09/2023 06:01 AM    ALTSGPT 42 10/02/2018 08:55 AM    TBILIRUBIN 0.4 11/09/2023 06:01 AM    TBILIRUBIN 0.7 10/02/2018 08:55 AM      Lab Results   Component Value Date/Time    WBC 8.7 11/09/2023 06:01 AM    WBC 6.4 10/02/2018 08:55 AM     Lab Results   Component Value Date/Time    HBA1C 5.2 07/14/2014 07:47 PM     No components found for: \"TROP\"      Cardiac Imaging and Procedures Review:      EKG 2/16/24 interpreted by me sinus 93 bpm, borderline repol    TTE 7/15/2014  CONCLUSIONS   Abnormal (paradoxical) septal motion consistent with right ventricular   (RV) volume overload and/or elevated RV end-diastolic pressure. Normal   left ventricular size and function. Normal left ventricular wall   thickness. Left ventricular ejection fraction is 60% to 65%. Grade I   diastolic dysfunction is present. Flattened septum in systole   consistent with RV pressure overload.   Enlarged right ventricular size.   Moderately dilated right atrium.   Moderate tricuspid regurgitation. Right ventricular systolic pressure   is estimated to be 43-48 mmHg consistent with moderate pulmonary   hypertension.     Medical Decision Making:  Problem List Items Addressed This Visit       Anemia    " Relevant Orders    CBC WITHOUT DIFFERENTIAL    Basic Metabolic Panel    SOB (shortness of breath) on exertion    Relevant Orders    EKG (Completed)    CBC WITHOUT DIFFERENTIAL    Basic Metabolic Panel    EC-ECHOCARDIOGRAM COMPLETE W/O CONT    [id number] PET W/CT ATTENUATION    Abnormal findings on diagnostic imaging of heart and coronary circulation    Relevant Orders    [id number] PET W/CT ATTENUATION       Prior severe anemia repeat labs.    Check TTE and MPI PET for shortness of breath. If negative consider rule out repeat PE as multiple subtherapeutic INR readings.    Preoperative assessment pending results.     It was my pleasure to meet with Ms. Pagan.

## 2024-02-17 ENCOUNTER — APPOINTMENT (OUTPATIENT)
Dept: CARDIOLOGY | Facility: MEDICAL CENTER | Age: 74
DRG: 812 | End: 2024-02-17
Attending: STUDENT IN AN ORGANIZED HEALTH CARE EDUCATION/TRAINING PROGRAM
Payer: MEDICARE

## 2024-02-17 LAB
ANION GAP SERPL CALC-SCNC: 11 MMOL/L (ref 7–16)
APTT PPP: 32.4 SEC (ref 24.7–36)
BUN SERPL-MCNC: 9 MG/DL (ref 8–22)
CALCIUM SERPL-MCNC: 8.3 MG/DL (ref 8.5–10.5)
CHLORIDE SERPL-SCNC: 107 MMOL/L (ref 96–112)
CO2 SERPL-SCNC: 19 MMOL/L (ref 20–33)
CREAT SERPL-MCNC: 0.63 MG/DL (ref 0.5–1.4)
EKG IMPRESSION: NORMAL
GFR SERPLBLD CREATININE-BSD FMLA CKD-EPI: 92 ML/MIN/1.73 M 2
GLUCOSE SERPL-MCNC: 105 MG/DL (ref 65–99)
HCT VFR BLD AUTO: 30.9 % (ref 37–47)
HCT VFR BLD AUTO: 31.7 % (ref 37–47)
HCT VFR BLD AUTO: 32.1 % (ref 37–47)
HCT VFR BLD AUTO: 32.8 % (ref 37–47)
HGB BLD-MCNC: 10 G/DL (ref 12–16)
HGB BLD-MCNC: 9.3 G/DL (ref 12–16)
HGB BLD-MCNC: 9.4 G/DL (ref 12–16)
HGB BLD-MCNC: 9.4 G/DL (ref 12–16)
INR PPP: 1.96 (ref 0.87–1.13)
POTASSIUM SERPL-SCNC: 4 MMOL/L (ref 3.6–5.5)
PROTHROMBIN TIME: 22.6 SEC (ref 12–14.6)
SODIUM SERPL-SCNC: 137 MMOL/L (ref 135–145)

## 2024-02-17 PROCEDURE — 85730 THROMBOPLASTIN TIME PARTIAL: CPT

## 2024-02-17 PROCEDURE — 36430 TRANSFUSION BLD/BLD COMPNT: CPT

## 2024-02-17 PROCEDURE — 700111 HCHG RX REV CODE 636 W/ 250 OVERRIDE (IP): Performed by: STUDENT IN AN ORGANIZED HEALTH CARE EDUCATION/TRAINING PROGRAM

## 2024-02-17 PROCEDURE — C9113 INJ PANTOPRAZOLE SODIUM, VIA: HCPCS | Performed by: STUDENT IN AN ORGANIZED HEALTH CARE EDUCATION/TRAINING PROGRAM

## 2024-02-17 PROCEDURE — 93010 ELECTROCARDIOGRAM REPORT: CPT | Performed by: INTERNAL MEDICINE

## 2024-02-17 PROCEDURE — 700105 HCHG RX REV CODE 258: Performed by: STUDENT IN AN ORGANIZED HEALTH CARE EDUCATION/TRAINING PROGRAM

## 2024-02-17 PROCEDURE — 36415 COLL VENOUS BLD VENIPUNCTURE: CPT

## 2024-02-17 PROCEDURE — 99233 SBSQ HOSP IP/OBS HIGH 50: CPT | Performed by: STUDENT IN AN ORGANIZED HEALTH CARE EDUCATION/TRAINING PROGRAM

## 2024-02-17 PROCEDURE — 700101 HCHG RX REV CODE 250: Performed by: INTERNAL MEDICINE

## 2024-02-17 PROCEDURE — 99222 1ST HOSP IP/OBS MODERATE 55: CPT | Performed by: INTERNAL MEDICINE

## 2024-02-17 PROCEDURE — 80048 BASIC METABOLIC PNL TOTAL CA: CPT

## 2024-02-17 PROCEDURE — 85610 PROTHROMBIN TIME: CPT

## 2024-02-17 PROCEDURE — 85018 HEMOGLOBIN: CPT | Mod: 91

## 2024-02-17 PROCEDURE — P9016 RBC LEUKOCYTES REDUCED: HCPCS

## 2024-02-17 PROCEDURE — 93306 TTE W/DOPPLER COMPLETE: CPT

## 2024-02-17 PROCEDURE — 86923 COMPATIBILITY TEST ELECTRIC: CPT | Mod: 91

## 2024-02-17 PROCEDURE — 770020 HCHG ROOM/CARE - TELE (206)

## 2024-02-17 PROCEDURE — 85014 HEMATOCRIT: CPT

## 2024-02-17 RX ADMIN — SODIUM CHLORIDE: 9 INJECTION, SOLUTION INTRAVENOUS at 14:20

## 2024-02-17 RX ADMIN — SODIUM CHLORIDE: 9 INJECTION, SOLUTION INTRAVENOUS at 03:39

## 2024-02-17 RX ADMIN — PANTOPRAZOLE SODIUM 40 MG: 40 INJECTION, POWDER, FOR SOLUTION INTRAVENOUS at 17:14

## 2024-02-17 RX ADMIN — PANTOPRAZOLE SODIUM 40 MG: 40 INJECTION, POWDER, FOR SOLUTION INTRAVENOUS at 05:52

## 2024-02-17 RX ADMIN — SODIUM CHLORIDE: 9 INJECTION, SOLUTION INTRAVENOUS at 22:57

## 2024-02-17 RX ADMIN — POLYETHYLENE GLYCOL-3350 AND ELECTROLYTES 4 L: 236; 6.74; 5.86; 2.97; 22.74 POWDER, FOR SOLUTION ORAL at 14:57

## 2024-02-17 ASSESSMENT — ENCOUNTER SYMPTOMS
ABDOMINAL PAIN: 0
FEVER: 0
SPUTUM PRODUCTION: 0
VOMITING: 0
NAUSEA: 0
WEIGHT LOSS: 1
SHORTNESS OF BREATH: 1
CHILLS: 0
COUGH: 0

## 2024-02-17 ASSESSMENT — PAIN DESCRIPTION - PAIN TYPE
TYPE: ACUTE PAIN
TYPE: ACUTE PAIN

## 2024-02-17 ASSESSMENT — PATIENT HEALTH QUESTIONNAIRE - PHQ9
SUM OF ALL RESPONSES TO PHQ9 QUESTIONS 1 AND 2: 0
1. LITTLE INTEREST OR PLEASURE IN DOING THINGS: NOT AT ALL
2. FEELING DOWN, DEPRESSED, IRRITABLE, OR HOPELESS: NOT AT ALL

## 2024-02-17 ASSESSMENT — FIBROSIS 4 INDEX: FIB4 SCORE: 1.68

## 2024-02-17 NOTE — ED NOTES
Bedside report received from off going RN/tech: Geo, assumed care of patient.  POC discussed with patient. Call light within reach, all needs addressed at this time.       Fall risk interventions in place: Patient's personal possessions are with in their safe reach, Place socks on patient, Place fall risk sign on patient's door, Keep floor surfaces clean and dry, and Accompanied to restroom (all applicable per [facility] Fall risk assessment)   Continuous monitoring: Cardiac Leads, Pulse Ox, or Blood Pressure  IVF/IV medications: Not Applicable   Oxygen: Room Air  Bedside sitter: Not Applicable   Isolation: Not Applicable

## 2024-02-17 NOTE — PROGRESS NOTES
Arrived on the floor by stephanie, alert and oriented , slightly dizzy, blood transfusion infusing and tolerating well. Oriented to her room, call light given. Pt able to stand and pivot to her bed. Saturating well on RA. Placed on cardiac monitor SR. Needs attended. Call light within reach.

## 2024-02-17 NOTE — ASSESSMENT & PLAN NOTE
Recent diagnosis of invasive ductal carcinoma of the left breast, with metastasis to left axillary lymph node

## 2024-02-17 NOTE — HOSPITAL COURSE
Lawanda Pagan is a 73 y.o. female who presented 2/16/2024 with shortness of breath.  For the last few months, patient has reported progressively worsening shortness of breath.  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  She went to see cardiology, labs were drawn, and an hour after patient had gone home, she was called to come back into the ER for severe anemia.  Patient denies melena, hematemesis.  She takes Coumadin for history of pulmonary embolism, however did not take her Coumadin dose 2/16.     In ER, patient initially hypotensive and tachycardic, responded to fluids.  Pertinent labs include hemoglobin 4.8, BUN 12, creatinine 0.84.  Admitted for symptomatic anemia. Patient has received 4 units RBCs.  Hemoglobin stable at 9 at time of discharge.    EGD was performed on 2/18.  Consistent with patient's history of Kumar's esophagus.  She does not have any active bleeding at this time.  Patient's anemia is likely secondary to longstanding chronic iron deficiency.  I am going to discharge her home on iron replacement therapy every other day.  And Protonix 40 mg daily.    Additionally, I spent a significant amount of time looking through patient's records to determine why she was on warfarin.  It appears that in 2007 she had a provoked PE and at that time she was placed on warfarin.  Patient states that in 2007 she had just retired and she spent a lot of time sitting at home doing puzzles.  She was immobile for hours at a time.  She developed a blood clot in her upper right leg.  She states that over the past few years she has been asked many times why she is still on warfarin-no one had ever know the answer.  I do not see any reason for patient to continue warfarin at this time.  We will discontinue it.  I did advise her that with her recent diagnosis of cancer she is at increased risk for DVTs.  She understands.  She states she  is mobile every day with her grandchildren.  She also has a stationary bicycle that she uses at home.  We will discontinue warfarin at this time.    ECHO: EF 65%; RVSP 38mmHg.    Patient is going to follow-up with [facility] for ongoing primary care needs.    She will have a CBC performed later this week.

## 2024-02-17 NOTE — PROGRESS NOTES
"Hospital Medicine Daily Progress Note    Date of Service  2/17/2024    Chief Complaint  Lawanda Pagan is a 73 y.o. female admitted 2/16/2024 with anemia.    Hospital Course  Lawanda Pagan is a 73 y.o. female who presented 2/16/2024 with shortness of breath.  For the last few months, patient has reported progressively worsening shortness of breath.  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  She went to see cardiology, labs were drawn, and an hour after patient had gone home, she was called to come back into the ER for severe anemia.  Patient denies melena, hematemesis.  She takes Coumadin for history of pulmonary embolism, however did not take her Coumadin dose 2/16.     In ER, patient initially hypotensive and tachycardic, responded to fluids.  Pertinent labs include hemoglobin 4.8, BUN 12, creatinine 0.84.  Admitted for symptomatic anemia. Patient has received 4 units RBCs.    Interval Problem Update  2/17: Patient is feeling improved this morning; though she endorses fatigue. 4 Units RBCs since admission. Repeat hb 9.4. INR 1.8.  GI consult pending.      Patient states a longstanding history of iron deficiency anemia.  She was previously on iron supplementation but this was discontinued due to \"abnormally high levels\".    I have discussed this patient's plan of care and discharge plan at IDT rounds today with Case Management, Nursing, Nursing leadership, and other members of the IDT team.    Consultants/Specialty  GI    Code Status  Full Code    Disposition  The patient is not medically cleared for discharge to home or a post-acute facility.  Anticipate discharge to: home with close outpatient follow-up    I have placed the appropriate orders for post-discharge needs.    Review of Systems  Review of Systems   Constitutional:  Positive for weight loss. Negative for chills and fever.   Respiratory:  Positive for shortness " of breath. Negative for cough and sputum production.    Cardiovascular:  Negative for chest pain and leg swelling.   Gastrointestinal:  Negative for abdominal pain, nausea and vomiting.        Physical Exam  Temp:  [36.2 °C (97.2 °F)-37.5 °C (99.5 °F)] 37.1 °C (98.8 °F)  Pulse:  [] 86  Resp:  [16-24] 20  BP: (113-149)/(61-75) 137/75  SpO2:  [90 %-100 %] 91 %    Physical Exam  Vitals and nursing note reviewed.   Constitutional:       Appearance: Normal appearance. She is not ill-appearing.   HENT:      Head: Normocephalic.      Mouth/Throat:      Mouth: Mucous membranes are moist.      Pharynx: Oropharynx is clear.   Cardiovascular:      Rate and Rhythm: Normal rate and regular rhythm.      Pulses: Normal pulses.      Heart sounds: Murmur heard.   Pulmonary:      Effort: Pulmonary effort is normal. No respiratory distress.      Breath sounds: Normal breath sounds. No wheezing.   Abdominal:      General: Bowel sounds are normal. There is no distension.      Palpations: Abdomen is soft.      Tenderness: There is no abdominal tenderness.   Musculoskeletal:         General: No swelling or tenderness. Normal range of motion.      Cervical back: Normal range of motion. No tenderness.   Skin:     General: Skin is warm and dry.      Coloration: Skin is pale.   Neurological:      Mental Status: She is alert and oriented to person, place, and time. Mental status is at baseline.   Psychiatric:         Mood and Affect: Mood normal.         Behavior: Behavior normal.         Fluids    Intake/Output Summary (Last 24 hours) at 2/17/2024 1336  Last data filed at 2/17/2024 1205  Gross per 24 hour   Intake 3533.34 ml   Output 0 ml   Net 3533.34 ml       Laboratory  Recent Labs     02/16/24  1142 02/16/24  1915 02/17/24  0642 02/17/24  0800   WBC 3.6* 3.8*  --   --    RBC 3.93* 3.50*  --   --    HEMOGLOBIN 5.2* 4.8* 9.3* 9.4*   HEMATOCRIT 22.5* 19.8* 30.9* 31.7*   MCV 57.3* 56.6*  --   --    MCH 13.2* 13.7*  --   --    MCHC 23.1*  24.2*  --   --    RDW 47.3 46.5  --   --    PLATELETCT 336 286  --   --      Recent Labs     02/16/24  1142 02/16/24  1815 02/17/24  0642   SODIUM 135 135 137   POTASSIUM 3.7 3.4* 4.0   CHLORIDE 101 100 107   CO2 20 19* 19*   GLUCOSE 139* 171* 105*   BUN 12 12 9   CREATININE 0.77 0.84 0.63   CALCIUM 8.8 9.0 8.3*     Recent Labs     02/16/24  0000 02/17/24  0800   APTT  --  32.4   INR 1.80* 1.96*               Imaging      By my interpretation: Low voltage throughout all leads.  Regular rate and rhythm.  No ST elevation or depression.    Assessment/Plan  * Symptomatic anemia  Assessment & Plan  Found to have hemoglobin 4.8 and severe microcytic anemia  Per patient longstanding history of iron deficiency anemia.  She is not on iron supplementations at this time.  S/p 4 units RBCs; repeat Hb 9.4  Transfuse for hemoglobin less than 7; or less than 8 with symptoms  Trend Hb  IV Protonix  Continuous telemetry monitoring   Gastroenterology consult pending      ACP (advance care planning)  Assessment & Plan  16 minutes spent discussing goals of care with patient.  She was explained her clinical condition and treatment plan.  She was asked about CODE STATUS, to which she states she would like everything done, including chest compressions and intubation.    Above per previous hospitalist     2/17: Confirmed CODE STATUS with patient.  She is a full code.    Hypokalemia  Assessment & Plan  Replace    Carcinoma of upper-outer quadrant of female breast, left (HCC)- (present on admission)  Assessment & Plan  Recent diagnosis of invasive ductal carcinoma of the left breast, with metastasis to left axillary lymph node    Pulmonary embolism (HCC)- (present on admission)  Assessment & Plan  Chart review: Unprovoked PE 2014.  She was seen by Dr. Bloch.  Recommending anticoagulation for 6 months and outpatient follow-up for hypercoagulation.  I do not see any outpatient notes indicating of that follow-up occurred.  It is unlikely that  patient will need to continue Coumadin.  Would recommend reaching out to Dr. Bloch's office during business hours to discuss.    On coumadin, hold due to anemia.  Did not take her Coumadin 2/16  Monitor INR         VTE prophylaxis: VTE Selection    I have performed a physical exam and reviewed and updated ROS and Plan today (2/17/2024). In review of yesterday's note (2/16/2024), there are no changes except as documented above.

## 2024-02-17 NOTE — CONSULTS
Date of Consultation:  2/17/2024    Patient: : Lawanda Pagan  MRN: [id number]    Referring Physician:  Dr Elizondo     GI:Tom Payne M.D.     Reason for Consultation: Iron deficiency anemia    History of Present Illness:   73-year-old female with a history of PE on Coumadin and recent diagnosis of invasive breast cancer with lymph node metastases directed to the emergency department because of a low hemoglobin.  And trending out the patient's hemoglobin it has been low at least since November of last year.  Patient denies bloody show such as hematemesis or melena.  Her last colonoscopy was 9 years ago.  Her last upper endoscopy was 2017.  Patient has long segment Kumar's esophagus.  Her INR is 1.8.  She has been transfused 4 units.  Hemoglobin now 9.      Past Medical History:   Diagnosis Date    Elevated LFTs 1/26/2018    Kumar esophagus 8/10/2015    Breath shortness 2014 2017 denies now    Allergy     Arthritis     fingers hips    Blood clotting disorder (HCC)     lungs bilat     Blood transfusion without reported diagnosis     Fall     Hernia, hiatal     Measles        Past Surgical History:   Procedure Laterality Date    GASTROSCOPY N/A 9/1/2017    Procedure: GASTROSCOPY;  Surgeon: Stephanie Reyes M.D.;  Location: SURGERY SAME DAY [facility];  Service: Gastroenterology    OTHER ABDOMINAL SURGERY  2004    hernia repair    GYN SURGERY  1980    c sec lap tubal    TONSILLECTOMY         Family History   Problem Relation Age of Onset    Other Mother         parkinsons    Melanoma Mother     Cancer Father         stomach    Cancer Brother     Stroke Brother     Cancer Brother     Cancer Maternal Grandmother         stomach       Social History     Socioeconomic History    Marital status: Single   Tobacco Use    Smoking status: Former     Current packs/day: 0.00     Average packs/day: 0.2 packs/day for 1 year (0.2 ttl pk-yrs)     Types: Cigarettes     Start date: 8/25/1972     Quit date:  1973     Years since quittin.5    Smokeless tobacco: Never   Vaping Use    Vaping Use: Never used   Substance and Sexual Activity    Alcohol use: No     Comment: Stopped about 50 years ago.    Drug use: No    Sexual activity: Not Currently     Partners: Male     Birth control/protection: Post-Menopausal     Social Determinants of Health     Financial Resource Strain: Low Risk  (2024)    Overall Financial Resource Strain (CARDIA)     Difficulty of Paying Living Expenses: Not hard at all   Food Insecurity: No Food Insecurity (2024)    Hunger Vital Sign     Worried About Running Out of Food in the Last Year: Never true     Ran Out of Food in the Last Year: Never true   Transportation Needs: No Transportation Needs (2024)    PRAPARE - Transportation     Lack of Transportation (Medical): No     Lack of Transportation (Non-Medical): No   Physical Activity: Insufficiently Active (2024)    Exercise Vital Sign     Days of Exercise per Week: 4 days     Minutes of Exercise per Session: 10 min   Stress: No Stress Concern Present (2024)    [employer] [place] [employer] - Occupational Stress Questionnaire     Feeling of Stress : Only a little   Social Connections: Socially Isolated (2024)    Social Connection and Isolation Panel [NHANES]     Frequency of Communication with Friends and Family: More than three times a week     Frequency of Social Gatherings with Friends and Family: More than three times a week     Attends Rastafari Services: Never     Active Member of Clubs or Organizations: No     Attends Club or Organization Meetings: Never     Marital Status:    Intimate Partner Violence: Not At Risk (2024)    Humiliation, Afraid, Rape, and Kick questionnaire     Fear of Current or Ex-Partner: No     Emotionally Abused: No     Physically Abused: No     Sexually Abused: No   Housing Stability: Low Risk  (2024)    Housing Stability Vital Sign     Unable to Pay for  Housing in the Last Year: No     Number of Places Lived in the Last Year: 1     Unstable Housing in the Last Year: No       Current Meds (name, sig, last dose):     Current Facility-Administered Medications:     pantoprazole    NS    acetaminophen      ROS  10 systems reviewed and are negative unless otherwise noted in history of present illness.    Physical Exam:  Vitals:    02/17/24 0345 02/17/24 0456 02/17/24 0545 02/17/24 0754   BP: 130/66   116/64   Pulse: 77  69 79   Resp: 16  18 20   Temp: 37 °C (98.6 °F)  36.7 °C (98.1 °F) 37.1 °C (98.8 °F)   TempSrc: Temporal  Temporal Temporal   SpO2:    92%   Weight:  84 kg (185 lb 3 oz)     Height:           Physical Exam  Vitals and nursing note reviewed.   Eyes:      General: Scleral icterus present.   Cardiovascular:      Rate and Rhythm: Normal rate and regular rhythm.   Pulmonary:      Effort: Pulmonary effort is normal.      Breath sounds: Normal breath sounds.   Abdominal:      General: Bowel sounds are normal.      Palpations: Abdomen is soft.   Skin:     General: Skin is warm.   Neurological:      General: No focal deficit present.      Mental Status: She is alert.   Psychiatric:         Behavior: Behavior normal.         Judgment: Judgment normal.           Labs:  Recent Labs     02/16/24  1142 02/16/24  1815 02/17/24  0642   SODIUM 135 135 137   POTASSIUM 3.7 3.4* 4.0   CHLORIDE 101 100 107   CO2 20 19* 19*   BUN 12 12 9   CREATININE 0.77 0.84 0.63   CALCIUM 8.8 9.0 8.3*     Recent Labs     02/16/24  1142 02/16/24  1815 02/17/24  0642   ALTSGPT  --  14  --    ASTSGOT  --  24  --    ALKPHOSPHAT  --  122*  --    TBILIRUBIN  --  0.8  --    GLUCOSE 139* 171* 105*     Recent Labs     02/16/24  1142 02/16/24  1815 02/16/24  1915   WBC 3.6*  --  3.8*   NEUTSPOLYS  --   --  57.30   LYMPHOCYTES  --   --  26.80   MONOCYTES  --   --  14.20*   EOSINOPHILS  --   --  0.30   BASOPHILS  --   --  1.10   ASTSGOT  --  24  --    ALTSGPT  --  14  --    ALKPHOSPHAT  --  122*  --     TBILIRUBIN  --  0.8  --      Recent Labs     24  0000 24  1142 24  1142 24  1915 24  0642 24  0800   RBC  --  3.93*  --  3.50*  --   --    HEMOGLOBIN  --  5.2*   < > 4.8* 9.3* 9.4*   HEMATOCRIT  --  22.5*   < > 19.8* 30.9* 31.7*   PLATELETCT  --  336  --  286  --   --    INR 1.80*  --   --   --   --   --     < > = values in this interval not displayed.     Recent Results (from the past 24 hour(s))   EKG    Collection Time: 24 10:18 AM   Result Value Ref Range    Report       [facility] [place] [facility]    Test Date:  2024  Pt Name:    TENA MORALES              Department: [facility]  MRN:        [id number]                      Room:  Gender:     Female                       Technician: CA  :        1950                   Requested By:SCOTT NEGRON  Order #:    [id number]                    Reading MD:    Measurements  Intervals                                Axis  Rate:       93                           P:          54  RI:         163                          QRS:        50  QRSD:       82                           T:          31  QT:         355  QTc:        442    Interpretive Statements  Sinus rhythm  Borderline repolarization abnormality  Compared to ECG 2014 11:47:20  No significant changes     Basic Metabolic Panel    Collection Time: 24 11:42 AM   Result Value Ref Range    Sodium 135 135 - 145 mmol/L    Potassium 3.7 3.6 - 5.5 mmol/L    Chloride 101 96 - 112 mmol/L    Co2 20 20 - 33 mmol/L    Glucose 139 (H) 65 - 99 mg/dL    Bun 12 8 - 22 mg/dL    Creatinine 0.77 0.50 - 1.40 mg/dL    Calcium 8.8 8.5 - 10.5 mg/dL    Anion Gap 14.0 7.0 - 16.0   CBC WITHOUT DIFFERENTIAL    Collection Time: 24 11:42 AM   Result Value Ref Range    WBC 3.6 (L) 4.8 - 10.8 K/uL    RBC 3.93 (L) 4.20 - 5.40 M/uL    Hemoglobin 5.2 (LL) 12.0 - 16.0 g/dL    Hematocrit 22.5 (L) 37.0 - 47.0 %    MCV 57.3 (L) 81.4 - 97.8 fL    MCH 13.2 (L) 27.0 - 33.0 pg    MCHC 23.1  (L) 32.2 - 35.5 g/dL    RDW 47.3 35.9 - 50.0 fL    Platelet Count 336 164 - 446 K/uL   ESTIMATED GFR    Collection Time: 24 11:42 AM   Result Value Ref Range    GFR (CKD-EPI) 80 >60 mL/min/1.73 m 2   EKG    Collection Time: 24  3:08 PM   Result Value Ref Range    Report       [facility] Emergency Dept[facility]    Test Date:  2024  Pt Name:    TENA MORALES              Department: ER  MRN:        [id number]                      Room:  Gender:     Female                       Technician: [id number]  :        1950                   Requested By:ER TRIAGE PROTOCOL  Order #:    [id number]                    Reading MD:    Measurements  Intervals                                Axis  Rate:       86                           P:          27  TX:         172                          QRS:        8  QRSD:       88                           T:          31  QT:         388  QTc:        464    Interpretive Statements  Sinus rhythm  Low voltage, precordial leads  Compared to ECG 2024 10:18:51  Low QRS voltage now present     Comp Metabolic Panel    Collection Time: 24  6:15 PM   Result Value Ref Range    Sodium 135 135 - 145 mmol/L    Potassium 3.4 (L) 3.6 - 5.5 mmol/L    Chloride 100 96 - 112 mmol/L    Co2 19 (L) 20 - 33 mmol/L    Anion Gap 16.0 7.0 - 16.0    Glucose 171 (H) 65 - 99 mg/dL    Bun 12 8 - 22 mg/dL    Creatinine 0.84 0.50 - 1.40 mg/dL    Calcium 9.0 8.5 - 10.5 mg/dL    Correct Calcium 8.8 8.5 - 10.5 mg/dL    AST(SGOT) 24 12 - 45 U/L    ALT(SGPT) 14 2 - 50 U/L    Alkaline Phosphatase 122 (H) 30 - 99 U/L    Total Bilirubin 0.8 0.1 - 1.5 mg/dL    Albumin 4.3 3.2 - 4.9 g/dL    Total Protein 7.7 6.0 - 8.2 g/dL    Globulin 3.4 1.9 - 3.5 g/dL    A-G Ratio 1.3 g/dL   ESTIMATED GFR    Collection Time: 24  6:15 PM   Result Value Ref Range    GFR (CKD-EPI) 72 >60 mL/min/1.73 m 2   proBrain Natriuretic Peptide, NT    Collection Time: 24  6:15 PM   Result Value Ref Range     NT-proBNP 346 (H) 0 - 125 pg/mL   COD (ADULT)    Collection Time: 02/16/24  7:15 PM   Result Value Ref Range    ABO Grouping Only A     Rh Grouping Only POS     Antibody Screen-Cod NEG     Component R       R99                 Red Cells, LR       [id number]   transfused   02/16/24   20:48      Product Type R99     Dispense Status transfused     Unit Number (Barcoded) [id number]     Product Code (Barcoded) S8821D89     Blood Type (Barcoded) 6200     Component R       R99                 Red Cells, LR       [id number]   transfused   02/16/24   23:01      Product Type R99     Dispense Status transfused     Unit Number (Barcoded) [id number]     Product Code (Barcoded) O1296A07     Blood Type (Barcoded) 6200     Component R       R99                 Red Cells, LR       [id number]   issued       02/17/24   01:02      Product Type R99     Dispense Status issued     Unit Number (Barcoded) [id number]     Product Code (Barcoded) B1597X90     Blood Type (Barcoded) 6200     Component R       R99                 Red Cells, LR       [id number]   issued       02/17/24   03:16      Product Type R99     Dispense Status issued     Unit Number (Barcoded) [id number]     Product Code (Barcoded) D0328B78     Blood Type (Barcoded) 6200    CBC WITH DIFFERENTIAL    Collection Time: 02/16/24  7:15 PM   Result Value Ref Range    WBC 3.8 (L) 4.8 - 10.8 K/uL    RBC 3.50 (L) 4.20 - 5.40 M/uL    Hemoglobin 4.8 (LL) 12.0 - 16.0 g/dL    Hematocrit 19.8 (L) 37.0 - 47.0 %    MCV 56.6 (L) 81.4 - 97.8 fL    MCH 13.7 (L) 27.0 - 33.0 pg    MCHC 24.2 (L) 32.2 - 35.5 g/dL    RDW 46.5 35.9 - 50.0 fL    Platelet Count 286 164 - 446 K/uL    Neutrophils-Polys 57.30 44.00 - 72.00 %    Lymphocytes 26.80 22.00 - 41.00 %    Monocytes 14.20 (H) 0.00 - 13.40 %    Eosinophils 0.30 0.00 - 6.90 %    Basophils 1.10 0.00 - 1.80 %    Immature Granulocytes 0.30 0.00 - 0.90 %    Nucleated RBC 0.50 (H) 0.00 - 0.20 /100 WBC    Neutrophils (Absolute)  2.18 1.82 - 7.42 K/uL    Lymphs (Absolute) 1.02 1.00 - 4.80 K/uL    Monos (Absolute) 0.54 0.00 - 0.85 K/uL    Eos (Absolute) 0.01 0.00 - 0.51 K/uL    Baso (Absolute) 0.04 0.00 - 0.12 K/uL    Immature Granulocytes (abs) 0.01 0.00 - 0.11 K/uL    NRBC (Absolute) 0.02 K/uL    Hypochromia 2+ (A)     Anisocytosis 1+     Microcytosis 2+ (A)    PLATELET ESTIMATE    Collection Time: 02/16/24  7:15 PM   Result Value Ref Range    Plt Estimation Normal    MORPHOLOGY    Collection Time: 02/16/24  7:15 PM   Result Value Ref Range    RBC Morphology Present     Polychromia 1+     Poikilocytosis 1+    PERIPHERAL SMEAR REVIEW    Collection Time: 02/16/24  7:15 PM   Result Value Ref Range    Peripheral Smear Review see below    DIFFERENTIAL COMMENT    Collection Time: 02/16/24  7:15 PM   Result Value Ref Range    Comments-Diff see below    Basic Metabolic Panel (BMP)    Collection Time: 02/17/24  6:42 AM   Result Value Ref Range    Sodium 137 135 - 145 mmol/L    Potassium 4.0 3.6 - 5.5 mmol/L    Chloride 107 96 - 112 mmol/L    Co2 19 (L) 20 - 33 mmol/L    Glucose 105 (H) 65 - 99 mg/dL    Bun 9 8 - 22 mg/dL    Creatinine 0.63 0.50 - 1.40 mg/dL    Calcium 8.3 (L) 8.5 - 10.5 mg/dL    Anion Gap 11.0 7.0 - 16.0   HEMOGLOBIN AND HEMATOCRIT    Collection Time: 02/17/24  6:42 AM   Result Value Ref Range    Hemoglobin 9.3 (L) 12.0 - 16.0 g/dL    Hematocrit 30.9 (L) 37.0 - 47.0 %   ESTIMATED GFR    Collection Time: 02/17/24  6:42 AM   Result Value Ref Range    GFR (CKD-EPI) 92 >60 mL/min/1.73 m 2   HEMOGLOBIN AND HEMATOCRIT    Collection Time: 02/17/24  8:00 AM   Result Value Ref Range    Hemoglobin 9.4 (L) 12.0 - 16.0 g/dL    Hematocrit 31.7 (L) 37.0 - 47.0 %         Imaging:  DX-CHEST-PORTABLE (1 VIEW)  Narrative: 2/16/2024 6:21 PM    HISTORY/REASON FOR EXAM:  Shortness of Breath    TECHNIQUE/EXAM DESCRIPTION AND NUMBER OF VIEWS:  Single portable view of the chest.    COMPARISON: 7/14/2014    FINDINGS:    The mediastinal and cardiac  silhouette is unremarkable.    The pulmonary vascularity is within normal limits.    There is ill-defined linear hazy opacity in the left lower lobe.    There is no significant pleural effusion.    There is no visible pneumothorax.    There are no acute bony abnormalities.  Impression: 1.  Ill-defined linear left lobe opacity could be atelectasis or pneumonitis.        MDM Data Review:  -Records reviewed and summarized in current documentation  -I personally reviewed and interpreted the laboratory results  -I personally reviewed the radiology images  -I have personally reviewed medications    Hospital Problem List:  Active Hospital Problems    Diagnosis     Hypokalemia [E87.6]     ACP (advance care planning) [Z71.89]     Carcinoma of upper-outer quadrant of female breast, left (HCC) [C50.412]     Symptomatic anemia [D64.9]     Pulmonary embolism (HCC) [I26.99]        Impressions:  73-year-old female with long segment Kumar's esophagus presents with microcytic anemia which is not new.  It has been there since at least November based on chart review.  Patient has rebounded with blood transfusion.  We discussed upper and lower endoscopy.  Patient should get esophageal biopsies.  Her INR is 1.8.  We will plan to do bidirectional endoscopy tomorrow.    The risk, benefits, and alternatives were discussed in detail. Risks include bowel perforation, procedure related bleeding event, infection, inability to safely complete the exam, sedation related complications. The patient, understanding the discussion, consents to proceed forward.        Recommendations:  Clear liquid diet  Bowel preparation this afternoon  Upper and lower endoscopy tomorrow

## 2024-02-17 NOTE — PROGRESS NOTES
4 Eyes Skin Assessment Completed by Charu, IVANA and Chin RN.    Head WDL  Ears WDL  Nose WDL  Mouth WDL  Neck WDL  Breast/Chest WDL  Shoulder Blades WDL  Spine WDL  (R) Arm/Elbow/Hand WDL  (L) Arm/Elbow/Hand WDL  Abdomen WDL  Groin WDL  Scrotum/Coccyx/Buttocks WDL  (R) Leg WDL  (L) Leg WDL  (R) Heel/Foot/Toe WDL  (L) Heel/Foot/Toe WDL          Devices In Places Tele Box and Pulse Ox      Interventions In Place Pillows    Possible Skin Injury No    Pictures Uploaded Into Epic N/A  Wound Consult Placed N/A  RN Wound Prevention Protocol Ordered No

## 2024-02-17 NOTE — ASSESSMENT & PLAN NOTE
Found to have hemoglobin 4.8 and severe microcytic anemia  Per patient longstanding history of iron deficiency anemia.  She is not on iron supplementations at this time.  S/p 4 units RBCs; repeat Hb 9.4  Transfuse for hemoglobin less than 7; or less than 8 with symptoms  Trend Hb  IV Protonix  Continuous telemetry monitoring   GI consulted; EGD 2/18, no acute bleeding. Advance diet and follow up outpatient

## 2024-02-17 NOTE — ASSESSMENT & PLAN NOTE
16 minutes spent discussing goals of care with patient.  She was explained her clinical condition and treatment plan.  She was asked about CODE STATUS, to which she states she would like everything done, including chest compressions and intubation.    Above per previous hospitalist     2/17: Confirmed CODE STATUS with patient.  She is a full code.

## 2024-02-17 NOTE — ED NOTES
Medication history reviewed with patient at bedside.   Med rec is complete  Allergies reviewed.   Patient has not had any outpatient antibiotics in the last 30 days.   Anticoagulants: No      Boni Briggs

## 2024-02-17 NOTE — ED NOTES
Bedside report to Delia HEATH.  POC discussed with patient. Call light within reach, all needs addressed at this time.         Fall risk interventions in place: in place (all applicable per [facility] Fall risk assessment)   Continuous monitoring: Cardiac Leads, Pulse Ox, or Blood Pressure  IVF/IV medications: Not Applicable   Oxygen: Room Air  Bedside sitter: Not Applicable   Isolation: Not Applicable

## 2024-02-17 NOTE — H&P
Hospital Medicine History & Physical Note    Date of Service  2/16/2024    Primary Care Physician  ZARA Zambrano    Consultants  None    Code Status  Full Code    Chief Complaint  Chief Complaint   Patient presents with    Sent by MD     Pt sent by pcp for low hemoglobin, dx with breast cancer last week, dizziness and sob with ambulation        History of Presenting Illness  Lawanda Pagan is a 73 y.o. female who presented 2/16/2024 with shortness of breath.  For the last few months, patient has reported progressively worsening shortness of breath.  In the last few weeks, she states that has gotten so bad, she has been unable to ambulate.  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  She went to see cardiology, labs were drawn, and an hour after patient had gone home, she was called to come back into the ER for severe anemia.  Patient denies melena, hematemesis.  She takes Coumadin for history of pulmonary embolism, however did not take her Coumadin dose today.    In ER, patient initially hypotensive and tachycardic, responded to fluids.  Pertinent labs include hemoglobin 4.8, BUN 12, creatinine 0.84.  Admitted for symptomatic anemia.    I discussed the plan of care with patient.    Review of Systems  Review of Systems   Constitutional:  Positive for malaise/fatigue.   HENT: Negative.     Eyes: Negative.    Respiratory:  Positive for shortness of breath.    Cardiovascular: Negative.    Gastrointestinal: Negative.    Genitourinary: Negative.    Musculoskeletal: Negative.    Skin: Negative.    Neurological: Negative.    Endo/Heme/Allergies: Negative.    Psychiatric/Behavioral: Negative.         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    Surgical History   has a past surgical history that includes other abdominal surgery (2004); tonsillectomy; gyn surgery (1980); and gastroscopy (N/A, 9/1/2017).     Family History  family history includes Cancer in her brother, brother, father, and maternal grandmother; Melanoma in her mother; Other in her mother; Stroke in her brother.   Family history reviewed with patient. There is no family history that is pertinent to the chief complaint.     Social History   reports that she quit smoking about 50 years ago. Her smoking use included cigarettes. She started smoking about 51 years ago. She has a 0.2 pack-year smoking history. She has never used smokeless tobacco. She reports that she does not drink alcohol and does not use drugs.    Allergies  Allergies   Allergen Reactions    Other Environmental      Grass and pollens       Medications  Prior to Admission Medications   Prescriptions Last Dose Informant Patient Reported? Taking?   B Complex Vitamins (VITAMIN B COMPLEX PO)  Patient Yes No   Sig: Take  by mouth.   Cholecalciferol (VITAMIN D) 1000 UNIT Cap  Patient No No   Sig: Take 2 Caps by mouth every day.   omeprazole (PRILOSEC) 20 MG TBEC delayed-release tablet  Patient Yes No   Sig: Take 20 mg by mouth every day.   Patient not taking: Reported on 2/16/2024   warfarin (COUMADIN) 5 MG Tab   No No   Sig: Take one-half to one tablet by mouth daily or as directed by anticoagulation clinic      Facility-Administered Medications: None       Physical Exam  Temp:  [36.5 °C (97.7 °F)] 36.5 °C (97.7 °F)  Pulse:  [] 83  Resp:  [16] 16  BP: ()/(40-68) 149/68  SpO2:  [94 %-100 %] 94 %  Blood Pressure : (!) 149/68   Temperature: 36.5 °C (97.7 °F)   Pulse: 83   Respiration: 16   Pulse Oximetry: 94 %       Physical Exam  Constitutional:       Appearance: Normal appearance. She is obese.   HENT:      Head: Normocephalic.      Nose: Nose normal.      Mouth/Throat:      Mouth:  "Mucous membranes are moist.   Eyes:      Pupils: Pupils are equal, round, and reactive to light.   Cardiovascular:      Rate and Rhythm: Normal rate and regular rhythm.   Pulmonary:      Effort: Pulmonary effort is normal.      Comments: Diminished breath sounds  Abdominal:      General: Abdomen is flat. Bowel sounds are normal.      Palpations: Abdomen is soft.   Musculoskeletal:         General: Normal range of motion.      Cervical back: Neck supple.   Skin:     General: Skin is warm.   Neurological:      General: No focal deficit present.      Mental Status: She is alert and oriented to person, place, and time. Mental status is at baseline.   Psychiatric:         Mood and Affect: Mood normal.         Behavior: Behavior normal.         Thought Content: Thought content normal.         Judgment: Judgment normal.         Laboratory:  Recent Labs     02/16/24  1142 02/16/24  1915   WBC 3.6* 3.8*   RBC 3.93* 3.50*   HEMOGLOBIN 5.2* 4.8*   HEMATOCRIT 22.5* 19.8*   MCV 57.3* 56.6*   MCH 13.2* 13.7*   MCHC 23.1* 24.2*   RDW 47.3 46.5   PLATELETCT 336 286     Recent Labs     02/16/24  1142 02/16/24  1815   SODIUM 135 135   POTASSIUM 3.7 3.4*   CHLORIDE 101 100   CO2 20 19*   GLUCOSE 139* 171*   BUN 12 12   CREATININE 0.77 0.84   CALCIUM 8.8 9.0     Recent Labs     02/16/24  1142 02/16/24  1815   ALTSGPT  --  14   ASTSGOT  --  24   ALKPHOSPHAT  --  122*   TBILIRUBIN  --  0.8   GLUCOSE 139* 171*     Recent Labs     02/16/24  0000   INR 1.80*     No results for input(s): \"NTPROBNP\" in the last 72 hours.      No results for input(s): \"TROPONINT\" in the last 72 hours.    Imaging:  DX-CHEST-PORTABLE (1 VIEW)   Final Result      1.  Ill-defined linear left lobe opacity could be atelectasis or pneumonitis.          X-Ray:  I have personally reviewed the images and compared with prior images.    Assessment/Plan:  Justification for Admission Status  I anticipate this patient will require at least two midnights for appropriate " medical management, necessitating inpatient admission because pt has symptomatic anemia    Patient will need a Med/Surg bed on EMERGENCY service .  The need is secondary to symptomatic anemia.    * Symptomatic anemia  Assessment & Plan  Found to have hemoglobin 4.8 and severe microcytic anemia, to be transfused 4 units of blood. Kidney function wnl. Hemodynamically stable  GI consult in a.m.  Fluids  Protonix  TTE  Serial CBC    ACP (advance care planning)  Assessment & Plan  16 minutes spent discussing goals of care with patient.  She was explained her clinical condition and treatment plan.  She was asked about CODE STATUS, to which she states she would like everything done, including chest compressions and intubation.    Hypokalemia  Assessment & Plan  Replace    Carcinoma of upper-outer quadrant of female breast, left (HCC)- (present on admission)  Assessment & Plan  Recent diagnosis of invasive ductal carcinoma of the left breast, with metastasis to left axillary lymph node    Pulmonary embolism (HCC)- (present on admission)  Assessment & Plan  Hx of  On coumadin, hold due to anemia.  Did not take her Coumadin today  Monitor INR        VTE prophylaxis: pharmacologic prophylaxis contraindicated due to anemia

## 2024-02-17 NOTE — PROGRESS NOTES
Received bedside report from RN, pt care assumed, VSS, pt assessment complete. Pt AAOx4, with 0/10 of pain at this time. No signs of acute distress noted at this time. Plan of care discussed with pt and verbalizes no questions. Pt denies any additional needs at this time. Bed locked/in lowest position, bed alarm on, pt educated on fall risk and verbalized understanding, call light within reach, hourly rounding initiated.

## 2024-02-17 NOTE — H&P (VIEW-ONLY)
Date of Consultation:  2/17/2024    Patient: : Lawanda Pagan  MRN: [id number]    Referring Physician:  Dr Elizondo     GI:Tom Payne M.D.     Reason for Consultation: Iron deficiency anemia    History of Present Illness:   73-year-old female with a history of PE on Coumadin and recent diagnosis of invasive breast cancer with lymph node metastases directed to the emergency department because of a low hemoglobin.  And trending out the patient's hemoglobin it has been low at least since November of last year.  Patient denies bloody show such as hematemesis or melena.  Her last colonoscopy was 9 years ago.  Her last upper endoscopy was 2017.  Patient has long segment Kumar's esophagus.  Her INR is 1.8.  She has been transfused 4 units.  Hemoglobin now 9.      Past Medical History:   Diagnosis Date    Elevated LFTs 1/26/2018    Kumar esophagus 8/10/2015    Breath shortness 2014 2017 denies now    Allergy     Arthritis     fingers hips    Blood clotting disorder (HCC)     lungs bilat     Blood transfusion without reported diagnosis     Fall     Hernia, hiatal     Measles        Past Surgical History:   Procedure Laterality Date    GASTROSCOPY N/A 9/1/2017    Procedure: GASTROSCOPY;  Surgeon: Stephanie Reyes M.D.;  Location: SURGERY SAME DAY [facility];  Service: Gastroenterology    OTHER ABDOMINAL SURGERY  2004    hernia repair    GYN SURGERY  1980    c sec lap tubal    TONSILLECTOMY         Family History   Problem Relation Age of Onset    Other Mother         parkinsons    Melanoma Mother     Cancer Father         stomach    Cancer Brother     Stroke Brother     Cancer Brother     Cancer Maternal Grandmother         stomach       Social History     Socioeconomic History    Marital status: Single   Tobacco Use    Smoking status: Former     Current packs/day: 0.00     Average packs/day: 0.2 packs/day for 1 year (0.2 ttl pk-yrs)     Types: Cigarettes     Start date: 8/25/1972     Quit date:  1973     Years since quittin.5    Smokeless tobacco: Never   Vaping Use    Vaping Use: Never used   Substance and Sexual Activity    Alcohol use: No     Comment: Stopped about 50 years ago.    Drug use: No    Sexual activity: Not Currently     Partners: Male     Birth control/protection: Post-Menopausal     Social Determinants of Health     Financial Resource Strain: Low Risk  (2024)    Overall Financial Resource Strain (CARDIA)     Difficulty of Paying Living Expenses: Not hard at all   Food Insecurity: No Food Insecurity (2024)    Hunger Vital Sign     Worried About Running Out of Food in the Last Year: Never true     Ran Out of Food in the Last Year: Never true   Transportation Needs: No Transportation Needs (2024)    PRAPARE - Transportation     Lack of Transportation (Medical): No     Lack of Transportation (Non-Medical): No   Physical Activity: Insufficiently Active (2024)    Exercise Vital Sign     Days of Exercise per Week: 4 days     Minutes of Exercise per Session: 10 min   Stress: No Stress Concern Present (2024)    [employer] [place] [employer] - Occupational Stress Questionnaire     Feeling of Stress : Only a little   Social Connections: Socially Isolated (2024)    Social Connection and Isolation Panel [NHANES]     Frequency of Communication with Friends and Family: More than three times a week     Frequency of Social Gatherings with Friends and Family: More than three times a week     Attends Islam Services: Never     Active Member of Clubs or Organizations: No     Attends Club or Organization Meetings: Never     Marital Status:    Intimate Partner Violence: Not At Risk (2024)    Humiliation, Afraid, Rape, and Kick questionnaire     Fear of Current or Ex-Partner: No     Emotionally Abused: No     Physically Abused: No     Sexually Abused: No   Housing Stability: Low Risk  (2024)    Housing Stability Vital Sign     Unable to Pay for  Housing in the Last Year: No     Number of Places Lived in the Last Year: 1     Unstable Housing in the Last Year: No       Current Meds (name, sig, last dose):     Current Facility-Administered Medications:     pantoprazole    NS    acetaminophen      ROS  10 systems reviewed and are negative unless otherwise noted in history of present illness.    Physical Exam:  Vitals:    02/17/24 0345 02/17/24 0456 02/17/24 0545 02/17/24 0754   BP: 130/66   116/64   Pulse: 77  69 79   Resp: 16  18 20   Temp: 37 °C (98.6 °F)  36.7 °C (98.1 °F) 37.1 °C (98.8 °F)   TempSrc: Temporal  Temporal Temporal   SpO2:    92%   Weight:  84 kg (185 lb 3 oz)     Height:           Physical Exam  Vitals and nursing note reviewed.   Eyes:      General: Scleral icterus present.   Cardiovascular:      Rate and Rhythm: Normal rate and regular rhythm.   Pulmonary:      Effort: Pulmonary effort is normal.      Breath sounds: Normal breath sounds.   Abdominal:      General: Bowel sounds are normal.      Palpations: Abdomen is soft.   Skin:     General: Skin is warm.   Neurological:      General: No focal deficit present.      Mental Status: She is alert.   Psychiatric:         Behavior: Behavior normal.         Judgment: Judgment normal.           Labs:  Recent Labs     02/16/24  1142 02/16/24  1815 02/17/24  0642   SODIUM 135 135 137   POTASSIUM 3.7 3.4* 4.0   CHLORIDE 101 100 107   CO2 20 19* 19*   BUN 12 12 9   CREATININE 0.77 0.84 0.63   CALCIUM 8.8 9.0 8.3*     Recent Labs     02/16/24  1142 02/16/24  1815 02/17/24  0642   ALTSGPT  --  14  --    ASTSGOT  --  24  --    ALKPHOSPHAT  --  122*  --    TBILIRUBIN  --  0.8  --    GLUCOSE 139* 171* 105*     Recent Labs     02/16/24  1142 02/16/24  1815 02/16/24  1915   WBC 3.6*  --  3.8*   NEUTSPOLYS  --   --  57.30   LYMPHOCYTES  --   --  26.80   MONOCYTES  --   --  14.20*   EOSINOPHILS  --   --  0.30   BASOPHILS  --   --  1.10   ASTSGOT  --  24  --    ALTSGPT  --  14  --    ALKPHOSPHAT  --  122*  --     TBILIRUBIN  --  0.8  --      Recent Labs     24  0000 24  1142 24  1142 24  1915 24  0642 24  0800   RBC  --  3.93*  --  3.50*  --   --    HEMOGLOBIN  --  5.2*   < > 4.8* 9.3* 9.4*   HEMATOCRIT  --  22.5*   < > 19.8* 30.9* 31.7*   PLATELETCT  --  336  --  286  --   --    INR 1.80*  --   --   --   --   --     < > = values in this interval not displayed.     Recent Results (from the past 24 hour(s))   EKG    Collection Time: 24 10:18 AM   Result Value Ref Range    Report       [facility] [place] [facility]    Test Date:  2024  Pt Name:    TENA MORALES              Department: [facility]  MRN:        [id number]                      Room:  Gender:     Female                       Technician: CA  :        1950                   Requested By:SCOTT NEGRON  Order #:    [id number]                    Reading MD:    Measurements  Intervals                                Axis  Rate:       93                           P:          54  WY:         163                          QRS:        50  QRSD:       82                           T:          31  QT:         355  QTc:        442    Interpretive Statements  Sinus rhythm  Borderline repolarization abnormality  Compared to ECG 2014 11:47:20  No significant changes     Basic Metabolic Panel    Collection Time: 24 11:42 AM   Result Value Ref Range    Sodium 135 135 - 145 mmol/L    Potassium 3.7 3.6 - 5.5 mmol/L    Chloride 101 96 - 112 mmol/L    Co2 20 20 - 33 mmol/L    Glucose 139 (H) 65 - 99 mg/dL    Bun 12 8 - 22 mg/dL    Creatinine 0.77 0.50 - 1.40 mg/dL    Calcium 8.8 8.5 - 10.5 mg/dL    Anion Gap 14.0 7.0 - 16.0   CBC WITHOUT DIFFERENTIAL    Collection Time: 24 11:42 AM   Result Value Ref Range    WBC 3.6 (L) 4.8 - 10.8 K/uL    RBC 3.93 (L) 4.20 - 5.40 M/uL    Hemoglobin 5.2 (LL) 12.0 - 16.0 g/dL    Hematocrit 22.5 (L) 37.0 - 47.0 %    MCV 57.3 (L) 81.4 - 97.8 fL    MCH 13.2 (L) 27.0 - 33.0 pg    MCHC 23.1  (L) 32.2 - 35.5 g/dL    RDW 47.3 35.9 - 50.0 fL    Platelet Count 336 164 - 446 K/uL   ESTIMATED GFR    Collection Time: 24 11:42 AM   Result Value Ref Range    GFR (CKD-EPI) 80 >60 mL/min/1.73 m 2   EKG    Collection Time: 24  3:08 PM   Result Value Ref Range    Report       [facility] Emergency [facility]    Test Date:  2024  Pt Name:    TENA MORALES              Department: ER  MRN:        [id number]                      Room:  Gender:     Female                       Technician: [id number]  :        [id number]                   Requested By:ER TRIAGE PROTOCOL  Order #:    [id number]                    Reading MD:    Measurements  Intervals                                Axis  Rate:       86                           P:          27  WV:         172                          QRS:        8  QRSD:       88                           T:          31  QT:         388  QTc:        464    Interpretive Statements  Sinus rhythm  Low voltage, precordial leads  Compared to ECG 2024 10:18:51  Low QRS voltage now present     Comp Metabolic Panel    Collection Time: 24  6:15 PM   Result Value Ref Range    Sodium 135 135 - 145 mmol/L    Potassium 3.4 (L) 3.6 - 5.5 mmol/L    Chloride 100 96 - 112 mmol/L    Co2 19 (L) 20 - 33 mmol/L    Anion Gap 16.0 7.0 - 16.0    Glucose 171 (H) 65 - 99 mg/dL    Bun 12 8 - 22 mg/dL    Creatinine 0.84 0.50 - 1.40 mg/dL    Calcium 9.0 8.5 - 10.5 mg/dL    Correct Calcium 8.8 8.5 - 10.5 mg/dL    AST(SGOT) 24 12 - 45 U/L    ALT(SGPT) 14 2 - 50 U/L    Alkaline Phosphatase 122 (H) 30 - 99 U/L    Total Bilirubin 0.8 0.1 - 1.5 mg/dL    Albumin 4.3 3.2 - 4.9 g/dL    Total Protein 7.7 6.0 - 8.2 g/dL    Globulin 3.4 1.9 - 3.5 g/dL    A-G Ratio 1.3 g/dL   ESTIMATED GFR    Collection Time: 24  6:15 PM   Result Value Ref Range    GFR (CKD-EPI) 72 >60 mL/min/1.73 m 2   proBrain Natriuretic Peptide, NT    Collection Time: 24  6:15 PM   Result Value Ref Range     NT-proBNP 346 (H) 0 - 125 pg/mL   COD (ADULT)    Collection Time: 02/16/24  7:15 PM   Result Value Ref Range    ABO Grouping Only A     Rh Grouping Only POS     Antibody Screen-Cod NEG     Component R       R99                 Red Cells, LR       [id number]   transfused   02/16/24   20:48      Product Type R99     Dispense Status transfused     Unit Number (Barcoded) [id number]     Product Code (Barcoded) R0241T07     Blood Type (Barcoded) 6200     Component R       R99                 Red Cells, LR       [id number]   transfused   02/16/24   23:01      Product Type R99     Dispense Status transfused     Unit Number (Barcoded) [id number]     Product Code (Barcoded) D3926R20     Blood Type (Barcoded) 6200     Component R       R99                 Red Cells, LR       [id number]   issued       02/17/24   01:02      Product Type R99     Dispense Status issued     Unit Number (Barcoded) [id number]     Product Code (Barcoded) N3780J41     Blood Type (Barcoded) 6200     Component R       R99                 Red Cells, LR       [id number]   issued       02/17/24   03:16      Product Type R99     Dispense Status issued     Unit Number (Barcoded) [id number]     Product Code (Barcoded) W3581Y90     Blood Type (Barcoded) 6200    CBC WITH DIFFERENTIAL    Collection Time: 02/16/24  7:15 PM   Result Value Ref Range    WBC 3.8 (L) 4.8 - 10.8 K/uL    RBC 3.50 (L) 4.20 - 5.40 M/uL    Hemoglobin 4.8 (LL) 12.0 - 16.0 g/dL    Hematocrit 19.8 (L) 37.0 - 47.0 %    MCV 56.6 (L) 81.4 - 97.8 fL    MCH 13.7 (L) 27.0 - 33.0 pg    MCHC 24.2 (L) 32.2 - 35.5 g/dL    RDW 46.5 35.9 - 50.0 fL    Platelet Count 286 164 - 446 K/uL    Neutrophils-Polys 57.30 44.00 - 72.00 %    Lymphocytes 26.80 22.00 - 41.00 %    Monocytes 14.20 (H) 0.00 - 13.40 %    Eosinophils 0.30 0.00 - 6.90 %    Basophils 1.10 0.00 - 1.80 %    Immature Granulocytes 0.30 0.00 - 0.90 %    Nucleated RBC 0.50 (H) 0.00 - 0.20 /100 WBC    Neutrophils (Absolute)  2.18 1.82 - 7.42 K/uL    Lymphs (Absolute) 1.02 1.00 - 4.80 K/uL    Monos (Absolute) 0.54 0.00 - 0.85 K/uL    Eos (Absolute) 0.01 0.00 - 0.51 K/uL    Baso (Absolute) 0.04 0.00 - 0.12 K/uL    Immature Granulocytes (abs) 0.01 0.00 - 0.11 K/uL    NRBC (Absolute) 0.02 K/uL    Hypochromia 2+ (A)     Anisocytosis 1+     Microcytosis 2+ (A)    PLATELET ESTIMATE    Collection Time: 02/16/24  7:15 PM   Result Value Ref Range    Plt Estimation Normal    MORPHOLOGY    Collection Time: 02/16/24  7:15 PM   Result Value Ref Range    RBC Morphology Present     Polychromia 1+     Poikilocytosis 1+    PERIPHERAL SMEAR REVIEW    Collection Time: 02/16/24  7:15 PM   Result Value Ref Range    Peripheral Smear Review see below    DIFFERENTIAL COMMENT    Collection Time: 02/16/24  7:15 PM   Result Value Ref Range    Comments-Diff see below    Basic Metabolic Panel (BMP)    Collection Time: 02/17/24  6:42 AM   Result Value Ref Range    Sodium 137 135 - 145 mmol/L    Potassium 4.0 3.6 - 5.5 mmol/L    Chloride 107 96 - 112 mmol/L    Co2 19 (L) 20 - 33 mmol/L    Glucose 105 (H) 65 - 99 mg/dL    Bun 9 8 - 22 mg/dL    Creatinine 0.63 0.50 - 1.40 mg/dL    Calcium 8.3 (L) 8.5 - 10.5 mg/dL    Anion Gap 11.0 7.0 - 16.0   HEMOGLOBIN AND HEMATOCRIT    Collection Time: 02/17/24  6:42 AM   Result Value Ref Range    Hemoglobin 9.3 (L) 12.0 - 16.0 g/dL    Hematocrit 30.9 (L) 37.0 - 47.0 %   ESTIMATED GFR    Collection Time: 02/17/24  6:42 AM   Result Value Ref Range    GFR (CKD-EPI) 92 >60 mL/min/1.73 m 2   HEMOGLOBIN AND HEMATOCRIT    Collection Time: 02/17/24  8:00 AM   Result Value Ref Range    Hemoglobin 9.4 (L) 12.0 - 16.0 g/dL    Hematocrit 31.7 (L) 37.0 - 47.0 %         Imaging:  DX-CHEST-PORTABLE (1 VIEW)  Narrative: 2/16/2024 6:21 PM    HISTORY/REASON FOR EXAM:  Shortness of Breath    TECHNIQUE/EXAM DESCRIPTION AND NUMBER OF VIEWS:  Single portable view of the chest.    COMPARISON: 7/14/2014    FINDINGS:    The mediastinal and cardiac  silhouette is unremarkable.    The pulmonary vascularity is within normal limits.    There is ill-defined linear hazy opacity in the left lower lobe.    There is no significant pleural effusion.    There is no visible pneumothorax.    There are no acute bony abnormalities.  Impression: 1.  Ill-defined linear left lobe opacity could be atelectasis or pneumonitis.        MDM Data Review:  -Records reviewed and summarized in current documentation  -I personally reviewed and interpreted the laboratory results  -I personally reviewed the radiology images  -I have personally reviewed medications    Hospital Problem List:  Active Hospital Problems    Diagnosis     Hypokalemia [E87.6]     ACP (advance care planning) [Z71.89]     Carcinoma of upper-outer quadrant of female breast, left (HCC) [C50.412]     Symptomatic anemia [D64.9]     Pulmonary embolism (HCC) [I26.99]        Impressions:  73-year-old female with long segment Kumar's esophagus presents with microcytic anemia which is not new.  It has been there since at least November based on chart review.  Patient has rebounded with blood transfusion.  We discussed upper and lower endoscopy.  Patient should get esophageal biopsies.  Her INR is 1.8.  We will plan to do bidirectional endoscopy tomorrow.    The risk, benefits, and alternatives were discussed in detail. Risks include bowel perforation, procedure related bleeding event, infection, inability to safely complete the exam, sedation related complications. The patient, understanding the discussion, consents to proceed forward.        Recommendations:  Clear liquid diet  Bowel preparation this afternoon  Upper and lower endoscopy tomorrow

## 2024-02-17 NOTE — CARE PLAN
Problem: Knowledge Deficit - Standard  Goal: Patient and family/care givers will demonstrate understanding of plan of care, disease process/condition, diagnostic tests and medications  Outcome: Progressing   The patient is Stable - Low risk of patient condition declining or worsening    Shift Goals  Clinical Goals: Blood transfusion, monitor HGB  Patient Goals: rest, no dizziness    Progress made toward(s) clinical / shift goals:  Pt have units PRBC completed, she's on 4th units, no untoward reaction noted. Denies any dizziness . Per pt she feels a lot better.     Patient is not progressing towards the following goals:

## 2024-02-17 NOTE — ED NOTES
Blood transfusion verified with second RN, transfusion started. Teaching given to pt on blood transfusion reaction and pt verbalized understanding.

## 2024-02-17 NOTE — ED PROVIDER NOTES
ER Provider Note    Scribed for Dr. Altagracia Vergara D.O. by Carlos Enrique Fitzpatrick. 2/16/2024  6:17 PM    Primary Care Provider: ZARA Zambrano    CHIEF COMPLAINT  Chief Complaint   Patient presents with    Sent by MD     Pt sent by pcp for low hemoglobin, dx with breast cancer last week, dizziness and sob with ambulation        EXTERNAL RECORDS REVIEWED  Outpatient laboratories and per cardiology yesterday and [facility] cancer services on 214/24, PET scan  on 2/8/2024.    HPI/ROS  LIMITATION TO HISTORY   Select: : None    OUTSIDE HISTORIAN(S):  Family Patient's son present at beside to help provide patient history.     Lawanda Pagan is a 73 y.o. female who presents to the ED for evaluation of anemia onset prior to arrival. The patient states she had blood work done with the cardiologist (Dr. Pham) today, and he advised she present to the ED due to results that suggested anemia. The patient reports 9 months ago her grandchild hit her chest, and it took her 7 months to get a mammogram which ultimately showed a mass.   The patient reports she was diagnosed with breast cancer last week, and notes her PET scan showed cancer in her lymph nodes as well. She states Dr. Borden wanted to do surgical intervention, and she had to present to cardiology for medical clearance for the surgery.The patient reports shortness of breath, noting she can only walk very short distances. she has a mild sore throat. The patient denies nausea, any pain, or seeing an oncologist yet. The patient reports a history of anemia when she was young, noting she took iron for many years before being taken off of it due to her ion level getting to high. The patient reports a medical history of pulmonary embolism.       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    Blood clotting disorder (HCC)     lungs bilat     Blood transfusion without reported diagnosis  "    Breath shortness 2014 2017 denies now    Elevated LFTs 1/26/2018    Fall     Hernia, hiatal     Measles        SURGICAL HISTORY  Past Surgical History:   Procedure Laterality Date    GASTROSCOPY N/A 9/1/2017    Procedure: GASTROSCOPY;  Surgeon: Stephanie Reyes M.D.;  Location: SURGERY SAME DAY [facility];  Service: Gastroenterology    OTHER ABDOMINAL SURGERY  2004    hernia repair    GYN SURGERY  1980    c sec lap tubal    TONSILLECTOMY         FAMILY HISTORY  Family History   Problem Relation Age of Onset    Other Mother         parkinsons    Melanoma Mother     Cancer Father         stomach    Cancer Brother     Stroke Brother     Cancer Brother     Cancer Maternal Grandmother         stomach       SOCIAL HISTORY   reports that she quit smoking about 50 years ago. Her smoking use included cigarettes. She started smoking about 51 years ago. She has a 0.2 pack-year smoking history. She has never used smokeless tobacco. She reports that she does not drink alcohol and does not use drugs.    CURRENT MEDICATIONS  Previous Medications    ACETAMINOPHEN PO    Take 1 Tablet by mouth 1 time a day as needed for Mild Pain.    CYANOCOBALAMIN (B-12) 1000 MCG TAB    Take 1,000 mcg by mouth every day.    IBUPROFEN (MOTRIN) 200 MG TAB    Take 200 mg by mouth 1 time a day as needed for Mild Pain.    OMEPRAZOLE (PRILOSEC) 20 MG TABLET DELAYED RESPONSE DELAYED-RELEASE TABLET    Take 20 mg by mouth 1 time a day as needed (heart burn).    VITAMIN D (CHOLECALCIFEROL) 1000 UNIT TAB    Take 2,000 Units by mouth every day. 2 tablets=2000 units    WARFARIN (COUMADIN) 5 MG TAB    Take one-half to one tablet by mouth daily or as directed by anticoagulation clinic       ALLERGIES  Other environmental    PHYSICAL EXAM  /61   Pulse 90   Temp 37.4 °C (99.3 °F)   Resp 17   Ht 1.626 m (5' 4\")   Wt 89.8 kg (198 lb)   LMP 02/08/2000 (Approximate) Comment: Age of first period: 11, No post-menopausal HRT.  SpO2 94%   BMI 33.99 " kg/m²   Constitutional: Patient is well developed, well nourished. Non-toxic appearing. Mild distress. Very emotional.   HENT: Normocephalic, atraumatic.  Moist oral mucosa.  Eyes: PERRL, EOMI, Pale conjunctiva.   Cardiovascular: Tachycardic, No murmur.  Thorax & Lungs: Clear and equal breath sounds with good excursion. Coarse rhonchi in upper airways , clear with coughing. No wheezing. Left breast has a 4 cm hardened mass on the lateral quadrant above the areola with no erythema and is tender to the touch.   Abdomen: Bowel sounds normal in all four quadrants. Soft, obese, nontender, no rebound , guarding, palpable masses.   Skin: Warm, Dry, No erythema, No rashes.   Extremities: Peripheral pulses 4/4 No edema, no calf edema or tenderness.   Neurologic: Alert & oriented x 3, Normal motor function, Normal sensory function.  Psychiatric: Affect normal, Judgment normal, Mood depressed      DIAGNOSTIC STUDIES & PROCEDURES    Labs:   Results for orders placed or performed during the hospital encounter of 02/16/24   Comp Metabolic Panel   Result Value Ref Range    Sodium 135 135 - 145 mmol/L    Potassium 3.4 (L) 3.6 - 5.5 mmol/L    Chloride 100 96 - 112 mmol/L    Co2 19 (L) 20 - 33 mmol/L    Anion Gap 16.0 7.0 - 16.0    Glucose 171 (H) 65 - 99 mg/dL    Bun 12 8 - 22 mg/dL    Creatinine 0.84 0.50 - 1.40 mg/dL    Calcium 9.0 8.5 - 10.5 mg/dL    Correct Calcium 8.8 8.5 - 10.5 mg/dL    AST(SGOT) 24 12 - 45 U/L    ALT(SGPT) 14 2 - 50 U/L    Alkaline Phosphatase 122 (H) 30 - 99 U/L    Total Bilirubin 0.8 0.1 - 1.5 mg/dL    Albumin 4.3 3.2 - 4.9 g/dL    Total Protein 7.7 6.0 - 8.2 g/dL    Globulin 3.4 1.9 - 3.5 g/dL    A-G Ratio 1.3 g/dL   COD (ADULT)   Result Value Ref Range    ABO Grouping Only A     Rh Grouping Only POS     Antibody Screen-Cod NEG     Component R       R99                 Red Cells, LR       [id number]   issued       02/16/24   20:48      Product Type R99     Dispense Status issued     Unit Number  (Barcoded) [id number]     Product Code (Barcoded) H7330T63     Blood Type (Barcoded) 6200    ESTIMATED GFR   Result Value Ref Range    GFR (CKD-EPI) 72 >60 mL/min/1.73 m 2   CBC WITH DIFFERENTIAL   Result Value Ref Range    WBC 3.8 (L) 4.8 - 10.8 K/uL    RBC 3.50 (L) 4.20 - 5.40 M/uL    Hemoglobin 4.8 (LL) 12.0 - 16.0 g/dL    Hematocrit 19.8 (L) 37.0 - 47.0 %    MCV 56.6 (L) 81.4 - 97.8 fL    MCH 13.7 (L) 27.0 - 33.0 pg    MCHC 24.2 (L) 32.2 - 35.5 g/dL    RDW 46.5 35.9 - 50.0 fL    Platelet Count 286 164 - 446 K/uL    Neutrophils-Polys 57.30 44.00 - 72.00 %    Lymphocytes 26.80 22.00 - 41.00 %    Monocytes 14.20 (H) 0.00 - 13.40 %    Eosinophils 0.30 0.00 - 6.90 %    Basophils 1.10 0.00 - 1.80 %    Immature Granulocytes 0.30 0.00 - 0.90 %    Nucleated RBC 0.50 (H) 0.00 - 0.20 /100 WBC    Neutrophils (Absolute) 2.18 1.82 - 7.42 K/uL    Lymphs (Absolute) 1.02 1.00 - 4.80 K/uL    Monos (Absolute) 0.54 0.00 - 0.85 K/uL    Eos (Absolute) 0.01 0.00 - 0.51 K/uL    Baso (Absolute) 0.04 0.00 - 0.12 K/uL    Immature Granulocytes (abs) 0.01 0.00 - 0.11 K/uL    NRBC (Absolute) 0.02 K/uL    Hypochromia 2+ (A)     Anisocytosis 1+     Microcytosis 2+ (A)    PLATELET ESTIMATE   Result Value Ref Range    Plt Estimation Normal    MORPHOLOGY   Result Value Ref Range    RBC Morphology Present     Polychromia 1+     Poikilocytosis 1+    PERIPHERAL SMEAR REVIEW   Result Value Ref Range    Peripheral Smear Review see below    DIFFERENTIAL COMMENT   Result Value Ref Range    Comments-Diff see below    EKG   Result Value Ref Range    Report       [facility]    Test Date:  2024  Pt Name:    TENA MORALES              Department: ER  MRN:        [id number]                      Room:  Gender:     Female                       Technician: [id number]  :        [id number]                   Requested By:ER TRIAGE PROTOCOL  Order #:    [id number]                    Reading MD:    Measurements  Intervals                                 Axis  Rate:       86                           P:          27  NV:         172                          QRS:        8  QRSD:       88                           T:          31  QT:         388  QTc:        464    Interpretive Statements  Sinus rhythm  Low voltage, precordial leads  Compared to ECG 02/16/2024 10:18:51  Low QRS voltage now present       All labs reviewed by me.    EKG:   I have independently interpreted this EKG as seen above.      Radiology:   The attending Emergency Physician has independently interpreted the diagnostic imaging associated with this visit and is awaiting the final reading from the radiologist, which will be displayed below.    Preliminary interpretation is a follows: No gross focal infiltrates  Radiologist interpretation:    DX-CHEST-PORTABLE (1 VIEW)   Final Result      1.  Ill-defined linear left lobe opacity could be atelectasis or pneumonitis.      EC-ECHOCARDIOGRAM COMPLETE W/O CONT    (Results Pending)        COURSE & MEDICAL DECISION MAKING    ED Observation Status? No; Patient does not meet criteria for ED Observation.     INITIAL ASSESSMENT AND PLAN  Care Narrative:       6:17 PM - Patient seen and evaluated at bedside. Discussed plan of care, including lab work and diagnostic imaging. Patient agrees to plan of care. Ordered EKG, CMP, COD, Prothrombin Time, APTT, proBrain Natriuretic Peptide, UA, and DX-Chest to evaluate. Differential diagnoses include but are not limited to: Iron deficiency anemia vs anemia of the chronic disease   Laboratories reveal a severe anemia which appears to be microcytic.  Her hemoglobin is 4.8 with hematocrit of 19.8, white count is low at 3.8, she does have increased monocytes at 14.2.  Remaining labs show a potassium depleted at 3.4, CO2 19, glucose 171.  Patient was typed and crossmatched for 4 units of packed red blood cells.  She was given IV fluids    8:51 PM - Patient will be medicated with Protonix 40 mg  injection.   Upon recheck she was mildly improved and the plan will be to admit her into the hospital for further treatment.  She is currently in guarded condition    9:01 PM - I discussed the patient's case and the above findings with Dr. Nava (Hospitalist) who agreed to hospitalize the patient. Patient's care was transferred at this time.  CRITICAL CARE  The very real possibilty of a deterioration of this patient's condition required the highest level of my preparedness for sudden, emergent intervention.  I provided critical care services, which included medication orders, frequent reevaluations of the patient's condition and response to treatment, ordering and reviewing test results, and discussing the case with various consultants.  The critical care time associated with the care of the patient was 40 minutes. Review chart for interventions. This time is exclusive of any other billable procedures.      ADDITIONAL PROBLEM LIST AND DISPOSITION  Left breast cancer, chronic anemia               DISPOSITION AND DISCUSSIONS  I have discussed management of the patient with the following physicians and BARRERA's: Dr. Nava (Hospitalist)     Discussion of management with other [facility] or appropriate source(s): None     Barriers to care at this time, including but not limited to:  None .       DISPOSITION:  Patient will be hospitalized by Dr. Nava in guarded condition.      FINAL IMPRESSION   1. Breast cancer metastasized to axillary lymph node, left (HCC)    2. Symptomatic anemia    3. Hypokalemia        Carlos Enrique CASTILLO), am scribing for, and in the presence of, Altagracia Vergara D.O..    Electronically signed by: Carlos Enrique Santa), 2/16/2024    Altagracia CASTILLO D.O. personally performed the services described in this documentation, as scribed by Carlos Enrique Fitzpatrick in my presence, and it is both accurate and complete.    The note accurately reflects work and decisions made by me.  Altagracia Vergara D.O.  2/17/2024  3:31 AM

## 2024-02-17 NOTE — ASSESSMENT & PLAN NOTE
Chart review: Unprovoked PE 2014.  She was seen by Dr. Bloch.  Recommending anticoagulation for 6 months and outpatient follow-up for hypercoagulation.  I do not see any outpatient notes indicating of that follow-up occurred.  It is unlikely that patient will need to continue Coumadin.  Would recommend reaching out to Dr. Bloch's office during business hours to discuss.    On coumadin, hold due to anemia.  Did not take her Coumadin 2/16  Monitor INR

## 2024-02-18 ENCOUNTER — ANESTHESIA (OUTPATIENT)
Dept: SURGERY | Facility: MEDICAL CENTER | Age: 74
DRG: 812 | End: 2024-02-18
Payer: MEDICARE

## 2024-02-18 ENCOUNTER — ANESTHESIA EVENT (OUTPATIENT)
Dept: SURGERY | Facility: MEDICAL CENTER | Age: 74
DRG: 812 | End: 2024-02-18
Payer: MEDICARE

## 2024-02-18 ENCOUNTER — APPOINTMENT (OUTPATIENT)
Dept: RADIOLOGY | Facility: MEDICAL CENTER | Age: 74
DRG: 812 | End: 2024-02-18
Attending: STUDENT IN AN ORGANIZED HEALTH CARE EDUCATION/TRAINING PROGRAM
Payer: MEDICARE

## 2024-02-18 VITALS
TEMPERATURE: 98.1 F | OXYGEN SATURATION: 94 % | RESPIRATION RATE: 16 BRPM | BODY MASS INDEX: 31.62 KG/M2 | DIASTOLIC BLOOD PRESSURE: 58 MMHG | HEART RATE: 80 BPM | HEIGHT: 64 IN | WEIGHT: 185.19 LBS | SYSTOLIC BLOOD PRESSURE: 117 MMHG

## 2024-02-18 PROBLEM — R05.1 ACUTE COUGH: Status: ACTIVE | Noted: 2024-02-18

## 2024-02-18 LAB
ALBUMIN SERPL BCP-MCNC: 3.3 G/DL (ref 3.2–4.9)
ALBUMIN/GLOB SERPL: 0.8 G/DL
ALP SERPL-CCNC: 118 U/L (ref 30–99)
ALT SERPL-CCNC: 12 U/L (ref 2–50)
ANION GAP SERPL CALC-SCNC: 12 MMOL/L (ref 7–16)
AST SERPL-CCNC: 25 U/L (ref 12–45)
BILIRUB SERPL-MCNC: 1.1 MG/DL (ref 0.1–1.5)
BUN SERPL-MCNC: 5 MG/DL (ref 8–22)
CALCIUM ALBUM COR SERPL-MCNC: 9.2 MG/DL (ref 8.5–10.5)
CALCIUM SERPL-MCNC: 8.6 MG/DL (ref 8.5–10.5)
CHLORIDE SERPL-SCNC: 108 MMOL/L (ref 96–112)
CO2 SERPL-SCNC: 17 MMOL/L (ref 20–33)
CREAT SERPL-MCNC: 0.69 MG/DL (ref 0.5–1.4)
ERYTHROCYTE [DISTWIDTH] IN BLOOD BY AUTOMATED COUNT: 75.9 FL (ref 35.9–50)
GFR SERPLBLD CREATININE-BSD FMLA CKD-EPI: 90 ML/MIN/1.73 M 2
GLOBULIN SER CALC-MCNC: 3.9 G/DL (ref 1.9–3.5)
GLUCOSE SERPL-MCNC: 97 MG/DL (ref 65–99)
HCT VFR BLD AUTO: 30.9 % (ref 37–47)
HGB BLD-MCNC: 9.1 G/DL (ref 12–16)
LV EJECT FRACT MOD 2C 99903: 68.8
LV EJECT FRACT MOD 4C 99902: 63.68
LV EJECT FRACT MOD BP 99901: 66.33
MAGNESIUM SERPL-MCNC: 2.2 MG/DL (ref 1.5–2.5)
MCH RBC QN AUTO: 20.2 PG (ref 27–33)
MCHC RBC AUTO-ENTMCNC: 29.4 G/DL (ref 32.2–35.5)
MCV RBC AUTO: 68.7 FL (ref 81.4–97.8)
PLATELET # BLD AUTO: 215 K/UL (ref 164–446)
POTASSIUM SERPL-SCNC: 3.7 MMOL/L (ref 3.6–5.5)
PROT SERPL-MCNC: 7.2 G/DL (ref 6–8.2)
RBC # BLD AUTO: 4.5 M/UL (ref 4.2–5.4)
SODIUM SERPL-SCNC: 137 MMOL/L (ref 135–145)
WBC # BLD AUTO: 5 K/UL (ref 4.8–10.8)

## 2024-02-18 PROCEDURE — 83735 ASSAY OF MAGNESIUM: CPT

## 2024-02-18 PROCEDURE — 0DJ08ZZ INSPECTION OF UPPER INTESTINAL TRACT, VIA NATURAL OR ARTIFICIAL OPENING ENDOSCOPIC: ICD-10-PCS | Performed by: INTERNAL MEDICINE

## 2024-02-18 PROCEDURE — 700105 HCHG RX REV CODE 258: Performed by: ANESTHESIOLOGY

## 2024-02-18 PROCEDURE — 99239 HOSP IP/OBS DSCHRG MGMT >30: CPT | Performed by: STUDENT IN AN ORGANIZED HEALTH CARE EDUCATION/TRAINING PROGRAM

## 2024-02-18 PROCEDURE — 700111 HCHG RX REV CODE 636 W/ 250 OVERRIDE (IP): Performed by: STUDENT IN AN ORGANIZED HEALTH CARE EDUCATION/TRAINING PROGRAM

## 2024-02-18 PROCEDURE — 700101 HCHG RX REV CODE 250: Performed by: ANESTHESIOLOGY

## 2024-02-18 PROCEDURE — 45378 DIAGNOSTIC COLONOSCOPY: CPT | Performed by: INTERNAL MEDICINE

## 2024-02-18 PROCEDURE — 160009 HCHG ANES TIME/MIN: Performed by: INTERNAL MEDICINE

## 2024-02-18 PROCEDURE — 700111 HCHG RX REV CODE 636 W/ 250 OVERRIDE (IP): Performed by: ANESTHESIOLOGY

## 2024-02-18 PROCEDURE — 80053 COMPREHEN METABOLIC PANEL: CPT

## 2024-02-18 PROCEDURE — 85027 COMPLETE CBC AUTOMATED: CPT

## 2024-02-18 PROCEDURE — 160048 HCHG OR STATISTICAL LEVEL 1-5: Performed by: INTERNAL MEDICINE

## 2024-02-18 PROCEDURE — 93306 TTE W/DOPPLER COMPLETE: CPT | Mod: 26 | Performed by: STUDENT IN AN ORGANIZED HEALTH CARE EDUCATION/TRAINING PROGRAM

## 2024-02-18 PROCEDURE — 160035 HCHG PACU - 1ST 60 MINS PHASE I: Performed by: INTERNAL MEDICINE

## 2024-02-18 PROCEDURE — 71045 X-RAY EXAM CHEST 1 VIEW: CPT

## 2024-02-18 PROCEDURE — 36415 COLL VENOUS BLD VENIPUNCTURE: CPT

## 2024-02-18 PROCEDURE — 160203 HCHG ENDO MINUTES - 1ST 30 MINS LEVEL 4: Performed by: INTERNAL MEDICINE

## 2024-02-18 PROCEDURE — 43235 EGD DIAGNOSTIC BRUSH WASH: CPT | Performed by: INTERNAL MEDICINE

## 2024-02-18 PROCEDURE — C9113 INJ PANTOPRAZOLE SODIUM, VIA: HCPCS | Performed by: STUDENT IN AN ORGANIZED HEALTH CARE EDUCATION/TRAINING PROGRAM

## 2024-02-18 PROCEDURE — 160002 HCHG RECOVERY MINUTES (STAT): Performed by: INTERNAL MEDICINE

## 2024-02-18 RX ORDER — LIDOCAINE HYDROCHLORIDE 20 MG/ML
INJECTION, SOLUTION EPIDURAL; INFILTRATION; INTRACAUDAL; PERINEURAL PRN
Status: DISCONTINUED | OUTPATIENT
Start: 2024-02-18 | End: 2024-02-18 | Stop reason: SURG

## 2024-02-18 RX ORDER — EPHEDRINE SULFATE 50 MG/ML
5 INJECTION, SOLUTION INTRAVENOUS
Status: DISCONTINUED | OUTPATIENT
Start: 2024-02-18 | End: 2024-02-18 | Stop reason: HOSPADM

## 2024-02-18 RX ORDER — PANTOPRAZOLE SODIUM 40 MG/1
40 TABLET, DELAYED RELEASE ORAL 2 TIMES DAILY
Qty: 60 TABLET | Refills: 3 | Status: SHIPPED | OUTPATIENT
Start: 2024-02-18

## 2024-02-18 RX ORDER — HYDROMORPHONE HYDROCHLORIDE 1 MG/ML
0.4 INJECTION, SOLUTION INTRAMUSCULAR; INTRAVENOUS; SUBCUTANEOUS
Status: DISCONTINUED | OUTPATIENT
Start: 2024-02-18 | End: 2024-02-18 | Stop reason: HOSPADM

## 2024-02-18 RX ORDER — HALOPERIDOL 5 MG/ML
1 INJECTION INTRAMUSCULAR
Status: DISCONTINUED | OUTPATIENT
Start: 2024-02-18 | End: 2024-02-18 | Stop reason: HOSPADM

## 2024-02-18 RX ORDER — HYDROMORPHONE HYDROCHLORIDE 1 MG/ML
0.2 INJECTION, SOLUTION INTRAMUSCULAR; INTRAVENOUS; SUBCUTANEOUS
Status: DISCONTINUED | OUTPATIENT
Start: 2024-02-18 | End: 2024-02-18 | Stop reason: HOSPADM

## 2024-02-18 RX ORDER — METOPROLOL TARTRATE 1 MG/ML
1 INJECTION, SOLUTION INTRAVENOUS
Status: DISCONTINUED | OUTPATIENT
Start: 2024-02-18 | End: 2024-02-18 | Stop reason: HOSPADM

## 2024-02-18 RX ORDER — SODIUM CHLORIDE, SODIUM LACTATE, POTASSIUM CHLORIDE, CALCIUM CHLORIDE 600; 310; 30; 20 MG/100ML; MG/100ML; MG/100ML; MG/100ML
INJECTION, SOLUTION INTRAVENOUS
Status: DISCONTINUED | OUTPATIENT
Start: 2024-02-18 | End: 2024-02-18 | Stop reason: SURG

## 2024-02-18 RX ORDER — FERROUS SULFATE 325(65) MG
325 TABLET ORAL
Qty: 30 TABLET | Refills: 3 | Status: SHIPPED | OUTPATIENT
Start: 2024-02-20

## 2024-02-18 RX ORDER — LABETALOL HYDROCHLORIDE 5 MG/ML
5 INJECTION, SOLUTION INTRAVENOUS
Status: DISCONTINUED | OUTPATIENT
Start: 2024-02-18 | End: 2024-02-18 | Stop reason: HOSPADM

## 2024-02-18 RX ORDER — HYDRALAZINE HYDROCHLORIDE 20 MG/ML
5 INJECTION INTRAMUSCULAR; INTRAVENOUS
Status: DISCONTINUED | OUTPATIENT
Start: 2024-02-18 | End: 2024-02-18 | Stop reason: HOSPADM

## 2024-02-18 RX ORDER — HYDROMORPHONE HYDROCHLORIDE 1 MG/ML
0.1 INJECTION, SOLUTION INTRAMUSCULAR; INTRAVENOUS; SUBCUTANEOUS
Status: DISCONTINUED | OUTPATIENT
Start: 2024-02-18 | End: 2024-02-18 | Stop reason: HOSPADM

## 2024-02-18 RX ORDER — DIPHENHYDRAMINE HYDROCHLORIDE 50 MG/ML
12.5 INJECTION INTRAMUSCULAR; INTRAVENOUS
Status: DISCONTINUED | OUTPATIENT
Start: 2024-02-18 | End: 2024-02-18 | Stop reason: HOSPADM

## 2024-02-18 RX ORDER — SODIUM CHLORIDE, SODIUM LACTATE, POTASSIUM CHLORIDE, CALCIUM CHLORIDE 600; 310; 30; 20 MG/100ML; MG/100ML; MG/100ML; MG/100ML
INJECTION, SOLUTION INTRAVENOUS CONTINUOUS
Status: DISCONTINUED | OUTPATIENT
Start: 2024-02-18 | End: 2024-02-18 | Stop reason: HOSPADM

## 2024-02-18 RX ORDER — ONDANSETRON 2 MG/ML
4 INJECTION INTRAMUSCULAR; INTRAVENOUS
Status: DISCONTINUED | OUTPATIENT
Start: 2024-02-18 | End: 2024-02-18 | Stop reason: HOSPADM

## 2024-02-18 RX ADMIN — PANTOPRAZOLE SODIUM 40 MG: 40 INJECTION, POWDER, FOR SOLUTION INTRAVENOUS at 04:49

## 2024-02-18 RX ADMIN — SODIUM CHLORIDE, POTASSIUM CHLORIDE, SODIUM LACTATE AND CALCIUM CHLORIDE: 600; 310; 30; 20 INJECTION, SOLUTION INTRAVENOUS at 09:02

## 2024-02-18 RX ADMIN — PROPOFOL 40 MG: 10 INJECTION, EMULSION INTRAVENOUS at 09:02

## 2024-02-18 RX ADMIN — LIDOCAINE HYDROCHLORIDE 100 MG: 20 INJECTION, SOLUTION EPIDURAL; INFILTRATION; INTRACAUDAL at 09:02

## 2024-02-18 RX ADMIN — PROPOFOL 150 MCG/KG/MIN: 10 INJECTION, EMULSION INTRAVENOUS at 09:03

## 2024-02-18 ASSESSMENT — COGNITIVE AND FUNCTIONAL STATUS - GENERAL
MOBILITY SCORE: 24
SUGGESTED CMS G CODE MODIFIER MOBILITY: CH
SUGGESTED CMS G CODE MODIFIER DAILY ACTIVITY: CH
DAILY ACTIVITIY SCORE: 24

## 2024-02-18 ASSESSMENT — PAIN SCALES - GENERAL: PAIN_LEVEL: 0

## 2024-02-18 NOTE — ANESTHESIA TIME REPORT
Anesthesia Start and Stop Event Times       Date Time Event    2/18/2024 0849 Ready for Procedure     0858 Anesthesia Start     0927 Anesthesia Stop          Responsible Staff  02/18/24      Name Role Begin End    Moody Martinez M.D. Anesth 0858 0991          Overtime Reason:  no overtime (within assigned shift)    Comments:

## 2024-02-18 NOTE — INTERVAL H&P NOTE
INR remains elevated.  Proceed with endoscopic eval for bleeding source. Aggressive biopsies for barretts not possible given inr    The risk, benefits, and alternatives were discussed in detail. Risks include bowel perforation, procedure related bleeding event, infection, inability to safely complete the exam, sedation related complications. The patient, understanding the discussion, consents to proceed forward.  Plan : bidirectional endoscopy

## 2024-02-18 NOTE — ASSESSMENT & PLAN NOTE
Started on 2/14. Worsened over the last 2 days.   Mild sputum production present  CXR pending  Procal pending   Not hypoxic, tachycardic or febrile.

## 2024-02-18 NOTE — DISCHARGE PLANNING
HTH/SCP TCN chart review completed. The most current review of medical record, knowledge of pt's PLOF and social support, LACE+ score of 64, 6 clicks scores unavailable.    Pt seen at bedside. Introduced TCN program. Provided education regarding post acute levels of care. Education provided regarding case management policy for blanket SNF referrals. Discussed HTH/SCP plan benefits. Pt verbalizes understanding.    Pt reports no functional concerns with dc to home once medically cleared. She reports feeling much improved after receiving multiple units of blood at this time as well. She reports she remains ambulatory with no AD as well. Note that she is awaiting upper and lower endoscopy tomorrow. She reports no concerns with transportation to home, has a FWW if needed and good social support from her son as well.     Given aforementioned, no additional provider consults requested and no choice obtained as anticipate pt will dc to home with outpatient follow ups if indicated. TCN will continue to follow and collaborate with discharge planning team as additional post acute needs arise. Thank you.     Completed today:  Choice obtained: none; see above  SCP with [facility] PCP. Pt reports having upcoming outpatient appointments with oncology, etc as well

## 2024-02-18 NOTE — CARE PLAN
The patient is Stable - Low risk of patient condition declining or worsening    Shift Goals  Clinical Goals: monitor H&H, safety  Patient Goals: rest, comfort    Progress made toward(s) clinical / shift goals:      Problem: Communication  Goal: The ability to communicate needs accurately and effectively will improve  Outcome: Progressing     Problem: Hemodynamics  Goal: Patient's hemodynamics, fluid balance and neurologic status will be stable or improve  Outcome: Progressing     Problem: Nutrition  Goal: Patient's nutritional and fluid intake will be adequate or improve  Outcome: Progressing       Patient is not progressing towards the following goals:

## 2024-02-18 NOTE — OP REPORT
PreOp Diagnosis: Microcytic anemia, iron deficiency.      PostOp Diagnosis:   Long segment Kumar's esophagus,  Hiatal hernia, sliding-type  Sigmoid diverticulosis  Internal hemorrhoids    Procedure(s):  ENDOSCOPY UPPER AND LOWER - Wound Class: Clean Contaminated    Surgeon(s):  Tom Payne M.D.    Anesthesiologist/Type of Anesthesia:  Anesthesiologist: Moody Martinez M.D./JEREMY    Surgical Staff:  Circulator: Leticia Perez R.N.  Endoscopy Technician: Fouzia Marti    Specimens removed if any:  * No specimens in log *      CONSENT: The risks, benefits and alternatives of the procedure were discussed in detail. The risks include and are not limited to bleeding, infection, perforation, missed lesions, and sedations risks (cardiopulmonary compromise and allergic reaction to medications).    DESCRIPTION:   The patient presented to the operating room.  A time out was performed prior to beginning the procedure.   The patient was placed in the left lateral recumbent position.   Patient was sedated by anesthesia: Propofol.    OPERATIVE FINDINGS:    Esophagus: Long segment Kumar's esophagus from 22 cm to 30 cm.  No nodularity or high risk features.  Biopsies contraindicated given INR 1.9.  Proximal esophagus normal.  Stomach: Sliding type hiatal hernia from 30 cm to 37 cm.  Stomach otherwise normal.  Duodenum: Normal to second portion.    Patient repositioned.  Digital rectal examination normal.  Petrolia prep score:   Right colon: 3; Transverse colon: 3; Left Colon: 3. BPS score: 9.  Withdrawal time 6 minutes  Endoscope advanced to the cecum.  Sigmoid diverticulosis.  Colonoscopy otherwise normal.  Retroflexion in the rectum demonstrated moderate size internal hemorrhoids which were nonbleeding.    Blood loss: None    The patient tolerated the procedure well.      There were no immediate complications.    IMPRESSION:  Long segment Kumar's esophagus, not biopsied given elevated INR.  Moderate to large size hiatal  hernia, sliding-type.  Sigmoid diverticulosis.  Internal hemorrhoids    RECOMMENDATIONS:  Outpatient GI follow-up.  Patient should have repeat upper endoscopy at a point to be determined for esophageal biopsies once her INR has normalized.  Resume home medications per hospitalist discretion.  Advance diet.  GI will sign off.

## 2024-02-18 NOTE — ANESTHESIA PREPROCEDURE EVALUATION
Case: [id number] Date/Time: 02/18/24 0840    Procedure: ENDOSCOPY UPPER AND LOWER    Location: [facility] OR 06 / SURGERY [facility]    Surgeons: Tom Payne M.D.            Relevant Problems   PULMONARY   (positive) SOB (shortness of breath) on exertion      CARDIAC   (positive) Pulmonary embolism (HCC)   (positive) SOB (shortness of breath) on exertion      GI   (positive) Gastroesophageal reflux disease without esophagitis       Physical Exam    Airway   Mallampati: II  TM distance: >3 FB  Neck ROM: full       Cardiovascular - normal exam  Rhythm: regular  Rate: normal  (-) murmur     Dental - normal exam        Very poor dentition     Pulmonary - normal exam  Breath sounds clear to auscultation     Abdominal    Neurological - normal exam                 Anesthesia Plan    ASA 2       Plan - MAC               Induction: intravenous      Pertinent diagnostic labs and testing reviewed    Informed Consent:    Anesthetic plan and risks discussed with patient.

## 2024-02-18 NOTE — ANESTHESIA POSTPROCEDURE EVALUATION
Patient: Lawanda Pagan    Procedure Summary       Date: 02/18/24 Room / Location: [facility] OR 06 / SURGERY [facility]    Anesthesia Start: 0858 Anesthesia Stop: 0927    Procedure: ENDOSCOPY UPPER AND LOWER (Esophagus) Diagnosis: (hiatal hernia, barretts, hemorrhoids, diverticulosis)    Surgeons: Tom Payne M.D. Responsible Provider: Moody Martinez M.D.    Anesthesia Type: MAC ASA Status: 2            Final Anesthesia Type: MAC  Last vitals  BP   Blood Pressure : 128/60    Temp   36.2 °C (97.1 °F)    Pulse   (!) 58   Resp   13    SpO2   99 %      Anesthesia Post Evaluation    Patient location during evaluation: PACU  Patient participation: complete - patient participated  Level of consciousness: awake and alert  Pain score: 0    Airway patency: patent  Anesthetic complications: no  Cardiovascular status: hemodynamically stable  Respiratory status: acceptable  Hydration status: euvolemic    PONV: none          No notable events documented.     Nurse Pain Score: 0 (NPRS)

## 2024-02-18 NOTE — PROGRESS NOTES
Monitor summary:        Rhythm: sinus rhythm  Rate: 58-79  Ectopy: (r)PVC, (r)couplet  Measurements: .20/.07/.42        12hr chart check

## 2024-02-18 NOTE — CARE PLAN
The patient is Stable - Low risk of patient condition declining or worsening    Shift Goals  Clinical Goals: Monitor H&H, bowel prep  Patient Goals: Rest, bowel prep  Family Goals: PILI    Progress made toward(s) clinical / shift goals:    Problem: Communication  Goal: The ability to communicate needs accurately and effectively will improve  Outcome: Progressing     Problem: Discharge Barriers/Planning  Goal: Patient's continuum of care needs are met  Outcome: Progressing     Problem: Hemodynamics  Goal: Patient's hemodynamics, fluid balance and neurologic status will be stable or improve  Outcome: Progressing       Patient is not progressing towards the following goals:

## 2024-02-18 NOTE — OR NURSING
"              After Visit Summary   1/8/2018    Chantal Lino    MRN: [id number]           Patient Information     Date Of Birth          1974        Visit Information        Provider Department      1/8/2018 8:40 AM Shari Shah MD [facility] [place]        Today's Diagnoses     Encounter for gynecological examination without abnormal finding    -  1       Follow-ups after your visit        Your next 10 appointments already scheduled     Jan 08, 2018  9:30 AM CST   (Arrive by 9:15 AM)   MA SCREENING DIGITAL BILATERAL with WEMA1   [facility] [place] ([facility] [place])    [address], [address]  [place] [address]   [phone]           Do not use any powder, lotion or deodorant under your arms or on your breast. If you do, we will ask you to remove it before your exam.  Wear comfortable, two-piece clothing.  If you have any allergies, tell your care team.  Bring any previous mammograms from other facilities or have them mailed to the breast center. Three-dimensional (3D) mammograms are available at [facility] locations in [facility], [facility], [place], [place], and [place]. [facility] locations include [facility] in [place]. Benefits of 3D mammograms include: - Improved rate of cancer detection - Decreases your chance of having to go back for more tests, which means fewer: - \"False-positive\" results (This means that there is an abnormal area but it isn't cancer.) - Invasive testing procedures, such as a biopsy or surgery - Can provide clearer images of the breast if you have dense breast tissue. 3D mammography is an optional exam that anyone can have with a 2D mammogram. It doesn't replace or take the place of a 2D mammogram. 2D mammograms remain an effective screening test for all women.  Not all insurance companies cover the cost of a 3D mammogram. Check with your " Report to Jomar HEATH. Plan of care discussed. Patient denies pain or nausea. VSS. Tele box placed to patient. Verified with Rosario in monitors. SB 57.   "insurance.              Who to contact     If you have questions or need follow up information about today's clinic visit or your schedule please contact [facility] directly at [phone].  Normal or non-critical lab and imaging results will be communicated to you by MyChart, letter or phone within 4 business days after the clinic has received the results. If you do not hear from us within 7 days, please contact the clinic through MyChart or phone. If you have a critical or abnormal lab result, we will notify you by phone as soon as possible.  Submit refill requests through [employer] or call your pharmacy and they will forward the refill request to us. Please allow 3 business days for your refill to be completed.          Additional Information About Your Visit        [employer] Desk Information     [employer] lets you send messages to your doctor, view your test results, renew your prescriptions, schedule appointments and more. To sign up, go to [email or web address] . Click on \"Log in\" on the left side of the screen, which will take you to the Welcome page. Then click on \"Sign up Now\" on the right side of the page.     You will be asked to enter the access code listed below, as well as some personal information. Please follow the directions to create your username and password.     Your access code is: [email or web address]  Expires: 2018  9:11 AM     Your access code will  in 90 days. If you need help or a new code, please call your [facility] clinic or [phone].        Care EveryWhere ID     This is your Care EveryWhere ID. This could be used by other organizations to access your [facility] medical records  [id number]        Your Vitals Were     Pulse Height Breastfeeding? BMI (Body Mass Index)          68 5' 5\" (1.651 m) No 29.79 kg/m2         Blood Pressure from Last 3 Encounters:   18 122/70   17 138/82   11/30/15 122/78    Weight from Last 3 Encounters:   18 179 lb (81.2 kg) "   01/05/17 174 lb (78.9 kg)   11/30/15 171 lb (77.6 kg)              Today, you had the following     No orders found for display       Primary Care Provider Office Phone # Fax #    Mateusz Juarez [phone]       [address]   [facility] [address]        Equal Access to Services     [facility]: Hadii aad ku hadasho Soomaali, waaxda luqadaha, qaybta kaalmada adeegyada, waxay idiin hayaan adeeg kharash la'aan ah. So [facility] [phone].    ATENCIÓN: Si habla espyojana, tiene a larkin disposición servicios gratuitos de asistencia lingüística. Nan al [phone].    We comply with applicable federal civil rights laws and [place] laws. We do not discriminate on the basis of race, color, national origin, age, disability, sex, sexual orientation, or gender identity.            Thank you!     Thank you for choosing [facility] [place]  for your care. Our goal is always to provide you with excellent care. Hearing back from our patients is one way we can continue to improve our services. Please take a few minutes to complete the written survey that you may receive in the mail after your visit with us. Thank you!             Your Updated Medication List - Protect others around you: Learn how to safely use, store and throw away your medicines at www.disposemymeds.org.          This list is accurate as of: 1/8/18  9:11 AM.  Always use your most recent med list.                   Brand Name Dispense Instructions for use Diagnosis    aspirin 81 MG tablet      Take 1 tablet by mouth daily.        FISH OIL PO      Take 1 tablet by mouth daily.        levonorgestrel 20 MCG/24HR IUD    MIRENA     1 each by Intrauterine route once        lisinopril-hydrochlorothiazide 20-12.5 MG per tablet    PRINZIDE/ZESTORETIC     Take 1 tablet by mouth daily        MULTIVITAMIN PO      Take  by mouth.        VITAMIN D3 PO      Take 2,000 Units by mouth daily

## 2024-02-19 NOTE — DISCHARGE SUMMARY
Discharge Summary    CHIEF COMPLAINT ON ADMISSION  Chief Complaint   Patient presents with    Sent by MD     Pt sent by pcp for low hemoglobin, dx with breast cancer last week, dizziness and sob with ambulation        Reason for Admission  sent by MD     Admission Date  2/16/2024    CODE STATUS  Prior    HPI & HOSPITAL COURSE    Lawanda Pagan is a 73 y.o. female who presented 2/16/2024 with shortness of breath.  For the last few months, patient has reported progressively worsening shortness of breath.  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  She went to see cardiology, labs were drawn, and an hour after patient had gone home, she was called to come back into the ER for severe anemia.  Patient denies melena, hematemesis.  She takes Coumadin for history of pulmonary embolism, however did not take her Coumadin dose 2/16.     In ER, patient initially hypotensive and tachycardic, responded to fluids.  Pertinent labs include hemoglobin 4.8, BUN 12, creatinine 0.84.  Admitted for symptomatic anemia. Patient has received 4 units RBCs.  Hemoglobin stable at 9 at time of discharge.    EGD was performed on 2/18.  Consistent with patient's history of Kumar's esophagus.  She does not have any active bleeding at this time.  Patient's anemia is likely secondary to longstanding chronic iron deficiency.  I am going to discharge her home on iron replacement therapy every other day.  And Protonix 40 mg daily.    Additionally, I spent a significant amount of time looking through patient's records to determine why she was on warfarin.  It appears that in 2007 she had a provoked PE and at that time she was placed on warfarin.  Patient states that in 2007 she had just retired and she spent a lot of time sitting at home doing puzzles.  She was immobile for hours at a time.  She developed a blood clot in her upper right leg.  She states that over the  past few years she has been asked many times why she is still on warfarin-no one had ever know the answer.  I do not see any reason for patient to continue warfarin at this time.  We will discontinue it.  I did advise her that with her recent diagnosis of cancer she is at increased risk for DVTs.  She understands.  She states she is mobile every day with her grandchildren.  She also has a stationary bicycle that she uses at home.  We will discontinue warfarin at this time.    ECHO: EF 65%; RVSP 38mmHg.    Patient is going to follow-up with [facility] for ongoing primary care needs.    She will have a CBC performed later this week.    Therefore, she is discharged in good and stable condition to home with close outpatient follow-up.    The patient met 2-midnight criteria for an inpatient stay at the time of discharge.    Discharge Date  2/18/2024    FOLLOW UP ITEMS POST DISCHARGE  Established with primary care  CBC this week  Follow-up with surgery and oncology as scheduled    DISCHARGE DIAGNOSES  Principal Problem:    Symptomatic anemia (POA: Unknown)  Active Problems:    Carcinoma of upper-outer quadrant of female breast, left (HCC) (POA: Yes)    Hypokalemia (POA: Unknown)    ACP (advance care planning) (POA: Unknown)    Acute cough (POA: Unknown)  Resolved Problems:    Pulmonary embolism (HCC) (POA: Yes)      FOLLOW UP  Future Appointments   Date Time Provider Department Center   2/23/2024  8:15 AM [address] [address]   2/23/2024  9:15 AM V EXAM 4 VMED None   2/29/2024  7:15 AM V EXAM 9 ECHO [facility]   3/7/2024  2:00 PM Reza Lyles M.D. ONCRMO None   3/8/2024  9:40 AM ZARA Zambrano [facility]   3/19/2024  8:45 AM MOOSE Perez None     Segundo Pruett M.D.  [address] [place] [id number]  [phone]    Follow up in 3 day(s)        MEDICATIONS ON DISCHARGE     Medication List        START taking these medications         Instructions   ferrous sulfate 325 (65 Fe) MG tablet  Start taking on: February 20, 2024   Doctor's comments: Take with a meal  Take 1 Tablet by mouth every Tue and Thu at 6 PM.  Dose: 325 mg     pantoprazole 40 MG Tbec  Commonly known as: Protonix   Take 1 Tablet by mouth 2 times a day.  Dose: 40 mg            CONTINUE taking these medications        Instructions   ACETAMINOPHEN PO   Take 1 Tablet by mouth 1 time a day as needed for Mild Pain.  Dose: 1 Tablet     B-12 1000 MCG Tabs   Take 1,000 mcg by mouth every day.  Dose: 1,000 mcg     vitamin D 1000 Unit Tabs  Commonly known as: Cholecalciferol   Take 2,000 Units by mouth every day. 2 tablets=2000 units  Dose: 2,000 Units            STOP taking these medications      ibuprofen 200 MG Tabs  Commonly known as: Motrin     omeprazole 20 MG Tbec delayed-release tablet  Commonly known as: PriLOSEC     warfarin 5 MG Tabs  Commonly known as: Coumadin              Allergies  Allergies   Allergen Reactions    Other Environmental Runny Nose     Grass and pollens       DIET  No orders of the defined types were placed in this encounter.      ACTIVITY  As tolerated.  Weight bearing as tolerated    CONSULTATIONS  GI    PROCEDURES  EGD 2/18    LABORATORY  Lab Results   Component Value Date    SODIUM 137 02/18/2024    POTASSIUM 3.7 02/18/2024    CHLORIDE 108 02/18/2024    CO2 17 (L) 02/18/2024    GLUCOSE 97 02/18/2024    BUN 5 (L) 02/18/2024    CREATININE 0.69 02/18/2024        Lab Results   Component Value Date    WBC 5.0 02/18/2024    HEMOGLOBIN 9.1 (L) 02/18/2024    HEMATOCRIT 30.9 (L) 02/18/2024    PLATELETCT 215 02/18/2024        Total time of the discharge process exceeds 44 minutes.

## 2024-02-21 ENCOUNTER — PATIENT OUTREACH (OUTPATIENT)
Dept: ONCOLOGY | Facility: MEDICAL CENTER | Age: 74
End: 2024-02-21
Payer: MEDICARE

## 2024-02-21 DIAGNOSIS — Z65.8 PSYCHOSOCIAL DISTRESS: ICD-10-CM

## 2024-02-21 NOTE — PROGRESS NOTES
"ONCOLOGY NURSE NAVIGATION(ONN) ASSESSMENT:  LISA Hussein reached out to patient to follow up on provider referral.  LISA introduced myself, my role at [facility] and shared I am here to provide support, education, guidance, and to assist with any barriers to care.  We reviewed plan of care.  Patient has met with Dr. Borden & biopsy results from left breast and left axillary node were reviewed.  Pathology left breast:  IDC, ER+, MA-, HER2- by FISH, Ki 15%. Axillary lymph node Bx +.  Dr. Borden ordered imaging.  PET/CT 2.23.24, Echo Cardiogram 2.29.24.  NPC Dr. Lyles 3.7.24.  Patient knows location of appointments and where to park.      Barriers to care reviewed and documented below.  Distress screening 7 out of 10 related to \"the unknown about surgery and tests.\"    NN discussed the [facility] Newly Diagnosed Breast Cancer class offered Monday afternoons and encouraged patient to attend virtually or in person.  Registration instructions provided.  NN shared information about the [facility], support services team & calendar for cancer patients.  Patient's support team is her son, daughters, extended family & friends.    LISA Hussein's letter of introduction sent to patient's personal email.  Cancer support Services Calendar and flyers relating to breast cancer were sent to patient's home address via [employer].  Patient's current questions have been answered & she will call Navigation with future questions or concerns.          BARRIERS ASSESSMENT:  TRANSPORTATION:  Denies barriers  EMPLOYMENT:   Retired, denies barriers      FINANCIAL:  Denies barriers  INSURANCE:  Recent [place] [employer]  Denies Barriers.   Financial resource advocate role reviewed.    SUPPORT SYSTEM:  Son, 2 daughters, extended family, friends  PSYCHOLOGICAL:   DST 7/10 r/t \"the unknown about surgery and tests\".  Oncology social worker role reviewed. Declined counselor support.  COMMUNICATION:  No barriers noted.   FAMILY CARE:    Denies barriers  SELF " CARE:  Lives in senior housing, but independent living, denies barriers.       INTERVENTION:  ONN introduction letter & other oncology support information sent to patient.

## 2024-02-23 ENCOUNTER — HOSPITAL ENCOUNTER (OUTPATIENT)
Dept: RADIOLOGY | Facility: MEDICAL CENTER | Age: 74
End: 2024-02-23
Attending: INTERNAL MEDICINE
Payer: MEDICARE

## 2024-02-23 ENCOUNTER — APPOINTMENT (OUTPATIENT)
Dept: VASCULAR LAB | Facility: MEDICAL CENTER | Age: 74
End: 2024-02-23
Attending: INTERNAL MEDICINE
Payer: MEDICARE

## 2024-02-23 DIAGNOSIS — R06.02 SOB (SHORTNESS OF BREATH) ON EXERTION: ICD-10-CM

## 2024-02-23 DIAGNOSIS — R93.1 ABNORMAL FINDINGS ON DIAGNOSTIC IMAGING OF HEART AND CORONARY CIRCULATION: ICD-10-CM

## 2024-02-23 PROCEDURE — 78431 MYOCRD IMG PET RST&STRS CT: CPT | Mod: 26 | Performed by: INTERNAL MEDICINE

## 2024-02-23 PROCEDURE — 93018 CV STRESS TEST I&R ONLY: CPT | Performed by: INTERNAL MEDICINE

## 2024-02-23 PROCEDURE — 78431 MYOCRD IMG PET RST&STRS CT: CPT

## 2024-02-26 ENCOUNTER — TELEPHONE (OUTPATIENT)
Dept: CARDIOLOGY | Facility: MEDICAL CENTER | Age: 74
End: 2024-02-26
Payer: MEDICARE

## 2024-02-26 NOTE — LETTER
PROCEDURE/SURGERY CLEARANCE FORM      Encounter Date: 2/26/2024    Patient: Lawanda Pagan  YOB: 1950    CARDIOLOGIST:  Jose Pham M.D.    REFERRING DOCTOR:  No ref. provider found    The following procedure/surgery: Left partial mastectomy with excision of axillary nodes                                            Additional comments: Noncardiac reason for symptoms. Patient needs to make sure GI and primary are ok with patient proceeding. No further cardiac testing would modify perioperative risk for intermediate risk procedure.  Thank you!       Electronically signed         MD Signature   Jose Pham M.D.

## 2024-02-26 NOTE — TELEPHONE ENCOUNTER
Last OV: 02/16/2024  Proposed Surgery: Left partial mastectomy with excision of axillary nodes   Surgery Date: TBD   Requesting Office Name: [facility]   Fax Number: [phone]  Preference of Location (default is surgery center unless specified by Cardiologist or [facility])  Prior Clearance Addressed: Yes   Per VR OV note on 02/16/2024    Pending breast surgery she describes and needs preoperative assessment.   Preoperative assessment pending results.      Echo scheduled 02/29/2024      History (cardiac history):   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    Blood clotting disorder (HCC)     lungs bilat     Blood transfusion without reported diagnosis     Breath shortness 2014 2017 denies now    Elevated LFTs 1/26/2018    Fall     Hernia, hiatal     Measles              Surgical Clearance Letter Sent: No Provider to advise after results.   **Scan clearance request letter into [facility].**

## 2024-02-27 ENCOUNTER — TELEPHONE (OUTPATIENT)
Dept: SURGERY | Facility: MEDICAL CENTER | Age: 74
End: 2024-02-27
Payer: MEDICARE

## 2024-02-29 ENCOUNTER — APPOINTMENT (OUTPATIENT)
Dept: CARDIOLOGY | Facility: MEDICAL CENTER | Age: 74
End: 2024-02-29
Attending: NURSE PRACTITIONER
Payer: MEDICARE

## 2024-03-01 ENCOUNTER — TELEPHONE (OUTPATIENT)
Dept: CARDIOLOGY | Facility: MEDICAL CENTER | Age: 74
End: 2024-03-01

## 2024-03-01 NOTE — TELEPHONE ENCOUNTER
VR    Caller: Flex from Dr. Elicia Borden office    Topic/issue: Flex called to follow up on this surgical clearance request. The procedure is scheduled for 03.19.24.    Please advise.    Callback Number: see routing comments    Thank you,    Emilia PIRES

## 2024-03-05 ENCOUNTER — HOSPITAL ENCOUNTER (OUTPATIENT)
Dept: LAB | Facility: MEDICAL CENTER | Age: 74
End: 2024-03-05
Payer: MEDICARE

## 2024-03-05 LAB
ALBUMIN SERPL BCP-MCNC: 4.3 G/DL (ref 3.2–4.9)
ALBUMIN/GLOB SERPL: 1 G/DL
ALP SERPL-CCNC: 189 U/L (ref 30–99)
ALT SERPL-CCNC: 42 U/L (ref 2–50)
ANION GAP SERPL CALC-SCNC: 15 MMOL/L (ref 7–16)
AST SERPL-CCNC: 53 U/L (ref 12–45)
BILIRUB SERPL-MCNC: 1.2 MG/DL (ref 0.1–1.5)
BUN SERPL-MCNC: 14 MG/DL (ref 8–22)
CALCIUM ALBUM COR SERPL-MCNC: 9.5 MG/DL (ref 8.5–10.5)
CALCIUM SERPL-MCNC: 9.7 MG/DL (ref 8.5–10.5)
CHLORIDE SERPL-SCNC: 102 MMOL/L (ref 96–112)
CHOLEST SERPL-MCNC: 176 MG/DL (ref 100–199)
CO2 SERPL-SCNC: 22 MMOL/L (ref 20–33)
CREAT SERPL-MCNC: 1.03 MG/DL (ref 0.5–1.4)
FASTING STATUS PATIENT QL REPORTED: NORMAL
GFR SERPLBLD CREATININE-BSD FMLA CKD-EPI: 56 ML/MIN/1.73 M 2
GLOBULIN SER CALC-MCNC: 4.4 G/DL (ref 1.9–3.5)
GLUCOSE SERPL-MCNC: 155 MG/DL (ref 65–99)
HDLC SERPL-MCNC: 30 MG/DL
LDLC SERPL CALC-MCNC: 117 MG/DL
POTASSIUM SERPL-SCNC: 4 MMOL/L (ref 3.6–5.5)
PROT SERPL-MCNC: 8.7 G/DL (ref 6–8.2)
SODIUM SERPL-SCNC: 139 MMOL/L (ref 135–145)
TRIGL SERPL-MCNC: 143 MG/DL (ref 0–149)
TSH SERPL DL<=0.005 MIU/L-ACNC: 1.56 UIU/ML (ref 0.38–5.33)

## 2024-03-05 PROCEDURE — 85027 COMPLETE CBC AUTOMATED: CPT

## 2024-03-05 PROCEDURE — 80061 LIPID PANEL: CPT

## 2024-03-05 PROCEDURE — 85007 BL SMEAR W/DIFF WBC COUNT: CPT

## 2024-03-05 PROCEDURE — 84443 ASSAY THYROID STIM HORMONE: CPT

## 2024-03-05 PROCEDURE — 36415 COLL VENOUS BLD VENIPUNCTURE: CPT

## 2024-03-05 PROCEDURE — 80053 COMPREHEN METABOLIC PANEL: CPT

## 2024-03-05 NOTE — TELEPHONE ENCOUNTER
Per VR - Noncardiac reason for symptoms. Patient needs to make sure GI and primary are ok with patient proceeding. No further cardiac testing would modify perioperative risk for intermediate risk procedure.  Thank you!    Letter created and faxed.

## 2024-03-06 LAB
ANISOCYTOSIS BLD QL SMEAR: ABNORMAL
BASOPHILS # BLD AUTO: 0 % (ref 0–1.8)
BASOPHILS # BLD: 0 K/UL (ref 0–0.12)
EOSINOPHIL # BLD AUTO: 0 K/UL (ref 0–0.51)
EOSINOPHIL NFR BLD: 0 % (ref 0–6.9)
HCT VFR BLD AUTO: 43.4 % (ref 37–47)
HGB BLD-MCNC: 11.4 G/DL (ref 12–16)
LYMPHOCYTES # BLD AUTO: 1.98 K/UL (ref 1–4.8)
LYMPHOCYTES NFR BLD: 23.9 % (ref 22–41)
MACROCYTES BLD QL SMEAR: ABNORMAL
MANUAL DIFF BLD: NORMAL
MCH RBC QN AUTO: 21.9 PG (ref 27–33)
MCHC RBC AUTO-ENTMCNC: 26.3 G/DL (ref 32.2–35.5)
MCV RBC AUTO: 83.5 FL (ref 81.4–97.8)
MICROCYTES BLD QL SMEAR: ABNORMAL
MONOCYTES # BLD AUTO: 0.59 K/UL (ref 0–0.85)
MONOCYTES NFR BLD AUTO: 7.1 % (ref 0–13.4)
MORPHOLOGY BLD-IMP: NORMAL
NEUTROPHILS # BLD AUTO: 5.73 K/UL (ref 1.82–7.42)
NEUTROPHILS NFR BLD: 69 % (ref 44–72)
NRBC # BLD AUTO: 0 K/UL
NRBC BLD-RTO: 0 /100 WBC (ref 0–0.2)
PLATELET # BLD AUTO: 392 K/UL (ref 164–446)
PLATELET BLD QL SMEAR: NORMAL
POIKILOCYTOSIS BLD QL SMEAR: NORMAL
RBC # BLD AUTO: 5.2 M/UL (ref 4.2–5.4)
RBC BLD AUTO: PRESENT
SCHISTOCYTES BLD QL SMEAR: NORMAL
WBC # BLD AUTO: 8.3 K/UL (ref 4.8–10.8)

## 2024-03-07 ENCOUNTER — HOSPITAL ENCOUNTER (OUTPATIENT)
Dept: HEMATOLOGY ONCOLOGY | Facility: MEDICAL CENTER | Age: 74
End: 2024-03-07
Attending: INTERNAL MEDICINE
Payer: MEDICARE

## 2024-03-07 VITALS
TEMPERATURE: 97.5 F | WEIGHT: 181.66 LBS | HEIGHT: 64 IN | OXYGEN SATURATION: 98 % | RESPIRATION RATE: 16 BRPM | DIASTOLIC BLOOD PRESSURE: 70 MMHG | HEART RATE: 72 BPM | SYSTOLIC BLOOD PRESSURE: 128 MMHG | BODY MASS INDEX: 31.01 KG/M2

## 2024-03-07 DIAGNOSIS — C50.412 CARCINOMA OF UPPER-OUTER QUADRANT OF FEMALE BREAST, LEFT (HCC): ICD-10-CM

## 2024-03-07 PROCEDURE — 99212 OFFICE O/P EST SF 10 MIN: CPT | Performed by: INTERNAL MEDICINE

## 2024-03-07 PROCEDURE — 99205 OFFICE O/P NEW HI 60 MIN: CPT | Performed by: INTERNAL MEDICINE

## 2024-03-07 RX ORDER — ANASTROZOLE 1 MG/1
1 TABLET ORAL DAILY
Qty: 90 TABLET | Refills: 1 | Status: SHIPPED | OUTPATIENT
Start: 2024-03-07

## 2024-03-07 ASSESSMENT — FIBROSIS 4 INDEX: FIB4 SCORE: 1.56

## 2024-03-07 NOTE — PROGRESS NOTES
Consult:  Hematology/Oncology      Referring Physician: Elicia Borden MD  Primary Care:  Segundo Pruett M.D.    Diagnosis:     Chief Complaint:      History of Presenting Illness:  Lawanda Pagan is a 73 y.o. female who self detected a mass in her breast in mid 2023.  She had problems with her insurance and cannot get an to get a mammogram until January 2024.  This revealed a mass in the left upper outer quadrant measuring 3.5 x 3 cm.  There was a suspicious lymph node as well.  She underwent a ultrasound-guided biopsy on 2/13/2024 which showed invasive ductal carcinoma, grade 1, no lymph vascular invasion, ER +90%, MI negative, Ki-67 15%, HER2 2+, HER2 FISH negative.  There was metastatic carcinoma and a left axillary lymph node.  She saw Dr. Borden who noted that the patient was extremely tachycardic and short of breath.  Evaluation was initiated.  PET CT scan was negative for metastatic disease and was hypermetabolic in the breast and lymph node.  On 2/16/2024 she was admitted to the hospital when on initial cardiac evaluation she was found to have a hemoglobin of 4.8, hematocrit of 29.8 MCV of 56.6 with a white count of 3.8 and platelets 286.  Her records show that her hemoglobin was 6.2 in November 2023 and apparently nothing was done about this.  She received 4 units of packed red blood cells and improved symptomatically immediately.  She underwent an EGD and apparently a colonoscopy at the same time while in the hospital although I cannot locate the records right now.  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  It is unclear whether she was ever worked up for hereditary thrombophilia.  Also because of her insurance issues she did not have adequate monitoring of her Coumadin level.  Her INR level was slightly elevated around  the time of her admission.  Coumadin was discontinued.  Echocardiogram showed normal ejection fraction of 65%.  Of note she has an almost lifelong history of anemia and has been on iron tablets in the past but during her pregnancy and afterwards but none for the last several years.  She did not apparently need a transfusion in the past nor iron infusion but she is really been minimally monitored over the last several years.    Since discharge she has felt dramatically better and has no tachycardia, she is ambulating normal distances without difficulty.  She saw her new primary care physician 2 days ago and her hemoglobin was up to 11.4 hematocrit 43.4 MCV up to 83.5 with normals WBC and differential and a normal platelet count.  Her LDL was elevated and she was started on statin.  No family history of breast or ovarian cancer.  Genetics consult has been placed.      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    Blood clotting disorder (HCC)     lungs bilat     Blood transfusion without reported diagnosis     Breath shortness 2014 2017 denies now    Elevated LFTs 1/26/2018    Fall     Hernia, hiatal     Measles        Past Surgical History:   Procedure Laterality Date    ENDOSCOPY PROCEDURE N/A 2/18/2024    Procedure: ENDOSCOPY UPPER AND LOWER;  Surgeon: Tom Payne M.D.;  Location: SURGERY [facility];  Service: Gastroenterology    GASTROSCOPY N/A 9/1/2017    Procedure: GASTROSCOPY;  Surgeon: Stephanie Reyes M.D.;  Location: SURGERY SAME DAY [facility];  Service: Gastroenterology    OTHER ABDOMINAL SURGERY  2004    hernia repair    GYN SURGERY  1980    c sec lap tubal    TONSILLECTOMY         Social History     Tobacco Use    Smoking status: Former     Current packs/day: 0.00     Average packs/day: 0.2 packs/day for 1 year (0.2 ttl pk-yrs)     Types: Cigarettes     Start date: 8/25/1972     Quit date: 8/25/1973     Years since  "quittin.5    Smokeless tobacco: Never   Vaping Use    Vaping Use: Never used   Substance Use Topics    Alcohol use: No     Comment: Stopped about 50 years ago.    Drug use: No        Family History   Problem Relation Age of Onset    Other Mother         parkinsons    Melanoma Mother     Cancer Father         stomach    Cancer Brother     Stroke Brother     Cancer Brother     Cancer Maternal Grandmother         stomach       Allergies as of 2024 - Reviewed 2024   Allergen Reaction Noted    Other environmental Runny Nose 2017         Current Outpatient Medications:     rosuvastatin (CRESTOR) 10 MG Tab, Take 10 mg by mouth every evening., Disp: , Rfl:     pantoprazole (PROTONIX) 40 MG Tablet Delayed Response, Take 1 Tablet by mouth 2 times a day., Disp: 60 Tablet, Rfl: 3    ferrous sulfate 325 (65 Fe) MG tablet, Take 1 Tablet by mouth every Tue and Thu at 6 PM., Disp: 30 Tablet, Rfl: 3    ACETAMINOPHEN PO, Take 1 Tablet by mouth 1 time a day as needed for Mild Pain., Disp: , Rfl:     vitamin D (CHOLECALCIFEROL) 1000 Unit Tab, Take 2,000 Units by mouth every day. 2 tablets=2000 units, Disp: , Rfl:     Cyanocobalamin (B-12) 1000 MCG Tab, Take 1,000 mcg by mouth every day., Disp: , Rfl:     Review of Systems:  ROS       Physical Exam:  Vitals:    24 1352   BP: 128/70   BP Location: Left arm   Patient Position: Sitting   BP Cuff Size: Adult   Pulse: 72   Resp: 16   Temp: 36.4 °C (97.5 °F)   TempSrc: Temporal   SpO2: 98%   Weight: 82.4 kg (181 lb 10.5 oz)   Height: 1.626 m (5' 4\")       DESC; KARNOFSKY SCALE WITH ECOG EQUIVALENT: 90, Able to carry on normal activity; minor signs or symptoms of disease (ECOG equivalent 0)    DISTRESS LEVEL: mild distress    Physical Exam  Constitutional:       Appearance: Normal appearance.   HENT:      Head: Normocephalic.      Mouth/Throat:      Mouth: Mucous membranes are moist.      Pharynx: Oropharynx is clear.   Eyes:      Extraocular Movements: Extraocular " movements intact.      Conjunctiva/sclera: Conjunctivae normal.      Pupils: Pupils are equal, round, and reactive to light.   Cardiovascular:      Rate and Rhythm: Normal rate and regular rhythm.      Pulses: Normal pulses.   Pulmonary:      Effort: Pulmonary effort is normal.      Breath sounds: Normal breath sounds.   Chest:      Comments: 3/7/2024: At 1230 o'clock 5 cm from the nipple is a 4.5 cm wide by 4 cm tall mass.  There is a 1 x 1 cm left axillary lymph node palpable and freely movable.  No left supraclavicular adenopathy is noted.  The right breast is without masses nipple discharge or tenderness.  Abdominal:      General: Abdomen is flat. Bowel sounds are normal.      Palpations: Abdomen is soft. There is hepatomegaly and splenomegaly. There is no mass.      Comments: Large periumbilical hernia   Musculoskeletal:         General: Normal range of motion.   Skin:     General: Skin is warm.   Neurological:      General: No focal deficit present.      Mental Status: She is alert and oriented to person, place, and time.   Psychiatric:         Mood and Affect: Mood normal.         Behavior: Behavior normal.            Labs:  Hospital Outpatient Visit on 03/05/2024   Component Date Value Ref Range Status    Sodium 03/05/2024 139  135 - 145 mmol/L Final    Potassium 03/05/2024 4.0  3.6 - 5.5 mmol/L Final    Chloride 03/05/2024 102  96 - 112 mmol/L Final    Co2 03/05/2024 22  20 - 33 mmol/L Final    Anion Gap 03/05/2024 15.0  7.0 - 16.0 Final    Glucose 03/05/2024 155 (H)  65 - 99 mg/dL Final    Bun 03/05/2024 14  8 - 22 mg/dL Final    Creatinine 03/05/2024 1.03  0.50 - 1.40 mg/dL Final    Calcium 03/05/2024 9.7  8.5 - 10.5 mg/dL Final    Correct Calcium 03/05/2024 9.5  8.5 - 10.5 mg/dL Final    AST(SGOT) 03/05/2024 53 (H)  12 - 45 U/L Final    ALT(SGPT) 03/05/2024 42  2 - 50 U/L Final    Alkaline Phosphatase 03/05/2024 189 (H)  30 - 99 U/L Final    Total Bilirubin 03/05/2024 1.2  0.1 - 1.5 mg/dL Final     Albumin 03/05/2024 4.3  3.2 - 4.9 g/dL Final    Total Protein 03/05/2024 8.7 (H)  6.0 - 8.2 g/dL Final    Globulin 03/05/2024 4.4 (H)  1.9 - 3.5 g/dL Final    A-G Ratio 03/05/2024 1.0  g/dL Final    WBC 03/05/2024 8.3  4.8 - 10.8 K/uL Final    RBC 03/05/2024 5.20  4.20 - 5.40 M/uL Final    Hemoglobin 03/05/2024 11.4 (L)  12.0 - 16.0 g/dL Final    Hematocrit 03/05/2024 43.4  37.0 - 47.0 % Final    MCV 03/05/2024 83.5  81.4 - 97.8 fL Final    MCH 03/05/2024 21.9 (L)  27.0 - 33.0 pg Final    MCHC 03/05/2024 26.3 (L)  32.2 - 35.5 g/dL Final    Please note new reference range effective 05/22/2023.    Platelet Count 03/05/2024 392  164 - 446 K/uL Final    Comment: Platelet clumping confirmed on smear review.  If a more accurate platelet  count is required, please request recollection.      Neutrophils-Polys 03/05/2024 69.00  44.00 - 72.00 % Final    Lymphocytes 03/05/2024 23.90  22.00 - 41.00 % Final    Monocytes 03/05/2024 7.10  0.00 - 13.40 % Final    Eosinophils 03/05/2024 0.00  0.00 - 6.90 % Final    Basophils 03/05/2024 0.00  0.00 - 1.80 % Final    Nucleated RBC 03/05/2024 0.00  0.00 - 0.20 /100 WBC Final    Please note new reference range effective 05/22/2023.    Neutrophils (Absolute) 03/05/2024 5.73  1.82 - 7.42 K/uL Final    Comment: Includes immature neutrophils, if present.  Please note new reference range effective 05/22/2023.      Lymphs (Absolute) 03/05/2024 1.98  1.00 - 4.80 K/uL Final    Monos (Absolute) 03/05/2024 0.59  0.00 - 0.85 K/uL Final    Eos (Absolute) 03/05/2024 0.00  0.00 - 0.51 K/uL Final    Baso (Absolute) 03/05/2024 0.00  0.00 - 0.12 K/uL Final    NRBC (Absolute) 03/05/2024 0.00  K/uL Final    Anisocytosis 03/05/2024 1+   Final    Macrocytosis 03/05/2024 1+   Final    Microcytosis 03/05/2024 2+ (A)   Final    TSH 03/05/2024 1.560  0.380 - 5.330 uIU/mL Final    Comment: The 2011 American Thyroid Association (JAZZMINE) guidelines  recommended that the interpretation of thyroid function in  pregnancy  be based on trimester specific reference ranges.    1st Trimester  0.100-2.500 mIU/L  2nd Trimester  0.200-3.000 mIU/L  3rd Trimester  0.300-3.500 mIU/L    These established reference ranges have not been validated  at [facility].      Cholesterol,Tot 03/05/2024 176  100 - 199 mg/dL Final    Triglycerides 03/05/2024 143  0 - 149 mg/dL Final    HDL 03/05/2024 30 (A)  >=40 mg/dL Final    LDL 03/05/2024 117 (H)  <100 mg/dL Final    Fasting Status 03/05/2024 Fasting   Final    GFR (CKD-EPI) 03/05/2024 56 (A)  >60 mL/min/1.73 m 2 Final    Comment: Estimated Glomerular Filtration Rate is calculated using  race neutral CKD-EPI 2021 equation per NKF-ASN recommendations.      Manual Diff Status 03/05/2024 PERFORMED   Final    Peripheral Smear Review 03/05/2024 see below   Final    Comment: Due to instrument suspect flags, further review of peripheral smear is  indicated on this patient sample. This review may or may not result in  abnormal findings.      Plt Estimation 03/05/2024 Normal   Final    RBC Morphology 03/05/2024 Present   Final    Poikilocytosis 03/05/2024 1+   Final    Schistocytes 03/05/2024 1+   Final   Admission on 02/16/2024, Discharged on 02/18/2024   Component Date Value Ref Range Status    Sodium 02/16/2024 135  135 - 145 mmol/L Final    Potassium 02/16/2024 3.4 (L)  3.6 - 5.5 mmol/L Final    Chloride 02/16/2024 100  96 - 112 mmol/L Final    Co2 02/16/2024 19 (L)  20 - 33 mmol/L Final    Anion Gap 02/16/2024 16.0  7.0 - 16.0 Final    Glucose 02/16/2024 171 (H)  65 - 99 mg/dL Final    Bun 02/16/2024 12  8 - 22 mg/dL Final    Creatinine 02/16/2024 0.84  0.50 - 1.40 mg/dL Final    Calcium 02/16/2024 9.0  8.5 - 10.5 mg/dL Final    Correct Calcium 02/16/2024 8.8  8.5 - 10.5 mg/dL Final    AST(SGOT) 02/16/2024 24  12 - 45 U/L Final    ALT(SGPT) 02/16/2024 14  2 - 50 U/L Final    Alkaline Phosphatase 02/16/2024 122 (H)  30 - 99 U/L Final    Total Bilirubin 02/16/2024 0.8  0.1 - 1.5 mg/dL Final     Albumin 2024 4.3  3.2 - 4.9 g/dL Final    Total Protein 2024 7.7  6.0 - 8.2 g/dL Final    Globulin 2024 3.4  1.9 - 3.5 g/dL Final    A-G Ratio 2024 1.3  g/dL Final    Report 2024    Preliminary                    Value:[facility]    Test Date:  2024  Pt Name:    TENA MORALES              Department: ER  MRN:        [id number]                      Room:  Gender:     Female                       Technician: [id number]  :        [id number]                   Requested By:ER TRIAGE PROTOCOL  Order #:    [id number]                    Reading MD:    Measurements  Intervals                                Axis  Rate:       86                           P:          27  PA:         172                          QRS:        8  QRSD:       88                           T:          31  QT:         388  QTc:        464    Interpretive Statements  Sinus rhythm  Low voltage, precordial leads  Compared to ECG 2024 10:18:51  Low QRS voltage now present      ABO Grouping Only 2024 A   Final    Rh Grouping Only 2024 POS   Final    Antibody Screen-Cod 2024 NEG   Final    Component R 2024    Final                    Value:R99                 Red Cells, LR       [id number]   transfused   24   20:48      Product Type 2024 R99   Final    Dispense Status 2024 transfused   Final    Unit Number (Barcoded) 2024 [id number]   Final    Product Code (Barcoded) 2024 Y7819C34   Final    Blood Type (Barcoded) 2024 6200   Final    Component R 2024    Final                    Value:R99                 Red Cells, LR       [id number]   transfused   24   23:01      Product Type 2024 R99   Final    Dispense Status 2024 transfused   Final    Unit Number (Barcoded) 2024 [id number]   Final    Product Code (Barcoded) 2024 P0160F58   Final    Blood Type (Barcoded) 2024 6200    Final    Component R 02/16/2024    Final                    Value:R99                 Red Cells, LR       [id number]   transfused   02/17/24   01:02      Product Type 02/16/2024 R99   Final    Dispense Status 02/16/2024 transfused   Final    Unit Number (Barcoded) 02/16/2024 [id number]   Final    Product Code (Barcoded) 02/16/2024 [id number]   Final    Blood Type (Barcoded) 02/16/2024 6200   Final    Component R 02/16/2024    Final                    Value:R99                 Red Cells, LR       [id number]   transfused   02/17/24   03:16      Product Type 02/16/2024 R99   Final    Dispense Status 02/16/2024 transfused   Final    Unit Number (Barcoded) 02/16/2024 [id number]   Final    Product Code (Barcoded) 02/16/2024 [id number]   Final    Blood Type (Barcoded) 02/16/2024 6200   Final    GFR (CKD-EPI) 02/16/2024 72  >60 mL/min/1.73 m 2 Final    Comment: Estimated Glomerular Filtration Rate is calculated using  race neutral CKD-EPI 2021 equation per NKF-ASN recommendations.      WBC 02/16/2024 3.8 (L)  4.8 - 10.8 K/uL Final    RBC 02/16/2024 3.50 (L)  4.20 - 5.40 M/uL Final    Hemoglobin 02/16/2024 4.8 (LL)  12.0 - 16.0 g/dL Final    Comment: This result has [place] RN [id number] by [id number] on 02/16/2024 20:30:51,  and has been read back.      Hematocrit 02/16/2024 19.8 (L)  37.0 - 47.0 % Final    MCV 02/16/2024 56.6 (L)  81.4 - 97.8 fL Final    MCH 02/16/2024 13.7 (L)  27.0 - 33.0 pg Final    MCHC 02/16/2024 24.2 (L)  32.2 - 35.5 g/dL Final    Please note new reference range effective 05/22/2023.    RDW 02/16/2024 46.5  35.9 - 50.0 fL Final    Platelet Count 02/16/2024 286  164 - 446 K/uL Final    Neutrophils-Polys 02/16/2024 57.30  44.00 - 72.00 % Final    Lymphocytes 02/16/2024 26.80  22.00 - 41.00 % Final    Monocytes 02/16/2024 14.20 (H)  0.00 - 13.40 % Final    Eosinophils 02/16/2024 0.30  0.00 - 6.90 % Final    Basophils 02/16/2024 1.10  0.00 - 1.80 % Final    Immature Granulocytes 02/16/2024 0.30   0.00 - 0.90 % Final    Nucleated RBC 02/16/2024 0.50 (H)  0.00 - 0.20 /100 WBC Final    Please note new reference range effective 05/22/2023.    Neutrophils (Absolute) 02/16/2024 2.18  1.82 - 7.42 K/uL Final    Comment: Includes immature neutrophils, if present.  Please note new reference range effective 05/22/2023.      Lymphs (Absolute) 02/16/2024 1.02  1.00 - 4.80 K/uL Final    Monos (Absolute) 02/16/2024 0.54  0.00 - 0.85 K/uL Final    Eos (Absolute) 02/16/2024 0.01  0.00 - 0.51 K/uL Final    Baso (Absolute) 02/16/2024 0.04  0.00 - 0.12 K/uL Final    Immature Granulocytes (abs) 02/16/2024 0.01  0.00 - 0.11 K/uL Final    NRBC (Absolute) 02/16/2024 0.02  K/uL Final    Hypochromia 02/16/2024 2+ (A)   Final    Anisocytosis 02/16/2024 1+   Final    Microcytosis 02/16/2024 2+ (A)   Final    Plt Estimation 02/16/2024 Normal   Final    RBC Morphology 02/16/2024 Present   Final    Polychromia 02/16/2024 1+   Final    Poikilocytosis 02/16/2024 1+   Final    Peripheral Smear Review 02/16/2024 see below   Final    Comment: Due to instrument suspect flags, further review of peripheral smear is  indicated on this patient sample. This review may or may not result in  abnormal findings.      Comments-Diff 02/16/2024 see below   Final    Results have been verified by peripheral blood smear review.    Eject.Frac. MOD BP 02/17/2024 66.33   Final    Eject.Frac. MOD 4C 02/17/2024 63.68   Final    Eject.Frac. MOD 2C 02/17/2024 68.8   Final    NT-proBNP 02/16/2024 346 (H)  0 - 125 pg/mL Final    Sodium 02/17/2024 137  135 - 145 mmol/L Final    Potassium 02/17/2024 4.0  3.6 - 5.5 mmol/L Final    Chloride 02/17/2024 107  96 - 112 mmol/L Final    Co2 02/17/2024 19 (L)  20 - 33 mmol/L Final    Glucose 02/17/2024 105 (H)  65 - 99 mg/dL Final    Bun 02/17/2024 9  8 - 22 mg/dL Final    Creatinine 02/17/2024 0.63  0.50 - 1.40 mg/dL Final    Calcium 02/17/2024 8.3 (L)  8.5 - 10.5 mg/dL Final    Anion Gap 02/17/2024 11.0  7.0 - 16.0 Final     Hemoglobin 02/17/2024 9.3 (L)  12.0 - 16.0 g/dL Final    Hematocrit 02/17/2024 30.9 (L)  37.0 - 47.0 % Final    PT 02/17/2024 22.6 (H)  12.0 - 14.6 sec Final    INR 02/17/2024 1.96 (H)  0.87 - 1.13 Final    Comment: INR - Non-therapeutic Reference Range: 0.87-1.13  INR - Therapeutic Reference Range: 2.0-4.0      Hemoglobin 02/17/2024 9.4 (L)  12.0 - 16.0 g/dL Final    Hematocrit 02/17/2024 31.7 (L)  37.0 - 47.0 % Final    APTT 02/17/2024 32.4  24.7 - 36.0 sec Final    GFR (CKD-EPI) 02/17/2024 92  >60 mL/min/1.73 m 2 Final    Comment: Estimated Glomerular Filtration Rate is calculated using  race neutral CKD-EPI 2021 equation per NKF-ASN recommendations.      Hemoglobin 02/17/2024 10.0 (L)  12.0 - 16.0 g/dL Final    Hematocrit 02/17/2024 32.8 (L)  37.0 - 47.0 % Final    Hemoglobin 02/17/2024 9.4 (L)  12.0 - 16.0 g/dL Final    Hematocrit 02/17/2024 32.1 (L)  37.0 - 47.0 % Final    Sodium 02/18/2024 137  135 - 145 mmol/L Final    Potassium 02/18/2024 3.7  3.6 - 5.5 mmol/L Final    Chloride 02/18/2024 108  96 - 112 mmol/L Final    Co2 02/18/2024 17 (L)  20 - 33 mmol/L Final    Anion Gap 02/18/2024 12.0  7.0 - 16.0 Final    Glucose 02/18/2024 97  65 - 99 mg/dL Final    Bun 02/18/2024 5 (L)  8 - 22 mg/dL Final    Creatinine 02/18/2024 0.69  0.50 - 1.40 mg/dL Final    Calcium 02/18/2024 8.6  8.5 - 10.5 mg/dL Final    Correct Calcium 02/18/2024 9.2  8.5 - 10.5 mg/dL Final    AST(SGOT) 02/18/2024 25  12 - 45 U/L Final    ALT(SGPT) 02/18/2024 12  2 - 50 U/L Final    Alkaline Phosphatase 02/18/2024 118 (H)  30 - 99 U/L Final    Total Bilirubin 02/18/2024 1.1  0.1 - 1.5 mg/dL Final    Albumin 02/18/2024 3.3  3.2 - 4.9 g/dL Final    Total Protein 02/18/2024 7.2  6.0 - 8.2 g/dL Final    Globulin 02/18/2024 3.9 (H)  1.9 - 3.5 g/dL Final    A-G Ratio 02/18/2024 0.8  g/dL Final    Magnesium 02/18/2024 2.2  1.5 - 2.5 mg/dL Final    GFR (CKD-EPI) 02/18/2024 90  >60 mL/min/1.73 m 2 Final    Comment: Estimated Glomerular Filtration Rate  is calculated using  race neutral CKD-EPI 2021 equation per NKF-ASN recommendations.      WBC 02/18/2024 5.0  4.8 - 10.8 K/uL Final    RBC 02/18/2024 4.50  4.20 - 5.40 M/uL Final    Hemoglobin 02/18/2024 9.1 (L)  12.0 - 16.0 g/dL Final    Hematocrit 02/18/2024 30.9 (L)  37.0 - 47.0 % Final    MCV 02/18/2024 68.7 (L)  81.4 - 97.8 fL Final    MCH 02/18/2024 20.2 (L)  27.0 - 33.0 pg Final    MCHC 02/18/2024 29.4 (L)  32.2 - 35.5 g/dL Final    Please note new reference range effective 05/22/2023.    RDW 02/18/2024 75.9 (H)  35.9 - 50.0 fL Final    Platelet Count 02/18/2024 215  164 - 446 K/uL Final    Reviewed by Clinical .   Hospital Outpatient Visit on 02/16/2024   Component Date Value Ref Range Status    Sodium 02/16/2024 135  135 - 145 mmol/L Final    Potassium 02/16/2024 3.7  3.6 - 5.5 mmol/L Final    Chloride 02/16/2024 101  96 - 112 mmol/L Final    Co2 02/16/2024 20  20 - 33 mmol/L Final    Glucose 02/16/2024 139 (H)  65 - 99 mg/dL Final    Bun 02/16/2024 12  8 - 22 mg/dL Final    Creatinine 02/16/2024 0.77  0.50 - 1.40 mg/dL Final    Calcium 02/16/2024 8.8  8.5 - 10.5 mg/dL Final    Anion Gap 02/16/2024 14.0  7.0 - 16.0 Final    WBC 02/16/2024 3.6 (L)  4.8 - 10.8 K/uL Final    RBC 02/16/2024 3.93 (L)  4.20 - 5.40 M/uL Final    Hemoglobin 02/16/2024 5.2 (LL)  12.0 - 16.0 g/dL Final    Hematocrit 02/16/2024 22.5 (L)  37.0 - 47.0 % Final    MCV 02/16/2024 57.3 (L)  81.4 - 97.8 fL Final    MCH 02/16/2024 13.2 (L)  27.0 - 33.0 pg Final    MCHC 02/16/2024 23.1 (L)  32.2 - 35.5 g/dL Final    Please note new reference range effective 05/22/2023.    RDW 02/16/2024 47.3  35.9 - 50.0 fL Final    Platelet Count 02/16/2024 336  164 - 446 K/uL Final    GFR (CKD-EPI) 02/16/2024 80  >60 mL/min/1.73 m 2 Final    Comment: Estimated Glomerular Filtration Rate is calculated using  race neutral CKD-EPI 2021 equation per NKF-ASN recommendations.     Office Visit on 02/16/2024   Component Date Value Ref Range  Status    Report 2024    Final                    Value:[facility]    Test Date:  2024  Pt Name:    TENA MORALES              Department: [facility]  MRN:        [id number]                      Room:  Gender:     Female                       Technician: CA  :        1950                   Requested By:JOSE PHAM  Order #:    [id number]                    Reading MD: Jose Pham MD    Measurements  Intervals                                Axis  Rate:       93                           P:          54  AK:         163                          QRS:        50  QRSD:       82                           T:          31  QT:         355  QTc:        442    Interpretive Statements  Sinus rhythm  Borderline repolarization abnormality  Compared to ECG 2014 11:47:20  No significant changes  Electronically Signed On 2024 17:29:27 PST by Jose Pham MD     Anticoagulation Visit on 2024   Component Date Value Ref Range Status    INR 2024 1.80 (A)  2 - 3.5 Final   Anticoagulation Visit on 2024   Component Date Value Ref Range Status    INR 2024 3.90 (A)  2 - 3.5 Final       Imaging:   All listed images below have been independently reviewed by me. I agree with the findings as summarized below:    EC-ECHOCARDIOGRAM COMPLETE W/O CONT    Result Date: 2024  Transthoracic Echo Report Echocardiography Laboratory CONCLUSIONS Normal left ventricular systolic function. The left ventricular ejection fraction is visually estimated to be 65%. The right ventricle is normal in size and systolic function. Mild tricuspid regurgitation. Estimated right ventricular systolic pressure is 38 mmHg (normal). No prior study is available for comparison. TENA MORALES Exam Date:         2024                    18:37 Exam Location:     Inpatient Priority:          Routine Ordering Physician:        HERIBERTO LOPEZ Referring Physician:       [id number] Maria  Sonographer:               Shahla Adam [facility] Age:    73     Gender:    F MRN:    [id number] :    1950 BSA:    1.95   Ht (in):    64     Wt (lb):    198 Exam Type:     Complete Indications:     Acute MI ICD Codes:       410.9 CPT Codes:       57623 BP:   149    /   68     HR:   70 Technical Quality:       Fair MEASUREMENTS  (Male / Female) Normal Values 2D ECHO LV Diastolic Diameter PLAX        4.6 cm                4.2 - 5.9 / 3.9 - 5.3 cm LV Systolic Diameter PLAX         2.8 cm                2.1 - 4.0 cm IVS Diastolic Thickness           1 cm                  LVPW Diastolic Thickness          0.92 cm               LVOT Diameter                     2 cm                  LV Ejection Fraction MOD BP       66.3 %                >= 55  % LV Ejection Fraction MOD 4C       63.7 %                LV Ejection Fraction MOD 2C       68.8 %                IVC Diameter                      2 cm                  DOPPLER AV Peak Velocity                  1.6 m/s               AV Peak Gradient                  10.5 mmHg             AV Mean Gradient                  5.8 mmHg              LVOT Peak Velocity                1.3 m/s               AV Area Cont Eq vti               2.3 cm2               Mitral E Point Velocity           1.2 m/s               Mitral E to A Ratio               1.1                   MV Pressure Half Time             57 ms                 MV Area PHT                       3.9 cm2               MV Deceleration Time              196 ms                TV Peak E Velocity                0.32 m/s              TR Peak Velocity                  275 cm/s              MV E' Velocity                    8.5 cm/s              * Indicates values subject to auto-interpretation LV EF:        % FINDINGS Left Ventricle Normal left ventricular chamber size. Normal left ventricular wall thickness. Normal left ventricular systolic function. The left ventricular ejection fraction is visually estimated to be 65%. Normal  regional wall motion. Indeterminate diastolic function. Right Ventricle The right ventricle is normal in size and systolic function. Right Atrium The right atrium is normal in size. Normal inferior vena cava size without inspiratory collapse. Left Atrium The left atrium is normal in size. Left atrial volume index is 24 mL/sq m. Mitral Valve Structurally normal mitral valve without significant stenosis or regurgitation. Aortic Valve Structurally normal aortic valve without significant stenosis or regurgitation. Tricuspid Valve No tricuspid stenosis. Mild tricuspid regurgitation. Estimated right ventricular systolic pressure is 38 mmHg. Right atrial pressure is estimated to be 8 mmHg. Pulmonic Valve Structurally normal pulmonic valve without significant stenosis or regurgitation. Pericardium No pericardial effusion. Aorta Normal aortic root for body surface area. The ascending aorta diameter is 3.0 cm. Roland Nuno MD (Electronically Signed) Final Date:     18 February 2024                 16:41    DX-CHEST-PORTABLE (1 VIEW)    Result Date: 2/18/2024 2/18/2024 3:58 PM HISTORY/REASON FOR EXAM:  Cough. TECHNIQUE/EXAM DESCRIPTION AND NUMBER OF VIEWS: Single portable view of the chest. COMPARISON: 2/16/2024 FINDINGS: Cardiomediastinal silhouette is stable. Interstitial and hazy opacity in the left lung base, likely left lower lobe, asymmetric edema versus pneumonitis. Right lung is clear. No pleural effusion or pneumothorax. No acute osseous abnormality.     Persistent mild hazy left lower lobe opacity could be mild pneumonitis.    DX-CHEST-PORTABLE (1 VIEW)    Result Date: 2/16/2024 2/16/2024 6:21 PM HISTORY/REASON FOR EXAM:  Shortness of Breath TECHNIQUE/EXAM DESCRIPTION AND NUMBER OF VIEWS: Single portable view of the chest. COMPARISON: 7/14/2014 FINDINGS: The mediastinal and cardiac silhouette is unremarkable. The pulmonary vascularity is within normal limits. There is ill-defined linear hazy opacity in the left  lower lobe. There is no significant pleural effusion. There is no visible pneumothorax. There are no acute bony abnormalities.     1.  Ill-defined linear left lobe opacity could be atelectasis or pneumonitis.    [id number] BASE TO MID-THIGH    Result Date: 2/9/2024 2/9/2024 12:22 PM HISTORY/REASON FOR EXAM:  cT3 cN1 upper outer left breast cancer TECHNIQUE/EXAM DESCRIPTION AND NUMBER OF VIEWS: PET body imaging. Initially, 10.1 mCi F-18 FDG was administered intravenously under standardized conditions. Approximately 45 minutes after FDG administration, the patient was placed in the supine position on the PET CT table. Blood glucose level was 138 mg/dL. Low dose spiral CT imaging was performed from the skull base to the mid thighs. PET imaging was then performed from the skull base to the mid thighs. CT images, PET images, and PET/CT fused images were reviewed on a PACS 3D workstation. The limited non-contrast CT data are used primarily for attenuation correction and anatomic correlation.  Evaluation of solid organs and bowel are especially limited utilizing this technique. A low dose CT was obtained of the same area without IV contrast for attenuation correction and coregistration, not for a diagnostic scan. COMPARISON: None. FINDINGS: VISUALIZED BRAIN: Normal metabolic activity. HEAD AND NECK: Normal physiologic uptake. CHEST: Background blood pool activity shows average SUV of 2.1. Hypermetabolic mass in the left breast (SUV max 13.8). Lungs show no hypermetabolic pulmonary nodules. Hypermetabolic left axillary lymph nodes (SUV max 15.7) Moderate hiatal hernia. ABDOMEN AND PELVIS: Background liver activity shows average SUV of 3.3. There is normal, uniform metabolic activity in the liver, spleen, adrenal glands, kidneys. Porcelain gallbladder. Large fat-containing periumbilical hernia There is no hypermetabolic mesenteric, retroperitoneal, iliac, or inguinal lymphadenopathy. Diffuse uptake throughout the colon.  VISUALIZED MUSCULOSKELETAL SYSTEM: No hypermetabolic activity to suggest osteolytic metastases or sclerosis to suggest osteoblastic metastases.     1. Hypermetabolic mass in the left breast (SUV max 13.8), in keeping with malignancy. 2. Hypermetabolic left axillary lymph nodes (SUV max 15.7), consistent with metastasis 3. Diffuse uptake throughout the colon associated with metformin use. Correlate clinically. 4. Moderate hiatal hernia. Large fat-containing periumbilical hernia. Porcelain gallbladder.       Pathology:      Assessment & Plan:  1.  Invasive ductal carcinoma of the left breast, grade 1, clinically stage IIb (cT2, CN I) ER positive greater than 90%, MS negative, HER2 2+ IHC FISH negative Ki-67 15%.  Workup was negative for metastatic disease.  Her tumor is close to T3 and could benefit from cytoreduction with endocrine therapy to hopefully convert her to a partial mastectomy.  2.  Severe iron deficiency anemia likely secondary to long-term Coumadin use without monitoring INR dietary lack of iron and poor absorption, now almost completely resolved.    Plan: Start anastrozole.  I will see her back in 6 weeks to assess her tolerance of therapy.      Any questions and concerns raised by the patient were answered to the best of my ability. Thank you for allowing me to participate in the care for this patient. Please feel free to contact me for any questions or concerns.     Reza Lyles M.D.

## 2024-03-08 ENCOUNTER — PHARMACY VISIT (OUTPATIENT)
Dept: PHARMACY | Facility: MEDICAL CENTER | Age: 74
End: 2024-03-08
Payer: COMMERCIAL

## 2024-03-08 ENCOUNTER — APPOINTMENT (OUTPATIENT)
Dept: MEDICAL GROUP | Facility: MEDICAL CENTER | Age: 74
End: 2024-03-08
Payer: MEDICARE

## 2024-03-08 PROCEDURE — RXMED WILLOW AMBULATORY MEDICATION CHARGE: Performed by: INTERNAL MEDICINE

## 2024-03-13 NOTE — PROGRESS NOTES
No chief complaint on file.         Subjective:   Lawanda Pagan is a 73 y.o. female who presents today for follow-up.     Patient of  ***.  Current medical problems include ***. Their last clinic visit was *** with ***.      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    Blood clotting disorder (HCC)     lungs bilat     Blood transfusion without reported diagnosis     Breath shortness 2014 denies now    Elevated LFTs 2018    Fall     Hernia, hiatal     Measles          Family History   Problem Relation Age of Onset    Other Mother         parkinsons    Melanoma Mother     Cancer Father         stomach    Cancer Brother     Stroke Brother     Cancer Brother     Cancer Maternal Grandmother         stomach         Social History     Tobacco Use    Smoking status: Former     Current packs/day: 0.00     Average packs/day: 0.2 packs/day for 1 year (0.2 ttl pk-yrs)     Types: Cigarettes     Start date: 1972     Quit date: 1973     Years since quittin.5    Smokeless tobacco: Never   Vaping Use    Vaping Use: Never used   Substance Use Topics    Alcohol use: No     Comment: Stopped about 50 years ago.    Drug use: No         Allergies   Allergen Reactions    Other Environmental Runny Nose     Grass and pollens         Current Outpatient Medications   Medication Sig    rosuvastatin (CRESTOR) 10 MG Tab Take 10 mg by mouth every evening.    anastrozole (ARIMIDEX) 1 MG Tab Take 1 Tablet by mouth every day.    pantoprazole (PROTONIX) 40 MG Tablet Delayed Response Take 1 Tablet by mouth 2 times a day.    ferrous sulfate 325 (65 Fe) MG tablet Take 1 Tablet by mouth every Tue and Thu at 6 PM.    ACETAMINOPHEN PO Take 1 Tablet by mouth 1 time a day as needed for Mild Pain.    vitamin D (CHOLECALCIFEROL) 1000 Unit Tab Take 2,000 Units by mouth every day. 2 tablets=2000 units    Cyanocobalamin (B-12) 1000 MCG Tab Take 1,000 mcg  by mouth every day.         ROS       Objective:   There were no vitals taken for this visit. There is no height or weight on file to calculate BMI.         Physical Exam     Cardiac PET 2/23/24   PET IMAGING INTERPRETATION      No evidence of significant jeopardized viable myocardium or prior myocardial    infarction.    Normal left ventricular size, ejection fraction, and wall motion.      FLOW RESERVE INTERPRETATION      Normal Myocardial Blood Flow Reserve (MBFR).      ECG INTERPRETATION      No ischemic changes with stress compared to baseline ECG.   Assessment:   No diagnosis found.     Medical Decision Making:  Today's Assessment / Plan:       No follow-ups on file.  Sooner if problems.

## 2024-03-19 ENCOUNTER — APPOINTMENT (OUTPATIENT)
Dept: CARDIOLOGY | Facility: MEDICAL CENTER | Age: 74
End: 2024-03-19
Attending: PHYSICIAN ASSISTANT
Payer: MEDICARE

## 2024-04-02 ENCOUNTER — PATIENT OUTREACH (OUTPATIENT)
Dept: ONCOLOGY | Facility: MEDICAL CENTER | Age: 74
End: 2024-04-02

## 2024-04-18 ENCOUNTER — HOSPITAL ENCOUNTER (OUTPATIENT)
Dept: HEMATOLOGY ONCOLOGY | Facility: MEDICAL CENTER | Age: 74
End: 2024-04-18
Attending: INTERNAL MEDICINE
Payer: MEDICARE

## 2024-04-18 VITALS
OXYGEN SATURATION: 98 % | HEART RATE: 83 BPM | RESPIRATION RATE: 16 BRPM | DIASTOLIC BLOOD PRESSURE: 76 MMHG | WEIGHT: 181 LBS | BODY MASS INDEX: 31.07 KG/M2 | TEMPERATURE: 97.1 F | SYSTOLIC BLOOD PRESSURE: 118 MMHG

## 2024-04-18 DIAGNOSIS — D64.9 SYMPTOMATIC ANEMIA: ICD-10-CM

## 2024-04-18 DIAGNOSIS — N63.0 BREAST MASS IN FEMALE: ICD-10-CM

## 2024-04-18 DIAGNOSIS — C50.412 CARCINOMA OF UPPER-OUTER QUADRANT OF FEMALE BREAST, LEFT (HCC): ICD-10-CM

## 2024-04-18 PROCEDURE — 99212 OFFICE O/P EST SF 10 MIN: CPT | Performed by: INTERNAL MEDICINE

## 2024-04-18 PROCEDURE — 99214 OFFICE O/P EST MOD 30 MIN: CPT | Performed by: INTERNAL MEDICINE

## 2024-04-18 ASSESSMENT — ENCOUNTER SYMPTOMS
CARDIOVASCULAR NEGATIVE: 1
NEUROLOGICAL NEGATIVE: 1
PSYCHIATRIC NEGATIVE: 1
RESPIRATORY NEGATIVE: 1
GASTROINTESTINAL NEGATIVE: 1
EYES NEGATIVE: 1
MYALGIAS: 1
CONSTITUTIONAL NEGATIVE: 1

## 2024-04-18 ASSESSMENT — FIBROSIS 4 INDEX: FIB4 SCORE: 1.56

## 2024-04-18 NOTE — PROGRESS NOTES
Consult:  Hematology/Oncology      Referring Physician: Elicia Borden MD  Primary Care:  Segundo Pruett M.D.    Diagnosis:     Chief Complaint:      History of Presenting Illness:  Lawanda Pagan is a 73 y.o. female who self detected a mass in her breast in mid 2023.  She had problems with her insurance and cannot get an to get a mammogram until January 2024.  This revealed a mass in the left upper outer quadrant measuring 3.5 x 3 cm.  There was a suspicious lymph node as well.  She underwent a ultrasound-guided biopsy on 2/13/2024 which showed invasive ductal carcinoma, grade 1, no lymph vascular invasion, ER +90%, RI negative, Ki-67 15%, HER2 2+, HER2 FISH negative.  There was metastatic carcinoma and a left axillary lymph node.  She saw Dr. Borden who noted that the patient was extremely tachycardic and short of breath.  Evaluation was initiated.  PET CT scan was negative for metastatic disease and was hypermetabolic in the breast and lymph node.  On 2/16/2024 she was admitted to the hospital when on initial cardiac evaluation she was found to have a hemoglobin of 4.8, hematocrit of 29.8 MCV of 56.6 with a white count of 3.8 and platelets 286.  Her records show that her hemoglobin was 6.2 in November 2023 and apparently nothing was done about this.  She received 4 units of packed red blood cells and improved symptomatically immediately.  She underwent an EGD and apparently a colonoscopy at the same time while in the hospital although I cannot locate the records right now.  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  It is unclear whether she was ever worked up for hereditary thrombophilia.  Also because of her insurance issues she did not have adequate monitoring of her Coumadin level.  Her INR level was slightly elevated around  the time of her admission.  Coumadin was discontinued.  Echocardiogram showed normal ejection fraction of 65%.  Of note she has an almost lifelong history of anemia and has been on iron tablets in the past but during her pregnancy and afterwards but none for the last several years.  She did not apparently need a transfusion in the past nor iron infusion but she is really been minimally monitored over the last several years.    Since discharge she has felt dramatically better and has no tachycardia, she is ambulating normal distances without difficulty.  She saw her new primary care physician 2 days ago and her hemoglobin was up to 11.4 hematocrit 43.4 MCV up to 83.5 with normals WBC and differential and a normal platelet count.  Her LDL was elevated and she was started on statin.  No family history of breast or ovarian cancer.  Genetics consult has been placed.    Interval history 4/18/2024: She started anastrozole at the beginning of March.  She is tolerating it well with some mild pains in her shoulders and arm muscles.  She does not require any pain medicine for this.  No hot flashes or night sweats.  She feels like her mass has shrunk significantly and is less tender than previously.  She is back to full activity and has no reduced exercise tolerance.      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    Blood clotting disorder (HCC)     lungs bilat     Blood transfusion without reported diagnosis     Breath shortness 2014 2017 denies now    Elevated LFTs 1/26/2018    Fall     Hernia, hiatal     Measles        Past Surgical History:   Procedure Laterality Date    ENDOSCOPY PROCEDURE N/A 2/18/2024    Procedure: ENDOSCOPY UPPER AND LOWER;  Surgeon: Tmo Payne M.D.;  Location: SURGERY [facility];  Service: Gastroenterology    GASTROSCOPY N/A 9/1/2017    Procedure: GASTROSCOPY;  Surgeon: Stephanie Reyes M.D.;  Location: SURGERY [facility];  Service: Gastroenterology    OTHER ABDOMINAL SURGERY  2004    hernia repair    GYN SURGERY  1980    c sec lap tubal    TONSILLECTOMY         Social History     Tobacco Use    Smoking status: Former     Current packs/day: 0.00     Average packs/day: 0.2 packs/day for 1 year (0.2 ttl pk-yrs)     Types: Cigarettes     Start date: 1972     Quit date: 1973     Years since quittin.6    Smokeless tobacco: Never   Vaping Use    Vaping Use: Never used   Substance Use Topics    Alcohol use: No     Comment: Stopped about 50 years ago.    Drug use: No        Family History   Problem Relation Age of Onset    Other Mother         parkinsons    Melanoma Mother     Cancer Father         stomach    Cancer Brother     Stroke Brother     Cancer Brother     Cancer Maternal Grandmother         stomach       Allergies as of 2024 - Reviewed 2024   Allergen Reaction Noted    Other environmental Runny Nose 2017         Current Outpatient Medications:     rosuvastatin (CRESTOR) 10 MG Tab, Take 10 mg by mouth every evening., Disp: , Rfl:     anastrozole (ARIMIDEX) 1 MG Tab, Take 1 Tablet by mouth every day., Disp: 90 Tablet, Rfl: 1    pantoprazole (PROTONIX) 40 MG Tablet Delayed Response, Take 1 Tablet by mouth 2 times a day., Disp: 60 Tablet, Rfl: 3    ferrous sulfate 325 (65 Fe) MG tablet, Take 1 Tablet by mouth every Tue and Thu at 6 PM., Disp: 30 Tablet, Rfl: 3    ACETAMINOPHEN PO, Take 1 Tablet by mouth 1 time a day as needed for Mild Pain., Disp: , Rfl:     vitamin D (CHOLECALCIFEROL) 1000 Unit Tab, Take 2,000 Units by mouth every day. 2 tablets=2000 units, Disp: , Rfl:     Cyanocobalamin (B-12) 1000 MCG Tab, Take 1,000 mcg by mouth every day., Disp: , Rfl:     Review of Systems:  Review of Systems   Constitutional: Negative.    HENT: Negative.     Eyes: Negative.    Respiratory: Negative.     Cardiovascular: Negative.    Gastrointestinal: Negative.    Genitourinary: Negative.     Musculoskeletal:  Positive for joint pain and myalgias.   Skin: Negative.    Neurological: Negative.    Endo/Heme/Allergies: Negative.    Psychiatric/Behavioral: Negative.            Physical Exam:  Vitals:    04/18/24 1026   BP: 118/76   BP Location: Right arm   Patient Position: Sitting   Pulse: 83   Resp: 16   Temp: 36.2 °C (97.1 °F)   TempSrc: Temporal   SpO2: 98%   Weight: 82.1 kg (181 lb)       DESC; KARNOFSKY SCALE WITH ECOG EQUIVALENT: 90, Able to carry on normal activity; minor signs or symptoms of disease (ECOG equivalent 0)    DISTRESS LEVEL: mild distress    Physical Exam  Constitutional:       Appearance: Normal appearance.   HENT:      Head: Normocephalic.      Mouth/Throat:      Mouth: Mucous membranes are moist.      Pharynx: Oropharynx is clear.   Eyes:      Extraocular Movements: Extraocular movements intact.      Conjunctiva/sclera: Conjunctivae normal.      Pupils: Pupils are equal, round, and reactive to light.   Cardiovascular:      Rate and Rhythm: Normal rate and regular rhythm.      Pulses: Normal pulses.   Pulmonary:      Effort: Pulmonary effort is normal.      Breath sounds: Normal breath sounds.   Chest:      Comments: 4/18/2024: 1230 o'clock mass now measures 4 cm wide by 3.5 cm tall and is softer and flatter.  No axillary adenopathy is felt.  3/7/2024: At 1230 o'clock 5 cm from the nipple is a 4.5 cm wide by 4 cm tall mass.  There is a 1 x 1 cm left axillary lymph node palpable and freely movable.  No left supraclavicular adenopathy is noted.  The right breast is without masses nipple discharge or tenderness.  Abdominal:      General: Abdomen is flat. Bowel sounds are normal.      Palpations: Abdomen is soft. There is hepatomegaly and splenomegaly. There is no mass.      Comments: Large periumbilical hernia   Musculoskeletal:         General: Normal range of motion.   Skin:     General: Skin is warm.   Neurological:      General: No focal deficit present.      Mental Status: She is  alert and oriented to person, place, and time.   Psychiatric:         Mood and Affect: Mood normal.         Behavior: Behavior normal.            Labs:  Hospital Outpatient Visit on 03/05/2024   Component Date Value Ref Range Status    Sodium 03/05/2024 139  135 - 145 mmol/L Final    Potassium 03/05/2024 4.0  3.6 - 5.5 mmol/L Final    Chloride 03/05/2024 102  96 - 112 mmol/L Final    Co2 03/05/2024 22  20 - 33 mmol/L Final    Anion Gap 03/05/2024 15.0  7.0 - 16.0 Final    Glucose 03/05/2024 155 (H)  65 - 99 mg/dL Final    Bun 03/05/2024 14  8 - 22 mg/dL Final    Creatinine 03/05/2024 1.03  0.50 - 1.40 mg/dL Final    Calcium 03/05/2024 9.7  8.5 - 10.5 mg/dL Final    Correct Calcium 03/05/2024 9.5  8.5 - 10.5 mg/dL Final    AST(SGOT) 03/05/2024 53 (H)  12 - 45 U/L Final    ALT(SGPT) 03/05/2024 42  2 - 50 U/L Final    Alkaline Phosphatase 03/05/2024 189 (H)  30 - 99 U/L Final    Total Bilirubin 03/05/2024 1.2  0.1 - 1.5 mg/dL Final    Albumin 03/05/2024 4.3  3.2 - 4.9 g/dL Final    Total Protein 03/05/2024 8.7 (H)  6.0 - 8.2 g/dL Final    Globulin 03/05/2024 4.4 (H)  1.9 - 3.5 g/dL Final    A-G Ratio 03/05/2024 1.0  g/dL Final    WBC 03/05/2024 8.3  4.8 - 10.8 K/uL Final    RBC 03/05/2024 5.20  4.20 - 5.40 M/uL Final    Hemoglobin 03/05/2024 11.4 (L)  12.0 - 16.0 g/dL Final    Hematocrit 03/05/2024 43.4  37.0 - 47.0 % Final    MCV 03/05/2024 83.5  81.4 - 97.8 fL Final    MCH 03/05/2024 21.9 (L)  27.0 - 33.0 pg Final    MCHC 03/05/2024 26.3 (L)  32.2 - 35.5 g/dL Final    Please note new reference range effective 05/22/2023.    Platelet Count 03/05/2024 392  164 - 446 K/uL Final    Comment: Platelet clumping confirmed on smear review.  If a more accurate platelet  count is required, please request recollection.      Neutrophils-Polys 03/05/2024 69.00  44.00 - 72.00 % Final    Lymphocytes 03/05/2024 23.90  22.00 - 41.00 % Final    Monocytes 03/05/2024 7.10  0.00 - 13.40 % Final    Eosinophils 03/05/2024 0.00  0.00 - 6.90  % Final    Basophils 03/05/2024 0.00  0.00 - 1.80 % Final    Nucleated RBC 03/05/2024 0.00  0.00 - 0.20 /100 WBC Final    Please note new reference range effective 05/22/2023.    Neutrophils (Absolute) 03/05/2024 5.73  1.82 - 7.42 K/uL Final    Comment: Includes immature neutrophils, if present.  Please note new reference range effective 05/22/2023.      Lymphs (Absolute) 03/05/2024 1.98  1.00 - 4.80 K/uL Final    Monos (Absolute) 03/05/2024 0.59  0.00 - 0.85 K/uL Final    Eos (Absolute) 03/05/2024 0.00  0.00 - 0.51 K/uL Final    Baso (Absolute) 03/05/2024 0.00  0.00 - 0.12 K/uL Final    NRBC (Absolute) 03/05/2024 0.00  K/uL Final    Anisocytosis 03/05/2024 1+   Final    Macrocytosis 03/05/2024 1+   Final    Microcytosis 03/05/2024 2+ (A)   Final    TSH 03/05/2024 1.560  0.380 - 5.330 uIU/mL Final    Comment: The 2011 American Thyroid Association (JAZZMINE) guidelines  recommended that the interpretation of thyroid function in  pregnancy be based on trimester specific reference ranges.    1st Trimester  0.100-2.500 mIU/L  2nd Trimester  0.200-3.000 mIU/L  3rd Trimester  0.300-3.500 mIU/L    These established reference ranges have not been validated  at [facility].      Cholesterol,Tot 03/05/2024 176  100 - 199 mg/dL Final    Triglycerides 03/05/2024 143  0 - 149 mg/dL Final    HDL 03/05/2024 30 (A)  >=40 mg/dL Final    LDL 03/05/2024 117 (H)  <100 mg/dL Final    Fasting Status 03/05/2024 Fasting   Final    GFR (CKD-EPI) 03/05/2024 56 (A)  >60 mL/min/1.73 m 2 Final    Comment: Estimated Glomerular Filtration Rate is calculated using  race neutral CKD-EPI 2021 equation per NKF-ASN recommendations.      Manual Diff Status 03/05/2024 PERFORMED   Final    Peripheral Smear Review 03/05/2024 see below   Final    Comment: Due to instrument suspect flags, further review of peripheral smear is  indicated on this patient sample. This review may or may not result in  abnormal findings.      Plt Estimation 03/05/2024  Normal   Final    RBC Morphology 2024 Present   Final    Poikilocytosis 2024 1+   Final    Schistocytes 2024 1+   Final   Admission on 2024, Discharged on 2024   Component Date Value Ref Range Status    Sodium 2024 135  135 - 145 mmol/L Final    Potassium 2024 3.4 (L)  3.6 - 5.5 mmol/L Final    Chloride 2024 100  96 - 112 mmol/L Final    Co2 2024 19 (L)  20 - 33 mmol/L Final    Anion Gap 2024 16.0  7.0 - 16.0 Final    Glucose 2024 171 (H)  65 - 99 mg/dL Final    Bun 2024 12  8 - 22 mg/dL Final    Creatinine 2024 0.84  0.50 - 1.40 mg/dL Final    Calcium 2024 9.0  8.5 - 10.5 mg/dL Final    Correct Calcium 2024 8.8  8.5 - 10.5 mg/dL Final    AST(SGOT) 2024 24  12 - 45 U/L Final    ALT(SGPT) 2024 14  2 - 50 U/L Final    Alkaline Phosphatase 2024 122 (H)  30 - 99 U/L Final    Total Bilirubin 2024 0.8  0.1 - 1.5 mg/dL Final    Albumin 2024 4.3  3.2 - 4.9 g/dL Final    Total Protein 2024 7.7  6.0 - 8.2 g/dL Final    Globulin 2024 3.4  1.9 - 3.5 g/dL Final    A-G Ratio 2024 1.3  g/dL Final    Report 2024    Preliminary                    Value:[facility]    Test Date:  2024  Pt Name:    TENA MORALES              Department: ER  MRN:        [id number]                      Room:  Gender:     Female                       Technician: [id number]  :        [id number]                   Requested By:ER TRIAGE PROTOCOL  Order #:    [id number]                    Reading MD:    Measurements  Intervals                                Axis  Rate:       86                           P:          27  AZ:         172                          QRS:        8  QRSD:       88                           T:          31  QT:         388  QTc:        464    Interpretive Statements  Sinus rhythm  Low voltage, precordial leads  Compared to ECG 2024 10:18:51  Low QRS  voltage now present      ABO Grouping Only 02/16/2024 A   Final    Rh Grouping Only 02/16/2024 POS   Final    Antibody Screen-Cod 02/16/2024 NEG   Final    Component R 02/16/2024    Final                    Value:R99                 Red Cells, LR       [id number]   transfused   02/16/24   20:48      Product Type 02/16/2024 R99   Final    Dispense Status 02/16/2024 transfused   Final    Unit Number (Barcoded) 02/16/2024 [id number]   Final    Product Code (Barcoded) 02/16/2024 [id number]   Final    Blood Type (Barcoded) 02/16/2024 6200   Final    Component R 02/16/2024    Final                    Value:R99                 Red Cells, LR       [id number]   transfused   02/16/24   23:01      Product Type 02/16/2024 R99   Final    Dispense Status 02/16/2024 transfused   Final    Unit Number (Barcoded) 02/16/2024 [id number]   Final    Product Code (Barcoded) 02/16/2024 [id number]   Final    Blood Type (Barcoded) 02/16/2024 6200   Final    Component R 02/16/2024    Final                    Value:R99                 Red Cells, LR       [id number]   transfused   02/17/24   01:02      Product Type 02/16/2024 R99   Final    Dispense Status 02/16/2024 transfused   Final    Unit Number (Barcoded) 02/16/2024 [id number]   Final    Product Code (Barcoded) 02/16/2024 [id number]   Final    Blood Type (Barcoded) 02/16/2024 6200   Final    Component R 02/16/2024    Final                    Value:R99                 Red Cells, LR       [id number]   transfused   02/17/24   03:16      Product Type 02/16/2024 R99   Final    Dispense Status 02/16/2024 transfused   Final    Unit Number (Barcoded) 02/16/2024 [id number]   Final    Product Code (Barcoded) 02/16/2024 [id number]   Final    Blood Type (Barcoded) 02/16/2024 6200   Final    GFR (CKD-EPI) 02/16/2024 72  >60 mL/min/1.73 m 2 Final    Comment: Estimated Glomerular Filtration Rate is calculated using  race neutral CKD-EPI 2021 equation per NKF-ASN recommendations.      WBC  02/16/2024 3.8 (L)  4.8 - 10.8 K/uL Final    RBC 02/16/2024 3.50 (L)  4.20 - 5.40 M/uL Final    Hemoglobin 02/16/2024 4.8 (LL)  12.0 - 16.0 g/dL Final    Comment: This result has been called [place] RN [id number] by [id number] on 02/16/2024 20:30:51,  and has been read back.      Hematocrit 02/16/2024 19.8 (L)  37.0 - 47.0 % Final    MCV 02/16/2024 56.6 (L)  81.4 - 97.8 fL Final    MCH 02/16/2024 13.7 (L)  27.0 - 33.0 pg Final    MCHC 02/16/2024 24.2 (L)  32.2 - 35.5 g/dL Final    Please note new reference range effective 05/22/2023.    RDW 02/16/2024 46.5  35.9 - 50.0 fL Final    Platelet Count 02/16/2024 286  164 - 446 K/uL Final    Neutrophils-Polys 02/16/2024 57.30  44.00 - 72.00 % Final    Lymphocytes 02/16/2024 26.80  22.00 - 41.00 % Final    Monocytes 02/16/2024 14.20 (H)  0.00 - 13.40 % Final    Eosinophils 02/16/2024 0.30  0.00 - 6.90 % Final    Basophils 02/16/2024 1.10  0.00 - 1.80 % Final    Immature Granulocytes 02/16/2024 0.30  0.00 - 0.90 % Final    Nucleated RBC 02/16/2024 0.50 (H)  0.00 - 0.20 /100 WBC Final    Please note new reference range effective 05/22/2023.    Neutrophils (Absolute) 02/16/2024 2.18  1.82 - 7.42 K/uL Final    Comment: Includes immature neutrophils, if present.  Please note new reference range effective 05/22/2023.      Lymphs (Absolute) 02/16/2024 1.02  1.00 - 4.80 K/uL Final    Monos (Absolute) 02/16/2024 0.54  0.00 - 0.85 K/uL Final    Eos (Absolute) 02/16/2024 0.01  0.00 - 0.51 K/uL Final    Baso (Absolute) 02/16/2024 0.04  0.00 - 0.12 K/uL Final    Immature Granulocytes (abs) 02/16/2024 0.01  0.00 - 0.11 K/uL Final    NRBC (Absolute) 02/16/2024 0.02  K/uL Final    Hypochromia 02/16/2024 2+ (A)   Final    Anisocytosis 02/16/2024 1+   Final    Microcytosis 02/16/2024 2+ (A)   Final    Plt Estimation 02/16/2024 Normal   Final    RBC Morphology 02/16/2024 Present   Final    Polychromia 02/16/2024 1+   Final    Poikilocytosis 02/16/2024 1+   Final    Peripheral Smear Review 02/16/2024 see  below   Final    Comment: Due to instrument suspect flags, further review of peripheral smear is  indicated on this patient sample. This review may or may not result in  abnormal findings.      Comments-Diff 02/16/2024 see below   Final    Results have been verified by peripheral blood smear review.    Eject.Frac. MOD BP 02/17/2024 66.33   Final    Eject.Frac. MOD 4C 02/17/2024 63.68   Final    Eject.Frac. MOD 2C 02/17/2024 68.8   Final    NT-proBNP 02/16/2024 346 (H)  0 - 125 pg/mL Final    Sodium 02/17/2024 137  135 - 145 mmol/L Final    Potassium 02/17/2024 4.0  3.6 - 5.5 mmol/L Final    Chloride 02/17/2024 107  96 - 112 mmol/L Final    Co2 02/17/2024 19 (L)  20 - 33 mmol/L Final    Glucose 02/17/2024 105 (H)  65 - 99 mg/dL Final    Bun 02/17/2024 9  8 - 22 mg/dL Final    Creatinine 02/17/2024 0.63  0.50 - 1.40 mg/dL Final    Calcium 02/17/2024 8.3 (L)  8.5 - 10.5 mg/dL Final    Anion Gap 02/17/2024 11.0  7.0 - 16.0 Final    Hemoglobin 02/17/2024 9.3 (L)  12.0 - 16.0 g/dL Final    Hematocrit 02/17/2024 30.9 (L)  37.0 - 47.0 % Final    PT 02/17/2024 22.6 (H)  12.0 - 14.6 sec Final    INR 02/17/2024 1.96 (H)  0.87 - 1.13 Final    Comment: INR - Non-therapeutic Reference Range: 0.87-1.13  INR - Therapeutic Reference Range: 2.0-4.0      Hemoglobin 02/17/2024 9.4 (L)  12.0 - 16.0 g/dL Final    Hematocrit 02/17/2024 31.7 (L)  37.0 - 47.0 % Final    APTT 02/17/2024 32.4  24.7 - 36.0 sec Final    GFR (CKD-EPI) 02/17/2024 92  >60 mL/min/1.73 m 2 Final    Comment: Estimated Glomerular Filtration Rate is calculated using  race neutral CKD-EPI 2021 equation per NKF-ASN recommendations.      Hemoglobin 02/17/2024 10.0 (L)  12.0 - 16.0 g/dL Final    Hematocrit 02/17/2024 32.8 (L)  37.0 - 47.0 % Final    Hemoglobin 02/17/2024 9.4 (L)  12.0 - 16.0 g/dL Final    Hematocrit 02/17/2024 32.1 (L)  37.0 - 47.0 % Final    Sodium 02/18/2024 137  135 - 145 mmol/L Final    Potassium 02/18/2024 3.7  3.6 - 5.5 mmol/L Final    Chloride  02/18/2024 108  96 - 112 mmol/L Final    Co2 02/18/2024 17 (L)  20 - 33 mmol/L Final    Anion Gap 02/18/2024 12.0  7.0 - 16.0 Final    Glucose 02/18/2024 97  65 - 99 mg/dL Final    Bun 02/18/2024 5 (L)  8 - 22 mg/dL Final    Creatinine 02/18/2024 0.69  0.50 - 1.40 mg/dL Final    Calcium 02/18/2024 8.6  8.5 - 10.5 mg/dL Final    Correct Calcium 02/18/2024 9.2  8.5 - 10.5 mg/dL Final    AST(SGOT) 02/18/2024 25  12 - 45 U/L Final    ALT(SGPT) 02/18/2024 12  2 - 50 U/L Final    Alkaline Phosphatase 02/18/2024 118 (H)  30 - 99 U/L Final    Total Bilirubin 02/18/2024 1.1  0.1 - 1.5 mg/dL Final    Albumin 02/18/2024 3.3  3.2 - 4.9 g/dL Final    Total Protein 02/18/2024 7.2  6.0 - 8.2 g/dL Final    Globulin 02/18/2024 3.9 (H)  1.9 - 3.5 g/dL Final    A-G Ratio 02/18/2024 0.8  g/dL Final    Magnesium 02/18/2024 2.2  1.5 - 2.5 mg/dL Final    GFR (CKD-EPI) 02/18/2024 90  >60 mL/min/1.73 m 2 Final    Comment: Estimated Glomerular Filtration Rate is calculated using  race neutral CKD-EPI 2021 equation per NKF-ASN recommendations.      WBC 02/18/2024 5.0  4.8 - 10.8 K/uL Final    RBC 02/18/2024 4.50  4.20 - 5.40 M/uL Final    Hemoglobin 02/18/2024 9.1 (L)  12.0 - 16.0 g/dL Final    Hematocrit 02/18/2024 30.9 (L)  37.0 - 47.0 % Final    MCV 02/18/2024 68.7 (L)  81.4 - 97.8 fL Final    MCH 02/18/2024 20.2 (L)  27.0 - 33.0 pg Final    MCHC 02/18/2024 29.4 (L)  32.2 - 35.5 g/dL Final    Please note new reference range effective 05/22/2023.    RDW 02/18/2024 75.9 (H)  35.9 - 50.0 fL Final    Platelet Count 02/18/2024 215  164 - 446 K/uL Final    Reviewed by Clinical .   Hospital Outpatient Visit on 02/16/2024   Component Date Value Ref Range Status    Sodium 02/16/2024 135  135 - 145 mmol/L Final    Potassium 02/16/2024 3.7  3.6 - 5.5 mmol/L Final    Chloride 02/16/2024 101  96 - 112 mmol/L Final    Co2 02/16/2024 20  20 - 33 mmol/L Final    Glucose 02/16/2024 139 (H)  65 - 99 mg/dL Final    Bun 02/16/2024 12  8 -  22 mg/dL Final    Creatinine 2024 0.77  0.50 - 1.40 mg/dL Final    Calcium 2024 8.8  8.5 - 10.5 mg/dL Final    Anion Gap 2024 14.0  7.0 - 16.0 Final    WBC 2024 3.6 (L)  4.8 - 10.8 K/uL Final    RBC 2024 3.93 (L)  4.20 - 5.40 M/uL Final    Hemoglobin 2024 5.2 (LL)  12.0 - 16.0 g/dL Final    Hematocrit 2024 22.5 (L)  37.0 - 47.0 % Final    MCV 2024 57.3 (L)  81.4 - 97.8 fL Final    MCH 2024 13.2 (L)  27.0 - 33.0 pg Final    MCHC 2024 23.1 (L)  32.2 - 35.5 g/dL Final    Please note new reference range effective 2023.    RDW 2024 47.3  35.9 - 50.0 fL Final    Platelet Count 2024 336  164 - 446 K/uL Final    GFR (CKD-EPI) 2024 80  >60 mL/min/1.73 m 2 Final    Comment: Estimated Glomerular Filtration Rate is calculated using  race neutral CKD-EPI  equation per NKF-ASN recommendations.     Office Visit on 2024   Component Date Value Ref Range Status    Report 2024    Final                    Value:[facility]    Test Date:  2024  Pt Name:    TENA MORALES              Department: [facility]  MRN:        [id number]                      Room:  Gender:     Female                       Technician: CA  :        1950                   Requested By:JOSE NEGRON  Order #:    [id number]                    Reading MD: Jose Negron MD    Measurements  Intervals                                Axis  Rate:       93                           P:          54  NM:         163                          QRS:        50  QRSD:       82                           T:          31  QT:         355  QTc:        442    Interpretive Statements  Sinus rhythm  Borderline repolarization abnormality  Compared to ECG 2014 11:47:20  No significant changes  Electronically Signed On 2024 17:29:27 PST by Jose Negron MD     Anticoagulation Visit on 2024   Component Date Value Ref Range Status    INR 2024  1.80 (A)  2 - 3.5 Final   Anticoagulation Visit on 2024   Component Date Value Ref Range Status    INR 2024 3.90 (A)  2 - 3.5 Final       Imaging:   All listed images below have been independently reviewed by me. I agree with the findings as summarized below:    EC-ECHOCARDIOGRAM COMPLETE W/O CONT    Result Date: 2024  Transthoracic Echo Report Echocardiography Laboratory CONCLUSIONS Normal left ventricular systolic function. The left ventricular ejection fraction is visually estimated to be 65%. The right ventricle is normal in size and systolic function. Mild tricuspid regurgitation. Estimated right ventricular systolic pressure is 38 mmHg (normal). No prior study is available for comparison. TENA MORALES Exam Date:         2024                    18:37 Exam Location:     Inpatient Priority:          Routine Ordering Physician:        HERIBERTO LOPEZ Referring Physician:       [id number] Maria Sonographer:               Shahla Adam RDCS Age:    73     Gender:    F MRN:    [id number] :    1950 BSA:    1.95   Ht (in):    64     Wt (lb):    198 Exam Type:     Complete Indications:     Acute MI ICD Codes:       410.9 CPT Codes:       32682 BP:   149    /   68     HR:   70 Technical Quality:       Fair MEASUREMENTS  (Male / Female) Normal Values 2D ECHO LV Diastolic Diameter PLAX        4.6 cm                4.2 - 5.9 / 3.9 - 5.3 cm LV Systolic Diameter PLAX         2.8 cm                2.1 - 4.0 cm IVS Diastolic Thickness           1 cm                  LVPW Diastolic Thickness          0.92 cm               LVOT Diameter                     2 cm                  LV Ejection Fraction MOD BP       66.3 %                >= 55  % LV Ejection Fraction MOD 4C       63.7 %                LV Ejection Fraction MOD 2C       68.8 %                IVC Diameter                      2 cm                  DOPPLER AV Peak Velocity                  1.6 m/s               AV Peak Gradient                   10.5 mmHg             AV Mean Gradient                  5.8 mmHg              LVOT Peak Velocity                1.3 m/s               AV Area Cont Eq vti               2.3 cm2               Mitral E Point Velocity           1.2 m/s               Mitral E to A Ratio               1.1                   MV Pressure Half Time             57 ms                 MV Area PHT                       3.9 cm2               MV Deceleration Time              196 ms                TV Peak E Velocity                0.32 m/s              TR Peak Velocity                  275 cm/s              MV E' Velocity                    8.5 cm/s              * Indicates values subject to auto-interpretation LV EF:        % FINDINGS Left Ventricle Normal left ventricular chamber size. Normal left ventricular wall thickness. Normal left ventricular systolic function. The left ventricular ejection fraction is visually estimated to be 65%. Normal regional wall motion. Indeterminate diastolic function. Right Ventricle The right ventricle is normal in size and systolic function. Right Atrium The right atrium is normal in size. Normal inferior vena cava size without inspiratory collapse. Left Atrium The left atrium is normal in size. Left atrial volume index is 24 mL/sq m. Mitral Valve Structurally normal mitral valve without significant stenosis or regurgitation. Aortic Valve Structurally normal aortic valve without significant stenosis or regurgitation. Tricuspid Valve No tricuspid stenosis. Mild tricuspid regurgitation. Estimated right ventricular systolic pressure is 38 mmHg. Right atrial pressure is estimated to be 8 mmHg. Pulmonic Valve Structurally normal pulmonic valve without significant stenosis or regurgitation. Pericardium No pericardial effusion. Aorta Normal aortic root for body surface area. The ascending aorta diameter is 3.0 cm. Roland Nuno MD (Electronically Signed) Final Date:     18 February 2024                  16:41    DX-CHEST-PORTABLE (1 VIEW)    Result Date: 2/18/2024 2/18/2024 3:58 PM HISTORY/REASON FOR EXAM:  Cough. TECHNIQUE/EXAM DESCRIPTION AND NUMBER OF VIEWS: Single portable view of the chest. COMPARISON: 2/16/2024 FINDINGS: Cardiomediastinal silhouette is stable. Interstitial and hazy opacity in the left lung base, likely left lower lobe, asymmetric edema versus pneumonitis. Right lung is clear. No pleural effusion or pneumothorax. No acute osseous abnormality.     Persistent mild hazy left lower lobe opacity could be mild pneumonitis.    DX-CHEST-PORTABLE (1 VIEW)    Result Date: 2/16/2024 2/16/2024 6:21 PM HISTORY/REASON FOR EXAM:  Shortness of Breath TECHNIQUE/EXAM DESCRIPTION AND NUMBER OF VIEWS: Single portable view of the chest. COMPARISON: 7/14/2014 FINDINGS: The mediastinal and cardiac silhouette is unremarkable. The pulmonary vascularity is within normal limits. There is ill-defined linear hazy opacity in the left lower lobe. There is no significant pleural effusion. There is no visible pneumothorax. There are no acute bony abnormalities.     1.  Ill-defined linear left lobe opacity could be atelectasis or pneumonitis.    [id number] BASE TO MID-THIGH    Result Date: 2/9/2024 2/9/2024 12:22 PM HISTORY/REASON FOR EXAM:  cT3 cN1 upper outer left breast cancer TECHNIQUE/EXAM DESCRIPTION AND NUMBER OF VIEWS: PET body imaging. Initially, 10.1 mCi F-18 FDG was administered intravenously under standardized conditions. Approximately 45 minutes after FDG administration, the patient was placed in the supine position on the PET CT table. Blood glucose level was 138 mg/dL. Low dose spiral CT imaging was performed from the skull base to the mid thighs. PET imaging was then performed from the skull base to the mid thighs. CT images, PET images, and PET/CT fused images were reviewed on a PACS 3D workstation. The limited non-contrast CT data are used primarily for attenuation correction and anatomic correlation.   Evaluation of solid organs and bowel are especially limited utilizing this technique. A low dose CT was obtained of the same area without IV contrast for attenuation correction and coregistration, not for a diagnostic scan. COMPARISON: None. FINDINGS: VISUALIZED BRAIN: Normal metabolic activity. HEAD AND NECK: Normal physiologic uptake. CHEST: Background blood pool activity shows average SUV of 2.1. Hypermetabolic mass in the left breast (SUV max 13.8). Lungs show no hypermetabolic pulmonary nodules. Hypermetabolic left axillary lymph nodes (SUV max 15.7) Moderate hiatal hernia. ABDOMEN AND PELVIS: Background liver activity shows average SUV of 3.3. There is normal, uniform metabolic activity in the liver, spleen, adrenal glands, kidneys. Porcelain gallbladder. Large fat-containing periumbilical hernia There is no hypermetabolic mesenteric, retroperitoneal, iliac, or inguinal lymphadenopathy. Diffuse uptake throughout the colon. VISUALIZED MUSCULOSKELETAL SYSTEM: No hypermetabolic activity to suggest osteolytic metastases or sclerosis to suggest osteoblastic metastases.     1. Hypermetabolic mass in the left breast (SUV max 13.8), in keeping with malignancy. 2. Hypermetabolic left axillary lymph nodes (SUV max 15.7), consistent with metastasis 3. Diffuse uptake throughout the colon associated with metformin use. Correlate clinically. 4. Moderate hiatal hernia. Large fat-containing periumbilical hernia. Porcelain gallbladder.       Pathology:      Assessment & Plan:  1.  Invasive ductal carcinoma of the left breast, grade 1, clinically stage IIb (cT2, CN I) ER positive greater than 90%, KS negative, HER2 2+ IHC FISH negative Ki-67 15%.  Workup was negative for metastatic disease.  Her tumor is close to T3 and could benefit from cytoreduction with endocrine therapy to hopefully convert her to a partial mastectomy.  Initiated anastrozole March 2024 with initial response tolerance and good  2.  Severe iron deficiency  anemia likely secondary to long-term Coumadin use without monitoring INR dietary lack of iron and poor absorption, now almost completely resolved.    Plan: We will continue anastrozole and I will see her back in 8 weeks with repeat CBC and iron studies.  Would would ideally like to see her to restrain to under 3 cm and then send back to Dr. Borden for partial mastectomy.  Any questions and concerns raised by the patient were answered to the best of my ability. Thank you for allowing me to participate in the care for this patient. Please feel free to contact me for any questions or concerns.     Reza Lyles M.D.

## 2024-04-18 NOTE — ADDENDUM NOTE
Encounter addended by: Mariela Valle, Med Ass't on: 4/18/2024 11:44 AM   Actions taken: Charge Capture section accepted

## 2024-04-23 ENCOUNTER — PATIENT MESSAGE (OUTPATIENT)
Dept: VASCULAR LAB | Facility: MEDICAL CENTER | Age: 74
End: 2024-04-23
Payer: MEDICARE

## 2024-05-09 ENCOUNTER — HOSPITAL ENCOUNTER (OUTPATIENT)
Dept: LAB | Facility: MEDICAL CENTER | Age: 74
End: 2024-05-09
Payer: MEDICARE

## 2024-05-09 LAB
ALBUMIN SERPL BCP-MCNC: 4.3 G/DL (ref 3.2–4.9)
ALBUMIN/GLOB SERPL: 1.4 G/DL
ALP SERPL-CCNC: 135 U/L (ref 30–99)
ALT SERPL-CCNC: 12 U/L (ref 2–50)
ANION GAP SERPL CALC-SCNC: 13 MMOL/L (ref 7–16)
AST SERPL-CCNC: 20 U/L (ref 12–45)
BILIRUB SERPL-MCNC: 0.2 MG/DL (ref 0.1–1.5)
BUN SERPL-MCNC: 11 MG/DL (ref 8–22)
CALCIUM ALBUM COR SERPL-MCNC: 9.3 MG/DL (ref 8.5–10.5)
CALCIUM SERPL-MCNC: 9.5 MG/DL (ref 8.5–10.5)
CHLORIDE SERPL-SCNC: 106 MMOL/L (ref 96–112)
CO2 SERPL-SCNC: 24 MMOL/L (ref 20–33)
CREAT SERPL-MCNC: 0.75 MG/DL (ref 0.5–1.4)
GFR SERPLBLD CREATININE-BSD FMLA CKD-EPI: 82 ML/MIN/1.73 M 2
GLOBULIN SER CALC-MCNC: 3.1 G/DL (ref 1.9–3.5)
GLUCOSE SERPL-MCNC: 133 MG/DL (ref 65–99)
POTASSIUM SERPL-SCNC: 4 MMOL/L (ref 3.6–5.5)
PROT SERPL-MCNC: 7.4 G/DL (ref 6–8.2)
SODIUM SERPL-SCNC: 143 MMOL/L (ref 135–145)

## 2024-05-16 ENCOUNTER — PHARMACY VISIT (OUTPATIENT)
Dept: PHARMACY | Facility: MEDICAL CENTER | Age: 74
End: 2024-05-16
Payer: COMMERCIAL

## 2024-05-16 PROCEDURE — RXMED WILLOW AMBULATORY MEDICATION CHARGE: Performed by: STUDENT IN AN ORGANIZED HEALTH CARE EDUCATION/TRAINING PROGRAM

## 2024-05-16 PROCEDURE — RXMED WILLOW AMBULATORY MEDICATION CHARGE: Performed by: INTERNAL MEDICINE

## 2024-05-20 ENCOUNTER — HOSPITAL ENCOUNTER (OUTPATIENT)
Dept: LAB | Facility: MEDICAL CENTER | Age: 74
End: 2024-05-20
Payer: MEDICARE

## 2024-05-21 LAB — GGT SERPL-CCNC: 39 U/L (ref 7–34)

## 2024-05-23 LAB
ALP BONE SERPL-CCNC: 53 U/L (ref 0–55)
ALP ISOS SERPL HS-CCNC: 0 U/L
ALP LIVER SERPL-CCNC: 91 U/L (ref 0–94)
ALP SERPL-CCNC: 144 U/L (ref 40–120)

## 2024-06-04 ENCOUNTER — APPOINTMENT (OUTPATIENT)
Dept: RADIOLOGY | Facility: MEDICAL CENTER | Age: 74
DRG: 330 | End: 2024-06-04
Attending: STUDENT IN AN ORGANIZED HEALTH CARE EDUCATION/TRAINING PROGRAM
Payer: MEDICARE

## 2024-06-04 ENCOUNTER — ANESTHESIA (OUTPATIENT)
Dept: SURGERY | Facility: MEDICAL CENTER | Age: 74
DRG: 330 | End: 2024-06-04
Payer: MEDICARE

## 2024-06-04 ENCOUNTER — HOSPITAL ENCOUNTER (INPATIENT)
Facility: MEDICAL CENTER | Age: 74
LOS: 4 days | DRG: 330 | End: 2024-06-08
Attending: EMERGENCY MEDICINE | Admitting: STUDENT IN AN ORGANIZED HEALTH CARE EDUCATION/TRAINING PROGRAM
Payer: MEDICARE

## 2024-06-04 ENCOUNTER — ANESTHESIA EVENT (OUTPATIENT)
Dept: SURGERY | Facility: MEDICAL CENTER | Age: 74
DRG: 330 | End: 2024-06-04
Payer: MEDICARE

## 2024-06-04 ENCOUNTER — APPOINTMENT (OUTPATIENT)
Dept: RADIOLOGY | Facility: MEDICAL CENTER | Age: 74
DRG: 330 | End: 2024-06-04
Attending: EMERGENCY MEDICINE
Payer: MEDICARE

## 2024-06-04 DIAGNOSIS — R53.1 WEAKNESS: ICD-10-CM

## 2024-06-04 DIAGNOSIS — K42.0 INCARCERATED UMBILICAL HERNIA: ICD-10-CM

## 2024-06-04 DIAGNOSIS — G89.18 POSTOPERATIVE PAIN: ICD-10-CM

## 2024-06-04 DIAGNOSIS — E11.9 TYPE 2 DIABETES MELLITUS WITHOUT COMPLICATION, WITHOUT LONG-TERM CURRENT USE OF INSULIN (HCC): ICD-10-CM

## 2024-06-04 PROBLEM — K43.6 VENTRAL HERNIA WITH BOWEL OBSTRUCTION: Status: ACTIVE | Noted: 2024-06-04

## 2024-06-04 LAB
ALBUMIN SERPL BCP-MCNC: 4.3 G/DL (ref 3.2–4.9)
ALBUMIN/GLOB SERPL: 1.3 G/DL
ALP SERPL-CCNC: 138 U/L (ref 30–99)
ALT SERPL-CCNC: 17 U/L (ref 2–50)
ANION GAP SERPL CALC-SCNC: 17 MMOL/L (ref 7–16)
AST SERPL-CCNC: 27 U/L (ref 12–45)
BASOPHILS # BLD AUTO: 0.4 % (ref 0–1.8)
BASOPHILS # BLD: 0.03 K/UL (ref 0–0.12)
BILIRUB SERPL-MCNC: 0.3 MG/DL (ref 0.1–1.5)
BUN SERPL-MCNC: 13 MG/DL (ref 8–22)
CALCIUM ALBUM COR SERPL-MCNC: 9.4 MG/DL (ref 8.5–10.5)
CALCIUM SERPL-MCNC: 9.6 MG/DL (ref 8.5–10.5)
CHLORIDE SERPL-SCNC: 105 MMOL/L (ref 96–112)
CO2 SERPL-SCNC: 19 MMOL/L (ref 20–33)
CREAT SERPL-MCNC: 0.75 MG/DL (ref 0.5–1.4)
EKG IMPRESSION: NORMAL
EOSINOPHIL # BLD AUTO: 0.03 K/UL (ref 0–0.51)
EOSINOPHIL NFR BLD: 0.4 % (ref 0–6.9)
ERYTHROCYTE [DISTWIDTH] IN BLOOD BY AUTOMATED COUNT: 49.8 FL (ref 35.9–50)
GFR SERPLBLD CREATININE-BSD FMLA CKD-EPI: 82 ML/MIN/1.73 M 2
GLOBULIN SER CALC-MCNC: 3.3 G/DL (ref 1.9–3.5)
GLUCOSE SERPL-MCNC: 183 MG/DL (ref 65–99)
HCT VFR BLD AUTO: 46.2 % (ref 37–47)
HGB BLD-MCNC: 14.5 G/DL (ref 12–16)
IMM GRANULOCYTES # BLD AUTO: 0.02 K/UL (ref 0–0.11)
IMM GRANULOCYTES NFR BLD AUTO: 0.3 % (ref 0–0.9)
LACTATE SERPL-SCNC: 3.8 MMOL/L (ref 0.5–2)
LIPASE SERPL-CCNC: 29 U/L (ref 11–82)
LYMPHOCYTES # BLD AUTO: 2.08 K/UL (ref 1–4.8)
LYMPHOCYTES NFR BLD: 27.2 % (ref 22–41)
MCH RBC QN AUTO: 25.3 PG (ref 27–33)
MCHC RBC AUTO-ENTMCNC: 31.4 G/DL (ref 32.2–35.5)
MCV RBC AUTO: 80.5 FL (ref 81.4–97.8)
MONOCYTES # BLD AUTO: 0.4 K/UL (ref 0–0.85)
MONOCYTES NFR BLD AUTO: 5.2 % (ref 0–13.4)
NEUTROPHILS # BLD AUTO: 5.09 K/UL (ref 1.82–7.42)
NEUTROPHILS NFR BLD: 66.5 % (ref 44–72)
NRBC # BLD AUTO: 0 K/UL
NRBC BLD-RTO: 0 /100 WBC (ref 0–0.2)
PLATELET # BLD AUTO: 235 K/UL (ref 164–446)
PMV BLD AUTO: 9.4 FL (ref 9–12.9)
POTASSIUM SERPL-SCNC: 4 MMOL/L (ref 3.6–5.5)
PROT SERPL-MCNC: 7.6 G/DL (ref 6–8.2)
RBC # BLD AUTO: 5.74 M/UL (ref 4.2–5.4)
SODIUM SERPL-SCNC: 141 MMOL/L (ref 135–145)
WBC # BLD AUTO: 7.7 K/UL (ref 4.8–10.8)

## 2024-06-04 PROCEDURE — 0WQF0ZZ REPAIR ABDOMINAL WALL, OPEN APPROACH: ICD-10-PCS | Performed by: STUDENT IN AN ORGANIZED HEALTH CARE EDUCATION/TRAINING PROGRAM

## 2024-06-04 PROCEDURE — 49594 RPR AA HRN 1ST 3-10 NCR/STRN: CPT | Performed by: STUDENT IN AN ORGANIZED HEALTH CARE EDUCATION/TRAINING PROGRAM

## 2024-06-04 PROCEDURE — 74177 CT ABD & PELVIS W/CONTRAST: CPT

## 2024-06-04 PROCEDURE — 160042 HCHG SURGERY MINUTES - EA ADDL 1 MIN LEVEL 5: Performed by: STUDENT IN AN ORGANIZED HEALTH CARE EDUCATION/TRAINING PROGRAM

## 2024-06-04 PROCEDURE — 700111 HCHG RX REV CODE 636 W/ 250 OVERRIDE (IP): Performed by: ANESTHESIOLOGY

## 2024-06-04 PROCEDURE — 88307 TISSUE EXAM BY PATHOLOGIST: CPT | Mod: 59

## 2024-06-04 PROCEDURE — 770001 HCHG ROOM/CARE - MED/SURG/GYN PRIV*

## 2024-06-04 PROCEDURE — 99222 1ST HOSP IP/OBS MODERATE 55: CPT | Mod: 57 | Performed by: STUDENT IN AN ORGANIZED HEALTH CARE EDUCATION/TRAINING PROGRAM

## 2024-06-04 PROCEDURE — 85025 COMPLETE CBC W/AUTO DIFF WBC: CPT

## 2024-06-04 PROCEDURE — 93005 ELECTROCARDIOGRAM TRACING: CPT

## 2024-06-04 PROCEDURE — 700101 HCHG RX REV CODE 250: Performed by: ANESTHESIOLOGY

## 2024-06-04 PROCEDURE — 96375 TX/PRO/DX INJ NEW DRUG ADDON: CPT

## 2024-06-04 PROCEDURE — 700111 HCHG RX REV CODE 636 W/ 250 OVERRIDE (IP): Performed by: EMERGENCY MEDICINE

## 2024-06-04 PROCEDURE — 80053 COMPREHEN METABOLIC PANEL: CPT

## 2024-06-04 PROCEDURE — 44125 REMOVAL OF SMALL INTESTINE: CPT | Performed by: STUDENT IN AN ORGANIZED HEALTH CARE EDUCATION/TRAINING PROGRAM

## 2024-06-04 PROCEDURE — 93005 ELECTROCARDIOGRAM TRACING: CPT | Performed by: EMERGENCY MEDICINE

## 2024-06-04 PROCEDURE — 99291 CRITICAL CARE FIRST HOUR: CPT

## 2024-06-04 PROCEDURE — 94799 UNLISTED PULMONARY SVC/PX: CPT

## 2024-06-04 PROCEDURE — 96374 THER/PROPH/DIAG INJ IV PUSH: CPT

## 2024-06-04 PROCEDURE — 160009 HCHG ANES TIME/MIN: Performed by: STUDENT IN AN ORGANIZED HEALTH CARE EDUCATION/TRAINING PROGRAM

## 2024-06-04 PROCEDURE — 700101 HCHG RX REV CODE 250: Performed by: EMERGENCY MEDICINE

## 2024-06-04 PROCEDURE — 700105 HCHG RX REV CODE 258: Performed by: ANESTHESIOLOGY

## 2024-06-04 PROCEDURE — 83690 ASSAY OF LIPASE: CPT

## 2024-06-04 PROCEDURE — 160048 HCHG OR STATISTICAL LEVEL 1-5: Performed by: STUDENT IN AN ORGANIZED HEALTH CARE EDUCATION/TRAINING PROGRAM

## 2024-06-04 PROCEDURE — 700117 HCHG RX CONTRAST REV CODE 255: Performed by: EMERGENCY MEDICINE

## 2024-06-04 PROCEDURE — 160002 HCHG RECOVERY MINUTES (STAT): Performed by: STUDENT IN AN ORGANIZED HEALTH CARE EDUCATION/TRAINING PROGRAM

## 2024-06-04 PROCEDURE — 160035 HCHG PACU - 1ST 60 MINS PHASE I: Performed by: STUDENT IN AN ORGANIZED HEALTH CARE EDUCATION/TRAINING PROGRAM

## 2024-06-04 PROCEDURE — 160031 HCHG SURGERY MINUTES - 1ST 30 MINS LEVEL 5: Performed by: STUDENT IN AN ORGANIZED HEALTH CARE EDUCATION/TRAINING PROGRAM

## 2024-06-04 PROCEDURE — 700111 HCHG RX REV CODE 636 W/ 250 OVERRIDE (IP)

## 2024-06-04 PROCEDURE — 0DBA0ZZ EXCISION OF JEJUNUM, OPEN APPROACH: ICD-10-PCS | Performed by: STUDENT IN AN ORGANIZED HEALTH CARE EDUCATION/TRAINING PROGRAM

## 2024-06-04 PROCEDURE — 0DBU0ZZ EXCISION OF OMENTUM, OPEN APPROACH: ICD-10-PCS | Performed by: STUDENT IN AN ORGANIZED HEALTH CARE EDUCATION/TRAINING PROGRAM

## 2024-06-04 PROCEDURE — 36415 COLL VENOUS BLD VENIPUNCTURE: CPT

## 2024-06-04 PROCEDURE — 83605 ASSAY OF LACTIC ACID: CPT

## 2024-06-04 RX ORDER — PROPOFOL 10 MG/ML
INJECTION, EMULSION INTRAVENOUS
Status: COMPLETED | OUTPATIENT
Start: 2024-06-04 | End: 2024-06-04

## 2024-06-04 RX ORDER — HALOPERIDOL 5 MG/ML
1 INJECTION INTRAMUSCULAR
Status: DISCONTINUED | OUTPATIENT
Start: 2024-06-04 | End: 2024-06-05

## 2024-06-04 RX ORDER — GLYCOPYRROLATE 0.2 MG/ML
INJECTION INTRAMUSCULAR; INTRAVENOUS PRN
Status: DISCONTINUED | OUTPATIENT
Start: 2024-06-04 | End: 2024-06-05 | Stop reason: SURG

## 2024-06-04 RX ORDER — METOPROLOL TARTRATE 1 MG/ML
INJECTION, SOLUTION INTRAVENOUS PRN
Status: DISCONTINUED | OUTPATIENT
Start: 2024-06-04 | End: 2024-06-05 | Stop reason: SURG

## 2024-06-04 RX ORDER — CEFOTETAN DISODIUM 2 G/20ML
INJECTION, POWDER, FOR SOLUTION INTRAMUSCULAR; INTRAVENOUS PRN
Status: DISCONTINUED | OUTPATIENT
Start: 2024-06-04 | End: 2024-06-05 | Stop reason: SURG

## 2024-06-04 RX ORDER — SODIUM CHLORIDE, SODIUM LACTATE, POTASSIUM CHLORIDE, CALCIUM CHLORIDE 600; 310; 30; 20 MG/100ML; MG/100ML; MG/100ML; MG/100ML
INJECTION, SOLUTION INTRAVENOUS
Status: DISCONTINUED | OUTPATIENT
Start: 2024-06-04 | End: 2024-06-05 | Stop reason: SURG

## 2024-06-04 RX ORDER — OXYCODONE HCL 5 MG/5 ML
5 SOLUTION, ORAL ORAL
Status: DISCONTINUED | OUTPATIENT
Start: 2024-06-04 | End: 2024-06-05

## 2024-06-04 RX ORDER — IPRATROPIUM BROMIDE AND ALBUTEROL SULFATE 2.5; .5 MG/3ML; MG/3ML
3 SOLUTION RESPIRATORY (INHALATION)
Status: DISCONTINUED | OUTPATIENT
Start: 2024-06-04 | End: 2024-06-05

## 2024-06-04 RX ORDER — ONDANSETRON 4 MG/1
4 TABLET, ORALLY DISINTEGRATING ORAL EVERY 4 HOURS PRN
Status: DISCONTINUED | OUTPATIENT
Start: 2024-06-04 | End: 2024-06-08 | Stop reason: HOSPADM

## 2024-06-04 RX ORDER — ONDANSETRON 2 MG/ML
INJECTION INTRAMUSCULAR; INTRAVENOUS PRN
Status: DISCONTINUED | OUTPATIENT
Start: 2024-06-04 | End: 2024-06-05 | Stop reason: SURG

## 2024-06-04 RX ORDER — OXYCODONE HYDROCHLORIDE 5 MG/1
5 TABLET ORAL
Status: DISCONTINUED | OUTPATIENT
Start: 2024-06-04 | End: 2024-06-08 | Stop reason: HOSPADM

## 2024-06-04 RX ORDER — ONDANSETRON 2 MG/ML
4 INJECTION INTRAMUSCULAR; INTRAVENOUS
Status: DISCONTINUED | OUTPATIENT
Start: 2024-06-04 | End: 2024-06-05

## 2024-06-04 RX ORDER — PROPOFOL 10 MG/ML
200 INJECTION, EMULSION INTRAVENOUS ONCE
Status: DISCONTINUED | OUTPATIENT
Start: 2024-06-04 | End: 2024-06-05

## 2024-06-04 RX ORDER — SODIUM CHLORIDE, SODIUM LACTATE, POTASSIUM CHLORIDE, CALCIUM CHLORIDE 600; 310; 30; 20 MG/100ML; MG/100ML; MG/100ML; MG/100ML
INJECTION, SOLUTION INTRAVENOUS CONTINUOUS
Status: DISCONTINUED | OUTPATIENT
Start: 2024-06-04 | End: 2024-06-07

## 2024-06-04 RX ORDER — DEXAMETHASONE SODIUM PHOSPHATE 4 MG/ML
INJECTION, SOLUTION INTRA-ARTICULAR; INTRALESIONAL; INTRAMUSCULAR; INTRAVENOUS; SOFT TISSUE PRN
Status: DISCONTINUED | OUTPATIENT
Start: 2024-06-04 | End: 2024-06-05 | Stop reason: SURG

## 2024-06-04 RX ORDER — SODIUM CHLORIDE, SODIUM GLUCONATE, SODIUM ACETATE, POTASSIUM CHLORIDE AND MAGNESIUM CHLORIDE 526; 502; 368; 37; 30 MG/100ML; MG/100ML; MG/100ML; MG/100ML; MG/100ML
500 INJECTION, SOLUTION INTRAVENOUS CONTINUOUS
Status: DISCONTINUED | OUTPATIENT
Start: 2024-06-04 | End: 2024-06-05

## 2024-06-04 RX ORDER — OXYCODONE HYDROCHLORIDE 5 MG/1
2.5 TABLET ORAL
Status: DISCONTINUED | OUTPATIENT
Start: 2024-06-04 | End: 2024-06-08 | Stop reason: HOSPADM

## 2024-06-04 RX ORDER — OXYCODONE HCL 5 MG/5 ML
10 SOLUTION, ORAL ORAL
Status: DISCONTINUED | OUTPATIENT
Start: 2024-06-04 | End: 2024-06-05

## 2024-06-04 RX ORDER — KETOROLAC TROMETHAMINE 15 MG/ML
INJECTION, SOLUTION INTRAMUSCULAR; INTRAVENOUS PRN
Status: DISCONTINUED | OUTPATIENT
Start: 2024-06-04 | End: 2024-06-05 | Stop reason: SURG

## 2024-06-04 RX ORDER — HYDROMORPHONE HYDROCHLORIDE 1 MG/ML
0.25 INJECTION, SOLUTION INTRAMUSCULAR; INTRAVENOUS; SUBCUTANEOUS
Status: DISCONTINUED | OUTPATIENT
Start: 2024-06-04 | End: 2024-06-08 | Stop reason: HOSPADM

## 2024-06-04 RX ORDER — MIDAZOLAM HYDROCHLORIDE 1 MG/ML
INJECTION INTRAMUSCULAR; INTRAVENOUS PRN
Status: DISCONTINUED | OUTPATIENT
Start: 2024-06-04 | End: 2024-06-05 | Stop reason: SURG

## 2024-06-04 RX ORDER — MEPERIDINE HYDROCHLORIDE 25 MG/ML
12.5 INJECTION INTRAMUSCULAR; INTRAVENOUS; SUBCUTANEOUS
Status: DISCONTINUED | OUTPATIENT
Start: 2024-06-04 | End: 2024-06-05

## 2024-06-04 RX ORDER — HYDROMORPHONE HYDROCHLORIDE 1 MG/ML
0.4 INJECTION, SOLUTION INTRAMUSCULAR; INTRAVENOUS; SUBCUTANEOUS
Status: DISCONTINUED | OUTPATIENT
Start: 2024-06-04 | End: 2024-06-05

## 2024-06-04 RX ORDER — DIPHENHYDRAMINE HYDROCHLORIDE 50 MG/ML
12.5 INJECTION INTRAMUSCULAR; INTRAVENOUS
Status: DISCONTINUED | OUTPATIENT
Start: 2024-06-04 | End: 2024-06-05

## 2024-06-04 RX ORDER — LIDOCAINE HYDROCHLORIDE 40 MG/ML
SOLUTION TOPICAL PRN
Status: DISCONTINUED | OUTPATIENT
Start: 2024-06-04 | End: 2024-06-05 | Stop reason: SURG

## 2024-06-04 RX ORDER — ONDANSETRON 4 MG/1
4 TABLET, ORALLY DISINTEGRATING ORAL ONCE
Status: COMPLETED | OUTPATIENT
Start: 2024-06-04 | End: 2024-06-04

## 2024-06-04 RX ORDER — HYDROMORPHONE HYDROCHLORIDE 1 MG/ML
1 INJECTION, SOLUTION INTRAMUSCULAR; INTRAVENOUS; SUBCUTANEOUS
Status: DISCONTINUED | OUTPATIENT
Start: 2024-06-04 | End: 2024-06-05

## 2024-06-04 RX ORDER — LIDOCAINE HYDROCHLORIDE 20 MG/ML
INJECTION, SOLUTION EPIDURAL; INFILTRATION; INTRACAUDAL; PERINEURAL PRN
Status: DISCONTINUED | OUTPATIENT
Start: 2024-06-04 | End: 2024-06-05 | Stop reason: SURG

## 2024-06-04 RX ORDER — ONDANSETRON 2 MG/ML
4 INJECTION INTRAMUSCULAR; INTRAVENOUS EVERY 4 HOURS PRN
Status: DISCONTINUED | OUTPATIENT
Start: 2024-06-04 | End: 2024-06-08 | Stop reason: HOSPADM

## 2024-06-04 RX ORDER — HYDROMORPHONE HYDROCHLORIDE 1 MG/ML
0.2 INJECTION, SOLUTION INTRAMUSCULAR; INTRAVENOUS; SUBCUTANEOUS
Status: DISCONTINUED | OUTPATIENT
Start: 2024-06-04 | End: 2024-06-05

## 2024-06-04 RX ORDER — ROCURONIUM BROMIDE 10 MG/ML
INJECTION, SOLUTION INTRAVENOUS PRN
Status: DISCONTINUED | OUTPATIENT
Start: 2024-06-04 | End: 2024-06-05 | Stop reason: SURG

## 2024-06-04 RX ORDER — MIDAZOLAM HYDROCHLORIDE 1 MG/ML
1 INJECTION INTRAMUSCULAR; INTRAVENOUS
Status: DISCONTINUED | OUTPATIENT
Start: 2024-06-04 | End: 2024-06-05

## 2024-06-04 RX ADMIN — MIDAZOLAM HYDROCHLORIDE 2 MG: 1 INJECTION, SOLUTION INTRAMUSCULAR; INTRAVENOUS at 20:15

## 2024-06-04 RX ADMIN — FENTANYL CITRATE 100 MCG: 50 INJECTION, SOLUTION INTRAMUSCULAR; INTRAVENOUS at 20:30

## 2024-06-04 RX ADMIN — ROCURONIUM BROMIDE 70 MG: 50 INJECTION, SOLUTION INTRAVENOUS at 19:54

## 2024-06-04 RX ADMIN — DEXAMETHASONE SODIUM PHOSPHATE 8 MG: 4 INJECTION INTRA-ARTICULAR; INTRALESIONAL; INTRAMUSCULAR; INTRAVENOUS; SOFT TISSUE at 19:59

## 2024-06-04 RX ADMIN — PROPOFOL 20 MG: 10 INJECTION, EMULSION INTRAVENOUS at 16:57

## 2024-06-04 RX ADMIN — IOHEXOL 100 ML: 350 INJECTION, SOLUTION INTRAVENOUS at 18:30

## 2024-06-04 RX ADMIN — METOPROLOL TARTRATE 5 MG: 5 INJECTION INTRAVENOUS at 20:30

## 2024-06-04 RX ADMIN — GLYCOPYRROLATE 0.2 MG: 0.2 INJECTION INTRAMUSCULAR; INTRAVENOUS at 20:59

## 2024-06-04 RX ADMIN — LIDOCAINE HYDROCHLORIDE 40 MG: 20 INJECTION, SOLUTION EPIDURAL; INFILTRATION; INTRACAUDAL at 19:54

## 2024-06-04 RX ADMIN — KETOROLAC TROMETHAMINE 15 MG: 15 INJECTION, SOLUTION INTRAMUSCULAR; INTRAVENOUS at 21:11

## 2024-06-04 RX ADMIN — FENTANYL CITRATE 100 MCG: 50 INJECTION, SOLUTION INTRAMUSCULAR; INTRAVENOUS at 20:15

## 2024-06-04 RX ADMIN — KETAMINE HYDROCHLORIDE 41 MG: 50 INJECTION INTRAMUSCULAR; INTRAVENOUS at 16:56

## 2024-06-04 RX ADMIN — SUGAMMADEX 200 MG: 100 INJECTION, SOLUTION INTRAVENOUS at 21:11

## 2024-06-04 RX ADMIN — CEFOTETAN DISODIUM 2 G: 2 INJECTION, POWDER, FOR SOLUTION INTRAMUSCULAR; INTRAVENOUS at 19:51

## 2024-06-04 RX ADMIN — PROPOFOL 120 MG: 10 INJECTION, EMULSION INTRAVENOUS at 19:54

## 2024-06-04 RX ADMIN — ONDANSETRON 4 MG: 4 TABLET, ORALLY DISINTEGRATING ORAL at 14:25

## 2024-06-04 RX ADMIN — LIDOCAINE HYDROCHLORIDE 4 ML: 40 SOLUTION TOPICAL at 19:54

## 2024-06-04 RX ADMIN — ONDANSETRON 4 MG: 2 INJECTION INTRAMUSCULAR; INTRAVENOUS at 21:11

## 2024-06-04 RX ADMIN — SODIUM CHLORIDE, POTASSIUM CHLORIDE, SODIUM LACTATE AND CALCIUM CHLORIDE: 600; 310; 30; 20 INJECTION, SOLUTION INTRAVENOUS at 19:41

## 2024-06-04 ASSESSMENT — PAIN DESCRIPTION - PAIN TYPE
TYPE: SURGICAL PAIN

## 2024-06-04 ASSESSMENT — FIBROSIS 4 INDEX: FIB4 SCORE: 1.1

## 2024-06-04 NOTE — ED NOTES
Rounded with patient in waiting room. Vital signs rechecked. All questions answered. Apologized for wait time. Pt has complaints of worsening vomiting. Triage RN aware.

## 2024-06-04 NOTE — ED NOTES
Pt brought back to [facility] 14 from lobby in . Pt able to transfer self to bed, in gown, on monitor, family at bedside, call light in reach. Chart up for ERP.

## 2024-06-04 NOTE — ED TRIAGE NOTES
"Lawanda Wymanchings  73 y.o. female  Chief Complaint   Patient presents with    Abdominal Pain     Mid abdominal pain started today. Reports of abdominal hernia for 15 years. No surgery done. Feels like passing out and having SOB. Denies chest pain.  Describes pain as stabbing and similar to \"hernia pain\"      Patient on wheelchair with family for above complaints. Diagnosed with left breast CA. Not on treatment. ED charge RN notified for patient to be roomed.     Pt is GCS 15, speaking in full sentences, follows commands and responds appropriately to questions.    Abdominal pain protocol ordered. Pt placed in phlebotomy. Pt educated on triage process. Pt encouraged to alert staff for any changes.       Vitals:    06/04/24 1306   BP: (!) 146/76   Pulse: (!) 45   Resp: 16   Temp: 36.5 °C (97.7 °F)   SpO2: 98%     "

## 2024-06-04 NOTE — ED NOTES
Pt family member up to the  with complaints of pt's  worsening pain and discomfort. Pt family concerned over wait time, triage process was explained  with wait time. Apologized for the wait time.

## 2024-06-04 NOTE — ED PROVIDER NOTES
"ED Provider Note    Scribed for Dr. Martell by Juanis Ramos. 6/4/2024,  3:37 PM.      CHIEF COMPLAINT  Chief Complaint   Patient presents with    Abdominal Pain     Mid abdominal pain started today. Reports of abdominal hernia for 15 years. No surgery done. Feels like passing out and having SOB. Denies chest pain.  Describes pain as stabbing and similar to \"hernia pain\"        EXTERNAL RECORDS REVIEWED  Inpatient Notes The patient was admitted 02/16/2024 for symptomatic anemia after being found with low hemoglobin. The patient has history of invasive ductal carcinoma of her left breast with metastasis to axillary lymph nodes.    HPI  LIMITATION TO HISTORY   Select: : None  OUTSIDE HISTORIAN(S):  None.    Lawanda Pagan is a 73 y.o. female who presents to the Emergency Department for evaluation of mid abdominal pain onset this morning. The patient describes that she first felt gassy around 11 AM this morning, but notes that the pain worsened as the day went on. The patient reports that the pain is a stabbing pain and states that it is similar to her hernia pain. The patient states that she felt as though she was going to pass out and notes that she was having shortness of breath. She denies any chest pain. The patient states that she has had an abdominal hernia for about 15 years. Per nursing note, the patient was diagnosed with left breast cancer.     REVIEW OF SYSTEMS  See HPI for further details. All other systems are negative.     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    Blood clotting disorder (HCC)     lungs bilat     Blood transfusion without reported diagnosis     Breath shortness 2014 2017 denies now    Elevated LFTs 1/26/2018    Fall     Hernia, hiatal     Measles      SURGICAL HISTORY  Past Surgical History:   Procedure Laterality Date    ENDOSCOPY PROCEDURE N/A 2/18/2024    Procedure: " "ENDOSCOPY UPPER AND LOWER;  Surgeon: Tom Payne M.D.;  Location: SURGERY [facility];  Service: Gastroenterology    GASTROSCOPY N/A 9/1/2017    Procedure: GASTROSCOPY;  Surgeon: Stephanie Reyes M.D.;  Location: SURGERY SAME DAY [facility];  Service: Gastroenterology    OTHER ABDOMINAL SURGERY  2004    hernia repair    GYN SURGERY  1980    c sec lap tubal    TONSILLECTOMY       FAMILY HISTORY  Family History   Problem Relation Age of Onset    Other Mother         parkinsons    Melanoma Mother     Cancer Father         stomach    Cancer Brother     Stroke Brother     Cancer Brother     Cancer Maternal Grandmother         stomach     SOCIAL HISTORY    reports that she quit smoking about 50 years ago. Her smoking use included cigarettes. She started smoking about 51 years ago. She has a 0.2 pack-year smoking history. She has never used smokeless tobacco. She reports that she does not drink alcohol and does not use drugs.    CURRENT MEDICATIONS  Home Medications       Reviewed by Jose Go R.N. (Registered Nurse) on 06/04/24 at 2011  Med List Status: Partial     Medication Last Dose Status   ACETAMINOPHEN PO  Active   anastrozole (ARIMIDEX) 1 MG Tab  Active   Cyanocobalamin (B-12) 1000 MCG Tab  Active   ferrous sulfate 325 (65 Fe) MG tablet  Active   pantoprazole (PROTONIX) 40 MG Tablet Delayed Response  Active   rosuvastatin (CRESTOR) 10 MG Tab  Active   vitamin D (CHOLECALCIFEROL) 1000 Unit Tab  Active                  Audit from Redirected Encounters    **Home medications have not yet been reviewed for this encounter**       ALLERGIES  Allergies   Allergen Reactions    Other Environmental Runny Nose     Grass and pollens     PHYSICAL EXAM  VITAL SIGNS: /66   Pulse (!) 42   Temp 36.5 °C (97.7 °F) (Temporal)   Resp 16   Ht 1.626 m (5' 4\")   Wt 81.6 kg (180 lb)   LMP 02/08/2000 (Approximate)   SpO2 97%   BMI 30.90 kg/m²     Gen: Alert, no acute distress  HEENT: ATNC  Eyes: PERRL, EOMI, " normal conjunctiva  Neck: trachea midline  Resp: no respiratory distress, lungs are clear  CV: No JVD, regular rate and rhythm, no murmur  Abd: incarcerated hiatal hernia, Soft, nontender, non-distended  Ext: No deformities  Neuro: speech fluent      DIAGNOSTIC STUDIES / PROCEDURES  EKG  I independently interpreted this EKG.  Results for orders placed or performed during the hospital encounter of 24   EKG   Result Value Ref Range    Report       [facility] Emergency Dept.    Test Date:  2024  Pt Name:    TENA MORALES              Department: ER  MRN:        [id number]                      Room:  Gender:     Female                       Technician: [id number]  :        1950                   Requested By:ER TRIAGE PROTOCOL  Order #:    [id number]                    Reading MD: Beny Martell    Measurements  Intervals                                Axis  Rate:       48                           P:          31  NH:         185                          QRS:        -3  QRSD:       94                           T:          21  QT:         495  QTc:        443    Interpretive Statements  Sinus bradycardia  Low voltage, precordial leads  Compared to ECG 2024 15:08:58  Sinus rhythm no longer present  Electronically Signed On 2024 15:43:35 PDT by Beny Martell         LABS  Labs Reviewed   CBC WITH DIFFERENTIAL - Abnormal; Notable for the following components:       Result Value    RBC 5.74 (*)     MCV 80.5 (*)     MCH 25.3 (*)     MCHC 31.4 (*)     All other components within normal limits   COMP METABOLIC PANEL - Abnormal; Notable for the following components:    Co2 19 (*)     Anion Gap 17.0 (*)     Glucose 183 (*)     Alkaline Phosphatase 138 (*)     All other components within normal limits   LACTIC ACID - Abnormal; Notable for the following components:    Lactic Acid 3.8 (*)     All other components within normal limits   LIPASE   ESTIMATED GFR   URINALYSIS   CBC WITH DIFFERENTIAL    BASIC METABOLIC PANEL   POCT GLUCOSE           Conscious Sedation Procedure Note    Indication: hernia     Consent: I have discussed with the patient and/or the patient representative the indication, alternatives, and the possible risks and/or complications of the planned procedure and the anesthesia methods. The patient and/or patient representative appear to understand and agree to proceed.    Physician Involvement: The attending physician was present and supervising this procedure.    Pre-Sedation Documentation and Exam: I have personally completed a history, physical exam & review of systems for this patient (see notes).  Airway Assessment: normal  f3  Prior History of Anesthesia Complications: none    ASA Classification: Class 2 - A normal healthy patient with mild systemic disease    Sedation/ Anesthesia Plan: intravenous sedation    Medications Used: propofol intravenously    Monitoring and Safety: The patient was placed on a cardiac monitor and vital signs, pulse oximetry and level of consciousness were continuously evaluated throughout the procedure. The patient was closely monitored until recovery from the medications was complete and the patient had returned to baseline status. Respiratory therapy was on standby at all times during the procedure.      (The following sections must be completed)  Post-Sedation Vital Signs: Vital signs were reviewed and were stable after the procedure (see flow sheet for vitals)            Intraservice Time:  10 minutes    Post-Sedation Exam: Lungs: clear           Complications: none    I provided both the sedation and procedure, a nurse was present at the bedside for the entire procedure.            COURSE & MEDICAL DECISION MAKING  Pertinent Labs & Imaging studies were reviewed. (See chart for details)    ED Observation Status? No; the patient does not meet criteria for ED observation.   -----------    3:37 PM Patient seen and examined at bedside. This is a 73 year old  woman who presents with mid abdominal pain and an inguinal hernia. Discussed plan of care with the patient and she is agreeable and understanding.     4:57 PM - The patient was reevaluated at bedside.      5:23 PM - Spoke with Dr. Camrona, Surgery, about the patient's condition.     6:05 PM - The patient was reevaluated at bedside with Dr. Carmona at bedside.      CRITICAL CARE  The very real possibility of a deterioration of this patient's condition required the highest level of my preparedness for sudden, emergent intervention.  I provided critical care services, which included medication orders, frequent reevaluations of the patient's condition and response to treatment, ordering and reviewing test results, and discussing the case with various consultants.  The critical care time associated with the care of the patient was 35 minutes. Review chart for interventions. This time is exclusive of any other billable procedures.      INITIAL ASSESSMENT AND PLAN  Medical Decision Making: Patient presents with pain in her supraumbilical region where she has a hernia.  Clinically the hernia appears to be incarcerated.  The patient underwent procedural sedation and I made an attempt at manual reduction of the hernia with direct palpation, however this was not successful.  The case was discussed with general surgery and CAT scan was ordered, which demonstrates a large hernia.  The patient will be transferred directly to the operating room for surgical intervention, where ultimately necrotic bowel was found    ADDITIONAL PROBLEM LIST AND DISPOSITION    I have discussed management of the patient with the following medical professionals:  RT, Dr. Carmona (Surgery)      Decision tools and prescription drugs considered including, but not limited to: Antibiotics no signs of sepsis at this time .      DISPOSITION:  Patient will be hospitalized by Dr. Carmona in guarded condition.       FINAL IMPRESSION  1. Incarcerated umbilical hernia       I, Juanis Ramos (Scribe), am scribing for, and in the presence of, Beny Martell M.D..    Electronically signed by: Juanis Ramos (Scribe), 6/4/2024    IBeny M.D. personally performed the services described in this documentation, as scribed by Juanis Ramos in my presence, and it is both accurate and complete.    The note accurately reflects work and decisions made by me.  Beny Martell M.D.  6/4/2024  11:23 PM      This dictation was created using voice recognition software. The accuracy of the dictation is limited to the abilities of the software. I expect there may be some errors of grammar and possibly content. The nursing notes were reviewed and certain aspects of this information were incorporated into this note.

## 2024-06-05 LAB
ANION GAP SERPL CALC-SCNC: 17 MMOL/L (ref 7–16)
APPEARANCE UR: CLEAR
BACTERIA #/AREA URNS HPF: ABNORMAL /HPF
BASOPHILS # BLD AUTO: 0.5 % (ref 0–1.8)
BASOPHILS # BLD: 0.07 K/UL (ref 0–0.12)
BILIRUB UR QL STRIP.AUTO: NEGATIVE
BUN SERPL-MCNC: 13 MG/DL (ref 8–22)
CALCIUM SERPL-MCNC: 8.9 MG/DL (ref 8.5–10.5)
CHLORIDE SERPL-SCNC: 104 MMOL/L (ref 96–112)
CO2 SERPL-SCNC: 19 MMOL/L (ref 20–33)
COLOR UR: YELLOW
CREAT SERPL-MCNC: 0.6 MG/DL (ref 0.5–1.4)
EOSINOPHIL # BLD AUTO: 0.03 K/UL (ref 0–0.51)
EOSINOPHIL NFR BLD: 0.2 % (ref 0–6.9)
EPI CELLS #/AREA URNS HPF: NEGATIVE /HPF
ERYTHROCYTE [DISTWIDTH] IN BLOOD BY AUTOMATED COUNT: 50.5 FL (ref 35.9–50)
GFR SERPLBLD CREATININE-BSD FMLA CKD-EPI: 93 ML/MIN/1.73 M 2
GLUCOSE BLD STRIP.AUTO-MCNC: 135 MG/DL (ref 65–99)
GLUCOSE BLD STRIP.AUTO-MCNC: 191 MG/DL (ref 65–99)
GLUCOSE BLD STRIP.AUTO-MCNC: 224 MG/DL (ref 65–99)
GLUCOSE SERPL-MCNC: 201 MG/DL (ref 65–99)
GLUCOSE UR STRIP.AUTO-MCNC: NEGATIVE MG/DL
HCT VFR BLD AUTO: 44.8 % (ref 37–47)
HGB BLD-MCNC: 14 G/DL (ref 12–16)
HYALINE CASTS #/AREA URNS LPF: ABNORMAL /LPF
IMM GRANULOCYTES # BLD AUTO: 0.05 K/UL (ref 0–0.11)
IMM GRANULOCYTES NFR BLD AUTO: 0.4 % (ref 0–0.9)
KETONES UR STRIP.AUTO-MCNC: ABNORMAL MG/DL
LEUKOCYTE ESTERASE UR QL STRIP.AUTO: ABNORMAL
LYMPHOCYTES # BLD AUTO: 0.8 K/UL (ref 1–4.8)
LYMPHOCYTES NFR BLD: 5.9 % (ref 22–41)
MCH RBC QN AUTO: 25.3 PG (ref 27–33)
MCHC RBC AUTO-ENTMCNC: 31.3 G/DL (ref 32.2–35.5)
MCV RBC AUTO: 80.9 FL (ref 81.4–97.8)
MICRO URNS: ABNORMAL
MONOCYTES # BLD AUTO: 0.58 K/UL (ref 0–0.85)
MONOCYTES NFR BLD AUTO: 4.3 % (ref 0–13.4)
NEUTROPHILS # BLD AUTO: 12.07 K/UL (ref 1.82–7.42)
NEUTROPHILS NFR BLD: 88.7 % (ref 44–72)
NITRITE UR QL STRIP.AUTO: POSITIVE
NRBC # BLD AUTO: 0 K/UL
NRBC BLD-RTO: 0 /100 WBC (ref 0–0.2)
PATHOLOGY CONSULT NOTE: NORMAL
PH UR STRIP.AUTO: 5.5 [PH] (ref 5–8)
PLATELET # BLD AUTO: 202 K/UL (ref 164–446)
PMV BLD AUTO: 9.7 FL (ref 9–12.9)
POTASSIUM SERPL-SCNC: 4.4 MMOL/L (ref 3.6–5.5)
PROT UR QL STRIP: NEGATIVE MG/DL
RBC # BLD AUTO: 5.54 M/UL (ref 4.2–5.4)
RBC # URNS HPF: ABNORMAL /HPF
RBC UR QL AUTO: NEGATIVE
SODIUM SERPL-SCNC: 140 MMOL/L (ref 135–145)
SP GR UR STRIP.AUTO: >=1.045
UROBILINOGEN UR STRIP.AUTO-MCNC: 0.2 MG/DL
WBC # BLD AUTO: 13.6 K/UL (ref 4.8–10.8)
WBC #/AREA URNS HPF: ABNORMAL /HPF

## 2024-06-05 PROCEDURE — 36415 COLL VENOUS BLD VENIPUNCTURE: CPT

## 2024-06-05 PROCEDURE — 51798 US URINE CAPACITY MEASURE: CPT

## 2024-06-05 PROCEDURE — 85025 COMPLETE CBC W/AUTO DIFF WBC: CPT

## 2024-06-05 PROCEDURE — 99024 POSTOP FOLLOW-UP VISIT: CPT

## 2024-06-05 PROCEDURE — 82962 GLUCOSE BLOOD TEST: CPT | Mod: 91

## 2024-06-05 PROCEDURE — 81001 URINALYSIS AUTO W/SCOPE: CPT

## 2024-06-05 PROCEDURE — 770001 HCHG ROOM/CARE - MED/SURG/GYN PRIV*

## 2024-06-05 PROCEDURE — 80048 BASIC METABOLIC PNL TOTAL CA: CPT

## 2024-06-05 PROCEDURE — 700105 HCHG RX REV CODE 258

## 2024-06-05 RX ORDER — OMEPRAZOLE 20 MG/1
20 CAPSULE, DELAYED RELEASE ORAL 2 TIMES DAILY
Status: DISCONTINUED | OUTPATIENT
Start: 2024-06-05 | End: 2024-06-08 | Stop reason: HOSPADM

## 2024-06-05 RX ORDER — PANTOPRAZOLE SODIUM 40 MG/1
40 TABLET, DELAYED RELEASE ORAL 2 TIMES DAILY
Status: DISCONTINUED | OUTPATIENT
Start: 2024-06-05 | End: 2024-06-05

## 2024-06-05 RX ORDER — ROSUVASTATIN CALCIUM 10 MG/1
10 TABLET, COATED ORAL EVERY EVENING
Status: DISCONTINUED | OUTPATIENT
Start: 2024-06-05 | End: 2024-06-08 | Stop reason: HOSPADM

## 2024-06-05 RX ADMIN — SODIUM CHLORIDE, POTASSIUM CHLORIDE, SODIUM LACTATE AND CALCIUM CHLORIDE: 600; 310; 30; 20 INJECTION, SOLUTION INTRAVENOUS at 20:42

## 2024-06-05 SDOH — ECONOMIC STABILITY: TRANSPORTATION INSECURITY
IN THE PAST 12 MONTHS, HAS THE LACK OF TRANSPORTATION KEPT YOU FROM MEDICAL APPOINTMENTS OR FROM GETTING MEDICATIONS?: NO

## 2024-06-05 SDOH — ECONOMIC STABILITY: TRANSPORTATION INSECURITY
IN THE PAST 12 MONTHS, HAS LACK OF RELIABLE TRANSPORTATION KEPT YOU FROM MEDICAL APPOINTMENTS, MEETINGS, WORK OR FROM GETTING THINGS NEEDED FOR DAILY LIVING?: NO

## 2024-06-05 ASSESSMENT — LIFESTYLE VARIABLES
HAVE PEOPLE ANNOYED YOU BY CRITICIZING YOUR DRINKING: NO
ON A TYPICAL DAY WHEN YOU DRINK ALCOHOL HOW MANY DRINKS DO YOU HAVE: 0
HOW MANY TIMES IN THE PAST YEAR HAVE YOU HAD 5 OR MORE DRINKS IN A DAY: 0
DOES PATIENT WANT TO STOP DRINKING: NO
EVER HAD A DRINK FIRST THING IN THE MORNING TO STEADY YOUR NERVES TO GET RID OF A HANGOVER: NO
CONSUMPTION TOTAL: NEGATIVE
EVER FELT BAD OR GUILTY ABOUT YOUR DRINKING: NO
TOTAL SCORE: 0
HAVE YOU EVER FELT YOU SHOULD CUT DOWN ON YOUR DRINKING: NO
ALCOHOL_USE: NO
AVERAGE NUMBER OF DAYS PER WEEK YOU HAVE A DRINK CONTAINING ALCOHOL: 0

## 2024-06-05 ASSESSMENT — COGNITIVE AND FUNCTIONAL STATUS - GENERAL
MOBILITY SCORE: 20
SUGGESTED CMS G CODE MODIFIER MOBILITY: CJ
HELP NEEDED FOR BATHING: A LITTLE
WALKING IN HOSPITAL ROOM: A LITTLE
CLIMB 3 TO 5 STEPS WITH RAILING: A LITTLE
MOVING TO AND FROM BED TO CHAIR: A LITTLE
DAILY ACTIVITIY SCORE: 22
SUGGESTED CMS G CODE MODIFIER DAILY ACTIVITY: CJ
STANDING UP FROM CHAIR USING ARMS: A LITTLE
TOILETING: A LITTLE

## 2024-06-05 ASSESSMENT — PAIN DESCRIPTION - PAIN TYPE
TYPE: SURGICAL PAIN
TYPE: ACUTE PAIN
TYPE: SURGICAL PAIN
TYPE: SURGICAL PAIN
TYPE: ACUTE PAIN
TYPE: SURGICAL PAIN
TYPE: SURGICAL PAIN
TYPE: ACUTE PAIN
TYPE: SURGICAL PAIN
TYPE: ACUTE PAIN

## 2024-06-05 ASSESSMENT — SOCIAL DETERMINANTS OF HEALTH (SDOH)
WITHIN THE PAST 12 MONTHS, THE FOOD YOU BOUGHT JUST DIDN'T LAST AND YOU DIDN'T HAVE MONEY TO GET MORE: NEVER TRUE
WITHIN THE LAST YEAR, HAVE YOU BEEN AFRAID OF YOUR PARTNER OR EX-PARTNER?: NO
IN THE PAST 12 MONTHS, HAS THE ELECTRIC, GAS, OIL, OR WATER COMPANY THREATENED TO SHUT OFF SERVICE IN YOUR HOME?: NO
WITHIN THE LAST YEAR, HAVE YOU BEEN HUMILIATED OR EMOTIONALLY ABUSED IN OTHER WAYS BY YOUR PARTNER OR EX-PARTNER?: NO
WITHIN THE LAST YEAR, HAVE YOU BEEN KICKED, HIT, SLAPPED, OR OTHERWISE PHYSICALLY HURT BY YOUR PARTNER OR EX-PARTNER?: NO
WITHIN THE LAST YEAR, HAVE TO BEEN RAPED OR FORCED TO HAVE ANY KIND OF SEXUAL ACTIVITY BY YOUR PARTNER OR EX-PARTNER?: NO
WITHIN THE PAST 12 MONTHS, YOU WORRIED THAT YOUR FOOD WOULD RUN OUT BEFORE YOU GOT THE MONEY TO BUY MORE: NEVER TRUE

## 2024-06-05 ASSESSMENT — COPD QUESTIONNAIRES
DURING THE PAST 4 WEEKS HOW MUCH DID YOU FEEL SHORT OF BREATH: NONE/LITTLE OF THE TIME
DO YOU EVER COUGH UP ANY MUCUS OR PHLEGM?: NO/ONLY WITH OCCASIONAL COLDS OR INFECTIONS
COPD SCREENING SCORE: 4
HAVE YOU SMOKED AT LEAST 100 CIGARETTES IN YOUR ENTIRE LIFE: YES

## 2024-06-05 ASSESSMENT — PAIN SCALES - GENERAL: PAIN_LEVEL: 0

## 2024-06-05 ASSESSMENT — PATIENT HEALTH QUESTIONNAIRE - PHQ9
2. FEELING DOWN, DEPRESSED, IRRITABLE, OR HOPELESS: NOT AT ALL
SUM OF ALL RESPONSES TO PHQ9 QUESTIONS 1 AND 2: 0
1. LITTLE INTEREST OR PLEASURE IN DOING THINGS: NOT AT ALL

## 2024-06-05 NOTE — OR NURSING
2123- pt brought out from OR in stable condition. Report received from Circulator RN and Anesthesiologist. Patient resting comfortably in bed, eyes closed, even and unlabored respirations noted, monitoring in place. Oral airway in place.     2134- called son, Ba, provided update. No changes in patient condition.    2145 - oral airway dc'd, pt changed into gown, new abdominal binder applied, dressing appears to be clean, dry and intact.     2158- attempted to bring son to bedside, not in waiting area at this time. Pt updated on POC.     2207- son at bedside at this time. Patient resting comfortably in bed, even and unlabored respirations noted, monitoring in place. Denies additional questions or needs at this time.     2215- Patient stood at bedside and walked approximately 5 ft to hospital bed. Pt reports incontinence, placed on purewick for comfort. Pt resting on new bed, denies additional questions or needs. Provided pacu care pouch for comfort.     2300- Patient resting comfortably in bed, eyes closed, even and unlabored respirations noted, monitoring in place.    0000- Patient resting comfortably in bed, eyes closed, even and unlabored respirations noted, monitoring in place.    0100- No change in patient condition    0200- Patient resting in bed with eyes closed, even and unlabored respirations noted.    0320- lab contacted for morning draw. Patient resting comfortably in bed, even and unlabored respirations noted, monitoring in place. Denies additional questions or needs at this time. Pt awake talking to RN at this time. Patient bladder scanned at this time and noted to have 746 mls in bladder. Pt unable to urinate at this time. Jaed placed per protocol.    0400- phlebotomist to bedside, labs and ua sent. No changes in pt condition.     0500- Patient resting comfortably in bed, eyes closed, even and unlabored respirations noted, monitoring in place.    0600- No change in pt condition    0700- Patient  resting comfortably in bed, even and unlabored respirations noted, monitoring in place. Denies additional questions or needs at this time. Bedside report to Dennise HEATH, pt care transferred at this time. Abdominal surgery dressing is clean, dry and intact and visualized for handoff. Abdominal binder back in place.

## 2024-06-05 NOTE — ED NOTES
ERP at bedside to attempt to reduce pt's hernia. RN, and RT at bedside for procedural sedation. Pt tolerated well.

## 2024-06-05 NOTE — ANESTHESIA PREPROCEDURE EVALUATION
Case: [id number] Date/Time: 06/04/24 1843    Procedures:       REPAIR, HERNIA, UMBILICAL      LAPAROTOMY, EXPLORATORY    Location: [facility] OR 10 / SURGERY [facility]    Surgeons: Karl Carmona M.D.            Relevant Problems   PULMONARY   (positive) SOB (shortness of breath) on exertion      CARDIAC   (positive) SOB (shortness of breath) on exertion      GI   (positive) Gastroesophageal reflux disease without esophagitis       Physical Exam    Airway   Mallampati: III  TM distance: >3 FB  Neck ROM: limited       Cardiovascular - normal exam  Rhythm: regular  Rate: normal  (-) murmur     Dental - normal exam           Pulmonary - normal exam  Breath sounds clear to auscultation     Abdominal   (+) obese     Neurological - normal exam         Other findings: Acute bowel obstruction, sepsis, lactic acidisis              Anesthesia Plan    ASA 3- EMERGENT       Plan - general       Airway plan will be ETT          Induction: intravenous    Postoperative Plan: Postoperative administration of opioids is intended.    Pertinent diagnostic labs and testing reviewed    Informed Consent:    Anesthetic plan and risks discussed with patient.    Use of blood products discussed with: patient whom consented to blood products.

## 2024-06-05 NOTE — OR NURSING
0700 Report received and assumed c/o patient.     0800 Ambulated in PACU with use of walker. VSS and denies pain or nausea.     1100 Son updated and patient is ready to transfer to floor

## 2024-06-05 NOTE — ANESTHESIA PROCEDURE NOTES
Airway    Date/Time: 6/4/2024 7:54 PM    Performed by: Nicolás Srinivasan III, M.D.  Authorized by: Nicolás Srinivasan III, M.D.    Location:  OR  Urgency:  Elective  Difficult Airway: No    Indications for Airway Management:  Anesthesia      Spontaneous Ventilation: absent    Sedation Level:  Deep  Preoxygenated: Yes    Patient Position:  Sniffing  Final Airway Type:  Endotracheal airway  Final Endotracheal Airway:  ETT  Cuffed: Yes    Technique Used for Successful ETT Placement:  Direct laryngoscopy    Insertion Site:  Oral  Blade Type:  Huertas  Laryngoscope Blade/Videolaryngoscope Blade Size:  2  ETT Size (mm):  7.5  Measured from:  Lips  ETT to Lips (cm):  22  Placement Verified by: auscultation and capnometry    Cormack-Lehane Classification:  Grade III - view of epiglottis only  Number of Attempts at Approach:  1

## 2024-06-05 NOTE — ANESTHESIA POSTPROCEDURE EVALUATION
Patient: Lawanda Pagan    Procedure Summary       Date: 06/04/24 Room / Location: [place] [address] / SURGERY [facility]    Anesthesia Start: 1941 Anesthesia Stop: 2127    Procedures:       REPAIR, HERNIA, UMBILICAL (Abdomen)      LAPAROTOMY, EXPLORATORY (Abdomen)      OMENTECTOMY (Abdomen)      SMALL BOWEL RESECTION (Abdomen) Diagnosis: (STRANGULATED VENTRAL HERNIA)    Surgeons: Karl Carmona M.D. Responsible Provider: Nicolás Srinivasan III, M.D.    Anesthesia Type: general ASA Status: 3 - Emergent            Final Anesthesia Type: general  Last vitals  BP   Blood Pressure : 128/71    Temp   37 °C (98.6 °F)    Pulse   80   Resp   18    SpO2   92 %      Anesthesia Post Evaluation    Patient location during evaluation: PACU  Patient participation: complete - patient participated  Level of consciousness: awake and alert  Pain score: 0    Airway patency: patent  Anesthetic complications: no  Cardiovascular status: hemodynamically stable  Respiratory status: acceptable  Hydration status: euvolemic    PONV: none          No notable events documented.     Nurse Pain Score: 0 (NPRS)

## 2024-06-05 NOTE — PROGRESS NOTES
"  DATE: 6/5/2024    Hospital Day 1  strangulated umbilical hernia .    Postoperative Day 1 Exploratory laparotomy, small bowl resection, & primary repair of umbilical hernia defect.    INTERVAL EVENTS:  No flatus achieved postoperatively.    PHYSICAL EXAMINATION:  Vital Signs: /71   Pulse 80   Temp 37 °C (98.6 °F) (Oral)   Resp 18   Ht 1.626 m (5' 4\")   Wt 81.6 kg (180 lb)   SpO2 92%     Alert. No acute distress.   Unlabored respirations.   NG tube in place.  Abdomen soft & non distended.   Minimal incisional tenderness.   Midline abdominal incision with dressing clean, dry, & intact.    LABORATORY VALUES:  Recent Labs     06/04/24  1345 06/05/24  0406   WBC 7.7 13.6*   RBC 5.74* 5.54*   HEMOGLOBIN 14.5 14.0   HEMATOCRIT 46.2 44.8   MCV 80.5* 80.9*   MCH 25.3* 25.3*   MCHC 31.4* 31.3*   RDW 49.8 50.5*   PLATELETCT 235 202   MPV 9.4 9.7     Recent Labs     06/04/24  1345 06/05/24  0406   SODIUM 141 140   POTASSIUM 4.0 4.4   CHLORIDE 105 104   CO2 19* 19*   GLUCOSE 183* 201*   BUN 13 13   CREATININE 0.75 0.60   CALCIUM 9.6 8.9     Recent Labs     06/04/24  1345   ASTSGOT 27   ALTSGPT 17   TBILIRUBIN 0.3   ALKPHOSPHAT 138*   GLOBULIN 3.3       ASSESSMENT AND PLAN:  Strangulated umbilical hernia s/p exploratory laparotomy, small bowel resection, & primary repair of umbilical hernia.    Continue NG tube to low continuous suction until greater return of GI function achieved.        ____________________________________     ZARA Johnson    "

## 2024-06-05 NOTE — ANESTHESIA PROCEDURE NOTES
Peripheral IV    Date/Time: 6/4/2024 8:10 PM    Performed by: Nicolás Srinivasan III, M.D.  Authorized by: Nicolás Srinivasan III, M.D.    Size:  18 G (short B Villasenor)  Laterality:  Left  Peripheral IV Location:  Hand  Local Anesthetic:  None  Site Prep:  Alcohol  Technique:  Direct puncture  Attempts:  3

## 2024-06-05 NOTE — H&P
DATE OF CONSULTATION:  2024     REFERRING PHYSICIAN:   JUAN Martell     CONSULTING PHYSICIAN:  Karl Carmona M.D.     REASON FOR CONSULTATION:  I have been asked by Dr. Martell to see the patient in surgical consultation for evaluation of ventral hernia.    HISTORY OF PRESENT ILLNESS: The patient is a 73 year-old White elderly woman who presents to the Emergency Department with an 8hour history of moderate periumbilical abdominal pain. The pain is associated with nausea and shortness of breath. The patient denies any recent or intercurrent illness. The patient has undergone  section in the past. .     She has a prior admission here in February for shortness of breath.  Her workup including echocardiogram was normal except for anemia.  She was taking Warfarin but this was stopped in February.  Her Hgb is much improved today with the addition of home iron supplement use.  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    PAST SURGICAL HISTORY:  has a past surgical history that includes other abdominal surgery (); tonsillectomy; gyn surgery (); gastroscopy (N/A, 2017); and endoscopy procedure (N/A, 2024).    ALLERGIES:   Allergies   Allergen Reactions    Other Environmental Runny Nose     Grass and pollens       CURRENT MEDICATIONS:    Home Medications       Reviewed by Pamela Lemus R.N. (Registered Nurse) on 24 at 1318  Med List Status: Partial     Medication Last Dose Status   ACETAMINOPHEN PO  Active   anastrozole (ARIMIDEX) 1 MG Tab  Active   Cyanocobalamin (B-12) 1000 MCG Tab  Active   ferrous sulfate 325 (65 Fe) MG tablet  Active   pantoprazole (PROTONIX) 40 MG  Tablet Delayed Response  Active   rosuvastatin (CRESTOR) 10 MG Tab  Active   vitamin D (CHOLECALCIFEROL) 1000 Unit Tab  Active                  Audit from Redirected Encounters    **Home medications have not yet been reviewed for this encounter**         FAMILY HISTORY: family history includes Cancer in her brother, brother, father, and maternal grandmother; Melanoma in her mother; Other in her mother; Stroke in her brother.    SOCIAL HISTORY:  reports that she quit smoking about 50 years ago. Her smoking use included cigarettes. She started smoking about 51 years ago. She has a 0.2 pack-year smoking history. She has never used smokeless tobacco. She reports that she does not drink alcohol and does not use drugs.    REVIEW OF SYSTEMS: Review of systems is remarkable for the following abdominal pain and shortness of breath. The remainder of the comprehensive ROS is negative with the exception of the aforementioned HPI, PMH, and PSH bullets in accordance with CMS guideline.    PHYSICAL EXAMINATION:    Physical Exam  Vitals and nursing note reviewed. Exam conducted with a chaperone present.   Constitutional:       Appearance: Normal appearance. She is not ill-appearing.   HENT:      Head: Normocephalic.      Right Ear: External ear normal.      Left Ear: External ear normal.      Nose: Nose normal.      Mouth/Throat:      Mouth: Mucous membranes are dry.      Pharynx: Oropharynx is clear.   Eyes:      Conjunctiva/sclera: Conjunctivae normal.      Pupils: Pupils are equal, round, and reactive to light.   Cardiovascular:      Rate and Rhythm: Normal rate and regular rhythm.      Pulses: Normal pulses.   Pulmonary:      Effort: Pulmonary effort is normal. No respiratory distress.      Breath sounds: No wheezing.   Abdominal:      Comments: No peritonitis.  Focal tenderness and bulge just superior to the umbilicus.  Bulge is firm and non-mobile.  Minimal erythema of the overlying skin   Musculoskeletal:         General: No  swelling or tenderness.      Cervical back: Normal range of motion and neck supple.      Right lower leg: No edema.      Left lower leg: No edema.   Skin:     General: Skin is warm and dry.      Capillary Refill: Capillary refill takes less than 2 seconds.   Neurological:      General: No focal deficit present.      Mental Status: She is alert and oriented to person, place, and time.   Psychiatric:         Mood and Affect: Mood normal.         LABORATORY VALUES:   Recent Labs     06/04/24  1345   WBC 7.7   RBC 5.74*   HEMOGLOBIN 14.5   HEMATOCRIT 46.2   MCV 80.5*   MCH 25.3*   MCHC 31.4*   RDW 49.8   PLATELETCT 235   MPV 9.4     Recent Labs     06/04/24  1345   SODIUM 141   POTASSIUM 4.0   CHLORIDE 105   CO2 19*   GLUCOSE 183*   BUN 13   CREATININE 0.75   CALCIUM 9.6     Recent Labs     06/04/24  1345   ASTSGOT 27   ALTSGPT 17   TBILIRUBIN 0.3   ALKPHOSPHAT 138*   GLOBULIN 3.3            IMAGING:   CT-ABDOMEN-PELVIS WITH    (Results Pending)       ASSESSMENT AND PLAN:   Ventral hernia - incarcerated    - To OR today for ventral hernia repair, possible exploratory laparotomy, possible small bowel resection    Full discussion of procedural details, risks, and alternative treatment strategies reviewed with patient.  Reviewed risks which include but are not limited to infection, bleeding, scars, and specifically rare but serious risk of injury to nearby structures which might necessitate additional procedures or major surgery to repair.  All patient questions answered.  Patient states understanding and provides consent for surgery as planned.          DISPOSITION[facility].     ____________________________________     Karl Carmona M.D.    DD: 6/4/2024  5:25 PM    AAST Grading System for EGS Conditions  ACS NSQIP Surgical Risk Calculator

## 2024-06-05 NOTE — CARE PLAN
The patient is Stable - Low risk of patient condition declining or worsening         Progress made toward(s) clinical / shift goals:    Problem: Fall Risk  Goal: Patient will remain free from falls  Description: Target End Date:  Prior to discharge or change in level of care    Document interventions on the Dalia Kumar Fall Risk Assessment    1.  Assess for fall risk factors  2.  Implement fall precautions  Outcome: Progressing     Problem: Knowledge Deficit - Standard  Goal: Patient and family/care givers will demonstrate understanding of plan of care, disease process/condition, diagnostic tests and medications  Description: Target End Date:  1-3 days or as soon as patient condition allows    Document in Patient Education    1.  Patient and family/caregiver oriented to unit, equipment, visitation policy and means for communicating concern  2.  Complete/review Learning Assessment  3.  Assess knowledge level of disease process/condition, treatment plan, diagnostic tests and medications  4.  Explain disease process/condition, treatment plan, diagnostic tests and medications  Outcome: Progressing

## 2024-06-05 NOTE — OP REPORT
DATE OF OPERATION:  6/4/2024    PREOPERATIVE DIAGNOSIS:  Incarcerated ventral hernia    POSTOPERATIVE DIAGNOSIS: Strangulated umbilical hernia    PROCEDURE PERFORMED:  .  Exploratory laparotomy  Small bowel resection  Primary suture repair of umbilical hernia defect    SURGEON:    Karl Carmona M.D.      ANESTHESIOLOGIST:  Nathanael Srinivasan M.D.    ANESTHESIA:   General endotracheal anesthesia.    ASA CLASSIFICATION:  III. Emergent.    INDICATIONS: The patient is a 73 year-old woman with exam and imaging findings consistent with incarcerated ventral hernia. She is taken to the operating room for exploration and possible repair.    FINDINGS: Large supraumbilical hernia defect with majority of omentum herniated into it.  Significant reactive fluid within sac.  Omentum necrotic.  4 cm segment of small bowel also necrotic due to closed loop obstruction.             WOUND CLASSIFICATION:  Class IV, Dirty or Infected.    SPECIMEN:       1) Omentum  2) Small bowel resection.    ESTIMATED BLOOD LOSS:  50 mL.    PROCEDURE: Following informed consent consent, the patient was properly identified, taken to the operating room and placed in supine position where a general endotracheal anesthesia administered. Intravenous antibiotics were administered by the anesthesiologist in correct time interval. The patient voided prior to surgery. A urinary catheter was not placed. Sequential compression devices were employed. The abdomen was widely prepped and draped into a sterile field.    A small midline incision was made overlying the hernia.  This was taken down through the very thin dermis using cautery.  The hernia sac was violated during dissection causing copious drainage of reactive ascites like fluid.  The hernia sac was circumferentially cleared down to the level of the fascia.  Near the neck of the hernia, through the sac, necrotic small intestine was visible.  The midline incision was extended several centimeters  cephalad.  The fascia was opened along the linea alba.  With the fascia opened, the hernia was reduced back into the abdomen.  The majority of the omentum was within the hernia and was necrotic, inflamed, and bleeding.  This was partially amputated using the Ligasure device.  The small intestine was then eviscerated and ran in a systematic fashion from the ligament of Treitz to the terminal ileum.  A 4cm segment of necrotic intestine was identified.         A small bowel resection was carried out. The jejunum was resected proximal and distal to the the jejunal segment of nonviable bowel with multiple firings of an ETHICON PROXIMATE® 75 mm Linear Cutter stapler with Blue Regular staple loads.  The mesentery to the necrotic segment was divided with the Ligasure device and specimen passed off the field A functional end-to-end stapled anastomosis was carried out with a single firings of an ETHICON PROXIMATE® 75 mm Linear Cutter stapler with Blue Regular staple loads.  The common enterotomy was closed with an additional Blue load. The The mesenteric defect was closed with running 3-0 Vicryl.  Final inspection of the anastomosis demonstrated a widely patent lumen, no tension across the anastomosis, and satisfactory hemostasis.     The abdominal contents were returned to the normal anatomic position.  The fascia was closed with a series of simple interrupted #0 Vicryl suture.  The large area of dead space where the hernia sac was previously was loosely re-approximated using deep interrupted 3-0 vicryl sutures.  The dermal layer was also partially closed with 3-0 Vicryl sutures and skin with staples.    The patient tolerated the procedure well, and there were no apparent complications. All sponge, needle, and instrument counts were correct on 2 separate occasions. The patient was awakened, extubated, and transferred to  to the post anesthesia care unit (PACU) in satisfactory condition.        ____________________________________     Karl Carmona M.D.    DD: 6/4/2024  9:24 PM

## 2024-06-05 NOTE — ANESTHESIA TIME REPORT
Anesthesia Start and Stop Event Times       Date Time Event    6/4/2024 1921 Ready for Procedure     1941 Anesthesia Start     2127 Anesthesia Stop          Responsible Staff  06/04/24      Name Role Begin End    Nicolás Srinivasan III, M.D. Anesth 1941 2127          Overtime Reason:  no overtime (within assigned shift)    Comments:

## 2024-06-06 LAB
ANION GAP SERPL CALC-SCNC: 11 MMOL/L (ref 7–16)
BASOPHILS # BLD AUTO: 0.2 % (ref 0–1.8)
BASOPHILS # BLD: 0.02 K/UL (ref 0–0.12)
BUN SERPL-MCNC: 15 MG/DL (ref 8–22)
CALCIUM SERPL-MCNC: 9.1 MG/DL (ref 8.5–10.5)
CHLORIDE SERPL-SCNC: 108 MMOL/L (ref 96–112)
CO2 SERPL-SCNC: 23 MMOL/L (ref 20–33)
CREAT SERPL-MCNC: 0.61 MG/DL (ref 0.5–1.4)
EOSINOPHIL # BLD AUTO: 0 K/UL (ref 0–0.51)
EOSINOPHIL NFR BLD: 0 % (ref 0–6.9)
ERYTHROCYTE [DISTWIDTH] IN BLOOD BY AUTOMATED COUNT: 51.8 FL (ref 35.9–50)
EST. AVERAGE GLUCOSE BLD GHB EST-MCNC: 146 MG/DL
GFR SERPLBLD CREATININE-BSD FMLA CKD-EPI: 93 ML/MIN/1.73 M 2
GLUCOSE BLD STRIP.AUTO-MCNC: 105 MG/DL (ref 65–99)
GLUCOSE BLD STRIP.AUTO-MCNC: 121 MG/DL (ref 65–99)
GLUCOSE BLD STRIP.AUTO-MCNC: 132 MG/DL (ref 65–99)
GLUCOSE BLD STRIP.AUTO-MCNC: 94 MG/DL (ref 65–99)
GLUCOSE SERPL-MCNC: 130 MG/DL (ref 65–99)
HBA1C MFR BLD: 6.7 % (ref 4–5.6)
HCT VFR BLD AUTO: 36.3 % (ref 37–47)
HGB BLD-MCNC: 11.6 G/DL (ref 12–16)
HGB BLD-MCNC: 12 G/DL (ref 12–16)
IMM GRANULOCYTES # BLD AUTO: 0.04 K/UL (ref 0–0.11)
IMM GRANULOCYTES NFR BLD AUTO: 0.4 % (ref 0–0.9)
LYMPHOCYTES # BLD AUTO: 1.91 K/UL (ref 1–4.8)
LYMPHOCYTES NFR BLD: 17.6 % (ref 22–41)
MAGNESIUM SERPL-MCNC: 2.1 MG/DL (ref 1.5–2.5)
MCH RBC QN AUTO: 25.5 PG (ref 27–33)
MCHC RBC AUTO-ENTMCNC: 32 G/DL (ref 32.2–35.5)
MCV RBC AUTO: 79.8 FL (ref 81.4–97.8)
MONOCYTES # BLD AUTO: 0.7 K/UL (ref 0–0.85)
MONOCYTES NFR BLD AUTO: 6.5 % (ref 0–13.4)
NEUTROPHILS # BLD AUTO: 8.17 K/UL (ref 1.82–7.42)
NEUTROPHILS NFR BLD: 75.3 % (ref 44–72)
NRBC # BLD AUTO: 0 K/UL
NRBC BLD-RTO: 0 /100 WBC (ref 0–0.2)
PHOSPHATE SERPL-MCNC: 3.1 MG/DL (ref 2.5–4.5)
PLATELET # BLD AUTO: 189 K/UL (ref 164–446)
PMV BLD AUTO: 10 FL (ref 9–12.9)
POTASSIUM SERPL-SCNC: 4.1 MMOL/L (ref 3.6–5.5)
RBC # BLD AUTO: 4.55 M/UL (ref 4.2–5.4)
SODIUM SERPL-SCNC: 142 MMOL/L (ref 135–145)
WBC # BLD AUTO: 10.8 K/UL (ref 4.8–10.8)

## 2024-06-06 PROCEDURE — 82962 GLUCOSE BLOOD TEST: CPT | Mod: 91

## 2024-06-06 PROCEDURE — 770001 HCHG ROOM/CARE - MED/SURG/GYN PRIV*

## 2024-06-06 PROCEDURE — 700102 HCHG RX REV CODE 250 W/ 637 OVERRIDE(OP)

## 2024-06-06 PROCEDURE — 700105 HCHG RX REV CODE 258

## 2024-06-06 PROCEDURE — 94669 MECHANICAL CHEST WALL OSCILL: CPT

## 2024-06-06 PROCEDURE — A9270 NON-COVERED ITEM OR SERVICE: HCPCS

## 2024-06-06 PROCEDURE — 84100 ASSAY OF PHOSPHORUS: CPT

## 2024-06-06 PROCEDURE — 80048 BASIC METABOLIC PNL TOTAL CA: CPT

## 2024-06-06 PROCEDURE — 85025 COMPLETE CBC W/AUTO DIFF WBC: CPT

## 2024-06-06 PROCEDURE — 83036 HEMOGLOBIN GLYCOSYLATED A1C: CPT

## 2024-06-06 PROCEDURE — 99024 POSTOP FOLLOW-UP VISIT: CPT

## 2024-06-06 PROCEDURE — 700111 HCHG RX REV CODE 636 W/ 250 OVERRIDE (IP): Mod: JZ

## 2024-06-06 PROCEDURE — 36415 COLL VENOUS BLD VENIPUNCTURE: CPT

## 2024-06-06 PROCEDURE — 83735 ASSAY OF MAGNESIUM: CPT

## 2024-06-06 PROCEDURE — 85018 HEMOGLOBIN: CPT

## 2024-06-06 RX ORDER — METHOCARBAMOL 500 MG/1
500 TABLET, FILM COATED ORAL 3 TIMES DAILY
Status: DISCONTINUED | OUTPATIENT
Start: 2024-06-06 | End: 2024-06-08 | Stop reason: HOSPADM

## 2024-06-06 RX ORDER — ENOXAPARIN SODIUM 100 MG/ML
40 INJECTION SUBCUTANEOUS DAILY
Status: DISCONTINUED | OUTPATIENT
Start: 2024-06-06 | End: 2024-06-08 | Stop reason: HOSPADM

## 2024-06-06 RX ORDER — ACETAMINOPHEN 325 MG/1
650 TABLET ORAL 4 TIMES DAILY
Status: DISCONTINUED | OUTPATIENT
Start: 2024-06-06 | End: 2024-06-08 | Stop reason: HOSPADM

## 2024-06-06 RX ORDER — GABAPENTIN 100 MG/1
100 CAPSULE ORAL 3 TIMES DAILY
Status: DISCONTINUED | OUTPATIENT
Start: 2024-06-06 | End: 2024-06-08 | Stop reason: HOSPADM

## 2024-06-06 RX ADMIN — ACETAMINOPHEN 650 MG: 325 TABLET, FILM COATED ORAL at 12:42

## 2024-06-06 RX ADMIN — ACETAMINOPHEN 650 MG: 325 TABLET, FILM COATED ORAL at 18:10

## 2024-06-06 RX ADMIN — METHOCARBAMOL 500 MG: 500 TABLET ORAL at 18:06

## 2024-06-06 RX ADMIN — SODIUM CHLORIDE, POTASSIUM CHLORIDE, SODIUM LACTATE AND CALCIUM CHLORIDE: 600; 310; 30; 20 INJECTION, SOLUTION INTRAVENOUS at 05:23

## 2024-06-06 RX ADMIN — OMEPRAZOLE 20 MG: 20 CAPSULE, DELAYED RELEASE ORAL at 18:06

## 2024-06-06 RX ADMIN — METHOCARBAMOL 500 MG: 500 TABLET ORAL at 12:42

## 2024-06-06 RX ADMIN — METFORMIN HYDROCHLORIDE 500 MG: 500 TABLET ORAL at 18:06

## 2024-06-06 RX ADMIN — ACETAMINOPHEN 650 MG: 325 TABLET, FILM COATED ORAL at 19:56

## 2024-06-06 RX ADMIN — ROSUVASTATIN 10 MG: 10 TABLET, FILM COATED ORAL at 18:06

## 2024-06-06 RX ADMIN — GABAPENTIN 100 MG: 100 CAPSULE ORAL at 12:42

## 2024-06-06 RX ADMIN — ENOXAPARIN SODIUM 40 MG: 100 INJECTION SUBCUTANEOUS at 18:06

## 2024-06-06 RX ADMIN — ACETAMINOPHEN 650 MG: 325 TABLET, FILM COATED ORAL at 10:11

## 2024-06-06 RX ADMIN — GABAPENTIN 100 MG: 100 CAPSULE ORAL at 18:06

## 2024-06-06 RX ADMIN — SODIUM CHLORIDE, POTASSIUM CHLORIDE, SODIUM LACTATE AND CALCIUM CHLORIDE: 600; 310; 30; 20 INJECTION, SOLUTION INTRAVENOUS at 15:04

## 2024-06-06 RX ADMIN — METFORMIN HYDROCHLORIDE 500 MG: 500 TABLET ORAL at 10:12

## 2024-06-06 ASSESSMENT — PAIN DESCRIPTION - PAIN TYPE
TYPE: ACUTE PAIN

## 2024-06-06 ASSESSMENT — FIBROSIS 4 INDEX: FIB4 SCORE: 2.6

## 2024-06-06 NOTE — DISCHARGE PLANNING
HTH/SCP TCN chart review completed. Collaborated with CM prior to meeting with the pt. The most current review of medical record, knowledge of pt's PLOF and social support, LACE+ score of 73, 6 clicks scores of 20 mobility were considered.      Pt seen at bedside. Introduced TCN program. Provided education regarding post acute levels of care. Education provided regarding case management policy for blanket SNF referrals. Discussed HTH/SCP plan benefits. Pt verbalizes understanding.     Patient reports no concerns about discharge home, reports her son lives 2 blocks away from her, and can assist her whenever she needs.  Patient indicates that her son has been providing her rides.  Patient reports she has ambulated within room, and anticipates her mobility will be good enough to discharge home, however admits that her balance is somewhat impaired at this time.  Patient is agreeable to HH choice, however anticipates she will not need.  Additionally patient declined DME (FWW) choice as patient states she owns [employer] and while she has been using FWW in hospital, would prefer to use device she owns when she returns home.     Choice proactively obtained for  and faxed to [facility]. TCN will continue to follow and collaborate with discharge planning team as additional post acute needs arise. Thank you.     Completed today:  Choice obtained:   SCP with Non-[facility] PCP.

## 2024-06-06 NOTE — DIETARY
Nutrition Services: Diabetes Education Consult   Day 2 of admit.  Lawanda Pagan is a 73 y.o. female with admitting DX of Ventral hernia with bowel obstruction     RD able to visit pt at bedside to provide diabetes education. RD discussed MyPlate method with role of carbohydrates and benefit of non-starchy vegetables in diet, provided handout reinforcing topics discussed. Pt demonstrated readiness and evidence of learning. RD able to answer all questions to patient's satisfaction.     No other education needs identified at this time. Consider referral to outpatient nutrition services for continuation of education as indicated or per pt preferences.     Please re-consult RD as indicated.

## 2024-06-06 NOTE — PROGRESS NOTES
"  DATE: 6/6/2024    Hospital Day 2  strangulated umbilical hernia .    Postoperative Day 2 Exploratory laparotomy, small bowl resection, & primary repair of umbilical hernia defect.    INTERVAL EVENTS:  Hgb A1c 6.7, new diagnosis of diabetes discussed with patient.   Doing well postoperatively with minimal NG tube output.    PHYSICAL EXAMINATION:  Vital Signs: BP (!) 148/70   Pulse (!) 55   Temp 36.4 °C (97.5 °F) (Temporal)   Resp 16   Ht 1.626 m (5' 4\")   Wt 83.8 kg (184 lb 11.9 oz)   SpO2 97%     Alert & oriented. Afebrile. No acute distress.   Unlabored respirations.   NG tube in place.  Abdomen soft & non distended.   Minimal incisional tenderness.   Midline abdominal incision well approximated with staples.    LABORATORY VALUES:  Recent Labs     06/04/24  1345 06/05/24  0406 06/06/24  0019 06/06/24  0745   WBC 7.7 13.6* 10.8  --    RBC 5.74* 5.54* 4.55  --    HEMOGLOBIN 14.5 14.0 11.6* 12.0   HEMATOCRIT 46.2 44.8 36.3*  --    MCV 80.5* 80.9* 79.8*  --    MCH 25.3* 25.3* 25.5*  --    MCHC 31.4* 31.3* 32.0*  --    RDW 49.8 50.5* 51.8*  --    PLATELETCT 235 202 189  --    MPV 9.4 9.7 10.0  --      Recent Labs     06/04/24  1345 06/05/24  0406 06/06/24  0019   SODIUM 141 140 142   POTASSIUM 4.0 4.4 4.1   CHLORIDE 105 104 108   CO2 19* 19* 23   GLUCOSE 183* 201* 130*   BUN 13 13 15   CREATININE 0.75 0.60 0.61   CALCIUM 9.6 8.9 9.1     Recent Labs     06/04/24  1345   ASTSGOT 27   ALTSGPT 17   TBILIRUBIN 0.3   ALKPHOSPHAT 138*   GLOBULIN 3.3       ASSESSMENT AND PLAN:  Strangulated umbilical hernia with satisfactory progress following exploratory laparotomy, small bowel resection, & primary repair of umbilical hernia. The patient's postoperative hospital course is notable for a new diagnosis of type 2 diabetes.    - Clamp NG tube  - Okay for sips with medications  - Start metformin, continue ISS  - Diabetic educator consult  - Discontinue thornton  - Mobilize with PT & OT for discharge recs as patient may benefit " from home health since she lives alone.       ____________________________________     THO Johnson.

## 2024-06-06 NOTE — PROGRESS NOTES
Assumed care at 1845. Pt resting in bed  A&ox 4  NPO at this time  NG tube to LCWS  Jade catheter in place.  Up wth 1 person assist  Abd midline dressing CDI. Binder in place  Pain controlled  Call light within reach. Hourly rounding in place

## 2024-06-06 NOTE — CARE PLAN
Problem: Hyperinflation  Goal: Prevent or improve atelectasis  Description: Target End Date:  3 to 4 days    1. Instruct incentive spirometry usage  2.  Perform hyperinflation therapy as indicated  Outcome: Progressing        Respiratory Update  Treatment Modality: PEP  Frequency: QID  IS (NEW START)    Pt tolerating current treatments well with no adverse reactions, will continue to monitor

## 2024-06-06 NOTE — CARE PLAN
The patient is Stable - Low risk of patient condition declining or worsening    Shift Goals  Clinical Goals: up to chair for meals, ambulate hallway 100ft x3 today, manage NG  Patient Goals: get out of bed    Progress made toward(s) clinical / shift goals:  up to chair for lunch today, ambulate x2 500ft each time, NG removed    Patient is not progressing towards the following goals:      Problem: Fall Risk  Goal: Patient will remain free from falls  Outcome: Progressing     Problem: Knowledge Deficit - Standard  Goal: Patient and family/care givers will demonstrate understanding of plan of care, disease process/condition, diagnostic tests and medications  Outcome: Progressing     Problem: Skin Integrity  Goal: Skin integrity is maintained or improved  Outcome: Progressing

## 2024-06-06 NOTE — CARE PLAN
The patient is Stable - Low risk of patient condition declining or worsening    Shift Goals  Clinical Goals: mobilize. bowel rest  Patient Goals: pain control, comort    Progress made toward(s) clinical / shift goals:  NPO at this time. Encouraged to mobilize. Ng tube to LCWS    Patient is not progressing towards the following goals:    Problem: Hemodynamics  Goal: Patient's hemodynamics, fluid balance and neurologic status will be stable or improve  Outcome: Progressing     Problem: Urinary Elimination  Goal: Establish and maintain regular urinary output  Outcome: Progressing

## 2024-06-06 NOTE — PROGRESS NOTES
4 Eyes Skin Assessment Completed by IVANA dang and kimmy RN.    Head WDL  Ears WDL  Nose ; ng tube in place  Mouth WDL  Neck WDL  Breast/Chest WDL  Shoulder Blades WDL  Spine WDL  (R) Arm/Elbow/Hand WDL  (L) Arm/Elbow/Hand WDL  Abdomen Incision; island dressing. Binder n place  Groin ; thornton catheter in place  Scrotum/Coccyx/Buttocks Redness and Blanching  (R) Leg WDL  (L) Leg WDL  (R) Heel/Foot/Toe WDL  (L) Heel/Foot/Toe WDL          Devices In Places Blood Pressure Cuff, Pulse Ox, Thornton, SCD's, OG/NG, and Nasal Cannula      Interventions In Place NC W/Ear Foams, Pillows, and Pressure Redistribution Mattress    Possible Skin Injury No    Pictures Uploaded Into Epic N/A  Wound Consult Placed N/A  RN Wound Prevention Protocol Ordered No

## 2024-06-06 NOTE — PROGRESS NOTES
Assumed care of patient at 0700. Patient is alert and oriented, respirations are unlabored and regular on 1L02, patient taken off oxygen and saturation at 92%, educated on use of IS, 1250mL. Lung sounds clear throughout, diminished in bases. Abdomen soft, tender, no bowel sounds heard, no flatus, no BM, right nare NG tube, clamped at this time, midline incision with staples MIRNA, CDI, abdominal binder in place. Patient states pain at 2, declines intervention. Bed in lowest position, call light within reach, patient states no needs at this time.

## 2024-06-06 NOTE — CARE PLAN
Problem: Hyperinflation  Goal: Prevent or improve atelectasis  Description: Target End Date:  3 to 4 days    1. Instruct incentive spirometry usage  2.  Perform hyperinflation therapy as indicated  Flowsheets (Taken 6/5/2024 2000 by Silas Doyle RRT)  Hyperinflation Protocol Goals/Outcome: Increase and/or stable inspiratory capacity for 24 hours  Hyperinflation Protocol Indications: Abdominal/Thoracic Surgeries (Open Heart)  Note:     Respiratory Update    Treatment modality: PEP  Frequency:QID    Pt tolerating current treatments well with no adverse reactions.

## 2024-06-07 LAB
ANION GAP SERPL CALC-SCNC: 10 MMOL/L (ref 7–16)
BASOPHILS # BLD AUTO: 0.3 % (ref 0–1.8)
BASOPHILS # BLD: 0.02 K/UL (ref 0–0.12)
BUN SERPL-MCNC: 15 MG/DL (ref 8–22)
CALCIUM SERPL-MCNC: 8.8 MG/DL (ref 8.5–10.5)
CHLORIDE SERPL-SCNC: 108 MMOL/L (ref 96–112)
CO2 SERPL-SCNC: 24 MMOL/L (ref 20–33)
CREAT SERPL-MCNC: 0.58 MG/DL (ref 0.5–1.4)
EOSINOPHIL # BLD AUTO: 0.06 K/UL (ref 0–0.51)
EOSINOPHIL NFR BLD: 0.9 % (ref 0–6.9)
ERYTHROCYTE [DISTWIDTH] IN BLOOD BY AUTOMATED COUNT: 51.7 FL (ref 35.9–50)
GFR SERPLBLD CREATININE-BSD FMLA CKD-EPI: 94 ML/MIN/1.73 M 2
GLUCOSE BLD STRIP.AUTO-MCNC: 104 MG/DL (ref 65–99)
GLUCOSE BLD STRIP.AUTO-MCNC: 95 MG/DL (ref 65–99)
GLUCOSE BLD STRIP.AUTO-MCNC: 95 MG/DL (ref 65–99)
GLUCOSE BLD STRIP.AUTO-MCNC: 98 MG/DL (ref 65–99)
GLUCOSE SERPL-MCNC: 100 MG/DL (ref 65–99)
HCT VFR BLD AUTO: 35.9 % (ref 37–47)
HGB BLD-MCNC: 11.4 G/DL (ref 12–16)
IMM GRANULOCYTES # BLD AUTO: 0.02 K/UL (ref 0–0.11)
IMM GRANULOCYTES NFR BLD AUTO: 0.3 % (ref 0–0.9)
LYMPHOCYTES # BLD AUTO: 2.2 K/UL (ref 1–4.8)
LYMPHOCYTES NFR BLD: 34 % (ref 22–41)
MCH RBC QN AUTO: 25.6 PG (ref 27–33)
MCHC RBC AUTO-ENTMCNC: 31.8 G/DL (ref 32.2–35.5)
MCV RBC AUTO: 80.5 FL (ref 81.4–97.8)
MONOCYTES # BLD AUTO: 0.49 K/UL (ref 0–0.85)
MONOCYTES NFR BLD AUTO: 7.6 % (ref 0–13.4)
NEUTROPHILS # BLD AUTO: 3.68 K/UL (ref 1.82–7.42)
NEUTROPHILS NFR BLD: 56.9 % (ref 44–72)
NRBC # BLD AUTO: 0 K/UL
NRBC BLD-RTO: 0 /100 WBC (ref 0–0.2)
PLATELET # BLD AUTO: 164 K/UL (ref 164–446)
PMV BLD AUTO: 9.8 FL (ref 9–12.9)
POTASSIUM SERPL-SCNC: 3.7 MMOL/L (ref 3.6–5.5)
RBC # BLD AUTO: 4.46 M/UL (ref 4.2–5.4)
SODIUM SERPL-SCNC: 142 MMOL/L (ref 135–145)
WBC # BLD AUTO: 6.5 K/UL (ref 4.8–10.8)

## 2024-06-07 PROCEDURE — 700102 HCHG RX REV CODE 250 W/ 637 OVERRIDE(OP)

## 2024-06-07 PROCEDURE — 97535 SELF CARE MNGMENT TRAINING: CPT

## 2024-06-07 PROCEDURE — 99024 POSTOP FOLLOW-UP VISIT: CPT

## 2024-06-07 PROCEDURE — 94760 N-INVAS EAR/PLS OXIMETRY 1: CPT

## 2024-06-07 PROCEDURE — 94669 MECHANICAL CHEST WALL OSCILL: CPT

## 2024-06-07 PROCEDURE — 770001 HCHG ROOM/CARE - MED/SURG/GYN PRIV*

## 2024-06-07 PROCEDURE — 85025 COMPLETE CBC W/AUTO DIFF WBC: CPT

## 2024-06-07 PROCEDURE — 80048 BASIC METABOLIC PNL TOTAL CA: CPT

## 2024-06-07 PROCEDURE — A9270 NON-COVERED ITEM OR SERVICE: HCPCS

## 2024-06-07 PROCEDURE — 700105 HCHG RX REV CODE 258

## 2024-06-07 PROCEDURE — 700111 HCHG RX REV CODE 636 W/ 250 OVERRIDE (IP): Mod: JZ

## 2024-06-07 PROCEDURE — 97165 OT EVAL LOW COMPLEX 30 MIN: CPT

## 2024-06-07 PROCEDURE — 97162 PT EVAL MOD COMPLEX 30 MIN: CPT

## 2024-06-07 PROCEDURE — 82962 GLUCOSE BLOOD TEST: CPT | Mod: 91

## 2024-06-07 RX ADMIN — GABAPENTIN 100 MG: 100 CAPSULE ORAL at 18:01

## 2024-06-07 RX ADMIN — GABAPENTIN 100 MG: 100 CAPSULE ORAL at 05:30

## 2024-06-07 RX ADMIN — ACETAMINOPHEN 650 MG: 325 TABLET, FILM COATED ORAL at 18:02

## 2024-06-07 RX ADMIN — ENOXAPARIN SODIUM 40 MG: 100 INJECTION SUBCUTANEOUS at 18:03

## 2024-06-07 RX ADMIN — SODIUM CHLORIDE, POTASSIUM CHLORIDE, SODIUM LACTATE AND CALCIUM CHLORIDE: 600; 310; 30; 20 INJECTION, SOLUTION INTRAVENOUS at 00:22

## 2024-06-07 RX ADMIN — ROSUVASTATIN 10 MG: 10 TABLET, FILM COATED ORAL at 20:52

## 2024-06-07 RX ADMIN — METFORMIN HYDROCHLORIDE 500 MG: 500 TABLET ORAL at 07:34

## 2024-06-07 RX ADMIN — METHOCARBAMOL 500 MG: 500 TABLET ORAL at 18:02

## 2024-06-07 RX ADMIN — GABAPENTIN 100 MG: 100 CAPSULE ORAL at 11:42

## 2024-06-07 RX ADMIN — METFORMIN HYDROCHLORIDE 500 MG: 500 TABLET ORAL at 18:02

## 2024-06-07 RX ADMIN — OMEPRAZOLE 20 MG: 20 CAPSULE, DELAYED RELEASE ORAL at 05:30

## 2024-06-07 RX ADMIN — OMEPRAZOLE 20 MG: 20 CAPSULE, DELAYED RELEASE ORAL at 18:02

## 2024-06-07 RX ADMIN — METHOCARBAMOL 500 MG: 500 TABLET ORAL at 05:30

## 2024-06-07 RX ADMIN — METHOCARBAMOL 500 MG: 500 TABLET ORAL at 11:42

## 2024-06-07 ASSESSMENT — COGNITIVE AND FUNCTIONAL STATUS - GENERAL
SUGGESTED CMS G CODE MODIFIER MOBILITY: CJ
SUGGESTED CMS G CODE MODIFIER DAILY ACTIVITY: CI
MOBILITY SCORE: 22
MOVING FROM LYING ON BACK TO SITTING ON SIDE OF FLAT BED: A LITTLE
DAILY ACTIVITIY SCORE: 23
TURNING FROM BACK TO SIDE WHILE IN FLAT BAD: A LITTLE
DRESSING REGULAR LOWER BODY CLOTHING: A LITTLE

## 2024-06-07 ASSESSMENT — PAIN DESCRIPTION - PAIN TYPE
TYPE: ACUTE PAIN
TYPE: ACUTE PAIN

## 2024-06-07 ASSESSMENT — ENCOUNTER SYMPTOMS
NAUSEA: 0
VOMITING: 0
SHORTNESS OF BREATH: 0
FEVER: 0

## 2024-06-07 ASSESSMENT — GAIT ASSESSMENTS
ASSISTIVE DEVICE: FRONT WHEEL WALKER
DISTANCE (FEET): 450
GAIT LEVEL OF ASSIST: SUPERVISED

## 2024-06-07 ASSESSMENT — ACTIVITIES OF DAILY LIVING (ADL): TOILETING: INDEPENDENT

## 2024-06-07 NOTE — PROGRESS NOTES
Virtual Nurse rounding complete.    Round Needs: Notified of Patient Needs: Pt happy to be discharging home today

## 2024-06-07 NOTE — PROGRESS NOTES
Surgical Daily Progress Note    Date of Service  6/7/2024    Chief Complaint  74 y.o. female POD#3 s/p ex-lap, small bowel resection, and primary repair of umbilical hernia due to strangulated hernia.    Interval Events  Pain only when moving around. Not requiring PRN pain medications    Feeling well after NG tube removal. No nausea or vomiting and is passing flatus. Feels as though she needs to have a BM this morning. Tolerating clear liquid diet well.    Review of Systems  Review of Systems   Constitutional:  Negative for fever.   Respiratory:  Negative for shortness of breath.    Cardiovascular:  Negative for chest pain.   Gastrointestinal:  Negative for nausea and vomiting.        Vital Signs  Temp:  [36.2 °C (97.1 °F)-36.5 °C (97.7 °F)] 36.4 °C (97.5 °F)  Pulse:  [60-71] 71  Resp:  [16] 16  BP: (119-157)/(57-85) 157/77  SpO2:  [91 %-92 %] 92 %    Physical Exam  Physical Exam  Constitutional:       General: She is not in acute distress.  HENT:      Head: Normocephalic and atraumatic.   Cardiovascular:      Rate and Rhythm: Normal rate and regular rhythm.      Heart sounds: No murmur heard.     No gallop.   Pulmonary:      Effort: Pulmonary effort is normal.      Breath sounds: Normal breath sounds. No wheezing, rhonchi or rales.   Abdominal:      General: There is no distension.      Palpations: Abdomen is soft.      Tenderness: There is no abdominal tenderness.      Comments: Stapled midline incision without erythema, drainage, or significant tenderness.  Abdominal binder in place.   Neurological:      Mental Status: She is alert and oriented to person, place, and time.   Psychiatric:         Mood and Affect: Mood normal.         Behavior: Behavior normal.         Laboratory  Recent Results (from the past 24 hour(s))   POCT glucose device results    Collection Time: 06/06/24 12:39 PM   Result Value Ref Range    POC Glucose, Blood 94 65 - 99 mg/dL   POCT glucose device results    Collection Time: 06/06/24   5:58 PM   Result Value Ref Range    POC Glucose, Blood 105 (H) 65 - 99 mg/dL   POCT glucose device results    Collection Time: 06/07/24 12:20 AM   Result Value Ref Range    POC Glucose, Blood 98 65 - 99 mg/dL   CBC with Differential: Tomorrow AM    Collection Time: 06/07/24  2:38 AM   Result Value Ref Range    WBC 6.5 4.8 - 10.8 K/uL    RBC 4.46 4.20 - 5.40 M/uL    Hemoglobin 11.4 (L) 12.0 - 16.0 g/dL    Hematocrit 35.9 (L) 37.0 - 47.0 %    MCV 80.5 (L) 81.4 - 97.8 fL    MCH 25.6 (L) 27.0 - 33.0 pg    MCHC 31.8 (L) 32.2 - 35.5 g/dL    RDW 51.7 (H) 35.9 - 50.0 fL    Platelet Count 164 164 - 446 K/uL    MPV 9.8 9.0 - 12.9 fL    Neutrophils-Polys 56.90 44.00 - 72.00 %    Lymphocytes 34.00 22.00 - 41.00 %    Monocytes 7.60 0.00 - 13.40 %    Eosinophils 0.90 0.00 - 6.90 %    Basophils 0.30 0.00 - 1.80 %    Immature Granulocytes 0.30 0.00 - 0.90 %    Nucleated RBC 0.00 0.00 - 0.20 /100 WBC    Neutrophils (Absolute) 3.68 1.82 - 7.42 K/uL    Lymphs (Absolute) 2.20 1.00 - 4.80 K/uL    Monos (Absolute) 0.49 0.00 - 0.85 K/uL    Eos (Absolute) 0.06 0.00 - 0.51 K/uL    Baso (Absolute) 0.02 0.00 - 0.12 K/uL    Immature Granulocytes (abs) 0.02 0.00 - 0.11 K/uL    NRBC (Absolute) 0.00 K/uL   Basic Metabolic Panel (BMP): Tomorrow AM    Collection Time: 06/07/24  2:38 AM   Result Value Ref Range    Sodium 142 135 - 145 mmol/L    Potassium 3.7 3.6 - 5.5 mmol/L    Chloride 108 96 - 112 mmol/L    Co2 24 20 - 33 mmol/L    Glucose 100 (H) 65 - 99 mg/dL    Bun 15 8 - 22 mg/dL    Creatinine 0.58 0.50 - 1.40 mg/dL    Calcium 8.8 8.5 - 10.5 mg/dL    Anion Gap 10.0 7.0 - 16.0   ESTIMATED GFR    Collection Time: 06/07/24  2:38 AM   Result Value Ref Range    GFR (CKD-EPI) 94 >60 mL/min/1.73 m 2   POCT glucose device results    Collection Time: 06/07/24  5:33 AM   Result Value Ref Range    POC Glucose, Blood 95 65 - 99 mg/dL       Fluids    Intake/Output Summary (Last 24 hours) at 6/7/2024 0813  Last data filed at 6/6/2024 1813  Gross per 24 hour    Intake 1363.26 ml   Output 700 ml   Net 663.26 ml         Assessment/Plan  74 year old female POD#3 s/p ex-lap, small bowel resection, and primary repair of umbilical hernia due to strangulated hernia. Recovering well; bowel function returning, tolerating oral diet, diabetes better controlled with metformin, and no signs of infection.    - Advance diet as tolerated  - Resume home medications  - Continue metformin  - Likely able to discharge later this afternoon pending PT/OT recs    Discussed patient condition with JAIR Reddy

## 2024-06-07 NOTE — PROGRESS NOTES
Diabetes education: Met with pt this afternoon. Please see consult note.  Plan: Pt states she thought she may need to go back to surgery ( nothing scheduled ), so CDE will follow up with her afterwards.  Unclear if a pt will need medication at discharge.

## 2024-06-07 NOTE — PROGRESS NOTES
"  DATE: 6/7/2024    Hospital Day 3  strangulated umbilical hernia .    Postoperative Day 3 Exploratory laparotomy, small bowl resection, & primary repair of umbilical hernia defect.    INTERVAL EVENTS:  NG tube removed yesterday and tolerating clear liquid diet.   Patient has started to pass gas this AM.  Last BM remains before admit.     PHYSICAL EXAMINATION:  Vital Signs: BP (!) 157/77   Pulse 71   Temp 36.4 °C (97.5 °F) (Temporal)   Resp 16   Ht 1.626 m (5' 4\")   Wt 83.8 kg (184 lb 11.9 oz)   SpO2 92%     Alert & oriented. Afebrile. No acute distress.   Unlabored respirations.   Abdomen soft & non distended.   Minimal incisional tenderness.   Midline abdominal incision well approximated with staples.    LABORATORY VALUES:  Recent Labs     06/05/24  0406 06/06/24  0019 06/06/24  0745 06/07/24  0238   WBC 13.6* 10.8  --  6.5   RBC 5.54* 4.55  --  4.46   HEMOGLOBIN 14.0 11.6* 12.0 11.4*   HEMATOCRIT 44.8 36.3*  --  35.9*   MCV 80.9* 79.8*  --  80.5*   MCH 25.3* 25.5*  --  25.6*   MCHC 31.3* 32.0*  --  31.8*   RDW 50.5* 51.8*  --  51.7*   PLATELETCT 202 189  --  164   MPV 9.7 10.0  --  9.8     Recent Labs     06/05/24  0406 06/06/24  0019 06/07/24  0238   SODIUM 140 142 142   POTASSIUM 4.4 4.1 3.7   CHLORIDE 104 108 108   CO2 19* 23 24   GLUCOSE 201* 130* 100*   BUN 13 15 15   CREATININE 0.60 0.61 0.58   CALCIUM 8.9 9.1 8.8     Recent Labs     06/04/24  1345   ASTSGOT 27   ALTSGPT 17   TBILIRUBIN 0.3   ALKPHOSPHAT 138*   GLOBULIN 3.3       ASSESSMENT AND PLAN:  Strangulated umbilical hernia with satisfactory progress following exploratory laparotomy, small bowel resection, & primary repair of umbilical hernia. The patient's postoperative hospital course is notable for a new diagnosis of type 2 diabetes.    - Advance to full liquid diet  - Mobilize with PT & OT for discharge recs as patient may benefit from home health since she lives alone.  -Disposition: anticipate discharge home today or tomorrow pending " therapy recommends and bowel function       ____________________________________     ZARA Johnson

## 2024-06-07 NOTE — PROGRESS NOTES
Bedside report received.  Assessment complete.  A&O x 4. Patient calls appropriately.  Patient ambulates with X1 assist w/FWW.  Patient has 0/10 pain. Patient declines intervention at this time  Denies N&V. Tolerating clear liquid diet.   MLI with staples MIRNA, abdomial binder in place.  + void, - flatus, - BM.  Patient denies SOB and chest pain.  SCD's on.  Patient IS 1500.  Review plan with of care with patient. Call light and personal belongings within reach. Hourly rounding in place. All needs met at this time.

## 2024-06-07 NOTE — CONSULTS
Diabetes education: Pt was admitted with blood sugar of 183, and Hga1c of 6.7%. Pt is currently on regular insulin per sliding scale every six hours ( please change to ac and hs if/when eating).  Met with pt this afternoon. Discussed what diabetes was, the difference between pre diabetes and type two,  hyperglycemia, and goals for blood sugars. Discussed need for carbohydrates and proteins, with every meal and   why. . Discussed need, benefit and precautions with exercise.  Discussed  what effects blood sugars.    Briefly reviewed Metformin.  Plan: Pt states she thought she may need to go back to surgery ( nothing scheduled ), so CDE will follow up with her afterwards.  Unclear if a pt will need medication at discharge.

## 2024-06-07 NOTE — THERAPY
"Occupational Therapy   Initial Evaluation     Patient Name: Lawanda Pagan  Age:  74 y.o., Sex:  female  Medical Record #: [id number]  Today's Date: 6/7/2024     Precautions  Precautions: (P) Fall Risk  Comments: abdominal prec    Assessment  Pt is 73 y/o female, s/p ex-lap, SBO, and primary repair of umbilical hernia due to strangulated hernia. PMHx anemia.     Pt seen for OT eval. Pt educ and handout provided for abdominal prec, has abd binder on, readjust and educ for don/doff. Pt supervised for functional mobility with use of FWW, has 4WW at home. Pt supervised for majority of ADLs, difficulty with RLE LB dressing, has A/E at home. Pt son and DIL live close by, can assist as needed, son provides all transportation. Educ on log roll technique, therapeutic breathing, pain mgmt with movement and activity prec. All questions answered. Anticipate pt will be safe to dc home when medically stable. Patient will not be actively followed for occupational therapy services at this time, however may be seen if requested by physician for 1 more visit within 30 days to address any discharge or equipment needs.      Plan    Occupational Therapy Initial Treatment Plan   Duration: (P) Discharge Needs Only    DC Equipment Recommendations: (P) None  Discharge Recommendations: (P) Anticipate that the patient will have no further occupational therapy needs after discharge from the hospital     Subjective    \"I've been feeling really well overall\"      Objective       06/07/24 0935   Prior Living Situation   Prior Services Home-Independent   Housing / Facility 1 Story Apartment / Condo;Independent Living Facility   Steps Into Home 0   Steps In Home 0   Bathroom Set up Bathtub / Shower Combination;Shower Chair;Grab Bars   Equipment Owned 4-Wheel Walker;Tub / Shower Seat;Grab Bar(s) In Tub / Shower   Lives with - Patient's Self Care Capacity Alone and Able to Care For Self   Comments Pt son and DIL live close by.   Prior Level of " ADL Function   Self Feeding Independent   Grooming / Hygiene Independent   Bathing Independent   Dressing Independent   Toileting Independent   Prior Level of IADL Function   Medication Management Independent   Laundry Independent   Kitchen Mobility Independent   Finances Independent   Home Management Independent   Shopping Independent   Prior Level Of Mobility Independent With Device in Community   Driving / Transportation Relatives / Others Provide Transportation   Comments Son drives pt to all appointments and assists as needed. Pt usually assists with caring for 4 y/ograndson.   History of Falls   History of Falls No   Precautions   Precautions Fall Risk   Comments abdominal prec   Vitals   Pulse 70   Pulse Oximetry 93 %   O2 Delivery Device None - Room Air   Pain   Intervention Declines   Pain 0 - 10 Group   Therapist Pain Assessment During Activity;Post Activity Pain Same as Prior to Activity;Nurse Notified;0   Non Verbal Descriptors   Non Verbal Scale  Calm   Cognition    Cognition / Consciousness WDL   Level of Consciousness Alert   Comments pleasant and coopertive, able to make needs known. good insight   Passive ROM Upper Body   Passive ROM Upper Body WDL   Active ROM Upper Body   Active ROM Upper Body  WDL   Dominant Hand Right   Strength Upper Body   Upper Body Strength  WDL   Comments grossly 4/5   Sensation Upper Body   Upper Extremity Sensation  WDL   Upper Body Muscle Tone   Upper Body Muscle Tone  WDL   Neurological Concerns   Neurological Concerns No   Coordination Upper Body   Coordination WDL   Balance Assessment   Sitting Balance (Static) Fair +   Sitting Balance (Dynamic) Fair   Standing Balance (Static) Fair   Standing Balance (Dynamic) Fair   Weight Shift Sitting Fair   Weight Shift Standing Fair   Comments w/FWW   Bed Mobility    Comments was up in chair pre and post session. discussed bed mobility, log roll technique, handout provided   ADL Assessment   Eating Independent   Grooming  Supervision;Standing   Bathing Supervision   Upper Body Dressing Supervision   Lower Body Dressing Moderate Assist  (assist for RLE. unable to tailor sit. owns a sock aide. will be wearing slippers at home)   Toileting Supervision   Functional Mobility   Sit to Stand Standby Assist   Bed, Chair, Wheelchair Transfer Supervised   Toilet Transfers Supervised   Transfer Method Stand Step   Mobility chair> in rm mobility> toilet> chair   Comments w/FWW   Visual Perception   Visual Perception  WDL   Comments wears reading glasses   Edema / Skin Assessment   Edema / Skin  X   Comments abdominal incision, abdominal binder in place   Activity Tolerance   Sitting in Chair pre and post session   Standing 5 min   Education Group   Education Provided Energy Conservation;Role of Occupational Therapist;Activities of Daily Living;Home Safety;Adaptive Equipment   Role of Occupational Therapist Patient Response Patient;Acceptance;Explanation;Demonstration;Action Demonstration;Verbal Demonstration   Energy Conservation Patient Response Patient;Acceptance;Explanation;Demonstration;Verbal Demonstration;Action Demonstration   Home Safety Patient Response Patient;Acceptance;Explanation;Demonstration;Action Demonstration;Verbal Demonstration   ADL Patient Response Patient;Acceptance;Explanation;Demonstration;Verbal Demonstration;Action Demonstration   Adaptive Equipment Patient Response Patient;Acceptance;Demonstration;Explanation;Action Demonstration;Verbal Demonstration   Occupational Therapy Initial Treatment Plan    Duration Discharge Needs Only   Problem List   Problem List Decreased Activity Tolerance   Anticipated Discharge Equipment and Recommendations   DC Equipment Recommendations None   Discharge Recommendations Anticipate that the patient will have no further occupational therapy needs after discharge from the hospital   Interdisciplinary Plan of Care Collaboration   IDT Collaboration with  Nursing;Physical Therapist   Patient  Position at End of Therapy Seated;Call Light within Reach   Collaboration Comments RN updated   Session Information   Date / Session Number  6/7, DC needs only

## 2024-06-07 NOTE — DISCHARGE PLANNING
TCN following. HTH/SCP chart reviewed. No new TCN needs identified. Per kardex patient is ambulating 2 x 25 feet with FWW, noted per prior TCN note patient declined DME (AD) choice secondary to owning 4WW and plans to use at home.  Patient also remains on RA.      Completed:  Choice obtained:   SCP with Non-[facility] PCP.

## 2024-06-07 NOTE — THERAPY
"Physical Therapy   Initial Evaluation     Patient Name: Lawanda Pagan  Age:  74 y.o., Sex:  female  Medical Record #: [id number]  Today's Date: 6/7/2024     Precautions  Precautions: Fall Risk  Comments: abdominal precautions, binder in place    Assessment  74 y.o. female POD#3 s/p ex-lap, small bowel resection, and primary repair of umbilical hernia due to strangulated hernia.  Patient presents to PT eval close to her  baseline. She was able to ambulate around the unit with the FWW and no overt LOB.  She verbalizes and demos abdominal precautions and will have assist from family at PR.  No further acute care PT needs at this time.     Plan    Physical Therapy Initial Treatment Plan   Duration: Evaluation only    PR Equipment Recommendations: None  Discharge Recommendations: Recommend outpatient physical therapy services to address higher level deficits       Subjective    \"I'm feeling ok I know all I need to do is poop\"     Objective       06/07/24 1134   Precautions   Precautions Fall Risk   Comments abdominal precautions, binder in place   Pain 0 - 10 Group   Therapist Pain Assessment 0;Post Activity Pain Same as Prior to Activity   Prior Living Situation   Prior Services Home-Independent   Housing / Facility 1 Story Apartment / Condo;Independent Living Facility   Steps Into Home 0   Steps In Home 0   Bathroom Set up Bathtub / Shower Combination;Shower Chair;Grab Bars   Equipment Owned 4-Wheel Walker;Tub / Shower Seat;Grab Bar(s) In Tub / Shower   Lives with - Patient's Self Care Capacity Alone and Able to Care For Self   Comments patient's son can provide some assist if needed   Prior Level of Functional Mobility   Bed Mobility Independent   Transfer Status Independent   Ambulation Independent   Ambulation Distance community distances   Assistive Devices Used 4-Wheel Walker   Comments indep with all household duties and ADLs at baseline   History of Falls   History of Falls No   Cognition    Cognition / " Consciousness WDL   Level of Consciousness Alert   Comments pleasant and cooperative   Passive ROM Upper Body   Passive ROM Upper Body WDL   Strength Upper Body   Upper Body Strength  WDL   Sensation Upper Body   Upper Extremity Sensation  WDL   Upper Body Muscle Tone   Upper Body Muscle Tone  WDL   Active ROM Lower Body    Active ROM Lower Body  WDL   Strength Lower Body   Lower Body Strength  WDL   Coordination Upper Body   Coordination WDL   Coordination Lower Body    Coordination Lower Body  WDL   Vision   Vision Comments wears bifocals   Other Treatments   Other Treatments Provided educated abotu abdominal precautions and the importance of self-pacing and wearing abdominal binder at home   Balance Assessment   Sitting Balance (Static) Fair +   Sitting Balance (Dynamic) Fair   Standing Balance (Static) Fair   Standing Balance (Dynamic) Fair   Weight Shift Sitting Fair   Weight Shift Standing Fair   Comments w/FWW   Gait Analysis   Gait Level Of Assist Supervised   Assistive Device Front Wheel Walker   Distance (Feet) 450   # of Times Distance was Traveled 1   Functional Mobility   Sit to Stand Standby Assist   Bed, Chair, Wheelchair Transfer Supervised   6 Clicks Assessment - How much HELP from from another person do you currently need... (If the patient hasn't done an activity recently, how much help from another person do you think he/she would need if he/she tried?)   Turning from your back to your side while in a flat bed without using bedrails? 3   Moving from lying on your back to sitting on the side of a flat bed without using bedrails? 3   Moving to and from a bed to a chair (including a wheelchair)? 4   Standing up from a chair using your arms (e.g., wheelchair, or bedside chair)? 4   Walking in hospital room? 4   Climbing 3-5 steps with a railing? 4   6 clicks Mobility Score 22   Activity Tolerance   Standing 12 min   Education Group   Education Provided Role of Physical Therapist;Gait Training   Role  of Physical Therapist Patient Response Patient;Acceptance;Explanation;Demonstration;Verbal Demonstration;Action Demonstration   Gait Training Patient Response Patient;Acceptance;Explanation;Demonstration;Verbal Demonstration;Action Demonstration   Physical Therapy Initial Treatment Plan    Duration Evaluation only   Anticipated Discharge Equipment and Recommendations   DC Equipment Recommendations None   Discharge Recommendations Recommend outpatient physical therapy services to address higher level deficits     Jessica Plata, PT, DPT, GCS

## 2024-06-07 NOTE — CARE PLAN
The patient is Stable - Low risk of patient condition declining or worsening    Shift Goals  Clinical Goals: mobility, tolerate diet  Patient Goals: rest    Progress made toward(s) clinical / shift goals:  Patient up to bathroom X1 w/FWW. Patient is tolerating clear liquids without nausea. Patient has started to pas gas. Patient is resting in bed with all needs met at this time.     Patient is not progressing towards the following goals: No BM this shift.      Problem: Fall Risk  Goal: Patient will remain free from falls  Outcome: Progressing     Problem: Knowledge Deficit - Standard  Goal: Patient and family/care givers will demonstrate understanding of plan of care, disease process/condition, diagnostic tests and medications  Outcome: Progressing     Problem: Psychosocial  Goal: Patient's level of anxiety will decrease  Outcome: Progressing  Goal: Patient's ability to verbalize feelings about condition will improve  Outcome: Progressing  Goal: Patient's ability to re-evaluate and adapt role responsibilities will improve  Outcome: Progressing  Goal: Patient and family will demonstrate ability to cope with life altering diagnosis and/or procedure  Outcome: Progressing  Goal: Spiritual and cultural needs incorporated into hospitalization  Outcome: Progressing     Problem: Communication  Goal: The ability to communicate needs accurately and effectively will improve  Outcome: Progressing     Problem: Discharge Barriers/Planning  Goal: Patient's continuum of care needs are met  Outcome: Progressing     Problem: Hemodynamics  Goal: Patient's hemodynamics, fluid balance and neurologic status will be stable or improve  Outcome: Progressing     Problem: Respiratory  Goal: Patient will achieve/maintain optimum respiratory ventilation and gas exchange  Outcome: Progressing     Problem: Chest Tube Management  Goal: Complications related to chest tube will be avoided or minimized  Outcome: Progressing     Problem: Fluid  Volume  Goal: Fluid volume balance will be maintained  Outcome: Progressing     Problem: Mechanical Ventilation  Goal: Safe management of artificial airway and ventilation  Outcome: Progressing  Goal: Successful weaning off mechanical ventilator, spontaneously maintains adequate gas exchange  Outcome: Progressing  Goal: Patient will be able to express needs and understand communication  Outcome: Progressing     Problem: Dysphagia  Goal: Dysphagia will improve  Outcome: Progressing     Problem: Risk for Aspiration  Goal: Patient's risk for aspiration will be absent or decrease  Outcome: Progressing     Problem: Nutrition  Goal: Patient's nutritional and fluid intake will be adequate or improve  Outcome: Progressing  Goal: Enteral nutrition will be maintained or improve  Outcome: Progressing  Goal: Enteral nutrition will be maintained or improve  Outcome: Progressing     Problem: Urinary Elimination  Goal: Establish and maintain regular urinary output  Outcome: Progressing     Problem: Bowel Elimination  Goal: Establish and maintain regular bowel function  Outcome: Progressing     Problem: Gastrointestinal Irritability  Goal: Nausea and vomiting will be absent or improve  Outcome: Progressing  Goal: Diarrhea will be absent or improved  Outcome: Progressing     Problem: Rectal Tube  Goal: Fecal output will be contained and skin will remain free from irritation  Outcome: Progressing     Problem: Mobility  Goal: Patient's capacity to carry out activities will improve  Outcome: Progressing     Problem: Self Care  Goal: Patient will have the ability to perform ADLs independently or with assistance (bathe, groom, dress, toilet and feed)  Outcome: Progressing     Problem: Infection - Standard  Goal: Patient will remain free from infection  Outcome: Progressing     Problem: Wound/ / Incision Healing  Goal: Patient's wound/surgical incision will decrease in size and heals properly  Outcome: Progressing     Problem: Pain -  Standard  Goal: Alleviation of pain or a reduction in pain to the patient’s comfort goal  Outcome: Progressing     Problem: Skin Integrity  Goal: Skin integrity is maintained or improved  Outcome: Progressing

## 2024-06-07 NOTE — CARE PLAN
Problem: Hyperinflation  Goal: Prevent or improve atelectasis  Description: Target End Date:  3 to 4 days    1. Instruct incentive spirometry usage  2.  Perform hyperinflation therapy as indicated  Flowsheets (Taken 6/7/2024 1415)  Hyperinflation Protocol Goals/Outcome: Increase and/or stable inspiratory capacity for 24 hours  Hyperinflation Protocol Indications: Laproscopic Abdominal Procedure  Note:     Respiratory Update    Treatment modality: PEP  Frequency:QID    Pt tolerating current treatments well with no adverse reactions.

## 2024-06-07 NOTE — PROGRESS NOTES
Received report from previous shift RN  Assessment complete.  A&O x 4. Patient calls appropriately.  Patient ambulates with standby assist.Patient has 2/10 pain. Pain managed with prescribed medications.  Denies N&V. Tolerating diet.  Skin per flowsheets.  + void, + flatus, - BM.  Patient denies SOB.  SCD's on.  Patient pleasant and cooperative with plan of care.  Review plan with of care with patient. Call light and personal belongings with in reach. Hourly rounding in place. All needs met at this time.

## 2024-06-08 ENCOUNTER — PHARMACY VISIT (OUTPATIENT)
Dept: PHARMACY | Facility: MEDICAL CENTER | Age: 74
End: 2024-06-08
Payer: COMMERCIAL

## 2024-06-08 VITALS
HEART RATE: 60 BPM | OXYGEN SATURATION: 94 % | HEIGHT: 64 IN | DIASTOLIC BLOOD PRESSURE: 81 MMHG | RESPIRATION RATE: 16 BRPM | SYSTOLIC BLOOD PRESSURE: 160 MMHG | BODY MASS INDEX: 31.54 KG/M2 | TEMPERATURE: 97.3 F | WEIGHT: 184.75 LBS

## 2024-06-08 LAB
ANION GAP SERPL CALC-SCNC: 12 MMOL/L (ref 7–16)
BASOPHILS # BLD AUTO: 0.5 % (ref 0–1.8)
BASOPHILS # BLD: 0.03 K/UL (ref 0–0.12)
BUN SERPL-MCNC: 11 MG/DL (ref 8–22)
CALCIUM SERPL-MCNC: 8.9 MG/DL (ref 8.5–10.5)
CHLORIDE SERPL-SCNC: 107 MMOL/L (ref 96–112)
CO2 SERPL-SCNC: 23 MMOL/L (ref 20–33)
CREAT SERPL-MCNC: 0.61 MG/DL (ref 0.5–1.4)
EOSINOPHIL # BLD AUTO: 0.08 K/UL (ref 0–0.51)
EOSINOPHIL NFR BLD: 1.4 % (ref 0–6.9)
ERYTHROCYTE [DISTWIDTH] IN BLOOD BY AUTOMATED COUNT: 50.9 FL (ref 35.9–50)
GFR SERPLBLD CREATININE-BSD FMLA CKD-EPI: 93 ML/MIN/1.73 M 2
GLUCOSE BLD STRIP.AUTO-MCNC: 108 MG/DL (ref 65–99)
GLUCOSE BLD STRIP.AUTO-MCNC: 87 MG/DL (ref 65–99)
GLUCOSE BLD STRIP.AUTO-MCNC: 90 MG/DL (ref 65–99)
GLUCOSE SERPL-MCNC: 101 MG/DL (ref 65–99)
HCT VFR BLD AUTO: 38.3 % (ref 37–47)
HGB BLD-MCNC: 12.2 G/DL (ref 12–16)
IMM GRANULOCYTES # BLD AUTO: 0.02 K/UL (ref 0–0.11)
IMM GRANULOCYTES NFR BLD AUTO: 0.3 % (ref 0–0.9)
LYMPHOCYTES # BLD AUTO: 2.1 K/UL (ref 1–4.8)
LYMPHOCYTES NFR BLD: 36.6 % (ref 22–41)
MCH RBC QN AUTO: 25.3 PG (ref 27–33)
MCHC RBC AUTO-ENTMCNC: 31.9 G/DL (ref 32.2–35.5)
MCV RBC AUTO: 79.5 FL (ref 81.4–97.8)
MONOCYTES # BLD AUTO: 0.41 K/UL (ref 0–0.85)
MONOCYTES NFR BLD AUTO: 7.2 % (ref 0–13.4)
NEUTROPHILS # BLD AUTO: 3.09 K/UL (ref 1.82–7.42)
NEUTROPHILS NFR BLD: 54 % (ref 44–72)
NRBC # BLD AUTO: 0 K/UL
NRBC BLD-RTO: 0 /100 WBC (ref 0–0.2)
PLATELET # BLD AUTO: 179 K/UL (ref 164–446)
PMV BLD AUTO: 9.7 FL (ref 9–12.9)
POTASSIUM SERPL-SCNC: 3.8 MMOL/L (ref 3.6–5.5)
RBC # BLD AUTO: 4.82 M/UL (ref 4.2–5.4)
SODIUM SERPL-SCNC: 142 MMOL/L (ref 135–145)
WBC # BLD AUTO: 5.7 K/UL (ref 4.8–10.8)

## 2024-06-08 PROCEDURE — 99024 POSTOP FOLLOW-UP VISIT: CPT

## 2024-06-08 PROCEDURE — 80048 BASIC METABOLIC PNL TOTAL CA: CPT

## 2024-06-08 PROCEDURE — 82962 GLUCOSE BLOOD TEST: CPT

## 2024-06-08 PROCEDURE — RXMED WILLOW AMBULATORY MEDICATION CHARGE

## 2024-06-08 PROCEDURE — A9270 NON-COVERED ITEM OR SERVICE: HCPCS

## 2024-06-08 PROCEDURE — 700102 HCHG RX REV CODE 250 W/ 637 OVERRIDE(OP)

## 2024-06-08 PROCEDURE — 85025 COMPLETE CBC W/AUTO DIFF WBC: CPT

## 2024-06-08 RX ORDER — METHOCARBAMOL 500 MG/1
500 TABLET, FILM COATED ORAL 3 TIMES DAILY
Qty: 21 TABLET | Refills: 0 | Status: SHIPPED | OUTPATIENT
Start: 2024-06-08 | End: 2024-06-15

## 2024-06-08 RX ORDER — LANCETS 30 GAUGE
EACH MISCELLANEOUS
Qty: 100 EACH | Refills: 0 | Status: SHIPPED | OUTPATIENT
Start: 2024-06-08

## 2024-06-08 RX ORDER — GABAPENTIN 100 MG/1
100 CAPSULE ORAL 3 TIMES DAILY
Qty: 21 CAPSULE | Refills: 0 | Status: SHIPPED | OUTPATIENT
Start: 2024-06-08 | End: 2024-06-15

## 2024-06-08 RX ORDER — ACETAMINOPHEN 325 MG/1
650 TABLET ORAL EVERY 4 HOURS PRN
Qty: 42 TABLET | Refills: 0 | COMMUNITY
Start: 2024-06-08 | End: 2024-06-15

## 2024-06-08 RX ORDER — GLUCOSAMINE HCL/CHONDROITIN SU 500-400 MG
CAPSULE ORAL
Qty: 100 EACH | Refills: 0 | Status: SHIPPED | OUTPATIENT
Start: 2024-06-08

## 2024-06-08 RX ORDER — BLOOD-GLUCOSE METER
1 KIT MISCELLANEOUS
Qty: 1 KIT | Refills: 0 | Status: SHIPPED | OUTPATIENT
Start: 2024-06-08

## 2024-06-08 RX ORDER — TRAMADOL HYDROCHLORIDE 50 MG/1
50 TABLET ORAL EVERY 4 HOURS PRN
Qty: 20 TABLET | Refills: 0 | Status: SHIPPED | OUTPATIENT
Start: 2024-06-08 | End: 2024-06-15

## 2024-06-08 RX ADMIN — METHOCARBAMOL 500 MG: 500 TABLET ORAL at 14:15

## 2024-06-08 RX ADMIN — OMEPRAZOLE 20 MG: 20 CAPSULE, DELAYED RELEASE ORAL at 05:42

## 2024-06-08 RX ADMIN — METFORMIN HYDROCHLORIDE 500 MG: 500 TABLET ORAL at 08:21

## 2024-06-08 RX ADMIN — METHOCARBAMOL 500 MG: 500 TABLET ORAL at 05:42

## 2024-06-08 RX ADMIN — ACETAMINOPHEN 650 MG: 325 TABLET, FILM COATED ORAL at 00:27

## 2024-06-08 RX ADMIN — GABAPENTIN 100 MG: 100 CAPSULE ORAL at 14:15

## 2024-06-08 RX ADMIN — ACETAMINOPHEN 650 MG: 325 TABLET, FILM COATED ORAL at 08:21

## 2024-06-08 RX ADMIN — GABAPENTIN 100 MG: 100 CAPSULE ORAL at 05:42

## 2024-06-08 RX ADMIN — ACETAMINOPHEN 650 MG: 325 TABLET, FILM COATED ORAL at 14:15

## 2024-06-08 NOTE — PROGRESS NOTES
Assumed care at 1845. Pt resting in bed  A&ox 4  Ambulating with SBA  Tolerating full liq diet  Abd midline staples. Binder in place  Voiding adequately  +BM  Denies pain   Call light within reach. Hourly rounding in place

## 2024-06-08 NOTE — CARE PLAN
Problem: Hyperinflation  Goal: Prevent or improve atelectasis  Description: Target End Date:  3 to 4 days    1. Instruct incentive spirometry usage  2.  Perform hyperinflation therapy as indicated  Outcome: Progressing  Flowsheets (Taken 6/7/2024 1415 by Maria G Hassan, NACHO)  Hyperinflation Protocol Goals/Outcome: Increase and/or stable inspiratory capacity for 24 hours  Hyperinflation Protocol Indications: Laproscopic Abdominal Procedure       Respiratory Update    Treatment modality: PEP  IS: 1500  Frequency: BID    Pt tolerating current treatments well with no adverse reactions.

## 2024-06-08 NOTE — CARE PLAN
The patient is Stable - Low risk of patient condition declining or worsening    Shift Goals  Clinical Goals: mobilize. bowel movement  Patient Goals: rest;BM    Progress made toward(s) clinical / shift goals:  ambulating in hallways. +BM yesterday. Tolerating full liq diet    Patient is not progressing towards the following goals:    Problem: Hemodynamics  Goal: Patient's hemodynamics, fluid balance and neurologic status will be stable or improve  Outcome: Progressing     Problem: Bowel Elimination  Goal: Establish and maintain regular bowel function  Outcome: Progressing

## 2024-06-08 NOTE — DISCHARGE SUMMARY
DISCHARGE  SUMMARY    DATE OF ADMISSION: 6/4/2024    DATE OF DISCHARGE: 6/8/2024    DISCHARGE DIAGNOSIS:  Principal Problem:    Ventral hernia with bowel obstruction  Resolved Problems:    * No resolved hospital problems. *      PROCEDURES:  6/4/24 Exploratory laparotomy, small bowl resection, & primary repair of umbilical hernia defect by Dr. Karl Carmona.    BRIEF HPI and HOSPITAL COURSE:   The patient is a 73 year-old white elderly woman who presented to the Emergency Department with periumbilical abdominal pain. A CT was obtained and showed an incarcerated hernia containing small bowel. General surgery was consulted and the patient was taken to the operating room where she underwent an exploratory laparotomy, small bowel resection, and primary repair of her umbilical hernia defect. Postoperatively, she was admitted to the surgical ornelas on bowel rest. He bowel function gradually returned and she was placed and a diet. She had a bowel movement before discharge and was tolerating a regular diet. During her hospitalization, a hemoglobin A1C was obtained for persistent hyperglycemia. Her hemoglobin A1C was 6.7 and a new diagnosis of diabetes was discussed with the patient. She received diabetic education and was prescribed metformin and a blood glucose meter for discharge. She was subsequently discharged home with outpatient follow up as discussed below.      DISPOSITION:   Discharged home on 6/8/24. The patient was counseled and questions were answered. Specifically, signs and symptoms of infection, respiratory decompensation, and persistent or worsening pain were discussed and the patient agrees to seek medical attention if any of these develop.    DISCHARGE MEDICATIONS:  The patients controlled substance history was reviewed and a controlled substance use informed consent (if applicable) was provided by [facility] and the patient has been prescribed.     Medication List        START  taking these medications        Instructions   Blood Glucose Meter Kit   1 Each 4 Times a Day,Before Meals and at Bedtime.  Dose: 1 Each     gabapentin 100 MG Caps  Commonly known as: Neurontin   Take 1 Capsule by mouth 3 times a day for 7 days.  Dose: 100 mg     metFORMIN 500 MG Tabs  Commonly known as: Glucophage   Take 1 Tablet by mouth 2 times a day with meals for 30 days.  Dose: 500 mg     methocarbamol 500 MG Tabs  Commonly known as: Robaxin   Take 1 Tablet by mouth 3 times a day for 7 days.  Dose: 500 mg     traMADol 50 MG Tabs  Commonly known as: Ultram   Take 1 Tablet by mouth every four hours as needed for Severe Pain for up to 7 days.  Dose: 50 mg            CHANGE how you take these medications        Instructions   acetaminophen 325 MG Tabs  What changed:   medication strength  how much to take  when to take this  reasons to take this  Commonly known as: Tylenol   Take 2 Tablets by mouth every four hours as needed for Mild Pain or Moderate Pain for up to 7 days.  Dose: 650 mg            CONTINUE taking these medications        Instructions   anastrozole 1 MG Tabs  Commonly known as: Arimidex   Take 1 Tablet by mouth every day.  Dose: 1 mg     B-12 1000 MCG Tabs   Take 1,000 mcg by mouth every day.  Dose: 1,000 mcg     ferrous sulfate 325 (65 Fe) MG tablet   Doctor's comments: Take with a meal  Take 1 Tablet by mouth every Tue and Thu at 6 PM.  Dose: 325 mg     pantoprazole 40 MG Tbec  Commonly known as: Protonix   Take 1 Tablet by mouth 2 times a day.  Dose: 40 mg     rosuvastatin 10 MG Tabs  Commonly known as: Crestor   Take 10 mg by mouth every evening.  Dose: 10 mg     vitamin D 1000 Unit Tabs  Commonly known as: Cholecalciferol   Take 2,000 Units by mouth every day. 2 tablets=2000 units  Dose: 2,000 Units            You will be given a prescription for pain medication at discharge. Please take these as directed. It is important to remember not to take medications on an empty stomach as this may  cause nausea.  You may also take over the counter acetaminophen and/or NSAIDS (ibuprofen, Aleve, Advil, Motrin) per the package instructions.  You may also use ice to the wound to decrease pain and swelling. You may alternate 20 minutes on and 20 minutes off with the ice for the first 24-48 hours. Make sure you place a washcloth or towel between the ice pack and your skin.  Please note that narcotic pain medication cannot be refilled unless you are seen by a doctor. Make sure you call the office if you are running low on medication or if the dose you have been prescribed is not working well for you.    ACTIVITY:  After discharge from the hospital, you may resume full routine activities; however, there should be no heavy lifting (greater than 20 pounds or a bag of groceries) and no strenuous activities for at least 2 weeks. The duration may be longer, depending on your surgical procedure. Routine activities of daily living are acceptable. Activity level should be addressed at your post-op follow up appointment. You may drive whenever you are off pain medications and are able to perform the activities needed to drive, i.e., turning, bending, twisting, etc.    WOUND CARE:  You may shower, but do not submerge in a bath for at least two weeks. If you have wound dressings, they may come off after 48 hours. If you have skin glue to the wound, this will fall off on its own, do not pick at it. If you have steri strips to the wound, these will fall off on their own, do not pick at them, may trim the edges if needed.    DIET:  Upon discharge from the hospital, you may resume your normal preoperative diet, unless specifically directed otherwise. Depending on how you are feeling and whether you have nausea or not, you may wish to stay with a bland diet for the first few days. However, you can advance this as quickly as you feel ready.    FOLLOW UP:  Nallely Styles P.A.-C.  [address] [facility]  [id number] [phone]    Follow up  Follow up with your primary care provider ASAP to discuss your new diagnosis of diabetes.    [facility]  [address]  [place] [id number]  [phone]    Schedule an appointment as soon as possible for a visit  Follow up in the Acute Care Surgery Clinic at [facility] in 10-14 days for a postoperative visit and staple removal.    Call the office if you have: (1) Fevers to more than 101F, (2) Unusual chest or leg pain, (3) Drainage or fluid from incision that may be foul smelling, increased tenderness or soreness at the wound or the wound edges are no longer together, redness or swelling at the incision site. Do not hesitate to call with any other questions.    TIME SPENT ON DISCHARGE: 38 minutes      ____________________________________________  ZARA Johnson    DD: 6/8/2024 8:26 AM

## 2024-06-08 NOTE — DISCHARGE INSTRUCTIONS
Post Operative Discharge Instructions:    1. DIET: Upon discharge from the hospital, you may resume your normal preoperative diet, unless specifically directed otherwise. Depending on how you are feeling and whether you have nausea or not, you may wish to stay with a bland diet for the first few days. However, you can advance this as quickly as you feel ready.    2. ACTIVITIES: After discharge from the hospital, you may resume full routine activities; however, there should be no heavy lifting (greater than 20 pounds or a bag of groceries) and no strenuous activities for at least 2 weeks. The duration may be longer, depending on your surgical procedure. Routine activities of daily living are acceptable. Activity level should be addressed at your post-op follow up appointment.    3. DRIVING: You may drive whenever you are off pain medications and are able to perform the activities needed to drive, i.e., turning, bending, twisting, etc.    4. BATHING: You may get the wound wet at any time after leaving the hospital. You may shower, but do not submerge in a bath for at least two weeks.  If you have wound dressings, they may come off after 48 hours.  If you have skin glue to the wound, this will fall off on its own, do not pick at it.  If you have Steri-Strips to the wound, these will fall off on their own, do not pick at them. You may trim the edges if needed.    5. BOWEL FUNCTION:   After surgery, it is not uncommon for patients to develop either frequent or loose stools after meals. This usually corrects itself after a few days, to a few weeks. If this occurs, do not worry; this will resolve on its own.  Constipation is much more common than loose stools. The cause is the combination of pain medication and decreased activity level and possibly the nature of the surgical procedure performed. If you feel this is occurring, you may use an over-the-counter treatment such as MiraLAX (or Milk of Magnesia, Ex-Lax,  Senokot, etc.) until the problem has resolved. Drink plenty of water and try to wean off narcotic pain medications as soon as is comfortable for you.    6. PAIN MEDICATION:   You will be given a prescription for pain medication at discharge. Please take these as directed. It is important to remember not to take medications on an empty stomach as this may cause nausea.  You may also take over the counter acetaminophen and/or NSAIDS (ibuprofen, Aleve, Advil, Motrin) per the package instructions.  You may also use ice to the wound to decrease pain and swelling. You may alternate 20 minutes on and 20 minutes off with the ice for the first 24-48 hours. Make sure you place a washcloth or towel between the ice pack and your skin.  Please note that narcotic pain medication cannot be refilled unless you are seen by a doctor. Make sure you call the office if you are running low on medication or if the dose you have been prescribed is not working well for you.    7.CALL THE [facility] AT [phone] IF YOU HAVE:  (1) Fevers to more than 101F, (2) Unusual chest or leg pain, (3) Drainage or fluid from incision that may be foul smelling, increased tenderness or soreness at the wound or the wound edges are no longer together, redness or swelling at the incision site. Do not hesitate to call with any other questions.

## 2024-06-08 NOTE — PROGRESS NOTES
Transferring pt to discharge lounge  Medications picked up from pharmacy and given to pt.-education provided on meds to pt   All belongings and medications with pt at time of dc

## 2024-06-13 ENCOUNTER — HOSPITAL ENCOUNTER (OUTPATIENT)
Dept: HEMATOLOGY ONCOLOGY | Facility: MEDICAL CENTER | Age: 74
End: 2024-06-13
Attending: INTERNAL MEDICINE
Payer: MEDICARE

## 2024-06-13 VITALS
DIASTOLIC BLOOD PRESSURE: 62 MMHG | HEIGHT: 64 IN | SYSTOLIC BLOOD PRESSURE: 124 MMHG | OXYGEN SATURATION: 90 % | HEART RATE: 81 BPM | WEIGHT: 178.35 LBS | BODY MASS INDEX: 30.45 KG/M2 | TEMPERATURE: 96.9 F

## 2024-06-13 DIAGNOSIS — C50.412 CARCINOMA OF UPPER-OUTER QUADRANT OF FEMALE BREAST, LEFT (HCC): ICD-10-CM

## 2024-06-13 PROCEDURE — 99212 OFFICE O/P EST SF 10 MIN: CPT | Performed by: INTERNAL MEDICINE

## 2024-06-13 PROCEDURE — 99214 OFFICE O/P EST MOD 30 MIN: CPT | Performed by: INTERNAL MEDICINE

## 2024-06-13 ASSESSMENT — ENCOUNTER SYMPTOMS
RESPIRATORY NEGATIVE: 1
CONSTITUTIONAL NEGATIVE: 1
NEUROLOGICAL NEGATIVE: 1
MYALGIAS: 1
EYES NEGATIVE: 1
CARDIOVASCULAR NEGATIVE: 1
GASTROINTESTINAL NEGATIVE: 1
PSYCHIATRIC NEGATIVE: 1

## 2024-06-13 ASSESSMENT — PAIN SCALES - GENERAL: PAINLEVEL: 2=MINIMAL-SLIGHT

## 2024-06-13 ASSESSMENT — FIBROSIS 4 INDEX: FIB4 SCORE: 2.78

## 2024-06-13 NOTE — ADDENDUM NOTE
Encounter addended by: Flex Ortiz, Med Ass't on: 6/13/2024 10:26 AM   Actions taken: Charge Capture section accepted

## 2024-06-13 NOTE — PROGRESS NOTES
Consult:  Hematology/Oncology      Referring Physician: Elicia Borden MD  Primary Care:  Segundo Pruett M.D.    Diagnosis:     Chief Complaint:      History of Presenting Illness:  Lawanda Pagan is a 73 y.o. female who self detected a mass in her breast in mid 2023.  She had problems with her insurance and cannot get an to get a mammogram until January 2024.  This revealed a mass in the left upper outer quadrant measuring 3.5 x 3 cm.  There was a suspicious lymph node as well.  She underwent a ultrasound-guided biopsy on 2/13/2024 which showed invasive ductal carcinoma, grade 1, no lymph vascular invasion, ER +90%, AK negative, Ki-67 15%, HER2 2+, HER2 FISH negative.  There was metastatic carcinoma and a left axillary lymph node.  She saw Dr. Borden who noted that the patient was extremely tachycardic and short of breath.  Evaluation was initiated.  PET CT scan was negative for metastatic disease and was hypermetabolic in the breast and lymph node.  On 2/16/2024 she was admitted to the hospital when on initial cardiac evaluation she was found to have a hemoglobin of 4.8, hematocrit of 29.8 MCV of 56.6 with a white count of 3.8 and platelets 286.  Her records show that her hemoglobin was 6.2 in November 2023 and apparently nothing was done about this.  She received 4 units of packed red blood cells and improved symptomatically immediately.  She underwent an EGD and apparently a colonoscopy at the same time while in the hospital although I cannot locate the records right now.  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  It is unclear whether she was ever worked up for hereditary thrombophilia.  Also because of her insurance issues she did not have adequate monitoring of her Coumadin level.  Her INR level was slightly elevated around  the time of her admission.  Coumadin was discontinued.  Echocardiogram showed normal ejection fraction of 65%.  Of note she has an almost lifelong history of anemia and has been on iron tablets in the past but during her pregnancy and afterwards but none for the last several years.  She did not apparently need a transfusion in the past nor iron infusion but she is really been minimally monitored over the last several years.    Since discharge she has felt dramatically better and has no tachycardia, she is ambulating normal distances without difficulty.  She saw her new primary care physician 2 days ago and her hemoglobin was up to 11.4 hematocrit 43.4 MCV up to 83.5 with normals WBC and differential and a normal platelet count.  Her LDL was elevated and she was started on statin.  No family history of breast or ovarian cancer.  Genetics consult has been placed.    Interval history 4/18/2024: She started anastrozole at the beginning of March.  She is tolerating it well with some mild pains in her shoulders and arm muscles.  She does not require any pain medicine for this.  No hot flashes or night sweats.  She feels like her mass has shrunk significantly and is less tender than previously.  She is back to full activity and has no reduced exercise tolerance.    Interval history 6/13/2024: She had acute bowel incarceration of her umbilical hernia last week causing small bowel obstruction and went to the hospital where she underwent surgery.  She did very well with this and her hemoglobin remained stable in the 11-12 range after her operation.  She was also found to have diabetes mellitus and was placed on medication for this.  Blood sugars are under control now.  Her breast mass continues to shrink in her estimation.  She did miss a few doses of anastrozole while in the hospital.      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    Blood clotting  disorder (HCC)     lungs bilat     Blood transfusion without reported diagnosis     Breath shortness 2014 denies now    Elevated LFTs 2018    Fall     Hernia, hiatal     Measles        Past Surgical History:   Procedure Laterality Date    MO EXPLORATORY OF ABDOMEN N/A 2024    Procedure: LAPAROTOMY, EXPLORATORY;  Surgeon: Karl Carmona M.D.;  Location: SURGERY [facility];  Service: Thoracic    MO REMOVAL OF OMENTUM N/A 2024    Procedure: OMENTECTOMY;  Surgeon: Karl Carmona M.D.;  Location: SURGERY [facility];  Service: Thoracic    UMBILICAL HERNIA REPAIR N/A 2024    Procedure: REPAIR, HERNIA, UMBILICAL;  Surgeon: Karl Carmona M.D.;  Location: SURGERY [facility];  Service: Thoracic    BOWEL RESECTION N/A 2024    Procedure: SMALL BOWEL RESECTION;  Surgeon: Karl Carmona M.D.;  Location: SURGERY [facility];  Service: Thoracic    ENDOSCOPY PROCEDURE N/A 2024    Procedure: ENDOSCOPY UPPER AND LOWER;  Surgeon: Tom Payne M.D.;  Location: SURGERY [facility];  Service: Gastroenterology    GASTROSCOPY N/A 2017    Procedure: GASTROSCOPY;  Surgeon: Stephanie Reyes M.D.;  Location: SURGERY SAME DAY [facility];  Service: Gastroenterology    OTHER ABDOMINAL SURGERY      hernia repair    GYN SURGERY      c sec lap tubal    TONSILLECTOMY         Social History     Tobacco Use    Smoking status: Former     Current packs/day: 0.00     Average packs/day: 0.2 packs/day for 1 year (0.2 ttl pk-yrs)     Types: Cigarettes     Start date: 1972     Quit date: 1973     Years since quittin.8    Smokeless tobacco: Never   Vaping Use    Vaping status: Never Used   Substance Use Topics    Alcohol use: No     Comment: Stopped about 50 years ago.    Drug use: No        Family History   Problem Relation Age of Onset    Other Mother         parkinsons    Melanoma Mother     Cancer Father         stomach    Cancer Brother     Stroke Brother     Cancer Brother      Cancer Maternal Grandmother         stomach       Allergies as of 06/13/2024 - Reviewed 06/13/2024   Allergen Reaction Noted    Other environmental Runny Nose 08/25/2017         Current Outpatient Medications:     acetaminophen (TYLENOL) 325 MG Tab, Take 2 Tablets by mouth every four hours as needed for Mild Pain or Moderate Pain for up to 7 days., Disp: 42 Tablet, Rfl: 0    gabapentin (NEURONTIN) 100 MG Cap, Take 1 Capsule by mouth 3 times a day for 7 days., Disp: 21 Capsule, Rfl: 0    metFORMIN (GLUCOPHAGE) 500 MG Tab, Take 1 Tablet by mouth 2 times a day with meals for 30 days., Disp: 60 Tablet, Rfl: 0    methocarbamol (ROBAXIN) 500 MG Tab, Take 1 Tablet by mouth 3 times a day for 7 days., Disp: 21 Tablet, Rfl: 0    traMADol (ULTRAM) 50 MG Tab, Take 1 Tablet by mouth every four hours as needed for Severe Pain for up to 7 days., Disp: 20 Tablet, Rfl: 0    Blood Glucose Monitoring Suppl (BLOOD GLUCOSE SYSTEM RICHAR) Kit, 1 Each 4 Times a Day,Before Meals and at Bedtime., Disp: 1 Kit, Rfl: 0    glucose blood strip, Use one One Touch Verio strip to test blood sugar three times daily before meals., Disp: 100 Strip, Rfl: 0    Lancets, Use one One Touch Verio Flex lancet to test blood sugar three times daily before meals., Disp: 100 Each, Rfl: 0    Alcohol Swabs, Wipe site with prep pad prior to injection., Disp: 100 Each, Rfl: 0    rosuvastatin (CRESTOR) 10 MG Tab, Take 10 mg by mouth every evening., Disp: , Rfl:     anastrozole (ARIMIDEX) 1 MG Tab, Take 1 Tablet by mouth every day., Disp: 90 Tablet, Rfl: 1    pantoprazole (PROTONIX) 40 MG Tablet Delayed Response, Take 1 Tablet by mouth 2 times a day., Disp: 60 Tablet, Rfl: 3    ferrous sulfate 325 (65 Fe) MG tablet, Take 1 Tablet by mouth every Tue and Thu at 6 PM., Disp: 30 Tablet, Rfl: 3    vitamin D (CHOLECALCIFEROL) 1000 Unit Tab, Take 2,000 Units by mouth every day. 2 tablets=2000 units, Disp: , Rfl:     Cyanocobalamin (B-12) 1000 MCG Tab, Take 1,000 mcg by  "mouth every day., Disp: , Rfl:     Review of Systems:  Review of Systems   Constitutional: Negative.    HENT: Negative.     Eyes: Negative.    Respiratory: Negative.     Cardiovascular: Negative.    Gastrointestinal: Negative.    Genitourinary: Negative.    Musculoskeletal:  Positive for joint pain and myalgias.   Skin: Negative.    Neurological: Negative.    Endo/Heme/Allergies: Negative.    Psychiatric/Behavioral: Negative.            Physical Exam:  Vitals:    06/13/24 0922   BP: 124/62   BP Location: Right arm   Patient Position: Sitting   Pulse: 81   Temp: 36.1 °C (96.9 °F)   TempSrc: Temporal   SpO2: 90%   Weight: 80.9 kg (178 lb 5.6 oz)   Height: 1.626 m (5' 4\")       DESC; KARNOFSKY SCALE WITH ECOG EQUIVALENT: 90, Able to carry on normal activity; minor signs or symptoms of disease (ECOG equivalent 0)    DISTRESS LEVEL: mild distress    Physical Exam  Constitutional:       Appearance: Normal appearance.   HENT:      Head: Normocephalic.      Mouth/Throat:      Mouth: Mucous membranes are moist.      Pharynx: Oropharynx is clear.   Eyes:      Extraocular Movements: Extraocular movements intact.      Conjunctiva/sclera: Conjunctivae normal.      Pupils: Pupils are equal, round, and reactive to light.   Cardiovascular:      Rate and Rhythm: Normal rate and regular rhythm.      Pulses: Normal pulses.   Pulmonary:      Effort: Pulmonary effort is normal.      Breath sounds: Normal breath sounds.   Chest:      Comments: 6/13/2024: Left breast mass at 1230 o'clock 4 cm from the nipple now measures 3 x 3 cm and is less distinct.  No left axillary adenopathy noted  4/18/2024: 1230 o'clock mass now measures 4 cm wide by 3.5 cm tall and is softer and flatter.  No axillary adenopathy is felt.  3/7/2024: At 1230 o'clock 5 cm from the nipple is a 4.5 cm wide by 4 cm tall mass.  There is a 1 x 1 cm left axillary lymph node palpable and freely movable.  No left supraclavicular adenopathy is noted.  The right breast is " without masses nipple discharge or tenderness.  Abdominal:      General: Abdomen is flat. Bowel sounds are normal.      Palpations: Abdomen is soft. There is hepatomegaly and splenomegaly. There is no mass.      Comments: Large periumbilical hernia   Musculoskeletal:         General: Normal range of motion.   Skin:     General: Skin is warm.   Neurological:      General: No focal deficit present.      Mental Status: She is alert and oriented to person, place, and time.   Psychiatric:         Mood and Affect: Mood normal.         Behavior: Behavior normal.            Labs:  Hospital Outpatient Visit on 03/05/2024   Component Date Value Ref Range Status    Sodium 03/05/2024 139  135 - 145 mmol/L Final    Potassium 03/05/2024 4.0  3.6 - 5.5 mmol/L Final    Chloride 03/05/2024 102  96 - 112 mmol/L Final    Co2 03/05/2024 22  20 - 33 mmol/L Final    Anion Gap 03/05/2024 15.0  7.0 - 16.0 Final    Glucose 03/05/2024 155 (H)  65 - 99 mg/dL Final    Bun 03/05/2024 14  8 - 22 mg/dL Final    Creatinine 03/05/2024 1.03  0.50 - 1.40 mg/dL Final    Calcium 03/05/2024 9.7  8.5 - 10.5 mg/dL Final    Correct Calcium 03/05/2024 9.5  8.5 - 10.5 mg/dL Final    AST(SGOT) 03/05/2024 53 (H)  12 - 45 U/L Final    ALT(SGPT) 03/05/2024 42  2 - 50 U/L Final    Alkaline Phosphatase 03/05/2024 189 (H)  30 - 99 U/L Final    Total Bilirubin 03/05/2024 1.2  0.1 - 1.5 mg/dL Final    Albumin 03/05/2024 4.3  3.2 - 4.9 g/dL Final    Total Protein 03/05/2024 8.7 (H)  6.0 - 8.2 g/dL Final    Globulin 03/05/2024 4.4 (H)  1.9 - 3.5 g/dL Final    A-G Ratio 03/05/2024 1.0  g/dL Final    WBC 03/05/2024 8.3  4.8 - 10.8 K/uL Final    RBC 03/05/2024 5.20  4.20 - 5.40 M/uL Final    Hemoglobin 03/05/2024 11.4 (L)  12.0 - 16.0 g/dL Final    Hematocrit 03/05/2024 43.4  37.0 - 47.0 % Final    MCV 03/05/2024 83.5  81.4 - 97.8 fL Final    MCH 03/05/2024 21.9 (L)  27.0 - 33.0 pg Final    MCHC 03/05/2024 26.3 (L)  32.2 - 35.5 g/dL Final    Please note new reference  range effective 05/22/2023.    Platelet Count 03/05/2024 392  164 - 446 K/uL Final    Comment: Platelet clumping confirmed on smear review.  If a more accurate platelet  count is required, please request recollection.      Neutrophils-Polys 03/05/2024 69.00  44.00 - 72.00 % Final    Lymphocytes 03/05/2024 23.90  22.00 - 41.00 % Final    Monocytes 03/05/2024 7.10  0.00 - 13.40 % Final    Eosinophils 03/05/2024 0.00  0.00 - 6.90 % Final    Basophils 03/05/2024 0.00  0.00 - 1.80 % Final    Nucleated RBC 03/05/2024 0.00  0.00 - 0.20 /100 WBC Final    Please note new reference range effective 05/22/2023.    Neutrophils (Absolute) 03/05/2024 5.73  1.82 - 7.42 K/uL Final    Comment: Includes immature neutrophils, if present.  Please note new reference range effective 05/22/2023.      Lymphs (Absolute) 03/05/2024 1.98  1.00 - 4.80 K/uL Final    Monos (Absolute) 03/05/2024 0.59  0.00 - 0.85 K/uL Final    Eos (Absolute) 03/05/2024 0.00  0.00 - 0.51 K/uL Final    Baso (Absolute) 03/05/2024 0.00  0.00 - 0.12 K/uL Final    NRBC (Absolute) 03/05/2024 0.00  K/uL Final    Anisocytosis 03/05/2024 1+   Final    Macrocytosis 03/05/2024 1+   Final    Microcytosis 03/05/2024 2+ (A)   Final    TSH 03/05/2024 1.560  0.380 - 5.330 uIU/mL Final    Comment: The 2011 American Thyroid Association (JAZZMINE) guidelines  recommended that the interpretation of thyroid function in  pregnancy be based on trimester specific reference ranges.    1st Trimester  0.100-2.500 mIU/L  2nd Trimester  0.200-3.000 mIU/L  3rd Trimester  0.300-3.500 mIU/L    These established reference ranges have not been validated  at [facility].      Cholesterol,Tot 03/05/2024 176  100 - 199 mg/dL Final    Triglycerides 03/05/2024 143  0 - 149 mg/dL Final    HDL 03/05/2024 30 (A)  >=40 mg/dL Final    LDL 03/05/2024 117 (H)  <100 mg/dL Final    Fasting Status 03/05/2024 Fasting   Final    GFR (CKD-EPI) 03/05/2024 56 (A)  >60 mL/min/1.73 m 2 Final    Comment:  Estimated Glomerular Filtration Rate is calculated using  race neutral CKD-EPI  equation per NKF-ASN recommendations.      Manual Diff Status 2024 PERFORMED   Final    Peripheral Smear Review 2024 see below   Final    Comment: Due to instrument suspect flags, further review of peripheral smear is  indicated on this patient sample. This review may or may not result in  abnormal findings.      Plt Estimation 2024 Normal   Final    RBC Morphology 2024 Present   Final    Poikilocytosis 2024 1+   Final    Schistocytes 2024 1+   Final   Admission on 2024, Discharged on 2024   Component Date Value Ref Range Status    Sodium 2024 135  135 - 145 mmol/L Final    Potassium 2024 3.4 (L)  3.6 - 5.5 mmol/L Final    Chloride 2024 100  96 - 112 mmol/L Final    Co2 2024 19 (L)  20 - 33 mmol/L Final    Anion Gap 2024 16.0  7.0 - 16.0 Final    Glucose 2024 171 (H)  65 - 99 mg/dL Final    Bun 2024 12  8 - 22 mg/dL Final    Creatinine 2024 0.84  0.50 - 1.40 mg/dL Final    Calcium 2024 9.0  8.5 - 10.5 mg/dL Final    Correct Calcium 2024 8.8  8.5 - 10.5 mg/dL Final    AST(SGOT) 2024 24  12 - 45 U/L Final    ALT(SGPT) 2024 14  2 - 50 U/L Final    Alkaline Phosphatase 2024 122 (H)  30 - 99 U/L Final    Total Bilirubin 2024 0.8  0.1 - 1.5 mg/dL Final    Albumin 2024 4.3  3.2 - 4.9 g/dL Final    Total Protein 2024 7.7  6.0 - 8.2 g/dL Final    Globulin 2024 3.4  1.9 - 3.5 g/dL Final    A-G Ratio 2024 1.3  g/dL Final    Report 2024    Preliminary                    Value:[facility].    Test Date:  2024  Pt Name:    TENA MORALES              Department: ER  MRN:        [id number]                      Room:  Gender:     Female                       Technician: [id number]  :        [id number]                   Requested By:ER TRIAGE  PROTOCOL  Order #:    [id number]                    Reading MD:    Measurements  Intervals                                Axis  Rate:       86                           P:          27  CO:         172                          QRS:        8  QRSD:       88                           T:          31  QT:         388  QTc:        464    Interpretive Statements  Sinus rhythm  Low voltage, precordial leads  Compared to ECG 02/16/2024 10:18:51  Low QRS voltage now present      ABO Grouping Only 02/16/2024 A   Final    Rh Grouping Only 02/16/2024 POS   Final    Antibody Screen-Cod 02/16/2024 NEG   Final    Component R 02/16/2024    Final                    Value:R99                 Red Cells, LR       [id number]   transfused   02/16/24   20:48      Product Type 02/16/2024 R99   Final    Dispense Status 02/16/2024 transfused   Final    Unit Number (Barcoded) 02/16/2024 [id number]   Final    Product Code (Barcoded) 02/16/2024 C8753R19   Final    Blood Type (Barcoded) 02/16/2024 6200   Final    Component R 02/16/2024    Final                    Value:R99                 Red Cells, LR       [id number]   transfused   02/16/24   23:01      Product Type 02/16/2024 R99   Final    Dispense Status 02/16/2024 transfused   Final    Unit Number (Barcoded) 02/16/2024 [id number]   Final    Product Code (Barcoded) 02/16/2024 [id number]   Final    Blood Type (Barcoded) 02/16/2024 6200   Final    Component R 02/16/2024    Final                    Value:R99                 Red Cells, LR       [id number]   transfused   02/17/24   01:02      Product Type 02/16/2024 R99   Final    Dispense Status 02/16/2024 transfused   Final    Unit Number (Barcoded) 02/16/2024 [id number]   Final    Product Code (Barcoded) 02/16/2024 [id number]   Final    Blood Type (Barcoded) 02/16/2024 6200   Final    Component R 02/16/2024    Final                    Value:R99                 Red Cells, LR       [id number]   transfused   02/17/24   03:16       Product Type 02/16/2024 R99   Final    Dispense Status 02/16/2024 transfused   Final    Unit Number (Barcoded) 02/16/2024 [id number]   Final    Product Code (Barcoded) 02/16/2024 [id number]   Final    Blood Type (Barcoded) 02/16/2024 6200   Final    GFR (CKD-EPI) 02/16/2024 72  >60 mL/min/1.73 m 2 Final    Comment: Estimated Glomerular Filtration Rate is calculated using  race neutral CKD-EPI 2021 equation per NKF-ASN recommendations.      WBC 02/16/2024 3.8 (L)  4.8 - 10.8 K/uL Final    RBC 02/16/2024 3.50 (L)  4.20 - 5.40 M/uL Final    Hemoglobin 02/16/2024 4.8 (LL)  12.0 - 16.0 g/dL Final    Comment: This result has [place] RN [id number] by [id number] on 02/16/2024 20:30:51,  and has been read back.      Hematocrit 02/16/2024 19.8 (L)  37.0 - 47.0 % Final    MCV 02/16/2024 56.6 (L)  81.4 - 97.8 fL Final    MCH 02/16/2024 13.7 (L)  27.0 - 33.0 pg Final    MCHC 02/16/2024 24.2 (L)  32.2 - 35.5 g/dL Final    Please note new reference range effective 05/22/2023.    RDW 02/16/2024 46.5  35.9 - 50.0 fL Final    Platelet Count 02/16/2024 286  164 - 446 K/uL Final    Neutrophils-Polys 02/16/2024 57.30  44.00 - 72.00 % Final    Lymphocytes 02/16/2024 26.80  22.00 - 41.00 % Final    Monocytes 02/16/2024 14.20 (H)  0.00 - 13.40 % Final    Eosinophils 02/16/2024 0.30  0.00 - 6.90 % Final    Basophils 02/16/2024 1.10  0.00 - 1.80 % Final    Immature Granulocytes 02/16/2024 0.30  0.00 - 0.90 % Final    Nucleated RBC 02/16/2024 0.50 (H)  0.00 - 0.20 /100 WBC Final    Please note new reference range effective 05/22/2023.    Neutrophils (Absolute) 02/16/2024 2.18  1.82 - 7.42 K/uL Final    Comment: Includes immature neutrophils, if present.  Please note new reference range effective 05/22/2023.      Lymphs (Absolute) 02/16/2024 1.02  1.00 - 4.80 K/uL Final    Monos (Absolute) 02/16/2024 0.54  0.00 - 0.85 K/uL Final    Eos (Absolute) 02/16/2024 0.01  0.00 - 0.51 K/uL Final    Baso (Absolute) 02/16/2024 0.04  0.00 - 0.12 K/uL Final     Immature Granulocytes (abs) 02/16/2024 0.01  0.00 - 0.11 K/uL Final    NRBC (Absolute) 02/16/2024 0.02  K/uL Final    Hypochromia 02/16/2024 2+ (A)   Final    Anisocytosis 02/16/2024 1+   Final    Microcytosis 02/16/2024 2+ (A)   Final    Plt Estimation 02/16/2024 Normal   Final    RBC Morphology 02/16/2024 Present   Final    Polychromia 02/16/2024 1+   Final    Poikilocytosis 02/16/2024 1+   Final    Peripheral Smear Review 02/16/2024 see below   Final    Comment: Due to instrument suspect flags, further review of peripheral smear is  indicated on this patient sample. This review may or may not result in  abnormal findings.      Comments-Diff 02/16/2024 see below   Final    Results have been verified by peripheral blood smear review.    Eject.Frac. MOD BP 02/17/2024 66.33   Final    Eject.Frac. MOD 4C 02/17/2024 63.68   Final    Eject.Frac. MOD 2C 02/17/2024 68.8   Final    NT-proBNP 02/16/2024 346 (H)  0 - 125 pg/mL Final    Sodium 02/17/2024 137  135 - 145 mmol/L Final    Potassium 02/17/2024 4.0  3.6 - 5.5 mmol/L Final    Chloride 02/17/2024 107  96 - 112 mmol/L Final    Co2 02/17/2024 19 (L)  20 - 33 mmol/L Final    Glucose 02/17/2024 105 (H)  65 - 99 mg/dL Final    Bun 02/17/2024 9  8 - 22 mg/dL Final    Creatinine 02/17/2024 0.63  0.50 - 1.40 mg/dL Final    Calcium 02/17/2024 8.3 (L)  8.5 - 10.5 mg/dL Final    Anion Gap 02/17/2024 11.0  7.0 - 16.0 Final    Hemoglobin 02/17/2024 9.3 (L)  12.0 - 16.0 g/dL Final    Hematocrit 02/17/2024 30.9 (L)  37.0 - 47.0 % Final    PT 02/17/2024 22.6 (H)  12.0 - 14.6 sec Final    INR 02/17/2024 1.96 (H)  0.87 - 1.13 Final    Comment: INR - Non-therapeutic Reference Range: 0.87-1.13  INR - Therapeutic Reference Range: 2.0-4.0      Hemoglobin 02/17/2024 9.4 (L)  12.0 - 16.0 g/dL Final    Hematocrit 02/17/2024 31.7 (L)  37.0 - 47.0 % Final    APTT 02/17/2024 32.4  24.7 - 36.0 sec Final    GFR (CKD-EPI) 02/17/2024 92  >60 mL/min/1.73 m 2 Final    Comment: Estimated Glomerular  Filtration Rate is calculated using  race neutral CKD-EPI 2021 equation per NKF-ASN recommendations.      Hemoglobin 02/17/2024 10.0 (L)  12.0 - 16.0 g/dL Final    Hematocrit 02/17/2024 32.8 (L)  37.0 - 47.0 % Final    Hemoglobin 02/17/2024 9.4 (L)  12.0 - 16.0 g/dL Final    Hematocrit 02/17/2024 32.1 (L)  37.0 - 47.0 % Final    Sodium 02/18/2024 137  135 - 145 mmol/L Final    Potassium 02/18/2024 3.7  3.6 - 5.5 mmol/L Final    Chloride 02/18/2024 108  96 - 112 mmol/L Final    Co2 02/18/2024 17 (L)  20 - 33 mmol/L Final    Anion Gap 02/18/2024 12.0  7.0 - 16.0 Final    Glucose 02/18/2024 97  65 - 99 mg/dL Final    Bun 02/18/2024 5 (L)  8 - 22 mg/dL Final    Creatinine 02/18/2024 0.69  0.50 - 1.40 mg/dL Final    Calcium 02/18/2024 8.6  8.5 - 10.5 mg/dL Final    Correct Calcium 02/18/2024 9.2  8.5 - 10.5 mg/dL Final    AST(SGOT) 02/18/2024 25  12 - 45 U/L Final    ALT(SGPT) 02/18/2024 12  2 - 50 U/L Final    Alkaline Phosphatase 02/18/2024 118 (H)  30 - 99 U/L Final    Total Bilirubin 02/18/2024 1.1  0.1 - 1.5 mg/dL Final    Albumin 02/18/2024 3.3  3.2 - 4.9 g/dL Final    Total Protein 02/18/2024 7.2  6.0 - 8.2 g/dL Final    Globulin 02/18/2024 3.9 (H)  1.9 - 3.5 g/dL Final    A-G Ratio 02/18/2024 0.8  g/dL Final    Magnesium 02/18/2024 2.2  1.5 - 2.5 mg/dL Final    GFR (CKD-EPI) 02/18/2024 90  >60 mL/min/1.73 m 2 Final    Comment: Estimated Glomerular Filtration Rate is calculated using  race neutral CKD-EPI 2021 equation per NKF-ASN recommendations.      WBC 02/18/2024 5.0  4.8 - 10.8 K/uL Final    RBC 02/18/2024 4.50  4.20 - 5.40 M/uL Final    Hemoglobin 02/18/2024 9.1 (L)  12.0 - 16.0 g/dL Final    Hematocrit 02/18/2024 30.9 (L)  37.0 - 47.0 % Final    MCV 02/18/2024 68.7 (L)  81.4 - 97.8 fL Final    MCH 02/18/2024 20.2 (L)  27.0 - 33.0 pg Final    MCHC 02/18/2024 29.4 (L)  32.2 - 35.5 g/dL Final    Please note new reference range effective 05/22/2023.    RDW 02/18/2024 75.9 (H)  35.9 - 50.0 fL Final    Platelet  Count 2024 215  164 - 446 K/uL Final    Reviewed by Clinical .   Hospital Outpatient Visit on 2024   Component Date Value Ref Range Status    Sodium 2024 135  135 - 145 mmol/L Final    Potassium 2024 3.7  3.6 - 5.5 mmol/L Final    Chloride 2024 101  96 - 112 mmol/L Final    Co2 2024 20  20 - 33 mmol/L Final    Glucose 2024 139 (H)  65 - 99 mg/dL Final    Bun 2024 12  8 - 22 mg/dL Final    Creatinine 2024 0.77  0.50 - 1.40 mg/dL Final    Calcium 2024 8.8  8.5 - 10.5 mg/dL Final    Anion Gap 2024 14.0  7.0 - 16.0 Final    WBC 2024 3.6 (L)  4.8 - 10.8 K/uL Final    RBC 2024 3.93 (L)  4.20 - 5.40 M/uL Final    Hemoglobin 2024 5.2 (LL)  12.0 - 16.0 g/dL Final    Hematocrit 2024 22.5 (L)  37.0 - 47.0 % Final    MCV 2024 57.3 (L)  81.4 - 97.8 fL Final    MCH 2024 13.2 (L)  27.0 - 33.0 pg Final    MCHC 2024 23.1 (L)  32.2 - 35.5 g/dL Final    Please note new reference range effective 2023.    RDW 2024 47.3  35.9 - 50.0 fL Final    Platelet Count 2024 336  164 - 446 K/uL Final    GFR (CKD-EPI) 2024 80  >60 mL/min/1.73 m 2 Final    Comment: Estimated Glomerular Filtration Rate is calculated using  race neutral CKD-EPI  equation per NKF-ASN recommendations.     Office Visit on 2024   Component Date Value Ref Range Status    Report 2024    Final                    Value:[facility]    Test Date:  2024  Pt Name:    TENA MORALES              Department: [facility]  MRN:        [id number]                      Room:  Gender:     Female                       Technician: CA  :        1950                   Requested By:JOSE NEGRON  Order #:    [id number]                    Reading MD: Jose Negron MD    Measurements  Intervals                                Axis  Rate:       93                           P:          54  OH:         163                           QRS:        50  QRSD:       82                           T:          31  QT:         355  QTc:        442    Interpretive Statements  Sinus rhythm  Borderline repolarization abnormality  Compared to ECG 2014 11:47:20  No significant changes  Electronically Signed On 2024 17:29:27 PST by Jose Pham MD     Anticoagulation Visit on 2024   Component Date Value Ref Range Status    INR 2024 1.80 (A)  2 - 3.5 Final   Anticoagulation Visit on 2024   Component Date Value Ref Range Status    INR 2024 3.90 (A)  2 - 3.5 Final       Imaging:   All listed images below have been independently reviewed by me. I agree with the findings as summarized below:    EC-ECHOCARDIOGRAM COMPLETE W/O CONT    Result Date: 2024  Transthoracic Echo Report Echocardiography Laboratory CONCLUSIONS Normal left ventricular systolic function. The left ventricular ejection fraction is visually estimated to be 65%. The right ventricle is normal in size and systolic function. Mild tricuspid regurgitation. Estimated right ventricular systolic pressure is 38 mmHg (normal). No prior study is available for comparison. TENA MORALES Exam Date:         2024                    18:37 Exam Location:     Inpatient Priority:          Routine Ordering Physician:        HERIBERTO LOPEZ Referring Physician:       [id number] Werner Sonographer:               Shahla Adam RDCS Age:    73     Gender:    F MRN:    [id number] :    1950 BSA:    1.95   Ht (in):    64     Wt (lb):    198 Exam Type:     Complete Indications:     Acute MI ICD Codes:       410.9 CPT Codes:       24438 BP:   149    /   68     HR:   70 Technical Quality:       Fair MEASUREMENTS  (Male / Female) Normal Values 2D ECHO LV Diastolic Diameter PLAX        4.6 cm                4.2 - 5.9 / 3.9 - 5.3 cm LV Systolic Diameter PLAX         2.8 cm                2.1 - 4.0 cm IVS Diastolic Thickness           1 cm                   LVPW Diastolic Thickness          0.92 cm               LVOT Diameter                     2 cm                  LV Ejection Fraction MOD BP       66.3 %                >= 55  % LV Ejection Fraction MOD 4C       63.7 %                LV Ejection Fraction MOD 2C       68.8 %                IVC Diameter                      2 cm                  DOPPLER AV Peak Velocity                  1.6 m/s               AV Peak Gradient                  10.5 mmHg             AV Mean Gradient                  5.8 mmHg              LVOT Peak Velocity                1.3 m/s               AV Area Cont Eq vti               2.3 cm2               Mitral E Point Velocity           1.2 m/s               Mitral E to A Ratio               1.1                   MV Pressure Half Time             57 ms                 MV Area PHT                       3.9 cm2               MV Deceleration Time              196 ms                TV Peak E Velocity                0.32 m/s              TR Peak Velocity                  275 cm/s              MV E' Velocity                    8.5 cm/s              * Indicates values subject to auto-interpretation LV EF:        % FINDINGS Left Ventricle Normal left ventricular chamber size. Normal left ventricular wall thickness. Normal left ventricular systolic function. The left ventricular ejection fraction is visually estimated to be 65%. Normal regional wall motion. Indeterminate diastolic function. Right Ventricle The right ventricle is normal in size and systolic function. Right Atrium The right atrium is normal in size. Normal inferior vena cava size without inspiratory collapse. Left Atrium The left atrium is normal in size. Left atrial volume index is 24 mL/sq m. Mitral Valve Structurally normal mitral valve without significant stenosis or regurgitation. Aortic Valve Structurally normal aortic valve without significant stenosis or regurgitation. Tricuspid Valve No tricuspid stenosis. Mild tricuspid  regurgitation. Estimated right ventricular systolic pressure is 38 mmHg. Right atrial pressure is estimated to be 8 mmHg. Pulmonic Valve Structurally normal pulmonic valve without significant stenosis or regurgitation. Pericardium No pericardial effusion. Aorta Normal aortic root for body surface area. The ascending aorta diameter is 3.0 cm. Roland Nuno MD (Electronically Signed) Final Date:     18 February 2024                 16:41    DX-CHEST-PORTABLE (1 VIEW)    Result Date: 2/18/2024 2/18/2024 3:58 PM HISTORY/REASON FOR EXAM:  Cough. TECHNIQUE/EXAM DESCRIPTION AND NUMBER OF VIEWS: Single portable view of the chest. COMPARISON: 2/16/2024 FINDINGS: Cardiomediastinal silhouette is stable. Interstitial and hazy opacity in the left lung base, likely left lower lobe, asymmetric edema versus pneumonitis. Right lung is clear. No pleural effusion or pneumothorax. No acute osseous abnormality.     Persistent mild hazy left lower lobe opacity could be mild pneumonitis.    DX-CHEST-PORTABLE (1 VIEW)    Result Date: 2/16/2024 2/16/2024 6:21 PM HISTORY/REASON FOR EXAM:  Shortness of Breath TECHNIQUE/EXAM DESCRIPTION AND NUMBER OF VIEWS: Single portable view of the chest. COMPARISON: 7/14/2014 FINDINGS: The mediastinal and cardiac silhouette is unremarkable. The pulmonary vascularity is within normal limits. There is ill-defined linear hazy opacity in the left lower lobe. There is no significant pleural effusion. There is no visible pneumothorax. There are no acute bony abnormalities.     1.  Ill-defined linear left lobe opacity could be atelectasis or pneumonitis.    [id number] BASE TO MID-THIGH    Result Date: 2/9/2024 2/9/2024 12:22 PM HISTORY/REASON FOR EXAM:  cT3 cN1 upper outer left breast cancer TECHNIQUE/EXAM DESCRIPTION AND NUMBER OF VIEWS: PET body imaging. Initially, 10.1 mCi F-18 FDG was administered intravenously under standardized conditions. Approximately 45 minutes after FDG administration, the patient  was placed in the supine position on the PET CT table. Blood glucose level was 138 mg/dL. Low dose spiral CT imaging was performed from the skull base to the mid thighs. PET imaging was then performed from the skull base to the mid thighs. CT images, PET images, and PET/CT fused images were reviewed on a PACS 3D workstation. The limited non-contrast CT data are used primarily for attenuation correction and anatomic correlation.  Evaluation of solid organs and bowel are especially limited utilizing this technique. A low dose CT was obtained of the same area without IV contrast for attenuation correction and coregistration, not for a diagnostic scan. COMPARISON: None. FINDINGS: VISUALIZED BRAIN: Normal metabolic activity. HEAD AND NECK: Normal physiologic uptake. CHEST: Background blood pool activity shows average SUV of 2.1. Hypermetabolic mass in the left breast (SUV max 13.8). Lungs show no hypermetabolic pulmonary nodules. Hypermetabolic left axillary lymph nodes (SUV max 15.7) Moderate hiatal hernia. ABDOMEN AND PELVIS: Background liver activity shows average SUV of 3.3. There is normal, uniform metabolic activity in the liver, spleen, adrenal glands, kidneys. Porcelain gallbladder. Large fat-containing periumbilical hernia There is no hypermetabolic mesenteric, retroperitoneal, iliac, or inguinal lymphadenopathy. Diffuse uptake throughout the colon. VISUALIZED MUSCULOSKELETAL SYSTEM: No hypermetabolic activity to suggest osteolytic metastases or sclerosis to suggest osteoblastic metastases.     1. Hypermetabolic mass in the left breast (SUV max 13.8), in keeping with malignancy. 2. Hypermetabolic left axillary lymph nodes (SUV max 15.7), consistent with metastasis 3. Diffuse uptake throughout the colon associated with metformin use. Correlate clinically. 4. Moderate hiatal hernia. Large fat-containing periumbilical hernia. Porcelain gallbladder.       Pathology:      Assessment & Plan:  1.  Invasive ductal  carcinoma of the left breast, grade 1, clinically stage IIb (cT2, CN I) ER positive greater than 90%, KS negative, HER2 2+ IHC FISH negative Ki-67 15%.  Workup was negative for metastatic disease.  Her tumor is close to T3 and could benefit from cytoreduction with endocrine therapy to hopefully convert her to a partial mastectomy.  Initiated anastrozole March 2024 with initial response tolerance and good response to therapy  2.  Severe iron deficiency anemia likely secondary to long-term Coumadin use without monitoring INR dietary lack of iron and poor absorption, now almost completely resolved.  3.  Incarcerated umbilical hernia with small bowel obstruction repaired June 2024 with complications.    Plan: We will see her back in about 8 to 10 weeks.  She will continue anastrozole.  If the tumor is any smaller at that point we will repeat mammogram and ultrasound and refer her back to Dr. Borden for partial mastectomy.    Any questions and concerns raised by the patient were answered to the best of my ability. Thank you for allowing me to participate in the care for this patient. Please feel free to contact me for any questions or concerns.     Reza Lyles M.D.

## 2024-06-18 ENCOUNTER — OFFICE VISIT (OUTPATIENT)
Dept: SURGERY | Facility: MEDICAL CENTER | Age: 74
End: 2024-06-18
Attending: SURGERY
Payer: MEDICARE

## 2024-06-18 VITALS
OXYGEN SATURATION: 94 % | HEART RATE: 68 BPM | DIASTOLIC BLOOD PRESSURE: 76 MMHG | RESPIRATION RATE: 16 BRPM | BODY MASS INDEX: 30.05 KG/M2 | WEIGHT: 176 LBS | HEIGHT: 64 IN | SYSTOLIC BLOOD PRESSURE: 120 MMHG

## 2024-06-18 DIAGNOSIS — K43.6 VENTRAL HERNIA WITH BOWEL OBSTRUCTION: ICD-10-CM

## 2024-06-18 PROCEDURE — 99024 POSTOP FOLLOW-UP VISIT: CPT | Performed by: SURGERY

## 2024-06-18 ASSESSMENT — ENCOUNTER SYMPTOMS: ABDOMINAL PAIN: 0

## 2024-06-18 ASSESSMENT — FIBROSIS 4 INDEX: FIB4 SCORE: 2.78

## 2024-06-18 NOTE — PROGRESS NOTES
"Khalida Pagan is a 74 y.o. female who presents with Post-op (POST/REPAIR, HERNIA, UMBILICAL (Abdomen)/LAPAROTOMY, EXPLORATORY (Abdomen)/OMENTECTOMY (Abdomen)/SMALL BOWEL RESECTION//)            HPI Since dc, doing well. Tolerating diet. Stooling. Some fungal changes on abdomen. Already on meds.    Review of Systems   Gastrointestinal:  Negative for abdominal pain.              Objective     /76 (BP Location: Right arm, Patient Position: Sitting)   Pulse 68   Resp 16   Ht 1.626 m (5' 4\")   Wt 79.8 kg (176 lb)   LMP 02/08/2000 (Approximate) Comment: Age of first period: 11, No post-menopausal HRT.  SpO2 94%   BMI 30.21 kg/m²      Physical Exam  Pulmonary:      Effort: Pulmonary effort is normal.   Abdominal:      General: There is no distension.      Palpations: Abdomen is soft.      Tenderness: There is no abdominal tenderness.      Comments: Incision healing well  Staples removed.   Musculoskeletal:         General: Normal range of motion.      Cervical back: Neck supple.   Skin:     General: Skin is warm.   Neurological:      Mental Status: She is alert.                Path - Ischemic SB             Assessment & Plan        SP Ventral hernia repair with SB resection    Doing well  FU PRN  Already seen PCP                "

## 2024-06-24 PROCEDURE — RXMED WILLOW AMBULATORY MEDICATION CHARGE: Performed by: STUDENT IN AN ORGANIZED HEALTH CARE EDUCATION/TRAINING PROGRAM

## 2024-06-24 NOTE — PROGRESS NOTES
ONCOLOGY NURSE NAVIGATION (ONN):  LISA Hussein reached out to patient to follow up on provider referral.  No answer.  NN left voice message with my contact information requesting a call back at patient's earliest convenience.    INTERVENTION:  Patient enrolled in [facility] 2.21.2024.  ONN introduction letter sent to patient's personal email: [email or web address] and a packet with letter, calendar & cancer support information [facility]24.20[facility].

## 2024-06-25 ENCOUNTER — PHARMACY VISIT (OUTPATIENT)
Dept: PHARMACY | Facility: MEDICAL CENTER | Age: 74
End: 2024-06-25
Payer: COMMERCIAL

## 2024-07-25 DIAGNOSIS — C50.412 CARCINOMA OF UPPER-OUTER QUADRANT OF FEMALE BREAST, LEFT (HCC): ICD-10-CM

## 2024-07-25 RX ORDER — ANASTROZOLE 1 MG/1
1 TABLET ORAL DAILY
Qty: 90 TABLET | Refills: 1 | Status: SHIPPED | OUTPATIENT
Start: 2024-07-25

## 2024-07-26 PROCEDURE — RXMED WILLOW AMBULATORY MEDICATION CHARGE: Performed by: STUDENT IN AN ORGANIZED HEALTH CARE EDUCATION/TRAINING PROGRAM

## 2024-07-29 ENCOUNTER — PHARMACY VISIT (OUTPATIENT)
Dept: PHARMACY | Facility: MEDICAL CENTER | Age: 74
End: 2024-07-29
Payer: COMMERCIAL

## 2024-08-21 ENCOUNTER — HOSPITAL ENCOUNTER (OUTPATIENT)
Dept: HEMATOLOGY ONCOLOGY | Facility: MEDICAL CENTER | Age: 74
End: 2024-08-21
Attending: INTERNAL MEDICINE
Payer: MEDICARE

## 2024-08-21 VITALS
TEMPERATURE: 97 F | OXYGEN SATURATION: 97 % | HEIGHT: 64 IN | SYSTOLIC BLOOD PRESSURE: 118 MMHG | WEIGHT: 176.7 LBS | BODY MASS INDEX: 30.17 KG/M2 | HEART RATE: 67 BPM | DIASTOLIC BLOOD PRESSURE: 68 MMHG

## 2024-08-21 DIAGNOSIS — C50.412 CARCINOMA OF UPPER-OUTER QUADRANT OF FEMALE BREAST, LEFT (HCC): ICD-10-CM

## 2024-08-21 PROCEDURE — 99214 OFFICE O/P EST MOD 30 MIN: CPT | Performed by: INTERNAL MEDICINE

## 2024-08-21 PROCEDURE — 99212 OFFICE O/P EST SF 10 MIN: CPT | Performed by: INTERNAL MEDICINE

## 2024-08-21 ASSESSMENT — ENCOUNTER SYMPTOMS
MYALGIAS: 1
PSYCHIATRIC NEGATIVE: 1
NEUROLOGICAL NEGATIVE: 1
RESPIRATORY NEGATIVE: 1
GASTROINTESTINAL NEGATIVE: 1
CARDIOVASCULAR NEGATIVE: 1
EYES NEGATIVE: 1
CONSTITUTIONAL NEGATIVE: 1

## 2024-08-21 ASSESSMENT — FIBROSIS 4 INDEX: FIB4 SCORE: 2.78

## 2024-08-21 ASSESSMENT — PAIN SCALES - GENERAL: PAINLEVEL: NO PAIN

## 2024-08-21 NOTE — ADDENDUM NOTE
Encounter addended by: Flex Ortiz, Med Ass't on: 8/21/2024 10:43 AM   Actions taken: Charge Capture section accepted

## 2024-08-21 NOTE — PROGRESS NOTES
Medical Oncology Followup Visit: 8/21/2024      Referring Physician: Elicia Borden MD  Primary Care:  Segundo Pruett M.D.    Diagnosis:     Chief Complaint:      History of Presenting Illness:  Lawanda Pagan is a 73 y.o. female who self detected a mass in her breast in mid 2023.  She had problems with her insurance and cannot get an to get a mammogram until January 2024.  This revealed a mass in the left upper outer quadrant measuring 3.5 x 3 cm.  There was a suspicious lymph node as well.  She underwent a ultrasound-guided biopsy on 2/13/2024 which showed invasive ductal carcinoma, grade 1, no lymph vascular invasion, ER +90%, ME negative, Ki-67 15%, HER2 2+, HER2 FISH negative.  There was metastatic carcinoma and a left axillary lymph node.  She saw Dr. Borden who noted that the patient was extremely tachycardic and short of breath.  Evaluation was initiated.  PET CT scan was negative for metastatic disease and was hypermetabolic in the breast and lymph node.  On 2/16/2024 she was admitted to the hospital when on initial cardiac evaluation she was found to have a hemoglobin of 4.8, hematocrit of 29.8 MCV of 56.6 with a white count of 3.8 and platelets 286.  Her records show that her hemoglobin was 6.2 in November 2023 and apparently nothing was done about this.  She received 4 units of packed red blood cells and improved symptomatically immediately.  She underwent an EGD and apparently a colonoscopy at the same time while in the hospital although I cannot locate the records right now.  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  It is unclear whether she was ever worked up for hereditary thrombophilia.  Also because of her insurance issues she did not have adequate monitoring of her Coumadin level.  Her INR level was slightly  elevated around the time of her admission.  Coumadin was discontinued.  Echocardiogram showed normal ejection fraction of 65%.  Of note she has an almost lifelong history of anemia and has been on iron tablets in the past but during her pregnancy and afterwards but none for the last several years.  She did not apparently need a transfusion in the past nor iron infusion but she is really been minimally monitored over the last several years.    Since discharge she has felt dramatically better and has no tachycardia, she is ambulating normal distances without difficulty.  She saw her new primary care physician 2 days ago and her hemoglobin was up to 11.4 hematocrit 43.4 MCV up to 83.5 with normals WBC and differential and a normal platelet count.  Her LDL was elevated and she was started on statin.  No family history of breast or ovarian cancer.  Genetics consult has been placed.    Interval history 4/18/2024: She started anastrozole at the beginning of March.  She is tolerating it well with some mild pains in her shoulders and arm muscles.  She does not require any pain medicine for this.  No hot flashes or night sweats.  She feels like her mass has shrunk significantly and is less tender than previously.  She is back to full activity and has no reduced exercise tolerance.    Interval history 6/13/2024: She had acute bowel incarceration of her umbilical hernia last week causing small bowel obstruction and went to the hospital where she underwent surgery.  She did very well with this and her hemoglobin remained stable in the 11-12 range after her operation.  She was also found to have diabetes mellitus and was placed on medication for this.  Blood sugars are under control now.  Her breast mass continues to shrink in her estimation.  She did miss a few doses of anastrozole while in the hospital.    Interval history 8/21/2024: She has done well over the past 2 months.  She has had no recurrent GI complaints.  She takes  iron pills twice a week.  Lab has not been checked.  She feels like her tumor is essentially stable but softer.  She has been adherent with anastrozole.      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    Blood clotting disorder (HCC)     lungs bilat     Blood transfusion without reported diagnosis     Breath shortness 2014 denies now    Elevated LFTs 2018    Fall     Hernia, hiatal     Measles        Past Surgical History:   Procedure Laterality Date    OH EXPLORATORY OF ABDOMEN N/A 2024    Procedure: LAPAROTOMY, EXPLORATORY;  Surgeon: Karl Carmona M.D.;  Location: SURGERY [facility];  Service: Thoracic    OH REMOVAL OF OMENTUM N/A 2024    Procedure: OMENTECTOMY;  Surgeon: Karl Carmona M.D.;  Location: SURGERY [facility];  Service: Thoracic    UMBILICAL HERNIA REPAIR N/A 2024    Procedure: REPAIR, HERNIA, UMBILICAL;  Surgeon: Karl Carmona M.D.;  Location: SURGERY [facility];  Service: Thoracic    BOWEL RESECTION N/A 2024    Procedure: SMALL BOWEL RESECTION;  Surgeon: Karl Carmona M.D.;  Location: SURGERY [facility];  Service: Thoracic    ENDOSCOPY PROCEDURE N/A 2024    Procedure: ENDOSCOPY UPPER AND LOWER;  Surgeon: Tom Payne M.D.;  Location: SURGERY [facility];  Service: Gastroenterology    GASTROSCOPY N/A 2017    Procedure: GASTROSCOPY;  Surgeon: Stephanie Reyes M.D.;  Location: SURGERY SAME DAY [facility];  Service: Gastroenterology    OTHER ABDOMINAL SURGERY      hernia repair    GYN SURGERY      c sec lap tubal    TONSILLECTOMY         Social History     Tobacco Use    Smoking status: Former     Current packs/day: 0.00     Average packs/day: 0.2 packs/day for 1 year (0.2 ttl pk-yrs)     Types: Cigarettes     Start date: 1972     Quit date: 1973     Years since quittin.0    Smokeless tobacco: Never   Vaping Use    Vaping status: Never Used    Substance Use Topics    Alcohol use: No     Comment: Stopped about 50 years ago.    Drug use: No        Family History   Problem Relation Age of Onset    Other Mother         parkinsons    Melanoma Mother     Cancer Father         stomach    Cancer Brother     Stroke Brother     Cancer Brother     Cancer Maternal Grandmother         stomach       Allergies as of 08/21/2024 - Reviewed 08/21/2024   Allergen Reaction Noted    Other environmental Runny Nose 08/25/2017         Current Outpatient Medications:     metFORMIN (GLUCOPHAGE) 500 MG Tab, Take 500 mg by mouth every day., Disp: , Rfl:     Coenzyme Q10 (CO Q 10 PO), Take  by mouth., Disp: , Rfl:     anastrozole (ARIMIDEX) 1 MG Tab, Take 1 Tablet by mouth every day., Disp: 90 Tablet, Rfl: 1    Blood Glucose Monitoring Suppl (BLOOD GLUCOSE SYSTEM RICHAR) Kit, 1 Each 4 Times a Day,Before Meals and at Bedtime., Disp: 1 Kit, Rfl: 0    glucose blood strip, Use one One Touch Verio strip to test blood sugar three times daily before meals., Disp: 100 Strip, Rfl: 0    Lancets, Use one One Touch Verio Flex lancet to test blood sugar three times daily before meals., Disp: 100 Each, Rfl: 0    Alcohol Swabs, Wipe site with prep pad prior to injection., Disp: 100 Each, Rfl: 0    rosuvastatin (CRESTOR) 10 MG Tab, Take 10 mg by mouth every evening., Disp: , Rfl:     pantoprazole (PROTONIX) 40 MG Tablet Delayed Response, Take 1 Tablet by mouth 2 times a day., Disp: 60 Tablet, Rfl: 3    ferrous sulfate 325 (65 Fe) MG tablet, Take 1 Tablet by mouth every Tue and Thu at 6 PM., Disp: 30 Tablet, Rfl: 3    vitamin D (CHOLECALCIFEROL) 1000 Unit Tab, Take 2,000 Units by mouth every day. 2 tablets=2000 units, Disp: , Rfl:     Cyanocobalamin (B-12) 1000 MCG Tab, Take 1,000 mcg by mouth every day., Disp: , Rfl:     Review of Systems:  Review of Systems   Constitutional: Negative.    HENT: Negative.     Eyes: Negative.    Respiratory: Negative.     Cardiovascular: Negative.    Gastrointestinal:  "Negative.    Genitourinary: Negative.    Musculoskeletal:  Positive for joint pain and myalgias.   Skin: Negative.    Neurological: Negative.    Endo/Heme/Allergies: Negative.    Psychiatric/Behavioral: Negative.            Physical Exam:  Vitals:    08/21/24 0900   BP: 118/68   BP Location: Right arm   Patient Position: Sitting   Pulse: 67   Temp: 36.1 °C (97 °F)   TempSrc: Temporal   SpO2: 97%   Weight: 80.2 kg (176 lb 11.2 oz)   Height: 1.626 m (5' 4\")       DESC; KARNOFSKY SCALE WITH ECOG EQUIVALENT: 90, Able to carry on normal activity; minor signs or symptoms of disease (ECOG equivalent 0)    DISTRESS LEVEL: mild distress    Physical Exam  Constitutional:       Appearance: Normal appearance.   HENT:      Head: Normocephalic.      Mouth/Throat:      Mouth: Mucous membranes are moist.      Pharynx: Oropharynx is clear.   Eyes:      Extraocular Movements: Extraocular movements intact.      Conjunctiva/sclera: Conjunctivae normal.      Pupils: Pupils are equal, round, and reactive to light.   Cardiovascular:      Rate and Rhythm: Normal rate and regular rhythm.      Pulses: Normal pulses.   Pulmonary:      Effort: Pulmonary effort is normal.      Breath sounds: Normal breath sounds.   Chest:      Comments: 8/21/2024: Left breast mass measures 2.5 cm horizontal by 3 cm vertical.  No left supraclavicular adenopathy is noted.  6/13/2024: Left breast mass at 1230 o'clock 4 cm from the nipple now measures 3 x 3 cm and is less distinct.  No left axillary adenopathy noted  4/18/2024: 1230 o'clock mass now measures 4 cm wide by 3.5 cm tall and is softer and flatter.  No axillary adenopathy is felt.  3/7/2024: At 1230 o'clock 5 cm from the nipple is a 4.5 cm wide by 4 cm tall mass.  There is a 1 x 1 cm left axillary lymph node palpable and freely movable.  No left supraclavicular adenopathy is noted.  The right breast is without masses nipple discharge or tenderness.  Abdominal:      General: Abdomen is flat. Bowel sounds " are normal.      Palpations: Abdomen is soft. There is hepatomegaly and splenomegaly. There is no mass.      Comments: Large periumbilical hernia   Musculoskeletal:         General: Normal range of motion.   Skin:     General: Skin is warm.   Neurological:      General: No focal deficit present.      Mental Status: She is alert and oriented to person, place, and time.   Psychiatric:         Mood and Affect: Mood normal.         Behavior: Behavior normal.            Labs:  Hospital Outpatient Visit on 03/05/2024   Component Date Value Ref Range Status    Sodium 03/05/2024 139  135 - 145 mmol/L Final    Potassium 03/05/2024 4.0  3.6 - 5.5 mmol/L Final    Chloride 03/05/2024 102  96 - 112 mmol/L Final    Co2 03/05/2024 22  20 - 33 mmol/L Final    Anion Gap 03/05/2024 15.0  7.0 - 16.0 Final    Glucose 03/05/2024 155 (H)  65 - 99 mg/dL Final    Bun 03/05/2024 14  8 - 22 mg/dL Final    Creatinine 03/05/2024 1.03  0.50 - 1.40 mg/dL Final    Calcium 03/05/2024 9.7  8.5 - 10.5 mg/dL Final    Correct Calcium 03/05/2024 9.5  8.5 - 10.5 mg/dL Final    AST(SGOT) 03/05/2024 53 (H)  12 - 45 U/L Final    ALT(SGPT) 03/05/2024 42  2 - 50 U/L Final    Alkaline Phosphatase 03/05/2024 189 (H)  30 - 99 U/L Final    Total Bilirubin 03/05/2024 1.2  0.1 - 1.5 mg/dL Final    Albumin 03/05/2024 4.3  3.2 - 4.9 g/dL Final    Total Protein 03/05/2024 8.7 (H)  6.0 - 8.2 g/dL Final    Globulin 03/05/2024 4.4 (H)  1.9 - 3.5 g/dL Final    A-G Ratio 03/05/2024 1.0  g/dL Final    WBC 03/05/2024 8.3  4.8 - 10.8 K/uL Final    RBC 03/05/2024 5.20  4.20 - 5.40 M/uL Final    Hemoglobin 03/05/2024 11.4 (L)  12.0 - 16.0 g/dL Final    Hematocrit 03/05/2024 43.4  37.0 - 47.0 % Final    MCV 03/05/2024 83.5  81.4 - 97.8 fL Final    MCH 03/05/2024 21.9 (L)  27.0 - 33.0 pg Final    MCHC 03/05/2024 26.3 (L)  32.2 - 35.5 g/dL Final    Please note new reference range effective 05/22/2023.    Platelet Count 03/05/2024 392  164 - 446 K/uL Final    Comment: Platelet  clumping confirmed on smear review.  If a more accurate platelet  count is required, please request recollection.      Neutrophils-Polys 03/05/2024 69.00  44.00 - 72.00 % Final    Lymphocytes 03/05/2024 23.90  22.00 - 41.00 % Final    Monocytes 03/05/2024 7.10  0.00 - 13.40 % Final    Eosinophils 03/05/2024 0.00  0.00 - 6.90 % Final    Basophils 03/05/2024 0.00  0.00 - 1.80 % Final    Nucleated RBC 03/05/2024 0.00  0.00 - 0.20 /100 WBC Final    Please note new reference range effective 05/22/2023.    Neutrophils (Absolute) 03/05/2024 5.73  1.82 - 7.42 K/uL Final    Comment: Includes immature neutrophils, if present.  Please note new reference range effective 05/22/2023.      Lymphs (Absolute) 03/05/2024 1.98  1.00 - 4.80 K/uL Final    Monos (Absolute) 03/05/2024 0.59  0.00 - 0.85 K/uL Final    Eos (Absolute) 03/05/2024 0.00  0.00 - 0.51 K/uL Final    Baso (Absolute) 03/05/2024 0.00  0.00 - 0.12 K/uL Final    NRBC (Absolute) 03/05/2024 0.00  K/uL Final    Anisocytosis 03/05/2024 1+   Final    Macrocytosis 03/05/2024 1+   Final    Microcytosis 03/05/2024 2+ (A)   Final    TSH 03/05/2024 1.560  0.380 - 5.330 uIU/mL Final    Comment: The 2011 American Thyroid Association (JAZZMINE) guidelines  recommended that the interpretation of thyroid function in  pregnancy be based on trimester specific reference ranges.    1st Trimester  0.100-2.500 mIU/L  2nd Trimester  0.200-3.000 mIU/L  3rd Trimester  0.300-3.500 mIU/L    These established reference ranges have not been validated  at [place].      Cholesterol,Tot 03/05/2024 176  100 - 199 mg/dL Final    Triglycerides 03/05/2024 143  0 - 149 mg/dL Final    HDL 03/05/2024 30 (A)  >=40 mg/dL Final    LDL 03/05/2024 117 (H)  <100 mg/dL Final    Fasting Status 03/05/2024 Fasting   Final    GFR (CKD-EPI) 03/05/2024 56 (A)  >60 mL/min/1.73 m 2 Final    Comment: Estimated Glomerular Filtration Rate is calculated using  race neutral CKD-EPI 2021 equation per NKF-ASN  recommendations.      Manual Diff Status 2024 PERFORMED   Final    Peripheral Smear Review 2024 see below   Final    Comment: Due to instrument suspect flags, further review of peripheral smear is  indicated on this patient sample. This review may or may not result in  abnormal findings.      Plt Estimation 2024 Normal   Final    RBC Morphology 2024 Present   Final    Poikilocytosis 2024 1+   Final    Schistocytes 2024 1+   Final   Admission on 2024, Discharged on 2024   Component Date Value Ref Range Status    Sodium 2024 135  135 - 145 mmol/L Final    Potassium 2024 3.4 (L)  3.6 - 5.5 mmol/L Final    Chloride 2024 100  96 - 112 mmol/L Final    Co2 2024 19 (L)  20 - 33 mmol/L Final    Anion Gap 2024 16.0  7.0 - 16.0 Final    Glucose 2024 171 (H)  65 - 99 mg/dL Final    Bun 2024 12  8 - 22 mg/dL Final    Creatinine 2024 0.84  0.50 - 1.40 mg/dL Final    Calcium 2024 9.0  8.5 - 10.5 mg/dL Final    Correct Calcium 2024 8.8  8.5 - 10.5 mg/dL Final    AST(SGOT) 2024 24  12 - 45 U/L Final    ALT(SGPT) 2024 14  2 - 50 U/L Final    Alkaline Phosphatase 2024 122 (H)  30 - 99 U/L Final    Total Bilirubin 2024 0.8  0.1 - 1.5 mg/dL Final    Albumin 2024 4.3  3.2 - 4.9 g/dL Final    Total Protein 2024 7.7  6.0 - 8.2 g/dL Final    Globulin 2024 3.4  1.9 - 3.5 g/dL Final    A-G Ratio 2024 1.3  g/dL Final    Report 2024    Preliminary                    Value:[facility]    Test Date:  2024  Pt Name:    TENA MORALES              Department: ER  MRN:        [id number]                      Room:  Gender:     Female                       Technician: [id number]  :        [id number]                   Requested By:ER TRIAGE PROTOCOL  Order #:    [id number]                    Reading MD:    Measurements  Intervals                                 Axis  Rate:       86                           P:          27  MN:         172                          QRS:        8  QRSD:       88                           T:          31  QT:         388  QTc:        464    Interpretive Statements  Sinus rhythm  Low voltage, precordial leads  Compared to ECG 02/16/2024 10:18:51  Low QRS voltage now present      ABO Grouping Only 02/16/2024 A   Final    Rh Grouping Only 02/16/2024 POS   Final    Antibody Screen-Cod 02/16/2024 NEG   Final    Component R 02/16/2024    Final                    Value:R99                 Red Cells, LR       [id number]   transfused   02/16/24   20:48      Product Type 02/16/2024 R99   Final    Dispense Status 02/16/2024 transfused   Final    Unit Number (Barcoded) 02/16/2024 [id number]   Final    Product Code (Barcoded) 02/16/2024 [id number]   Final    Blood Type (Barcoded) 02/16/2024 6200   Final    Component R 02/16/2024    Final                    Value:R99                 Red Cells, LR       [id number]   transfused   02/16/24   23:01      Product Type 02/16/2024 R99   Final    Dispense Status 02/16/2024 transfused   Final    Unit Number (Barcoded) 02/16/2024 [id number]   Final    Product Code (Barcoded) 02/16/2024 [id number]   Final    Blood Type (Barcoded) 02/16/2024 6200   Final    Component R 02/16/2024    Final                    Value:R99                 Red Cells, LR       [id number]   transfused   02/17/24   01:02      Product Type 02/16/2024 R99   Final    Dispense Status 02/16/2024 transfused   Final    Unit Number (Barcoded) 02/16/2024 [id number]   Final    Product Code (Barcoded) 02/16/2024 [id number]   Final    Blood Type (Barcoded) 02/16/2024 6200   Final    Component R 02/16/2024    Final                    Value:R99                 Red Cells, LR       [id number]   transfused   02/17/24   03:16      Product Type 02/16/2024 R99   Final    Dispense Status 02/16/2024 transfused   Final    Unit Number (Barcoded)  02/16/2024 [id number]   Final    Product Code (Barcoded) 02/16/2024 [id number]   Final    Blood Type (Barcoded) 02/16/2024 6200   Final    GFR (CKD-EPI) 02/16/2024 72  >60 mL/min/1.73 m 2 Final    Comment: Estimated Glomerular Filtration Rate is calculated using  race neutral CKD-EPI 2021 equation per NKF-ASN recommendations.      WBC 02/16/2024 3.8 (L)  4.8 - 10.8 K/uL Final    RBC 02/16/2024 3.50 (L)  4.20 - 5.40 M/uL Final    Hemoglobin 02/16/2024 4.8 (LL)  12.0 - 16.0 g/dL Final    Comment: This result has been called to RN [id number] by [id number] on 02/16/2024 20:30:51,  and has been read back.      Hematocrit 02/16/2024 19.8 (L)  37.0 - 47.0 % Final    MCV 02/16/2024 56.6 (L)  81.4 - 97.8 fL Final    MCH 02/16/2024 13.7 (L)  27.0 - 33.0 pg Final    MCHC 02/16/2024 24.2 (L)  32.2 - 35.5 g/dL Final    Please note new reference range effective 05/22/2023.    RDW 02/16/2024 46.5  35.9 - 50.0 fL Final    Platelet Count 02/16/2024 286  164 - 446 K/uL Final    Neutrophils-Polys 02/16/2024 57.30  44.00 - 72.00 % Final    Lymphocytes 02/16/2024 26.80  22.00 - 41.00 % Final    Monocytes 02/16/2024 14.20 (H)  0.00 - 13.40 % Final    Eosinophils 02/16/2024 0.30  0.00 - 6.90 % Final    Basophils 02/16/2024 1.10  0.00 - 1.80 % Final    Immature Granulocytes 02/16/2024 0.30  0.00 - 0.90 % Final    Nucleated RBC 02/16/2024 0.50 (H)  0.00 - 0.20 /100 WBC Final    Please note new reference range effective 05/22/2023.    Neutrophils (Absolute) 02/16/2024 2.18  1.82 - 7.42 K/uL Final    Comment: Includes immature neutrophils, if present.  Please note new reference range effective 05/22/2023.      Lymphs (Absolute) 02/16/2024 1.02  1.00 - 4.80 K/uL Final    Monos (Absolute) 02/16/2024 0.54  0.00 - 0.85 K/uL Final    Eos (Absolute) 02/16/2024 0.01  0.00 - 0.51 K/uL Final    Baso (Absolute) 02/16/2024 0.04  0.00 - 0.12 K/uL Final    Immature Granulocytes (abs) 02/16/2024 0.01  0.00 - 0.11 K/uL Final    NRBC (Absolute) 02/16/2024 0.02  K/uL  Final    Hypochromia 02/16/2024 2+ (A)   Final    Anisocytosis 02/16/2024 1+   Final    Microcytosis 02/16/2024 2+ (A)   Final    Plt Estimation 02/16/2024 Normal   Final    RBC Morphology 02/16/2024 Present   Final    Polychromia 02/16/2024 1+   Final    Poikilocytosis 02/16/2024 1+   Final    Peripheral Smear Review 02/16/2024 see below   Final    Comment: Due to instrument suspect flags, further review of peripheral smear is  indicated on this patient sample. This review may or may not result in  abnormal findings.      Comments-Diff 02/16/2024 see below   Final    Results have been verified by peripheral blood smear review.    Eject.Frac. MOD BP 02/17/2024 66.33   Final    Eject.Frac. MOD 4C 02/17/2024 63.68   Final    Eject.Frac. MOD 2C 02/17/2024 68.8   Final    NT-proBNP 02/16/2024 346 (H)  0 - 125 pg/mL Final    Sodium 02/17/2024 137  135 - 145 mmol/L Final    Potassium 02/17/2024 4.0  3.6 - 5.5 mmol/L Final    Chloride 02/17/2024 107  96 - 112 mmol/L Final    Co2 02/17/2024 19 (L)  20 - 33 mmol/L Final    Glucose 02/17/2024 105 (H)  65 - 99 mg/dL Final    Bun 02/17/2024 9  8 - 22 mg/dL Final    Creatinine 02/17/2024 0.63  0.50 - 1.40 mg/dL Final    Calcium 02/17/2024 8.3 (L)  8.5 - 10.5 mg/dL Final    Anion Gap 02/17/2024 11.0  7.0 - 16.0 Final    Hemoglobin 02/17/2024 9.3 (L)  12.0 - 16.0 g/dL Final    Hematocrit 02/17/2024 30.9 (L)  37.0 - 47.0 % Final    PT 02/17/2024 22.6 (H)  12.0 - 14.6 sec Final    INR 02/17/2024 1.96 (H)  0.87 - 1.13 Final    Comment: INR - Non-therapeutic Reference Range: 0.87-1.13  INR - Therapeutic Reference Range: 2.0-4.0      Hemoglobin 02/17/2024 9.4 (L)  12.0 - 16.0 g/dL Final    Hematocrit 02/17/2024 31.7 (L)  37.0 - 47.0 % Final    APTT 02/17/2024 32.4  24.7 - 36.0 sec Final    GFR (CKD-EPI) 02/17/2024 92  >60 mL/min/1.73 m 2 Final    Comment: Estimated Glomerular Filtration Rate is calculated using  race neutral CKD-EPI 2021 equation per NKF-ASN recommendations.       Hemoglobin 02/17/2024 10.0 (L)  12.0 - 16.0 g/dL Final    Hematocrit 02/17/2024 32.8 (L)  37.0 - 47.0 % Final    Hemoglobin 02/17/2024 9.4 (L)  12.0 - 16.0 g/dL Final    Hematocrit 02/17/2024 32.1 (L)  37.0 - 47.0 % Final    Sodium 02/18/2024 137  135 - 145 mmol/L Final    Potassium 02/18/2024 3.7  3.6 - 5.5 mmol/L Final    Chloride 02/18/2024 108  96 - 112 mmol/L Final    Co2 02/18/2024 17 (L)  20 - 33 mmol/L Final    Anion Gap 02/18/2024 12.0  7.0 - 16.0 Final    Glucose 02/18/2024 97  65 - 99 mg/dL Final    Bun 02/18/2024 5 (L)  8 - 22 mg/dL Final    Creatinine 02/18/2024 0.69  0.50 - 1.40 mg/dL Final    Calcium 02/18/2024 8.6  8.5 - 10.5 mg/dL Final    Correct Calcium 02/18/2024 9.2  8.5 - 10.5 mg/dL Final    AST(SGOT) 02/18/2024 25  12 - 45 U/L Final    ALT(SGPT) 02/18/2024 12  2 - 50 U/L Final    Alkaline Phosphatase 02/18/2024 118 (H)  30 - 99 U/L Final    Total Bilirubin 02/18/2024 1.1  0.1 - 1.5 mg/dL Final    Albumin 02/18/2024 3.3  3.2 - 4.9 g/dL Final    Total Protein 02/18/2024 7.2  6.0 - 8.2 g/dL Final    Globulin 02/18/2024 3.9 (H)  1.9 - 3.5 g/dL Final    A-G Ratio 02/18/2024 0.8  g/dL Final    Magnesium 02/18/2024 2.2  1.5 - 2.5 mg/dL Final    GFR (CKD-EPI) 02/18/2024 90  >60 mL/min/1.73 m 2 Final    Comment: Estimated Glomerular Filtration Rate is calculated using  race neutral CKD-EPI 2021 equation per NKF-ASN recommendations.      WBC 02/18/2024 5.0  4.8 - 10.8 K/uL Final    RBC 02/18/2024 4.50  4.20 - 5.40 M/uL Final    Hemoglobin 02/18/2024 9.1 (L)  12.0 - 16.0 g/dL Final    Hematocrit 02/18/2024 30.9 (L)  37.0 - 47.0 % Final    MCV 02/18/2024 68.7 (L)  81.4 - 97.8 fL Final    MCH 02/18/2024 20.2 (L)  27.0 - 33.0 pg Final    MCHC 02/18/2024 29.4 (L)  32.2 - 35.5 g/dL Final    Please note new reference range effective 05/22/2023.    RDW 02/18/2024 75.9 (H)  35.9 - 50.0 fL Final    Platelet Count 02/18/2024 215  164 - 446 K/uL Final    Reviewed by Clinical .   [facility]  Outpatient Visit on 2024   Component Date Value Ref Range Status    Sodium 2024 135  135 - 145 mmol/L Final    Potassium 2024 3.7  3.6 - 5.5 mmol/L Final    Chloride 2024 101  96 - 112 mmol/L Final    Co2 2024 20  20 - 33 mmol/L Final    Glucose 2024 139 (H)  65 - 99 mg/dL Final    Bun 2024 12  8 - 22 mg/dL Final    Creatinine 2024 0.77  0.50 - 1.40 mg/dL Final    Calcium 2024 8.8  8.5 - 10.5 mg/dL Final    Anion Gap 2024 14.0  7.0 - 16.0 Final    WBC 2024 3.6 (L)  4.8 - 10.8 K/uL Final    RBC 2024 3.93 (L)  4.20 - 5.40 M/uL Final    Hemoglobin 2024 5.2 (LL)  12.0 - 16.0 g/dL Final    Hematocrit 2024 22.5 (L)  37.0 - 47.0 % Final    MCV 2024 57.3 (L)  81.4 - 97.8 fL Final    MCH 2024 13.2 (L)  27.0 - 33.0 pg Final    MCHC 2024 23.1 (L)  32.2 - 35.5 g/dL Final    Please note new reference range effective 2023.    RDW 2024 47.3  35.9 - 50.0 fL Final    Platelet Count 2024 336  164 - 446 K/uL Final    GFR (CKD-EPI) 2024 80  >60 mL/min/1.73 m 2 Final    Comment: Estimated Glomerular Filtration Rate is calculated using  race neutral CKD-EPI  equation per NKF-ASN recommendations.     Office Visit on 2024   Component Date Value Ref Range Status    Report 2024    Final                    Value:[facility]    Test Date:  2024  Pt Name:    TENA MORALES              Department: [facility]  MRN:        [id number]                      Room:  Gender:     Female                       Technician: [place]  :        1950                   Requested By:JOSE NEGRON  Order #:    [id number]                    Reading MD: Jose Negron MD    Measurements  Intervals                                Axis  Rate:       93                           P:          54  NY:         163                          QRS:        50  QRSD:       82                           T:          31  QT:          355  QTc:        442    Interpretive Statements  Sinus rhythm  Borderline repolarization abnormality  Compared to ECG 2014 11:47:20  No significant changes  Electronically Signed On 2024 17:29:27 PST by Jose Pham MD     Anticoagulation Visit on 2024   Component Date Value Ref Range Status    INR 2024 1.80 (A)  2 - 3.5 Final   Anticoagulation Visit on 2024   Component Date Value Ref Range Status    INR 2024 3.90 (A)  2 - 3.5 Final       Imaging:   All listed images below have been independently reviewed by me. I agree with the findings as summarized below:    EC-ECHOCARDIOGRAM COMPLETE W/O CONT    Result Date: 2024  Transthoracic Echo Report Echocardiography Laboratory CONCLUSIONS Normal left ventricular systolic function. The left ventricular ejection fraction is visually estimated to be 65%. The right ventricle is normal in size and systolic function. Mild tricuspid regurgitation. Estimated right ventricular systolic pressure is 38 mmHg (normal). No prior study is available for comparison. TENA MORALES Exam Date:         2024                    18:37 Exam Location:     Inpatient Priority:          Routine Ordering Physician:        HERIBERTO LOPEZ Referring Physician:       [id number] Maria Sonographer:               Shahla Adam [facility] Age:    73     Gender:    F MRN:    [id number] :    1950 BSA:    1.95   Ht (in):    64     Wt (lb):    198 Exam Type:     Complete Indications:     Acute MI ICD Codes:       410.9 CPT Codes:       85435 BP:   149    /   68     HR:   70 Technical Quality:       Fair MEASUREMENTS  (Male / Female) Normal Values 2D ECHO LV Diastolic Diameter PLAX        4.6 cm                4.2 - 5.9 / 3.9 - 5.3 cm LV Systolic Diameter PLAX         2.8 cm                2.1 - 4.0 cm IVS Diastolic Thickness           1 cm                  LVPW Diastolic Thickness          0.92 cm               LVOT Diameter                     2 cm                   LV Ejection Fraction MOD BP       66.3 %                >= 55  % LV Ejection Fraction MOD 4C       63.7 %                LV Ejection Fraction MOD 2C       68.8 %                IVC Diameter                      2 cm                  DOPPLER AV Peak Velocity                  1.6 m/s               AV Peak Gradient                  10.5 mmHg             AV Mean Gradient                  5.8 mmHg              LVOT Peak Velocity                1.3 m/s               AV Area Cont Eq vti               2.3 cm2               Mitral E Point Velocity           1.2 m/s               Mitral E to A Ratio               1.1                   MV Pressure Half Time             57 ms                 MV Area PHT                       3.9 cm2               MV Deceleration Time              196 ms                TV Peak E Velocity                0.32 m/s              TR Peak Velocity                  275 cm/s              MV E' Velocity                    8.5 cm/s              * Indicates values subject to auto-interpretation LV EF:        % FINDINGS Left Ventricle Normal left ventricular chamber size. Normal left ventricular wall thickness. Normal left ventricular systolic function. The left ventricular ejection fraction is visually estimated to be 65%. Normal regional wall motion. Indeterminate diastolic function. Right Ventricle The right ventricle is normal in size and systolic function. Right Atrium The right atrium is normal in size. Normal inferior vena cava size without inspiratory collapse. Left Atrium The left atrium is normal in size. Left atrial volume index is 24 mL/sq m. Mitral Valve Structurally normal mitral valve without significant stenosis or regurgitation. Aortic Valve Structurally normal aortic valve without significant stenosis or regurgitation. Tricuspid Valve No tricuspid stenosis. Mild tricuspid regurgitation. Estimated right ventricular systolic pressure is 38 mmHg. Right atrial pressure is  estimated to be 8 mmHg. Pulmonic Valve Structurally normal pulmonic valve without significant stenosis or regurgitation. Pericardium No pericardial effusion. Aorta Normal aortic root for body surface area. The ascending aorta diameter is 3.0 cm. Roland Nuno MD (Electronically Signed) Final Date:     18 February 2024                 16:41    DX-CHEST-PORTABLE (1 VIEW)    Result Date: 2/18/2024 2/18/2024 3:58 PM HISTORY/REASON FOR EXAM:  Cough. TECHNIQUE/EXAM DESCRIPTION AND NUMBER OF VIEWS: Single portable view of the chest. COMPARISON: 2/16/2024 FINDINGS: Cardiomediastinal silhouette is stable. Interstitial and hazy opacity in the left lung base, likely left lower lobe, asymmetric edema versus pneumonitis. Right lung is clear. No pleural effusion or pneumothorax. No acute osseous abnormality.     Persistent mild hazy left lower lobe opacity could be mild pneumonitis.    DX-CHEST-PORTABLE (1 VIEW)    Result Date: 2/16/2024 2/16/2024 6:21 PM HISTORY/REASON FOR EXAM:  Shortness of Breath TECHNIQUE/EXAM DESCRIPTION AND NUMBER OF VIEWS: Single portable view of the chest. COMPARISON: 7/14/2014 FINDINGS: The mediastinal and cardiac silhouette is unremarkable. The pulmonary vascularity is within normal limits. There is ill-defined linear hazy opacity in the left lower lobe. There is no significant pleural effusion. There is no visible pneumothorax. There are no acute bony abnormalities.     1.  Ill-defined linear left lobe opacity could be atelectasis or pneumonitis.    [id number] BASE TO MID-THIGH    Result Date: 2/9/2024 2/9/2024 12:22 PM HISTORY/REASON FOR EXAM:  cT3 cN1 upper outer left breast cancer TECHNIQUE/EXAM DESCRIPTION AND NUMBER OF VIEWS: PET body imaging. Initially, 10.1 mCi F-18 FDG was administered intravenously under standardized conditions. Approximately 45 minutes after FDG administration, the patient was placed in the supine position on the PET CT table. Blood glucose level was 138 mg/dL. Low  dose spiral CT imaging was performed from the skull base to the mid thighs. PET imaging was then performed from the skull base to the mid thighs. CT images, PET images, and PET/CT fused images were reviewed on a PACS 3D workstation. The limited non-contrast CT data are used primarily for attenuation correction and anatomic correlation.  Evaluation of solid organs and bowel are especially limited utilizing this technique. A low dose CT was obtained of the same area without IV contrast for attenuation correction and coregistration, not for a diagnostic scan. COMPARISON: None. FINDINGS: VISUALIZED BRAIN: Normal metabolic activity. HEAD AND NECK: Normal physiologic uptake. CHEST: Background blood pool activity shows average SUV of 2.1. Hypermetabolic mass in the left breast (SUV max 13.8). Lungs show no hypermetabolic pulmonary nodules. Hypermetabolic left axillary lymph nodes (SUV max 15.7) Moderate hiatal hernia. ABDOMEN AND PELVIS: Background liver activity shows average SUV of 3.3. There is normal, uniform metabolic activity in the liver, spleen, adrenal glands, kidneys. Porcelain gallbladder. Large fat-containing periumbilical hernia There is no hypermetabolic mesenteric, retroperitoneal, iliac, or inguinal lymphadenopathy. Diffuse uptake throughout the colon. VISUALIZED MUSCULOSKELETAL SYSTEM: No hypermetabolic activity to suggest osteolytic metastases or sclerosis to suggest osteoblastic metastases.     1. Hypermetabolic mass in the left breast (SUV max 13.8), in keeping with malignancy. 2. Hypermetabolic left axillary lymph nodes (SUV max 15.7), consistent with metastasis 3. Diffuse uptake throughout the colon associated with metformin use. Correlate clinically. 4. Moderate hiatal hernia. Large fat-containing periumbilical hernia. Porcelain gallbladder.       Pathology:      Assessment & Plan:  1.  Invasive ductal carcinoma of the left breast, grade 1, clinically stage IIb (cT2, CN I) ER positive greater than  90%, LA negative, HER2 2+ IHC FISH negative Ki-67 15%.  Workup was negative for metastatic disease.  Her tumor is close to T3 and could benefit from cytoreduction with endocrine therapy to hopefully convert her to a partial mastectomy.  Initiated anastrozole March 2024 with initial response tolerance and good response to therapy  2.  Severe iron deficiency anemia likely secondary to long-term Coumadin use without monitoring INR dietary lack of iron and poor absorption, now almost completely resolved.  3.  Incarcerated umbilical hernia with small bowel obstruction repaired June 2024 with complications.    Plan: We will get a mammogram and ultrasound on the left breast now to assess her response to therapy.  Also check labs today including CBC.  If stable we will send back to Dr. Borden for consideration of partial mastectomy and targeted axillary dissection.  Any questions and concerns raised by the patient were answered to the best of my ability. Thank you for allowing me to participate in the care for this patient. Please feel free to contact me for any questions or concerns.     Reza Lyles M.D.

## 2024-08-22 ENCOUNTER — HOSPITAL ENCOUNTER (OUTPATIENT)
Dept: LAB | Facility: MEDICAL CENTER | Age: 74
End: 2024-08-22
Attending: INTERNAL MEDICINE
Payer: MEDICARE

## 2024-08-22 DIAGNOSIS — C50.412 CARCINOMA OF UPPER-OUTER QUADRANT OF FEMALE BREAST, LEFT (HCC): ICD-10-CM

## 2024-08-22 LAB
ALBUMIN SERPL BCP-MCNC: 4.3 G/DL (ref 3.2–4.9)
ALBUMIN/GLOB SERPL: 1.4 G/DL
ALP SERPL-CCNC: 133 U/L (ref 30–99)
ALT SERPL-CCNC: 13 U/L (ref 2–50)
ANION GAP SERPL CALC-SCNC: 13 MMOL/L (ref 7–16)
AST SERPL-CCNC: 19 U/L (ref 12–45)
BASOPHILS # BLD AUTO: 0.7 % (ref 0–1.8)
BASOPHILS # BLD: 0.05 K/UL (ref 0–0.12)
BILIRUB SERPL-MCNC: 0.5 MG/DL (ref 0.1–1.5)
BUN SERPL-MCNC: 18 MG/DL (ref 8–22)
CALCIUM ALBUM COR SERPL-MCNC: 9.9 MG/DL (ref 8.5–10.5)
CALCIUM SERPL-MCNC: 10.1 MG/DL (ref 8.5–10.5)
CHLORIDE SERPL-SCNC: 106 MMOL/L (ref 96–112)
CO2 SERPL-SCNC: 24 MMOL/L (ref 20–33)
CREAT SERPL-MCNC: 0.72 MG/DL (ref 0.5–1.4)
EOSINOPHIL # BLD AUTO: 0.06 K/UL (ref 0–0.51)
EOSINOPHIL NFR BLD: 0.9 % (ref 0–6.9)
ERYTHROCYTE [DISTWIDTH] IN BLOOD BY AUTOMATED COUNT: 54.4 FL (ref 35.9–50)
FERRITIN SERPL-MCNC: 29.1 NG/ML (ref 10–291)
GFR SERPLBLD CREATININE-BSD FMLA CKD-EPI: 86 ML/MIN/1.73 M 2
GLOBULIN SER CALC-MCNC: 3.1 G/DL (ref 1.9–3.5)
GLUCOSE SERPL-MCNC: 100 MG/DL (ref 65–99)
HCT VFR BLD AUTO: 47.6 % (ref 37–47)
HGB BLD-MCNC: 15.4 G/DL (ref 12–16)
IMM GRANULOCYTES # BLD AUTO: 0.01 K/UL (ref 0–0.11)
IMM GRANULOCYTES NFR BLD AUTO: 0.1 % (ref 0–0.9)
IRON SATN MFR SERPL: 23 % (ref 15–55)
IRON SERPL-MCNC: 77 UG/DL (ref 40–170)
LYMPHOCYTES # BLD AUTO: 3.47 K/UL (ref 1–4.8)
LYMPHOCYTES NFR BLD: 50.8 % (ref 22–41)
MCH RBC QN AUTO: 28.2 PG (ref 27–33)
MCHC RBC AUTO-ENTMCNC: 32.4 G/DL (ref 32.2–35.5)
MCV RBC AUTO: 87.2 FL (ref 81.4–97.8)
MONOCYTES # BLD AUTO: 0.44 K/UL (ref 0–0.85)
MONOCYTES NFR BLD AUTO: 6.4 % (ref 0–13.4)
NEUTROPHILS # BLD AUTO: 2.8 K/UL (ref 1.82–7.42)
NEUTROPHILS NFR BLD: 41.1 % (ref 44–72)
NRBC # BLD AUTO: 0 K/UL
NRBC BLD-RTO: 0 /100 WBC (ref 0–0.2)
PLATELET # BLD AUTO: 226 K/UL (ref 164–446)
PMV BLD AUTO: 10.3 FL (ref 9–12.9)
POTASSIUM SERPL-SCNC: 4.2 MMOL/L (ref 3.6–5.5)
PROT SERPL-MCNC: 7.4 G/DL (ref 6–8.2)
RBC # BLD AUTO: 5.46 M/UL (ref 4.2–5.4)
SODIUM SERPL-SCNC: 143 MMOL/L (ref 135–145)
TIBC SERPL-MCNC: 337 UG/DL (ref 250–450)
UIBC SERPL-MCNC: 260 UG/DL (ref 110–370)
WBC # BLD AUTO: 6.8 K/UL (ref 4.8–10.8)

## 2024-08-22 PROCEDURE — RXMED WILLOW AMBULATORY MEDICATION CHARGE: Performed by: INTERNAL MEDICINE

## 2024-08-22 PROCEDURE — 85025 COMPLETE CBC W/AUTO DIFF WBC: CPT

## 2024-08-22 PROCEDURE — 83550 IRON BINDING TEST: CPT

## 2024-08-22 PROCEDURE — 82728 ASSAY OF FERRITIN: CPT

## 2024-08-22 PROCEDURE — 80053 COMPREHEN METABOLIC PANEL: CPT

## 2024-08-22 PROCEDURE — 83540 ASSAY OF IRON: CPT

## 2024-08-22 PROCEDURE — 36415 COLL VENOUS BLD VENIPUNCTURE: CPT

## 2024-08-28 ENCOUNTER — PHARMACY VISIT (OUTPATIENT)
Dept: PHARMACY | Facility: MEDICAL CENTER | Age: 74
End: 2024-08-28
Payer: COMMERCIAL

## 2024-09-12 ENCOUNTER — HOSPITAL ENCOUNTER (OUTPATIENT)
Dept: RADIOLOGY | Facility: MEDICAL CENTER | Age: 74
End: 2024-09-12
Attending: INTERNAL MEDICINE
Payer: MEDICARE

## 2024-09-12 DIAGNOSIS — C50.412 CARCINOMA OF UPPER-OUTER QUADRANT OF FEMALE BREAST, LEFT (HCC): ICD-10-CM

## 2024-09-12 PROCEDURE — G0279 TOMOSYNTHESIS, MAMMO: HCPCS | Mod: LT

## 2024-09-12 PROCEDURE — 76642 ULTRASOUND BREAST LIMITED: CPT | Mod: LT

## 2024-09-18 ENCOUNTER — OFFICE VISIT (OUTPATIENT)
Dept: SURGERY | Facility: MEDICAL CENTER | Age: 74
End: 2024-09-18
Payer: MEDICARE

## 2024-09-18 VITALS
WEIGHT: 174 LBS | DIASTOLIC BLOOD PRESSURE: 86 MMHG | HEART RATE: 75 BPM | TEMPERATURE: 97.2 F | HEIGHT: 64 IN | OXYGEN SATURATION: 96 % | SYSTOLIC BLOOD PRESSURE: 129 MMHG | BODY MASS INDEX: 29.71 KG/M2

## 2024-09-18 DIAGNOSIS — C50.412 CARCINOMA OF UPPER-OUTER QUADRANT OF FEMALE BREAST, LEFT (HCC): ICD-10-CM

## 2024-09-18 PROCEDURE — 3074F SYST BP LT 130 MM HG: CPT | Performed by: SURGERY

## 2024-09-18 PROCEDURE — G2212 PROLONG OUTPT/OFFICE VIS: HCPCS | Performed by: SURGERY

## 2024-09-18 PROCEDURE — 3079F DIAST BP 80-89 MM HG: CPT | Performed by: SURGERY

## 2024-09-18 PROCEDURE — 99215 OFFICE O/P EST HI 40 MIN: CPT | Performed by: SURGERY

## 2024-09-18 ASSESSMENT — ENCOUNTER SYMPTOMS
SHORTNESS OF BREATH: 1
ARTHRALGIAS: 1

## 2024-09-18 ASSESSMENT — FIBROSIS 4 INDEX: FIB4 SCORE: 1.77
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
## 2024-09-19 ENCOUNTER — APPOINTMENT (OUTPATIENT)
Dept: ADMISSIONS | Facility: MEDICAL CENTER | Age: 74
End: 2024-09-19
Attending: SURGERY
Payer: MEDICARE

## 2024-09-19 ENCOUNTER — DOCUMENTATION (OUTPATIENT)
Dept: SURGERY | Facility: MEDICAL CENTER | Age: 74
End: 2024-09-19
Payer: MEDICARE

## 2024-09-26 ENCOUNTER — PRE-ADMISSION TESTING (OUTPATIENT)
Dept: ADMISSIONS | Facility: MEDICAL CENTER | Age: 74
End: 2024-09-26
Attending: SURGERY
Payer: MEDICARE

## 2024-09-30 ENCOUNTER — TELEPHONE (OUTPATIENT)
Dept: HEMATOLOGY ONCOLOGY | Facility: MEDICAL CENTER | Age: 74
End: 2024-09-30
Payer: MEDICARE

## 2024-09-30 NOTE — TELEPHONE ENCOUNTER
Received phone call from patient in regard to instructions for Anastrozole around surgery appointment. Patient does not need to stop taking Anastrozole for surgery and can continue to take as she has been. No other questions or concerns at this time.

## 2024-10-01 ENCOUNTER — PRE-ADMISSION TESTING (OUTPATIENT)
Dept: ADMISSIONS | Facility: MEDICAL CENTER | Age: 74
End: 2024-10-01
Attending: SURGERY
Payer: MEDICARE

## 2024-10-01 DIAGNOSIS — Z01.810 PRE-OPERATIVE CARDIOVASCULAR EXAMINATION: ICD-10-CM

## 2024-10-01 DIAGNOSIS — Z01.812 PRE-OPERATIVE LABORATORY EXAMINATION: ICD-10-CM

## 2024-10-01 LAB
ANION GAP SERPL CALC-SCNC: 9 MMOL/L (ref 7–16)
BUN SERPL-MCNC: 27 MG/DL (ref 8–22)
CALCIUM SERPL-MCNC: 9.5 MG/DL (ref 8.5–10.5)
CHLORIDE SERPL-SCNC: 105 MMOL/L (ref 96–112)
CO2 SERPL-SCNC: 25 MMOL/L (ref 20–33)
CREAT SERPL-MCNC: 0.8 MG/DL (ref 0.5–1.4)
EKG IMPRESSION: NORMAL
ERYTHROCYTE [DISTWIDTH] IN BLOOD BY AUTOMATED COUNT: 49.4 FL (ref 35.9–50)
EST. AVERAGE GLUCOSE BLD GHB EST-MCNC: 123 MG/DL
GFR SERPLBLD CREATININE-BSD FMLA CKD-EPI: 76 ML/MIN/1.73 M 2
GLUCOSE SERPL-MCNC: 109 MG/DL (ref 65–99)
HBA1C MFR BLD: 5.9 % (ref 4–5.6)
HCT VFR BLD AUTO: 45.9 % (ref 37–47)
HGB BLD-MCNC: 15.2 G/DL (ref 12–16)
MCH RBC QN AUTO: 28.8 PG (ref 27–33)
MCHC RBC AUTO-ENTMCNC: 33.1 G/DL (ref 32.2–35.5)
MCV RBC AUTO: 86.9 FL (ref 81.4–97.8)
PLATELET # BLD AUTO: 261 K/UL (ref 164–446)
PMV BLD AUTO: 10.5 FL (ref 9–12.9)
POTASSIUM SERPL-SCNC: 4 MMOL/L (ref 3.6–5.5)
RBC # BLD AUTO: 5.28 M/UL (ref 4.2–5.4)
SODIUM SERPL-SCNC: 139 MMOL/L (ref 135–145)
WBC # BLD AUTO: 7.6 K/UL (ref 4.8–10.8)

## 2024-10-01 PROCEDURE — 36415 COLL VENOUS BLD VENIPUNCTURE: CPT

## 2024-10-01 PROCEDURE — 80048 BASIC METABOLIC PNL TOTAL CA: CPT

## 2024-10-01 PROCEDURE — 83036 HEMOGLOBIN GLYCOSYLATED A1C: CPT

## 2024-10-01 PROCEDURE — 93005 ELECTROCARDIOGRAM TRACING: CPT

## 2024-10-01 PROCEDURE — 85027 COMPLETE CBC AUTOMATED: CPT

## 2024-10-01 PROCEDURE — 93010 ELECTROCARDIOGRAM REPORT: CPT | Performed by: INTERNAL MEDICINE

## 2024-10-08 ENCOUNTER — APPOINTMENT (OUTPATIENT)
Dept: RADIOLOGY | Facility: MEDICAL CENTER | Age: 74
End: 2024-10-08
Attending: SURGERY
Payer: MEDICARE

## 2024-10-08 ENCOUNTER — PHARMACY VISIT (OUTPATIENT)
Dept: PHARMACY | Facility: MEDICAL CENTER | Age: 74
End: 2024-10-08
Payer: COMMERCIAL

## 2024-10-08 ENCOUNTER — HOSPITAL ENCOUNTER (OUTPATIENT)
Facility: MEDICAL CENTER | Age: 74
End: 2024-10-08
Attending: SURGERY | Admitting: SURGERY
Payer: MEDICARE

## 2024-10-08 ENCOUNTER — ANESTHESIA (OUTPATIENT)
Dept: SURGERY | Facility: MEDICAL CENTER | Age: 74
End: 2024-10-08
Payer: MEDICARE

## 2024-10-08 ENCOUNTER — ANESTHESIA EVENT (OUTPATIENT)
Dept: SURGERY | Facility: MEDICAL CENTER | Age: 74
End: 2024-10-08
Payer: MEDICARE

## 2024-10-08 VITALS
WEIGHT: 178.79 LBS | TEMPERATURE: 97.8 F | DIASTOLIC BLOOD PRESSURE: 75 MMHG | BODY MASS INDEX: 30.52 KG/M2 | RESPIRATION RATE: 18 BRPM | SYSTOLIC BLOOD PRESSURE: 151 MMHG | OXYGEN SATURATION: 92 % | HEIGHT: 64 IN | HEART RATE: 76 BPM

## 2024-10-08 DIAGNOSIS — G89.18 POSTOPERATIVE PAIN: ICD-10-CM

## 2024-10-08 DIAGNOSIS — C50.412 CARCINOMA OF UPPER-OUTER QUADRANT OF FEMALE BREAST, LEFT (HCC): ICD-10-CM

## 2024-10-08 LAB
GLUCOSE BLD STRIP.AUTO-MCNC: 82 MG/DL (ref 65–99)
PATHOLOGY CONSULT NOTE: NORMAL

## 2024-10-08 PROCEDURE — 88307 TISSUE EXAM BY PATHOLOGIST: CPT | Mod: 59

## 2024-10-08 PROCEDURE — 700101 HCHG RX REV CODE 250: Mod: UD | Performed by: ANESTHESIOLOGY

## 2024-10-08 PROCEDURE — 19301 PARTIAL MASTECTOMY: CPT | Mod: AS,LT

## 2024-10-08 PROCEDURE — 38900 IO MAP OF SENT LYMPH NODE: CPT | Mod: AS,LT

## 2024-10-08 PROCEDURE — 88360 TUMOR IMMUNOHISTOCHEM/MANUAL: CPT

## 2024-10-08 PROCEDURE — 76098 X-RAY EXAM SURGICAL SPECIMEN: CPT | Mod: LT

## 2024-10-08 PROCEDURE — 88305 TISSUE EXAM BY PATHOLOGIST: CPT

## 2024-10-08 PROCEDURE — 160002 HCHG RECOVERY MINUTES (STAT): Performed by: SURGERY

## 2024-10-08 PROCEDURE — A9270 NON-COVERED ITEM OR SERVICE: HCPCS | Mod: UD | Performed by: ANESTHESIOLOGY

## 2024-10-08 PROCEDURE — 700102 HCHG RX REV CODE 250 W/ 637 OVERRIDE(OP): Mod: UD | Performed by: ANESTHESIOLOGY

## 2024-10-08 PROCEDURE — 38900 IO MAP OF SENT LYMPH NODE: CPT | Mod: LT | Performed by: SURGERY

## 2024-10-08 PROCEDURE — 160009 HCHG ANES TIME/MIN: Performed by: SURGERY

## 2024-10-08 PROCEDURE — 160035 HCHG PACU - 1ST 60 MINS PHASE I: Performed by: SURGERY

## 2024-10-08 PROCEDURE — 700105 HCHG RX REV CODE 258: Mod: UD | Performed by: SURGERY

## 2024-10-08 PROCEDURE — 38792 RA TRACER ID OF SENTINL NODE: CPT | Mod: LT

## 2024-10-08 PROCEDURE — 160025 RECOVERY II MINUTES (STATS): Performed by: SURGERY

## 2024-10-08 PROCEDURE — 700111 HCHG RX REV CODE 636 W/ 250 OVERRIDE (IP): Mod: JG,UD | Performed by: SURGERY

## 2024-10-08 PROCEDURE — RXMED WILLOW AMBULATORY MEDICATION CHARGE

## 2024-10-08 PROCEDURE — 38525 BIOPSY/REMOVAL LYMPH NODES: CPT | Mod: LT | Performed by: SURGERY

## 2024-10-08 PROCEDURE — 160046 HCHG PACU - 1ST 60 MINS PHASE II: Performed by: SURGERY

## 2024-10-08 PROCEDURE — 160048 HCHG OR STATISTICAL LEVEL 1-5: Performed by: SURGERY

## 2024-10-08 PROCEDURE — 19301 PARTIAL MASTECTOMY: CPT | Mod: LT | Performed by: SURGERY

## 2024-10-08 PROCEDURE — A9270 NON-COVERED ITEM OR SERVICE: HCPCS | Mod: UD | Performed by: SURGERY

## 2024-10-08 PROCEDURE — 88374 M/PHMTRC ALYS ISHQUANT/SEMIQ: CPT

## 2024-10-08 PROCEDURE — 700101 HCHG RX REV CODE 250: Mod: UD | Performed by: SURGERY

## 2024-10-08 PROCEDURE — 160029 HCHG SURGERY MINUTES - 1ST 30 MINS LEVEL 4: Performed by: SURGERY

## 2024-10-08 PROCEDURE — 38525 BIOPSY/REMOVAL LYMPH NODES: CPT | Mod: AS,LT

## 2024-10-08 PROCEDURE — 160041 HCHG SURGERY MINUTES - EA ADDL 1 MIN LEVEL 4: Performed by: SURGERY

## 2024-10-08 PROCEDURE — 700111 HCHG RX REV CODE 636 W/ 250 OVERRIDE (IP): Mod: UD | Performed by: ANESTHESIOLOGY

## 2024-10-08 PROCEDURE — 82962 GLUCOSE BLOOD TEST: CPT

## 2024-10-08 PROCEDURE — 700102 HCHG RX REV CODE 250 W/ 637 OVERRIDE(OP): Mod: UD | Performed by: SURGERY

## 2024-10-08 RX ORDER — EPHEDRINE SULFATE 50 MG/ML
5 INJECTION, SOLUTION INTRAVENOUS
Status: DISCONTINUED | OUTPATIENT
Start: 2024-10-08 | End: 2024-10-08 | Stop reason: HOSPADM

## 2024-10-08 RX ORDER — METOPROLOL TARTRATE 1 MG/ML
1 INJECTION, SOLUTION INTRAVENOUS
Status: DISCONTINUED | OUTPATIENT
Start: 2024-10-08 | End: 2024-10-08 | Stop reason: HOSPADM

## 2024-10-08 RX ORDER — HYDROMORPHONE HYDROCHLORIDE 1 MG/ML
0.4 INJECTION, SOLUTION INTRAMUSCULAR; INTRAVENOUS; SUBCUTANEOUS
Status: DISCONTINUED | OUTPATIENT
Start: 2024-10-08 | End: 2024-10-08 | Stop reason: HOSPADM

## 2024-10-08 RX ORDER — OXYCODONE HCL 5 MG/5 ML
10 SOLUTION, ORAL ORAL
Status: DISCONTINUED | OUTPATIENT
Start: 2024-10-08 | End: 2024-10-08 | Stop reason: HOSPADM

## 2024-10-08 RX ORDER — DEXAMETHASONE SODIUM PHOSPHATE 4 MG/ML
INJECTION, SOLUTION INTRA-ARTICULAR; INTRALESIONAL; INTRAMUSCULAR; INTRAVENOUS; SOFT TISSUE PRN
Status: DISCONTINUED | OUTPATIENT
Start: 2024-10-08 | End: 2024-10-08 | Stop reason: SURG

## 2024-10-08 RX ORDER — ISOSULFAN BLUE 50 MG/5ML
INJECTION, SOLUTION SUBCUTANEOUS
Status: DISCONTINUED | OUTPATIENT
Start: 2024-10-08 | End: 2024-10-08 | Stop reason: HOSPADM

## 2024-10-08 RX ORDER — OXYCODONE HCL 5 MG/5 ML
5 SOLUTION, ORAL ORAL
Status: DISCONTINUED | OUTPATIENT
Start: 2024-10-08 | End: 2024-10-08 | Stop reason: HOSPADM

## 2024-10-08 RX ORDER — ALPRAZOLAM 0.25 MG/1
.25-.5 TABLET ORAL
Status: COMPLETED | OUTPATIENT
Start: 2024-10-08 | End: 2024-10-08

## 2024-10-08 RX ORDER — HYDROMORPHONE HYDROCHLORIDE 1 MG/ML
0.2 INJECTION, SOLUTION INTRAMUSCULAR; INTRAVENOUS; SUBCUTANEOUS
Status: DISCONTINUED | OUTPATIENT
Start: 2024-10-08 | End: 2024-10-08 | Stop reason: HOSPADM

## 2024-10-08 RX ORDER — ACETAMINOPHEN 500 MG
1000 TABLET ORAL ONCE
Status: COMPLETED | OUTPATIENT
Start: 2024-10-08 | End: 2024-10-08

## 2024-10-08 RX ORDER — MEPERIDINE HYDROCHLORIDE 25 MG/ML
12.5 INJECTION INTRAMUSCULAR; INTRAVENOUS; SUBCUTANEOUS
Status: DISCONTINUED | OUTPATIENT
Start: 2024-10-08 | End: 2024-10-08 | Stop reason: HOSPADM

## 2024-10-08 RX ORDER — SODIUM CHLORIDE, SODIUM LACTATE, POTASSIUM CHLORIDE, CALCIUM CHLORIDE 600; 310; 30; 20 MG/100ML; MG/100ML; MG/100ML; MG/100ML
INJECTION, SOLUTION INTRAVENOUS CONTINUOUS
Status: ACTIVE | OUTPATIENT
Start: 2024-10-08 | End: 2024-10-08

## 2024-10-08 RX ORDER — HALOPERIDOL 5 MG/ML
1 INJECTION INTRAMUSCULAR
Status: DISCONTINUED | OUTPATIENT
Start: 2024-10-08 | End: 2024-10-08 | Stop reason: HOSPADM

## 2024-10-08 RX ORDER — CEFAZOLIN SODIUM 1 G/3ML
INJECTION, POWDER, FOR SOLUTION INTRAMUSCULAR; INTRAVENOUS PRN
Status: DISCONTINUED | OUTPATIENT
Start: 2024-10-08 | End: 2024-10-08 | Stop reason: SURG

## 2024-10-08 RX ORDER — ONDANSETRON 2 MG/ML
4 INJECTION INTRAMUSCULAR; INTRAVENOUS ONCE
Status: DISCONTINUED | OUTPATIENT
Start: 2024-10-08 | End: 2024-10-08 | Stop reason: HOSPADM

## 2024-10-08 RX ORDER — LIDOCAINE/PRILOCAINE 2.5 %-2.5%
CREAM (GRAM) TOPICAL ONCE
Status: COMPLETED | OUTPATIENT
Start: 2024-10-08 | End: 2024-10-08

## 2024-10-08 RX ORDER — HYDROMORPHONE HYDROCHLORIDE 1 MG/ML
0.1 INJECTION, SOLUTION INTRAMUSCULAR; INTRAVENOUS; SUBCUTANEOUS
Status: DISCONTINUED | OUTPATIENT
Start: 2024-10-08 | End: 2024-10-08 | Stop reason: HOSPADM

## 2024-10-08 RX ORDER — SCOLOPAMINE TRANSDERMAL SYSTEM 1 MG/1
1 PATCH, EXTENDED RELEASE TRANSDERMAL
Status: DISCONTINUED | OUTPATIENT
Start: 2024-10-08 | End: 2024-10-08 | Stop reason: HOSPADM

## 2024-10-08 RX ORDER — LIDOCAINE HYDROCHLORIDE 20 MG/ML
INJECTION, SOLUTION EPIDURAL; INFILTRATION; INTRACAUDAL; PERINEURAL PRN
Status: DISCONTINUED | OUTPATIENT
Start: 2024-10-08 | End: 2024-10-08 | Stop reason: SURG

## 2024-10-08 RX ORDER — ISOSULFAN BLUE 50 MG/5ML
INJECTION, SOLUTION SUBCUTANEOUS
Status: DISCONTINUED
Start: 2024-10-08 | End: 2024-10-08 | Stop reason: HOSPADM

## 2024-10-08 RX ORDER — IPRATROPIUM BROMIDE AND ALBUTEROL SULFATE 2.5; .5 MG/3ML; MG/3ML
3 SOLUTION RESPIRATORY (INHALATION)
Status: DISCONTINUED | OUTPATIENT
Start: 2024-10-08 | End: 2024-10-08 | Stop reason: HOSPADM

## 2024-10-08 RX ORDER — DIPHENHYDRAMINE HYDROCHLORIDE 50 MG/ML
12.5 INJECTION INTRAMUSCULAR; INTRAVENOUS
Status: DISCONTINUED | OUTPATIENT
Start: 2024-10-08 | End: 2024-10-08 | Stop reason: HOSPADM

## 2024-10-08 RX ORDER — BUPIVACAINE HYDROCHLORIDE AND EPINEPHRINE 5; 5 MG/ML; UG/ML
INJECTION, SOLUTION EPIDURAL; INTRACAUDAL; PERINEURAL
Status: DISCONTINUED | OUTPATIENT
Start: 2024-10-08 | End: 2024-10-08 | Stop reason: HOSPADM

## 2024-10-08 RX ORDER — ONDANSETRON 4 MG/1
4 TABLET, FILM COATED ORAL EVERY 8 HOURS PRN
Qty: 9 TABLET | Refills: 0 | Status: SHIPPED | OUTPATIENT
Start: 2024-10-08 | End: 2024-10-11

## 2024-10-08 RX ORDER — HYDRALAZINE HYDROCHLORIDE 20 MG/ML
5 INJECTION INTRAMUSCULAR; INTRAVENOUS
Status: DISCONTINUED | OUTPATIENT
Start: 2024-10-08 | End: 2024-10-08 | Stop reason: HOSPADM

## 2024-10-08 RX ORDER — TRAMADOL HYDROCHLORIDE 50 MG/1
50 TABLET ORAL EVERY 6 HOURS PRN
Qty: 8 TABLET | Refills: 0 | Status: SHIPPED | OUTPATIENT
Start: 2024-10-08 | End: 2024-10-10

## 2024-10-08 RX ORDER — ONDANSETRON 2 MG/ML
INJECTION INTRAMUSCULAR; INTRAVENOUS PRN
Status: DISCONTINUED | OUTPATIENT
Start: 2024-10-08 | End: 2024-10-08 | Stop reason: SURG

## 2024-10-08 RX ORDER — LABETALOL HYDROCHLORIDE 5 MG/ML
5 INJECTION, SOLUTION INTRAVENOUS
Status: DISCONTINUED | OUTPATIENT
Start: 2024-10-08 | End: 2024-10-08 | Stop reason: HOSPADM

## 2024-10-08 RX ORDER — BUPIVACAINE HYDROCHLORIDE AND EPINEPHRINE 5; 5 MG/ML; UG/ML
INJECTION, SOLUTION EPIDURAL; INTRACAUDAL; PERINEURAL
Status: DISCONTINUED
Start: 2024-10-08 | End: 2024-10-08 | Stop reason: HOSPADM

## 2024-10-08 RX ADMIN — DEXAMETHASONE SODIUM PHOSPHATE 4 MG: 4 INJECTION INTRA-ARTICULAR; INTRALESIONAL; INTRAMUSCULAR; INTRAVENOUS; SOFT TISSUE at 16:29

## 2024-10-08 RX ADMIN — LIDOCAINE AND PRILOCAINE 1 APPLICATION: 25; 25 CREAM TOPICAL at 11:56

## 2024-10-08 RX ADMIN — FENTANYL CITRATE 50 MCG: 50 INJECTION, SOLUTION INTRAMUSCULAR; INTRAVENOUS at 16:49

## 2024-10-08 RX ADMIN — FENTANYL CITRATE 100 MCG: 50 INJECTION, SOLUTION INTRAMUSCULAR; INTRAVENOUS at 15:49

## 2024-10-08 RX ADMIN — PROPOFOL 150 MG: 10 INJECTION, EMULSION INTRAVENOUS at 15:53

## 2024-10-08 RX ADMIN — PROPOFOL 50 MG: 10 INJECTION, EMULSION INTRAVENOUS at 16:40

## 2024-10-08 RX ADMIN — ONDANSETRON 4 MG: 2 INJECTION INTRAMUSCULAR; INTRAVENOUS at 17:41

## 2024-10-08 RX ADMIN — FENTANYL CITRATE 50 MCG: 50 INJECTION, SOLUTION INTRAMUSCULAR; INTRAVENOUS at 16:40

## 2024-10-08 RX ADMIN — ACETAMINOPHEN 1000 MG: 500 TABLET ORAL at 15:19

## 2024-10-08 RX ADMIN — ALPRAZOLAM 0.25 MG: 0.25 TABLET ORAL at 12:47

## 2024-10-08 RX ADMIN — SCOPOLAMINE 1 PATCH: 1.5 PATCH, EXTENDED RELEASE TRANSDERMAL at 15:18

## 2024-10-08 RX ADMIN — CEFAZOLIN 2 G: 1 INJECTION, POWDER, FOR SOLUTION INTRAMUSCULAR; INTRAVENOUS at 16:01

## 2024-10-08 RX ADMIN — LIDOCAINE HYDROCHLORIDE 100 MG: 20 INJECTION, SOLUTION EPIDURAL; INFILTRATION; INTRACAUDAL at 15:53

## 2024-10-08 RX ADMIN — SODIUM CHLORIDE, POTASSIUM CHLORIDE, SODIUM LACTATE AND CALCIUM CHLORIDE: 600; 310; 30; 20 INJECTION, SOLUTION INTRAVENOUS at 13:33

## 2024-10-08 ASSESSMENT — FIBROSIS 4 INDEX: FIB4 SCORE: 1.53

## 2024-10-08 ASSESSMENT — PAIN SCALES - GENERAL: PAIN_LEVEL: 0

## 2024-10-09 ENCOUNTER — TELEPHONE (OUTPATIENT)
Dept: SURGERY | Facility: MEDICAL CENTER | Age: 74
End: 2024-10-09
Payer: MEDICARE

## 2024-10-15 ENCOUNTER — APPOINTMENT (OUTPATIENT)
Dept: HEMATOLOGY ONCOLOGY | Facility: MEDICAL CENTER | Age: 74
End: 2024-10-15
Payer: MEDICARE

## 2024-10-16 ENCOUNTER — APPOINTMENT (OUTPATIENT)
Dept: SURGERY | Facility: MEDICAL CENTER | Age: 74
End: 2024-10-16
Payer: MEDICARE

## 2024-10-16 VITALS
HEART RATE: 76 BPM | HEIGHT: 64 IN | TEMPERATURE: 97.6 F | DIASTOLIC BLOOD PRESSURE: 86 MMHG | SYSTOLIC BLOOD PRESSURE: 150 MMHG | OXYGEN SATURATION: 97 % | WEIGHT: 180 LBS | BODY MASS INDEX: 30.73 KG/M2

## 2024-10-16 DIAGNOSIS — C50.412 CARCINOMA OF UPPER-OUTER QUADRANT OF FEMALE BREAST, LEFT (HCC): ICD-10-CM

## 2024-10-16 PROCEDURE — 99024 POSTOP FOLLOW-UP VISIT: CPT | Performed by: SURGERY

## 2024-10-16 ASSESSMENT — FIBROSIS 4 INDEX: FIB4 SCORE: 1.53

## 2024-10-24 ENCOUNTER — PATIENT OUTREACH (OUTPATIENT)
Dept: ONCOLOGY | Facility: MEDICAL CENTER | Age: 74
End: 2024-10-24
Payer: MEDICARE

## 2024-10-24 NOTE — PROGRESS NOTES
"ONCOLOGY NURSE NAVIGATION (ONN) ASSESSMENT:  LISA Hussein reached out to the patient to follow up on previous voice message and referral.  NN introduced myself and my role.  I asked about the upcoming new patient consult with Dr. Young on 11.4.2024.  Patient plans to arrive at 0830.  She received directions and a map from Dr. Bodren's office.  No questions at this time regarding the appointment Monday.  NN asked about my letter of introduction and [facility] calendar sent via [employer] on 10.24.2024.  Patient denied receiving it.  NN asked to re-send the information to [facility] & patient agreed.  NN provided my direct phone number and name today so patient can call with any future questions or concerns.        DX: Left Breast cancer    POC: Patient established with Dr. Borden and Dr. Lyles.  New patient consult with Dr. Young on 11.4.2024.    FAMHX: Cancer-related family history includes Cancer in her brother and brother; Cancer (age of onset: 42) in her maternal grandmother; Cancer (age of onset: 50) in her father.           BARRIERS ASSESSMENT:  TRANSPORTATION: No barriers offered, lives in [place].  EMPLOYMENT:  Retired no barriers offered.   FINANCIAL:  No barriers offered  INSURANCE:  [facility] [employer]  SUPPORT SYSTEM:  3 adult children, friends  PSYCHOLOGICAL: DST 4/10 r/t \"nervous\"    COMMUNICATION: No barriers noted    FAMILY CARE:  No barriers offered  SELF CARE:  Independent living       INTERVENTION:  Letter of introduction & [facility] calendar [facility]       "

## 2024-10-30 ENCOUNTER — HOSPITAL ENCOUNTER (OUTPATIENT)
Dept: HEMATOLOGY ONCOLOGY | Facility: MEDICAL CENTER | Age: 74
End: 2024-10-30
Attending: INTERNAL MEDICINE
Payer: MEDICARE

## 2024-10-30 VITALS
RESPIRATION RATE: 17 BRPM | DIASTOLIC BLOOD PRESSURE: 64 MMHG | WEIGHT: 182.32 LBS | TEMPERATURE: 97.1 F | SYSTOLIC BLOOD PRESSURE: 132 MMHG | HEART RATE: 71 BPM | OXYGEN SATURATION: 98 % | BODY MASS INDEX: 31.3 KG/M2

## 2024-10-30 DIAGNOSIS — C50.412 CARCINOMA OF UPPER-OUTER QUADRANT OF FEMALE BREAST, LEFT (HCC): ICD-10-CM

## 2024-10-30 PROCEDURE — 99212 OFFICE O/P EST SF 10 MIN: CPT | Performed by: INTERNAL MEDICINE

## 2024-10-30 PROCEDURE — 36415 COLL VENOUS BLD VENIPUNCTURE: CPT

## 2024-10-30 ASSESSMENT — ENCOUNTER SYMPTOMS
EYES NEGATIVE: 1
PSYCHIATRIC NEGATIVE: 1
CONSTITUTIONAL NEGATIVE: 1
GASTROINTESTINAL NEGATIVE: 1
MYALGIAS: 1
NEUROLOGICAL NEGATIVE: 1
RESPIRATORY NEGATIVE: 1
CARDIOVASCULAR NEGATIVE: 1

## 2024-10-30 ASSESSMENT — FIBROSIS 4 INDEX: FIB4 SCORE: 1.53

## 2024-10-30 ASSESSMENT — PAIN SCALES - GENERAL: PAINLEVEL: 2=MINIMAL-SLIGHT

## 2024-11-04 ENCOUNTER — HOSPITAL ENCOUNTER (OUTPATIENT)
Dept: RADIATION ONCOLOGY | Facility: MEDICAL CENTER | Age: 74
End: 2024-11-04
Attending: RADIOLOGY
Payer: MEDICARE

## 2024-11-04 VITALS
BODY MASS INDEX: 30.94 KG/M2 | OXYGEN SATURATION: 95 % | DIASTOLIC BLOOD PRESSURE: 79 MMHG | WEIGHT: 181.22 LBS | HEIGHT: 64 IN | SYSTOLIC BLOOD PRESSURE: 137 MMHG | HEART RATE: 80 BPM

## 2024-11-04 DIAGNOSIS — C50.412 CARCINOMA OF UPPER-OUTER QUADRANT OF FEMALE BREAST, LEFT (HCC): ICD-10-CM

## 2024-11-04 PROCEDURE — 99214 OFFICE O/P EST MOD 30 MIN: CPT | Performed by: RADIOLOGY

## 2024-11-04 RX ORDER — ACETAMINOPHEN 325 MG/1
650 TABLET ORAL 2 TIMES DAILY
COMMUNITY

## 2024-11-04 ASSESSMENT — PAIN SCALES - GENERAL: PAINLEVEL_OUTOF10: NO PAIN

## 2024-11-04 ASSESSMENT — FIBROSIS 4 INDEX: FIB4 SCORE: 1.53

## 2024-11-04 NOTE — PROGRESS NOTES
"Patient was seen today in clinic with Dr. Young for consultation.  Vitals signs and weight were obtained and pain assessment was completed.  Allergies and medications were reviewed with the patient.       Vitals/Pain:  Vitals:    11/04/24 0836   BP: 137/79   BP Location: Right arm   Patient Position: Sitting   BP Cuff Size: Large adult   Pulse: 80   SpO2: 95%   Weight: 82.2 kg (181 lb 3.5 oz)   Height: 1.626 m (5' 4\")   Pain Score: No pain        Allergies:   Other environmental    Current Medications:  Current Outpatient Medications   Medication Sig Dispense Refill    acetaminophen (TYLENOL) 325 MG Tab Take 650 mg by mouth 2 times a day.      metFORMIN (GLUCOPHAGE) 500 MG Tab Take 500 mg by mouth every morning. FOR 10/8/24,  DO NOT TAKE DAY OF SURGERY      anastrozole (ARIMIDEX) 1 MG Tab Take 1 Tablet by mouth every day. (Patient taking differently: Take 1 mg by mouth every day. FOR 10/8/24,  COORDINATE MEDICATION INSTRUCTIONS FROM PRESCRIBING PHYSICIAN) 90 Tablet 1    Blood Glucose Monitoring Suppl (BLOOD GLUCOSE SYSTEM RICHAR) Kit 1 Each 4 Times a Day,Before Meals and at Bedtime. 1 Kit 0    glucose blood strip Use one One Touch Verio strip to test blood sugar three times daily before meals. 100 Strip 0    Lancets Use one One Touch Verio Flex lancet to test blood sugar three times daily before meals. 100 Each 0    Alcohol Swabs Wipe site with prep pad prior to injection. 100 Each 0    rosuvastatin (CRESTOR) 10 MG Tab Take 10 mg by mouth every evening. FOR 10/8/24,  CONTINUE TAKING MED PRIOR TO SURGERY      pantoprazole (PROTONIX) 40 MG Tablet Delayed Response Take 1 Tablet by mouth 2 times a day. (Patient taking differently: Take 40 mg by mouth every day. FOR 10/8/24,  CONTINUE TAKING MED PRIOR TO SURGERY) 60 Tablet 3     No current facility-administered medications for this encounter.         PCP:  Jensen Ochoa R.N.  "

## 2024-11-04 NOTE — CONSULTS
RADIATION ONCOLOGY CONSULT    DATE OF SERVICE: 11/4/2024    IDENTIFICATION:  Stage IIIA ([id number]) G1 invasive ductal carcinoma of the left upper outer quadrant of breast, ER positive NH negative HER2/ester negative with Ki-67 15% status post neoadjuvant anastrozole followed by lumpectomy and sentinel lymph node biopsy on 10/8/2024, [id number]. .      HISTORY OF PRESENT ILLNESS: I had the pleasure of seeing Ms. Pagan today in consultation at the request of Dr. Borden for her breast cancer.  Patient is a 74-year-old woman who felt a progressive mass in her left breast.  This led to diagnostic mammogram which showed a new spiculated mass in the left upper outer quadrant measuring roughly 3.7 cm in size.  In addition there was an enlarged axillary lymph node of concern.  Ultrasound showed a 3.3 cm left upper outer quadrant breast mass.  Axillary lymph node looks suspicious and measured 5.7 mm.  Biopsy of the primary lesion showed an invasive ductal carcinoma with grade 1 features.  Tumor was ER positive NH negative HER2/ester negative with a Ki-67 of 15%.  The lymph node was positive for malignancy as well.  By exam by Dr. Borden the mass measured over 5 cm in size.  Therefore either T2 or T3 in nature.  Given the size and scope PET scan was ordered which showed activity in the breast and lymph node but no further regional or distant disease.  Patient did exhibit significant shortness of breath and weakness in the office.  As a part of cardiac workup she was found to have a hemoglobin of 4.8.  She underwent blood transfusion.  Ultimately this was deemed iron deficiency anemia secondary to long-term Coumadin use.  All given time for this to resolve and to try to get some regression of disease for partial mastectomy patient underwent neoadjuvant aromatase inhibitor with anastrozole.  She was then taken for lumpectomy and sentinel lymph node biopsy on 10/8/2024.  This confirmed a 3.2 cm invasive ductal carcinoma with associated  extensive high-grade DCIS measuring roughly 3 cm.  All surgical margins were negative by 5 mm.  Tumor grade was 2.  1 of 3 lymph nodes was positive for malignancy measuring 9 mm in size without extranodal extension.  Have been asked to see her for consideration of adjuvant radiotherapy. .    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    Blood clotting disorder (HCC)     lungs bilat     Blood transfusion without reported diagnosis     Breast cancer (HCC)     Breath shortness 2014 2017 denies now    Cancer (HCC)     breast ca    Carcinoma of upper-outer quadrant of female breast, left (HCC)     Diabetes (HCC)     Elevated LFTs 01/26/2018    Fall     GERD (gastroesophageal reflux disease)     Hernia, hiatal     repaired june 2024    High cholesterol     Hyperlipidemia     Measles     Urinary incontinence        PAST SURGICAL HISTORY:  Past Surgical History:   Procedure Laterality Date    PB MASTECTOMY, PARTIAL Left 10/08/2024    Procedure: LEFT PARTIAL MASTECTOMY, LEFT SENTINEL LYMPH NODE INJECTION WITH EXCISION OF AXILLARY NODES;  Surgeon: Elicia Borden M.D.;  Location: SURGERY SAME DAY [facility];  Service: General    NODE BIOPSY SENTINEL Left 10/08/2024    Procedure: BIOPSY, LYMPH NODE, SENTINEL;  Surgeon: Elicia Borden M.D.;  Location: SURGERY SAME DAY [facility];  Service: General    IA EXPLORATORY OF ABDOMEN N/A 06/04/2024    Procedure: LAPAROTOMY, EXPLORATORY;  Surgeon: Karl Carmona M.D.;  Location: [facility];  Service: Thoracic    IA REMOVAL OF OMENTUM N/A 06/04/2024    Procedure: OMENTECTOMY;  Surgeon: Karl Carmona M.D.;  Location: [facility];  Service: Thoracic    UMBILICAL HERNIA REPAIR N/A 06/04/2024    Procedure: REPAIR, HERNIA, UMBILICAL;  Surgeon: Karl Carmona M.D.;  Location: SURGERY [facility];  Service: Thoracic    BOWEL  RESECTION N/A 2024    Procedure: SMALL BOWEL RESECTION;  Surgeon: Karl Carmona M.D.;  Location: SURGERY [facility];  Service: Thoracic    ENDOSCOPY PROCEDURE N/A 2024    Procedure: ENDOSCOPY UPPER AND LOWER;  Surgeon: Tom Payne M.D.;  Location: SURGERY [facility];  Service: Gastroenterology    GASTROSCOPY N/A 2017    Procedure: GASTROSCOPY;  Surgeon: Stephanie Reyes M.D.;  Location: SURGERY SAME DAY [facility];  Service: Gastroenterology    OTHER ABDOMINAL SURGERY  2004    hernia repair    GYN SURGERY  1980    c sec lap tubal    TONSILLECTOMY      TUBAL COAGULATION LAPAROSCOPIC BILATERAL         GYNECOLOGICAL STATUS:  : 4 and Para: 4    HORMONE USE:  Post-menopause use 0 years    CURRENT MEDICATIONS:  Current Outpatient Medications   Medication Sig Dispense Refill    acetaminophen (TYLENOL) 325 MG Tab Take 650 mg by mouth 2 times a day.      metFORMIN (GLUCOPHAGE) 500 MG Tab Take 500 mg by mouth every morning. FOR 10/8/24,  DO NOT TAKE DAY OF SURGERY      anastrozole (ARIMIDEX) 1 MG Tab Take 1 Tablet by mouth every day. (Patient taking differently: Take 1 mg by mouth every day. FOR 10/8/24,  COORDINATE MEDICATION INSTRUCTIONS FROM PRESCRIBING PHYSICIAN) 90 Tablet 1    Blood Glucose Monitoring Suppl (BLOOD GLUCOSE SYSTEM RICHAR) Kit 1 Each 4 Times a Day,Before Meals and at Bedtime. 1 Kit 0    glucose blood strip Use one One Touch Verio strip to test blood sugar three times daily before meals. 100 Strip 0    Lancets Use one One Touch Verio Flex lancet to test blood sugar three times daily before meals. 100 Each 0    Alcohol Swabs Wipe site with prep pad prior to injection. 100 Each 0    rosuvastatin (CRESTOR) 10 MG Tab Take 10 mg by mouth every evening. FOR 10/8/24,  CONTINUE TAKING MED PRIOR TO SURGERY      pantoprazole (PROTONIX) 40 MG Tablet Delayed Response Take 1 Tablet by mouth 2 times a day. (Patient taking differently: Take 40 mg by mouth every day. FOR  "10/8/24,  CONTINUE TAKING MED PRIOR TO SURGERY) 60 Tablet 3     No current facility-administered medications for this encounter.       ALLERGIES:    Other environmental    FAMILY HISTORY:    Family History   Problem Relation Age of Onset    Cancer Mother         Melanoma    Other Mother         parkinsons    Melanoma Mother     Cancer Father 50        stomach    Cancer Brother         unsure type, age was very young    Stroke Brother     Cancer Brother         unsure type,age. 1/2 brother    Cancer Maternal Grandmother 42        stomach       SOCIAL HISTORY:    Social History     Tobacco Use    Smoking status: Former     Current packs/day: 0.00     Average packs/day: 0.2 packs/day for 1 year (0.2 ttl pk-yrs)     Types: Cigarettes     Start date: 1972     Quit date: 1973     Years since quittin.2    Smokeless tobacco: Never    Tobacco comments:     Social, I never made it a habit   Vaping Use    Vaping status: Never Used   Substance Use Topics    Alcohol use: No     Comment: Stopped about 50 years ago.    Drug use: No     Patient is retired from [employer]   Lives with: Self in Senior Living     REVIEW OF SYSTEMS:  A complete review of systems was completed in patient's chart on 2024.  All are negative with relationship to this diagnosis with the exception of:  Patient is healing well from surgery, does not have any suspicious lesions in the breast or axilla, denies any acute areas of bony tenderness or deformity, denies any new headaches or neurologic symptoms     PHYSICAL EXAM:    Vitals:    24 0836   BP: 137/79   BP Location: Right arm   Patient Position: Sitting   BP Cuff Size: Large adult   Pulse: 80   SpO2: 95%   Weight: 82.2 kg (181 lb 3.5 oz)   Height: 1.626 m (5' 4\")   Pain Score: No pain        ECO= Fully active, able to carry on all pre-disease performance without restriction.    PAIN:  0  Patient reports 0 post operative pain. Joint pain since initiating " Anastrozole for which she takes Tylenol BID.     GENERAL: No apparent distress.  HEENT:  Pupils are equal, round, and reactive to light.  Extraocular muscles   are intact. Sclerae nonicteric.  Conjunctivae pink.  Oral cavity, tongue   protrudes midline.   NECK:  Supple without evidence of thyromegaly.  NODES:  No peripheral adenopathy of the neck, supraclavicular fossa or axillae   bilaterally.  LUNGS:  Clear to ascultation bilaterally   HEART:  Regular rate and rhythm.  No murmur appreciated  BREAST: No suspicious lesions found in either breast or axilla.  ABDOMEN:  Soft. No evidence of hepatosplenomegaly.  Positive bowel sounds.  EXTREMITIES:  Without Edema.  NEUROLOGIC:  Cranial nerves II through XII were intact. Normal stance and gait motor and sensory grossly within normal limits       IMPRESSION:    Stage IIIA ([id number]) G1 invasive ductal carcinoma of the left upper outer quadrant of breast, ER positive IN negative HER2/ester negative with Ki-67 15% status post neoadjuvant anastrozole followed by lumpectomy and sentinel lymph node biopsy on 10/8/2024, [id number].     RECOMMENDATIONS:    I discussed the diagnosis, prognosis, and treatment options over a 1 hr 5 min time period, 95% of that time dedicated to ongoing treatment management.  I discussed with the patient the more locally advanced nature of her presentation but the favorable biology of her tumor.  She did have clinical response to her neoadjuvant endocrine therapy.  Given the short duration of neoadjuvant use however did have expected residual disease.  She will continue on her aromatase inhibitor.  Next we discussed the data surrounding mastectomy versus lumpectomy and radiation.  We discussed lumpectomy with and without radiation.  I anticipate hypofractionated radiation with a high tangent without a boost.  She was given a simulation for November 12 at 9 AM.    We discussed the risks, benefits and side effects of treatment and the patient is amenable to  treatment.  If patient has any questions or concerns, she should feel free to contact me.    Thank you for the opportunity to participate in her care.  If any questions or comments, please do not hesitate in calling.

## 2024-11-12 ENCOUNTER — HOSPITAL ENCOUNTER (OUTPATIENT)
Dept: RADIATION ONCOLOGY | Facility: MEDICAL CENTER | Age: 74
End: 2024-11-12

## 2024-11-12 PROCEDURE — 77334 RADIATION TREATMENT AID(S): CPT | Performed by: RADIOLOGY

## 2024-11-12 PROCEDURE — 77290 THER RAD SIMULAJ FIELD CPLX: CPT | Performed by: RADIOLOGY

## 2024-11-12 NOTE — PROGRESS NOTES
11/13/2024  8:47 AM    Imaging facility:  [facility]  Primary care provider:  Nallely Styles P.A.-C.  Medical oncologist:  Dr. Reza Lyles  Radiation oncologist:  Dr. Nickolas Young    CC:   Chief Complaint   Patient presents with    Post-op        HPI: This friendly 74 y.o. female is scheduled for routine postoperative appointment. She denies any breast pain. Denies any nausea, vomiting, or constipation. She reports right hip and right shoulder pain that has increased since starting Anastrozole. Reports normal range of motion to her left shoulder.     She has been following up regularly with her PCP to monitor her anemia.     LABS:  Lab Results   Component Value Date/Time    HBA1C 5.9 (H) 10/01/2024 12:55 PM        Patient Active Problem List   Diagnosis    MEDICAL HOME    Symptomatic anemia    Kumar esophagus    Kumar's esophagus    Elevated LFTs    Morbid obesity with BMI of 40.0-44.9, adult (HCC)    Breast mass in female    Gastroesophageal reflux disease without esophagitis    Abnormal mammogram    Other hyperlipidemia    SOB (shortness of breath) on exertion    Carcinoma of upper-outer quadrant of female breast, left (HCC)    Abnormal findings on diagnostic imaging of heart and coronary circulation    Hypokalemia    ACP (advance care planning)    Acute cough    Ventral hernia with bowel obstruction       Past Surgical History:   Procedure Laterality Date    PB MASTECTOMY, PARTIAL Left 10/08/2024    Procedure: LEFT PARTIAL MASTECTOMY, LEFT SENTINEL LYMPH NODE INJECTION WITH EXCISION OF AXILLARY NODES;  Surgeon: Elicia Borden M.D.;  Location: SURGERY SAME DAY [facility];  Service: General    NODE BIOPSY SENTINEL Left 10/08/2024    Procedure: BIOPSY, LYMPH NODE, SENTINEL;  Surgeon: Elicia Borden M.D.;  Location: SURGERY SAME DAY [facility];  Service: General    NE EXPLORATORY OF ABDOMEN N/A 06/04/2024    Procedure: LAPAROTOMY, EXPLORATORY;  Surgeon: Karl Carmona M.D.;  Location: SURGERY  [facility];  Service: Thoracic    NV REMOVAL OF OMENTUM N/A 06/04/2024    Procedure: OMENTECTOMY;  Surgeon: Karl Carmona M.D.;  Location: SURGERY [facility];  Service: Thoracic    UMBILICAL HERNIA REPAIR N/A 06/04/2024    Procedure: REPAIR, HERNIA, UMBILICAL;  Surgeon: Karl Carmona M.D.;  Location: SURGERY [facility];  Service: Thoracic    BOWEL RESECTION N/A 06/04/2024    Procedure: SMALL BOWEL RESECTION;  Surgeon: Karl Carmona M.D.;  Location: SURGERY [facility];  Service: Thoracic    ENDOSCOPY PROCEDURE N/A 02/18/2024    Procedure: ENDOSCOPY UPPER AND LOWER;  Surgeon: Tom Payne M.D.;  Location: SURGERY [facility];  Service: Gastroenterology    GASTROSCOPY N/A 09/01/2017    Procedure: GASTROSCOPY;  Surgeon: Stephanie Reyes M.D.;  Location: SURGERY SAME DAY [facility];  Service: Gastroenterology    OTHER ABDOMINAL SURGERY  01/01/2004    hernia repair    GYN SURGERY  01/01/1980    c sec lap tubal    TONSILLECTOMY      TUBAL COAGULATION LAPAROSCOPIC BILATERAL         Allergies   Allergen Reactions    Other Environmental Runny Nose     Grass and pollens       Current Outpatient Medications   Medication Sig    acetaminophen (TYLENOL) 325 MG Tab Take 650 mg by mouth 2 times a day.    metFORMIN (GLUCOPHAGE) 500 MG Tab Take 500 mg by mouth every morning. FOR 10/8/24,  DO NOT TAKE DAY OF SURGERY    anastrozole (ARIMIDEX) 1 MG Tab Take 1 Tablet by mouth every day. (Patient taking differently: Take 1 mg by mouth every day. FOR 10/8/24,  COORDINATE MEDICATION INSTRUCTIONS FROM PRESCRIBING PHYSICIAN)    Blood Glucose Monitoring Suppl (BLOOD GLUCOSE SYSTEM RICHAR) Kit 1 Each 4 Times a Day,Before Meals and at Bedtime.    glucose blood strip Use one One Touch Verio strip to test blood sugar three times daily before meals.    Lancets Use one One Touch Verio Flex lancet to test blood sugar three times daily before meals.    Alcohol Swabs Wipe site with prep pad prior to injection.    rosuvastatin  "(CRESTOR) 10 MG Tab Take 10 mg by mouth every evening. FOR 10/8/24,  CONTINUE TAKING MED PRIOR TO SURGERY    pantoprazole (PROTONIX) 40 MG Tablet Delayed Response Take 1 Tablet by mouth 2 times a day. (Patient taking differently: Take 40 mg by mouth every day. FOR 10/8/24,  CONTINUE TAKING MED PRIOR TO SURGERY)       Social History     Tobacco Use    Smoking status: Former     Current packs/day: 0.00     Average packs/day: 0.2 packs/day for 1 year (0.2 ttl pk-yrs)     Types: Cigarettes     Start date: 1972     Quit date: 1973     Years since quittin.2    Smokeless tobacco: Never    Tobacco comments:     Social, I never made it a habit   Vaping Use    Vaping status: Never Used   Substance Use Topics    Alcohol use: No     Comment: Stopped about 50 years ago.    Drug use: No        Family and Occupational History     Socioeconomic History    Marital status: Single     Spouse name: Not on file    Number of children: Not on file    Years of education: Not on file    Highest education level: Not on file   Occupational History    Not on file       Family History   Problem Relation Age of Onset    Cancer Mother         Melanoma    Other Mother         parkinsons    Melanoma Mother     Cancer Father 50        stomach    Cancer Brother         unsure type, age was very young    Stroke Brother     Cancer Brother         unsure type,age. 1/2 brother    Cancer Maternal Grandmother 42        stomach       OB History    Para Term  AB Living   4 4 2 2 0 0   SAB IAB Ectopic Molar Multiple Live Births   0 0 0 0 0 0   Obstetric Comments   Age of first delivery: 17   Menarche:11   LMP:48   Post-dino 48   No HRT       Physical Exam:  BP (!) 152/78 (BP Location: Right arm, Patient Position: Sitting, BP Cuff Size: Large adult)   Pulse 87   Temp 36.4 °C (97.6 °F) (Temporal)   Ht 1.626 m (5' 4\")   Wt 83 kg (183 lb)   LMP 2000 (Approximate) Comment: Age of first period: 11, No post-menopausal HRT.  " SpO2 97%   BMI 31.41 kg/m²     General: Very pleasant demeanor.  Appears well, no acute distress.  Surgical site:  LEFT upper outer breast and axillary incision are well healed and soft.  No infection or hematoma.     1. Carcinoma of upper-outer quadrant of female breast, left (HCC)            ASSESSMENT:  LEFT upper outer (1:00, 4cm FN) IDC.  Clinical Stage cT3 cN1 M0.  US biopsies [facility] 24. PET showed no distant mets.  Pt found to be chronically extremely anemic upon evaluation by Cardiology with H/H of 4.8/19.8.  Responded to 4u PRBC with improvement of symptoms.  GI workup showed no bleeding site.  Felt to be a good candidate for NET.  Anastrozole since 3/24 (Dr. Lyles).  LEFT partial mastectomy, SLNB including UMESH loc node 10/8/24.  Hypofractionated XRT with high tangent without boost, starting 24 ([place]).                  PRE-NET:  Clinical Stage cT3 cN1 M0.                3.3cm on US.  Palpable mass 5cm post-biopsy.  HM COIL.    Grade 1.  LVI not seen.              ER 90%.  HI neg.              Ki-67 15%.                HER2 negative by FISH.                Metastatic to left axillary node.  UMESH, signal confirmed.                 POST-NET:  Pathologic Stage pT2 N1a.  RCB-III.                Modest clinical response:  2.5cm on US.                LEFT partial mastectomy, SLNB including UMESH loc node 10/8/24:    Residual IDC, 3.2cm, with HG DCIS (est 3cm).  MARGINS CLEAR by at least 5mm.  Previous biopsy site and clip.    Grade 2.  Focal LVI.  ER 90%.  HI neg.  Ki-67 5-10%.  HER2 FISH pending.                            Total 1/3 nodes:  One UMESH-loc node with residual metastasis, 9mm, without MERLY.  0/2 SLN.       Last bilateral mammogram 24:  Scattered FGD.  Last prior mammo      FH:  Mother with melanoma.  No AJ heritage.  Met with [employer] 24, advised to proceed with testing.  Genetics pending ([facility]).       Menopausal/HRT status:  .  No HRT.       LIFESTYLE:   Quit smoking and quit alcohol over 50 years ago.  BMI 33.  Lost 45 pounds with a change in diet over the last year, eating more fruits and vegetables, less processed snacks.     HIstory of DVT and PE 2014, on warfarin (managed through [facility]) since 2007.  Worsening shortness of breath, now at 20 ft.  Requires a walker.  ECOG 2.  Echo 2/17/24, EF 65%.     Acute incarceration of small bowel within umbilical hernia, required emergency surgery 6/24.       Diabetes mellitus.  (6.7)     ECOG 0-1    DISCUSSED/PLAN:  We discussed her activity related to the pain she notes with her hip and shoulder. She states she has had some balance issues lately but reports not using her walker. She reports using a pedal leg exerciser as her main form of activity.     Discussed the role of the Oncology Wellness provider to aide with symptom management and she will let us know in the future if she would like a referral.      Discussed the importance of continued self breast exams. She may continue to do gentle scar massage along her incisions.     Due for BILATERAL mammogram in JANUARY 2025. Discussed that her mammogram schedule will be slightly off with radiation starting this month. Her next appointment with Dr. Lyles is in JANUARY 2025 and expect that he will order next imaging. We are happy to coordinate appointments with Dr. Lyles to ensure CBE every 6 months.     Patient advised to call in 4 months for a follow up appointment in 6 months.

## 2024-11-12 NOTE — RADIATION PLANNING NOTES
DATE OF SERVICE: 11/12/2024    DIAGNOSIS:  Carcinoma of upper-outer quadrant of female breast, left (HCC)  Staging form: Breast, AJCC 8th Edition  - Clinical stage from 2/2/2024: Stage IIB (cT2, cN1, cM0, G1, ER+, NJ-, HER2-) - Signed by Reza Lyles M.D. on 3/7/2024  Stage prefix: Initial diagnosis  Histologic grading system: 3 grade system  - Pathologic stage from 10/31/2024: ypT2, pN1a - Signed by Nickolas Young M.D. on 10/31/2024  Stage prefix: Post-therapy       DATE OF SERVICE: 11/12/2024    TYPE OF SIMULATION: Breast       [] Right    [x] Left      GOAL OF TREATMENT:   [x] Curative  [] Palliative  [] Oligometastatic    COMPLEX:  [x] Complex Blocking   []Arcs  [] Custom Blocks  [] >3 Sites    PROCEDURE: Patient placed in supine position on wing board with VAC SKIP bag with appropriate slant to compensate for slope of chest wall.  Breast/chest wall borders marked CT scan obtained to contain entire volume of interest.  Deep inspiration breath-hold utilized.    I have personally reviewed the relevant data, performed the target localization, and determined all relevant factors for this patient’s simulation.

## 2024-11-12 NOTE — RADIATION PLANNING NOTES
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 on 3/7/2024  Stage prefix: Initial diagnosis  Histologic grading system: 3 grade system  - Pathologic stage from 10/31/2024: ypT2, pN1a - Signed by Nickolas Young M.D. on 10/31/2024  Stage prefix: Post-therapy         IMAGING REVIEWED:  [x] CT     [] MRI     [] PET/CT     [] BONE SCAN     [x] MAMMO     [] OTHER      TREATMENT INTENT:   [x] CURATIVE     [] MAINTENANCE     []  PALLIATIVE      []  SUPPORTIVE     []  PROPHYLACTIC     [] BENIGN     []  CONSOLIDATIVE      [] DEFINITIVE   []  OLOGIMETASTATIC      LINE OF TREATMENT:  [x] ADJUVANT   [] DEFINITIVE   [] NEOADJUVANT   [] RE-TREATMENT      TECHNIQUE PLANNED:  [] IMRT   [x] 3D   [] SBRT   [] SRS/SRT   [] HDR   [] ELECTRON       IMRT JUSTIFICATION:  []   An immediately adjacent area has been previously irradiated and abutting portals must be established with high precision.    []  Dose escalation is planned to deliver radiation doses in excess of those commonly utilized for similar tumors with conventional treatment.    []  The target volume is concave or convex, and the critical normal tissues are within or around that convexity or concavity.    []  The target volume is in close proximity to critical structures that must be protected.    []  The volume of interest must be covered with narrow margins to adequately protect  immediately adjacent structures.      FIELDS & BLOCKING:  [x] COMPLEX BLOCKS     []  = 3 TX AREAS     []  ARCS     []  CUSTOM SHEILD        []  SIMPLE BLOCK      CHEMOTHERAPY:  []  CONCURRENT     []  INDUCTION     [] SEQUENTIAL     []  <30 DAYS FROM XRT      NOTES:    OAR CONSTRAINTS: (GUIDELINES ONLY NOT ABSOLUTE)    Target Prescribed Coverage    PTV 95% of PTV covered by 95% (cGy) of RX Dose       JERE Goal   Max point dose PTV Eval <=110%   Contralateral Breast V5% < 2Gy   Ipsilateral Lung V15% < 20Gy   Ipsilateral Lung V35% < 10Gy   Ipsilateral Lung V50% < 5Gy   Contralateral Lung V10% < 5Gy   Heart (L Sided) V5% < 20Gy   *RTOG 1005, START-A, START-B

## 2024-11-13 ENCOUNTER — OFFICE VISIT (OUTPATIENT)
Dept: SURGERY | Facility: MEDICAL CENTER | Age: 74
End: 2024-11-13
Payer: MEDICARE

## 2024-11-13 VITALS
HEART RATE: 87 BPM | TEMPERATURE: 97.6 F | HEIGHT: 64 IN | BODY MASS INDEX: 31.24 KG/M2 | SYSTOLIC BLOOD PRESSURE: 152 MMHG | DIASTOLIC BLOOD PRESSURE: 78 MMHG | WEIGHT: 183 LBS | OXYGEN SATURATION: 97 %

## 2024-11-13 DIAGNOSIS — C50.412 CARCINOMA OF UPPER-OUTER QUADRANT OF FEMALE BREAST, LEFT (HCC): ICD-10-CM

## 2024-11-13 PROCEDURE — 99024 POSTOP FOLLOW-UP VISIT: CPT

## 2024-11-13 ASSESSMENT — FIBROSIS 4 INDEX: FIB4 SCORE: 1.53

## 2024-11-18 PROCEDURE — 77300 RADIATION THERAPY DOSE PLAN: CPT | Performed by: RADIOLOGY

## 2024-11-18 PROCEDURE — 77334 RADIATION TREATMENT AID(S): CPT | Performed by: RADIOLOGY

## 2024-11-18 PROCEDURE — 77295 3-D RADIOTHERAPY PLAN: CPT | Performed by: RADIOLOGY

## 2024-11-19 ENCOUNTER — HOSPITAL ENCOUNTER (OUTPATIENT)
Dept: RADIATION ONCOLOGY | Facility: MEDICAL CENTER | Age: 74
End: 2024-11-19

## 2024-11-19 LAB
CHEMOTHERAPY INFUSION START DATE: NORMAL
CHEMOTHERAPY RECORDS: 2.67 GY
CHEMOTHERAPY RECORDS: 4005 CGY
CHEMOTHERAPY RECORDS: NORMAL
CHEMOTHERAPY RX CANCER: NORMAL
DATE 1ST CHEMO CANCER: NORMAL
RAD ONC ARIA COURSE LAST TREATMENT DATE: NORMAL
RAD ONC ARIA COURSE TREATMENT ELAPSED DAYS: NORMAL
RAD ONC ARIA REFERENCE POINT DOSAGE GIVEN TO DATE: 2.67 GY
RAD ONC ARIA REFERENCE POINT ID: NORMAL
RAD ONC ARIA REFERENCE POINT SESSION DOSAGE GIVEN: 2.67 GY

## 2024-11-19 PROCEDURE — 77412 RADIATION TX DELIVERY LVL 3: CPT | Performed by: RADIOLOGY

## 2024-11-19 PROCEDURE — 77417 THER RADIOLOGY PORT IMAGE(S): CPT | Performed by: RADIOLOGY

## 2024-11-19 PROCEDURE — 77280 THER RAD SIMULAJ FIELD SMPL: CPT | Performed by: RADIOLOGY

## 2024-11-19 NOTE — CT SIMULATION
11/19/2024  9:56 AM      Carcinoma of upper-outer quadrant of female breast, left (HCC)  Staging form: Breast, AJCC 8th Edition  - Clinical stage from 2/2/2024: Stage IIB (cT2, cN1, cM0, G1, ER+, [place]-, HER2-) - Signed by Reza Lyles M.D. on 3/7/2024  Stage prefix: Initial diagnosis  Histologic grading system: 3 grade system  - Pathologic stage from 10/31/2024: ypT2, pN1a - Signed by Nickolas Young M.D. on 10/31/2024  Stage prefix: Post-therapy       [facility] Treatment Information          11/19/2024   [facility]ria Course Treatment Dates   Course First Treatment Date 11/19/2024    Course Last Treatment Date 11/19/2024    [facility] Treatment Summary   L_BreastHT DIBH [LBreastDIBHht]  Plan from Course C1_LBreastHT   Fraction 1 of 15   Elapsed Course Days 0 @ 11/19/2024   Prescribed Fraction Dose 267 cGy   Prescribed Total Dose 4,005 cGy   L_Breast  Reference Point from Course C1_LBreastHT   Elapsed Course Days 0 @ 11/19/2024   Session Dose 267 cGy   Total Dose 267 cGy        First Visit Simple Simulation: Called by [facility] [facility] machine to verify treatment parameters including:  treatment site, treatment dose, and treatment setup prior to first treatment. Image derived shifts reviewed in all appropriate planes.  Shifts approved.  Patient treated.    I have personally reviewed the relevant data, performed the target localization, and determined all relevant factors for this patient’s simulation.

## 2024-11-20 ENCOUNTER — HOSPITAL ENCOUNTER (OUTPATIENT)
Dept: RADIATION ONCOLOGY | Facility: MEDICAL CENTER | Age: 74
End: 2024-11-20
Attending: RADIOLOGY
Payer: MEDICARE

## 2024-11-20 ENCOUNTER — HOSPITAL ENCOUNTER (OUTPATIENT)
Dept: RADIATION ONCOLOGY | Facility: MEDICAL CENTER | Age: 74
End: 2024-11-20
Payer: MEDICARE

## 2024-11-20 VITALS
DIASTOLIC BLOOD PRESSURE: 77 MMHG | HEART RATE: 77 BPM | SYSTOLIC BLOOD PRESSURE: 151 MMHG | BODY MASS INDEX: 31.67 KG/M2 | TEMPERATURE: 97.9 F | OXYGEN SATURATION: 95 % | WEIGHT: 184.53 LBS

## 2024-11-20 LAB
CHEMOTHERAPY INFUSION START DATE: NORMAL
CHEMOTHERAPY RECORDS: 2.67 GY
CHEMOTHERAPY RECORDS: 4005 CGY
CHEMOTHERAPY RECORDS: NORMAL
CHEMOTHERAPY RX CANCER: NORMAL
DATE 1ST CHEMO CANCER: NORMAL
RAD ONC ARIA COURSE LAST TREATMENT DATE: NORMAL
RAD ONC ARIA COURSE TREATMENT ELAPSED DAYS: NORMAL
RAD ONC ARIA REFERENCE POINT DOSAGE GIVEN TO DATE: 5.34 GY
RAD ONC ARIA REFERENCE POINT ID: NORMAL
RAD ONC ARIA REFERENCE POINT SESSION DOSAGE GIVEN: 2.67 GY

## 2024-11-20 PROCEDURE — 77412 RADIATION TX DELIVERY LVL 3: CPT | Performed by: RADIOLOGY

## 2024-11-20 PROCEDURE — 77417 THER RADIOLOGY PORT IMAGE(S): CPT | Performed by: RADIOLOGY

## 2024-11-20 ASSESSMENT — FIBROSIS 4 INDEX: FIB4 SCORE: 1.53

## 2024-11-20 ASSESSMENT — PAIN SCALES - GENERAL: PAINLEVEL_OUTOF10: NO PAIN

## 2024-11-20 NOTE — ON TREATMENT VISIT
"  ON TREATMENT NOTE  RADIATION ONCOLOGY DEPARTMENT    Patient name:  Lawanda Pagan    Primary Physician:  Nallely Styles P.A.-C. MRN: [id number]  CSN: [id number]   Referring physician:  Elicia Borden M.D. : 1950, 74 y.o.     ENCOUNTER DATE:  24    DIAGNOSIS:    Carcinoma of upper-outer quadrant of female breast, left (HCC)  Staging form: Breast, AJCC 8th Edition  - Clinical stage from 2024: Stage IIB (cT2, cN1, cM0, G1, ER+, [place]-, HER2-) - Signed by Reza Lyles M.D. on 3/7/2024  Stage prefix: Initial diagnosis  Histologic grading system: 3 grade system  - Pathologic stage from 10/31/2024: ypT2, pN1a - Signed by Nickolas Young M.D. on 10/31/2024  Stage prefix: Post-therapy      TREATMENT SUMMARY:  [facility] Treatment Information          2024   Aria Course Treatment Dates   Course First Treatment Date 2024    Course Last Treatment Date 2024    [facility] Treatment Summary   L_BreastHT DIBH [LBreastDIBHht]  Plan from Course C1_LBreastHT   Fraction 2 of 15   Elapsed Course Days 1 @ 2024   Prescribed Fraction Dose 267 cGy   Prescribed Total Dose 4,005 cGy   L_Breast  Reference Point from Course C1_LBreastHT   Elapsed Course Days 1 @ 2024   Session Dose 267 cGy   Total Dose 534 cGy      Radiation Treatments       Active   Plans   L_BreastHT DIBH [LBreastDIBHht]   Most recent treatment: Dose planned: 267 cGy (fraction 2 of 15 on 2024)   Total: Dose planned: 4,005 cGy   Elapsed Days: 1 @ 2024           Historical   No historical radiation treatments to show.               SUBJECTIVE:   Patient is doing well.  She does not have any changes she would attribute to her radiation.    VITAL SIGNS:    Encounter Vitals  Temperature: 36.6 °C (97.9 °F)  Blood Pressure : (!) 151/77  Pulse: 77  Pulse Oximetry: 95 %  Weight: 83.7 kg (184 lb 8.4 oz)  Weight Source: Stand Up Scale  Pain Score: No pain (\"Normal hip and shoulder pain.\")      2024     " 9:23 AM 11/4/2024     8:36 AM 10/30/2024     9:23 AM 8/21/2024     9:00 AM 6/13/2024     9:22 AM   Pain Assessment   Pain Score NO PAIN NO PAIN 2=MINIMAL PA NO PAIN 2=MINIMAL PA   Pain Loc   HIP            PHYSICAL EXAM:  No erythema    TOXICITY      11/20/2024     9:25 AM   Toxicity Assessment   Toxicity Assessment Breast   Fatigue (lethargy, malaise, asthenia) None   Fever (in the absence of neutropenia) None   Radiation Dermatitis None   Lymphatics Normal   RT - Pain due to RT None   Dyspnea Normal         IMPRESSION:  Cancer Staging   Carcinoma of upper-outer quadrant of female breast, left (HCC)  Staging form: Breast, AJCC 8th Edition  - Clinical stage from 2/2/2024: Stage IIB (cT2, cN1, cM0, G1, ER+, [place]-, HER2-) - Signed by Reza Lyles M.D. on 3/7/2024  - Pathologic stage from 10/31/2024: ypT2, pN1a - Signed by Nickolas Young M.D. on 10/31/2024      PLAN:  No change in treatment plan    Disposition:  Treatment plan reviewed. Questions answered. Continue therapy outlined.     Nickolas Young M.D.    No orders of the defined types were placed in this encounter.

## 2024-11-21 ENCOUNTER — HOSPITAL ENCOUNTER (OUTPATIENT)
Dept: RADIATION ONCOLOGY | Facility: MEDICAL CENTER | Age: 74
End: 2024-11-21
Payer: MEDICARE

## 2024-11-21 LAB
CHEMOTHERAPY INFUSION START DATE: NORMAL
CHEMOTHERAPY RECORDS: 2.67 GY
CHEMOTHERAPY RECORDS: 4005 CGY
CHEMOTHERAPY RECORDS: NORMAL
CHEMOTHERAPY RX CANCER: NORMAL
DATE 1ST CHEMO CANCER: NORMAL
RAD ONC ARIA COURSE LAST TREATMENT DATE: NORMAL
RAD ONC ARIA COURSE TREATMENT ELAPSED DAYS: NORMAL
RAD ONC ARIA REFERENCE POINT DOSAGE GIVEN TO DATE: 8.01 GY
RAD ONC ARIA REFERENCE POINT ID: NORMAL
RAD ONC ARIA REFERENCE POINT SESSION DOSAGE GIVEN: 2.67 GY

## 2024-11-21 PROCEDURE — 77412 RADIATION TX DELIVERY LVL 3: CPT | Performed by: RADIOLOGY

## 2024-11-21 PROCEDURE — 77336 RADIATION PHYSICS CONSULT: CPT | Performed by: RADIOLOGY

## 2024-11-22 ENCOUNTER — HOSPITAL ENCOUNTER (OUTPATIENT)
Dept: RADIATION ONCOLOGY | Facility: MEDICAL CENTER | Age: 74
End: 2024-11-22
Payer: MEDICARE

## 2024-11-22 LAB
CHEMOTHERAPY INFUSION START DATE: NORMAL
CHEMOTHERAPY RECORDS: 2.67 GY
CHEMOTHERAPY RECORDS: 4005 CGY
CHEMOTHERAPY RECORDS: NORMAL
CHEMOTHERAPY RX CANCER: NORMAL
DATE 1ST CHEMO CANCER: NORMAL
RAD ONC ARIA COURSE LAST TREATMENT DATE: NORMAL
RAD ONC ARIA COURSE TREATMENT ELAPSED DAYS: NORMAL
RAD ONC ARIA REFERENCE POINT DOSAGE GIVEN TO DATE: 10.68 GY
RAD ONC ARIA REFERENCE POINT ID: NORMAL
RAD ONC ARIA REFERENCE POINT SESSION DOSAGE GIVEN: 2.67 GY

## 2024-11-22 PROCEDURE — 77412 RADIATION TX DELIVERY LVL 3: CPT | Performed by: RADIOLOGY

## 2024-11-24 ENCOUNTER — HOSPITAL ENCOUNTER (OUTPATIENT)
Dept: RADIATION ONCOLOGY | Facility: MEDICAL CENTER | Age: 74
End: 2024-11-24
Payer: MEDICARE

## 2024-11-24 LAB
CHEMOTHERAPY INFUSION START DATE: NORMAL
CHEMOTHERAPY RECORDS: 2.67 GY
CHEMOTHERAPY RECORDS: 4005 CGY
CHEMOTHERAPY RECORDS: NORMAL
CHEMOTHERAPY RX CANCER: NORMAL
DATE 1ST CHEMO CANCER: NORMAL
RAD ONC ARIA COURSE LAST TREATMENT DATE: NORMAL
RAD ONC ARIA COURSE TREATMENT ELAPSED DAYS: NORMAL
RAD ONC ARIA REFERENCE POINT DOSAGE GIVEN TO DATE: 13.35 GY
RAD ONC ARIA REFERENCE POINT ID: NORMAL
RAD ONC ARIA REFERENCE POINT SESSION DOSAGE GIVEN: 2.67 GY

## 2024-11-24 PROCEDURE — 77412 RADIATION TX DELIVERY LVL 3: CPT | Performed by: RADIOLOGY

## 2024-11-25 ENCOUNTER — HOSPITAL ENCOUNTER (OUTPATIENT)
Dept: RADIATION ONCOLOGY | Facility: MEDICAL CENTER | Age: 74
End: 2024-11-25

## 2024-11-25 LAB
CHEMOTHERAPY INFUSION START DATE: NORMAL
CHEMOTHERAPY RECORDS: 2.67 GY
CHEMOTHERAPY RECORDS: 4005 CGY
CHEMOTHERAPY RECORDS: NORMAL
CHEMOTHERAPY RX CANCER: NORMAL
DATE 1ST CHEMO CANCER: NORMAL
RAD ONC ARIA COURSE LAST TREATMENT DATE: NORMAL
RAD ONC ARIA COURSE TREATMENT ELAPSED DAYS: NORMAL
RAD ONC ARIA REFERENCE POINT DOSAGE GIVEN TO DATE: 16.02 GY
RAD ONC ARIA REFERENCE POINT ID: NORMAL
RAD ONC ARIA REFERENCE POINT SESSION DOSAGE GIVEN: 2.67 GY

## 2024-11-25 PROCEDURE — 77417 THER RADIOLOGY PORT IMAGE(S): CPT | Performed by: RADIOLOGY

## 2024-11-25 PROCEDURE — 77412 RADIATION TX DELIVERY LVL 3: CPT | Performed by: RADIOLOGY

## 2024-11-26 ENCOUNTER — HOSPITAL ENCOUNTER (OUTPATIENT)
Dept: RADIATION ONCOLOGY | Facility: MEDICAL CENTER | Age: 74
End: 2024-11-26

## 2024-11-26 LAB
CHEMOTHERAPY INFUSION START DATE: NORMAL
CHEMOTHERAPY RECORDS: 2.67 GY
CHEMOTHERAPY RECORDS: 4005 CGY
CHEMOTHERAPY RECORDS: NORMAL
CHEMOTHERAPY RX CANCER: NORMAL
DATE 1ST CHEMO CANCER: NORMAL
RAD ONC ARIA COURSE LAST TREATMENT DATE: NORMAL
RAD ONC ARIA COURSE TREATMENT ELAPSED DAYS: NORMAL
RAD ONC ARIA REFERENCE POINT DOSAGE GIVEN TO DATE: 18.69 GY
RAD ONC ARIA REFERENCE POINT ID: NORMAL
RAD ONC ARIA REFERENCE POINT SESSION DOSAGE GIVEN: 2.67 GY

## 2024-11-26 PROCEDURE — RXMED WILLOW AMBULATORY MEDICATION CHARGE: Performed by: INTERNAL MEDICINE

## 2024-11-26 PROCEDURE — 77412 RADIATION TX DELIVERY LVL 3: CPT | Performed by: RADIOLOGY

## 2024-11-27 ENCOUNTER — HOSPITAL ENCOUNTER (OUTPATIENT)
Dept: RADIATION ONCOLOGY | Facility: MEDICAL CENTER | Age: 74
End: 2024-11-27

## 2024-11-27 ENCOUNTER — HOSPITAL ENCOUNTER (OUTPATIENT)
Dept: RADIATION ONCOLOGY | Facility: MEDICAL CENTER | Age: 74
End: 2024-11-27
Attending: RADIOLOGY
Payer: MEDICARE

## 2024-11-27 VITALS
DIASTOLIC BLOOD PRESSURE: 88 MMHG | SYSTOLIC BLOOD PRESSURE: 154 MMHG | OXYGEN SATURATION: 95 % | HEART RATE: 81 BPM | WEIGHT: 188.27 LBS | BODY MASS INDEX: 32.32 KG/M2

## 2024-11-27 LAB
CHEMOTHERAPY INFUSION START DATE: NORMAL
CHEMOTHERAPY RECORDS: 2.67 GY
CHEMOTHERAPY RECORDS: 4005 CGY
CHEMOTHERAPY RECORDS: NORMAL
CHEMOTHERAPY RX CANCER: NORMAL
DATE 1ST CHEMO CANCER: NORMAL
RAD ONC ARIA COURSE LAST TREATMENT DATE: NORMAL
RAD ONC ARIA COURSE TREATMENT ELAPSED DAYS: NORMAL
RAD ONC ARIA REFERENCE POINT DOSAGE GIVEN TO DATE: 21.36 GY
RAD ONC ARIA REFERENCE POINT ID: NORMAL
RAD ONC ARIA REFERENCE POINT SESSION DOSAGE GIVEN: 2.67 GY

## 2024-11-27 PROCEDURE — 77412 RADIATION TX DELIVERY LVL 3: CPT | Performed by: RADIOLOGY

## 2024-11-27 PROCEDURE — 77336 RADIATION PHYSICS CONSULT: CPT | Performed by: RADIOLOGY

## 2024-11-27 ASSESSMENT — PAIN SCALES - GENERAL: PAINLEVEL_OUTOF10: NO PAIN

## 2024-11-27 ASSESSMENT — FIBROSIS 4 INDEX: FIB4 SCORE: 1.53

## 2024-11-27 NOTE — ON TREATMENT VISIT
"  ON TREATMENT NOTE  RADIATION ONCOLOGY DEPARTMENT    Patient name:  Lawanda Pagan    Primary Physician:  Nallely Styles P.A.-C. MRN: [id number]  CSN: [id number]   Referring physician:  Elicia Borden M.D. : 1950, 74 y.o.     ENCOUNTER DATE:  24    DIAGNOSIS:    Carcinoma of upper-outer quadrant of female breast, left (HCC)  Staging form: Breast, AJCC 8th Edition  - Clinical stage from 2024: Stage IIB (cT2, cN1, cM0, G1, ER+, [place]-, HER2-) - Signed by Reza Lyles M.D. on 3/7/2024  Stage prefix: Initial diagnosis  Histologic grading system: 3 grade system  - Pathologic stage from 10/31/2024: ypT2, pN1a - Signed by Nickolas Young M.D. on 10/31/2024  Stage prefix: Post-therapy      TREATMENT SUMMARY:  [facility] Treatment Information          2024   Aria Course Treatment Dates   Course First Treatment Date 2024    Course Last Treatment Date 2024    Aria Treatment Summary   L_BreastHT DIBH [LBreastDIBHht]  Plan from Course C1_LBreastHT   Fraction 8 of 15   Elapsed Course Days 8 @ 2024   Prescribed Fraction Dose 267 cGy   Prescribed Total Dose 4,005 cGy   L_Breast  Reference Point from Course C1_LBreastHT   Elapsed Course Days 8 @ 2024   Session Dose 267 cGy   Total Dose 2,136 cGy      Radiation Treatments       Active   Plans   L_BreastHT DIBH [LBreastDIBHht]   Most recent treatment: Dose planned: 267 cGy (fraction 8 of 15 on 2024)   Total: Dose planned: 4,005 cGy   Elapsed Days: 8 @ 2024           Historical   No historical radiation treatments to show.               SUBJECTIVE:   Patient is doing well.  She has mild erythema.    VITAL SIGNS:    Encounter Vitals  Blood Pressure : (!) 154/88  Pulse: 81  Pulse Oximetry: 95 %  Weight: 85.4 kg (188 lb 4.4 oz)  Weight Source: Stand Up Scale  Pain Score: No pain (\"Just tingling and itchy.\")      2024     9:48 AM 2024     9:23 AM 2024     8:36 AM 10/30/2024     9:23 AM " 8/21/2024     9:00 AM 6/13/2024     9:22 AM   Pain Assessment   Pain Score NO PAIN NO PAIN NO PAIN 2=MINIMAL PA NO PAIN 2=MINIMAL PA   Pain Loc    HIP            PHYSICAL EXAM:  Mild erythema in the treatment field    TOXICITY      11/27/2024     9:49 AM 11/20/2024     9:25 AM   Toxicity Assessment   Toxicity Assessment Breast Breast   Fatigue (lethargy, malaise, asthenia) None None   Fever (in the absence of neutropenia) None None   Radiation Dermatitis Faint erythema or dry desquamation None   Lymphatics Normal Normal   RT - Pain due to RT None None   Dyspnea Normal Normal         IMPRESSION:  Cancer Staging   Carcinoma of upper-outer quadrant of female breast, left (HCC)  Staging form: Breast, AJCC 8th Edition  - Clinical stage from 2/2/2024: Stage IIB (cT2, cN1, cM0, G1, ER+, [place]-, HER2-) - Signed by Reza Lyles M.D. on 3/7/2024  - Pathologic stage from 10/31/2024: ypT2, pN1a - Signed by Nickolas Young M.D. on 10/31/2024      PLAN:  No change in treatment plan    Disposition:  Treatment plan reviewed. Questions answered. Continue therapy outlined.     Nickolas Young M.D.    No orders of the defined types were placed in this encounter.

## 2024-12-02 ENCOUNTER — HOSPITAL ENCOUNTER (OUTPATIENT)
Dept: RADIATION ONCOLOGY | Facility: MEDICAL CENTER | Age: 74
End: 2024-12-02
Attending: RADIOLOGY
Payer: MEDICARE

## 2024-12-02 ENCOUNTER — HOSPITAL ENCOUNTER (OUTPATIENT)
Dept: RADIATION ONCOLOGY | Facility: MEDICAL CENTER | Age: 74
End: 2024-12-02
Payer: MEDICARE

## 2024-12-02 LAB
CHEMOTHERAPY INFUSION START DATE: NORMAL
CHEMOTHERAPY RECORDS: 2.67 GY
CHEMOTHERAPY RECORDS: 4005 CGY
CHEMOTHERAPY RECORDS: NORMAL
CHEMOTHERAPY RX CANCER: NORMAL
DATE 1ST CHEMO CANCER: NORMAL
RAD ONC ARIA COURSE LAST TREATMENT DATE: NORMAL
RAD ONC ARIA COURSE TREATMENT ELAPSED DAYS: NORMAL
RAD ONC ARIA REFERENCE POINT DOSAGE GIVEN TO DATE: 24.03 GY
RAD ONC ARIA REFERENCE POINT ID: NORMAL
RAD ONC ARIA REFERENCE POINT SESSION DOSAGE GIVEN: 2.67 GY

## 2024-12-02 PROCEDURE — 77412 RADIATION TX DELIVERY LVL 3: CPT | Performed by: RADIOLOGY

## 2024-12-03 ENCOUNTER — HOSPITAL ENCOUNTER (OUTPATIENT)
Dept: RADIATION ONCOLOGY | Facility: MEDICAL CENTER | Age: 74
End: 2024-12-03
Payer: MEDICARE

## 2024-12-03 LAB
CHEMOTHERAPY INFUSION START DATE: NORMAL
CHEMOTHERAPY RECORDS: 2.67 GY
CHEMOTHERAPY RECORDS: 4005 CGY
CHEMOTHERAPY RECORDS: NORMAL
CHEMOTHERAPY RX CANCER: NORMAL
DATE 1ST CHEMO CANCER: NORMAL
RAD ONC ARIA COURSE LAST TREATMENT DATE: NORMAL
RAD ONC ARIA COURSE TREATMENT ELAPSED DAYS: NORMAL
RAD ONC ARIA REFERENCE POINT DOSAGE GIVEN TO DATE: 26.7 GY
RAD ONC ARIA REFERENCE POINT ID: NORMAL
RAD ONC ARIA REFERENCE POINT SESSION DOSAGE GIVEN: 2.67 GY

## 2024-12-03 PROCEDURE — 77427 RADIATION TX MANAGEMENT X5: CPT | Performed by: RADIOLOGY

## 2024-12-03 PROCEDURE — 77412 RADIATION TX DELIVERY LVL 3: CPT | Performed by: RADIOLOGY

## 2024-12-04 ENCOUNTER — HOSPITAL ENCOUNTER (OUTPATIENT)
Dept: RADIATION ONCOLOGY | Facility: MEDICAL CENTER | Age: 74
End: 2024-12-04
Attending: RADIOLOGY
Payer: MEDICARE

## 2024-12-04 ENCOUNTER — PHARMACY VISIT (OUTPATIENT)
Dept: PHARMACY | Facility: MEDICAL CENTER | Age: 74
End: 2024-12-04
Payer: COMMERCIAL

## 2024-12-04 ENCOUNTER — HOSPITAL ENCOUNTER (OUTPATIENT)
Dept: RADIATION ONCOLOGY | Facility: MEDICAL CENTER | Age: 74
End: 2024-12-04
Payer: MEDICARE

## 2024-12-04 VITALS
SYSTOLIC BLOOD PRESSURE: 160 MMHG | WEIGHT: 185.41 LBS | OXYGEN SATURATION: 95 % | BODY MASS INDEX: 31.83 KG/M2 | DIASTOLIC BLOOD PRESSURE: 85 MMHG | HEART RATE: 82 BPM

## 2024-12-04 LAB
CHEMOTHERAPY INFUSION START DATE: NORMAL
CHEMOTHERAPY RECORDS: 2.67 GY
CHEMOTHERAPY RECORDS: 4005 CGY
CHEMOTHERAPY RECORDS: NORMAL
CHEMOTHERAPY RX CANCER: NORMAL
DATE 1ST CHEMO CANCER: NORMAL
RAD ONC ARIA COURSE LAST TREATMENT DATE: NORMAL
RAD ONC ARIA COURSE TREATMENT ELAPSED DAYS: NORMAL
RAD ONC ARIA REFERENCE POINT DOSAGE GIVEN TO DATE: 29.37 GY
RAD ONC ARIA REFERENCE POINT ID: NORMAL
RAD ONC ARIA REFERENCE POINT SESSION DOSAGE GIVEN: 2.67 GY

## 2024-12-04 PROCEDURE — 77417 THER RADIOLOGY PORT IMAGE(S): CPT | Performed by: RADIOLOGY

## 2024-12-04 PROCEDURE — 77412 RADIATION TX DELIVERY LVL 3: CPT | Performed by: RADIOLOGY

## 2024-12-04 ASSESSMENT — FIBROSIS 4 INDEX: FIB4 SCORE: 1.53

## 2024-12-04 ASSESSMENT — PAIN SCALES - GENERAL: PAINLEVEL_OUTOF10: NO PAIN

## 2024-12-04 NOTE — ON TREATMENT VISIT
ON TREATMENT NOTE  RADIATION ONCOLOGY DEPARTMENT    Patient name:  Lawanda Pagan    Primary Physician:  Nallely Styles P.A.-C. MRN: [id number]  [facility]: [id number]   Referring physician:  Elicia Boredn M.D. : 1950, 74 y.o.     ENCOUNTER DATE:  24    DIAGNOSIS:    Carcinoma of upper-outer quadrant of female breast, left (HCC)  Staging form: Breast, AJCC 8th Edition  - Clinical stage from 2024: Stage IIB (cT2, cN1, cM0, G1, ER+, [place]-, HER2-) - Signed by Reza Lyles M.D. on 3/7/2024  Stage prefix: Initial diagnosis  Histologic grading system: 3 grade system  - Pathologic stage from 10/31/2024: ypT2, pN1a - Signed by Nickolas Young M.D. on 10/31/2024  Stage prefix: Post-therapy      TREATMENT SUMMARY:  [facility] Treatment Information          2024   Aria Course Treatment Dates   Course First Treatment Date 2024    Course Last Treatment Date 2024    [facility]ria Treatment Summary   L_BreastHT DIBH [LBreastDIBHht]  Plan from Course C1_LBreastHT   Fraction 11 of 15   Elapsed Course Days 15 @ 2024   Prescribed Fraction Dose 267 cGy   Prescribed Total Dose 4,005 cGy   L_Breast  Reference Point from Course C1_LBreastHT   Elapsed Course Days 15 @ 2024   Session Dose 267 cGy   Total Dose 2,937 cGy      Radiation Treatments       Active   Plans   L_BreastHT DIBH [LBreastDIBHht]   Most recent treatment: Dose planned: 267 cGy (fraction 11 of 15 on 2024)   Total: Dose planned: 4,005 cGy   Elapsed Days: 15 @ 2024           Historical   No historical radiation treatments to show.               SUBJECTIVE:   Patient is doing well.  She does not have any changes she would attribute to her radiation.    VITAL SIGNS:    Encounter Vitals  Blood Pressure : (!) 160/85  Pulse: 82  Pulse Oximetry: 95 %  Weight: 84.1 kg (185 lb 6.5 oz)  Weight Source: Stand Up Scale  Pain Score: No pain      2024     9:36 AM 2024     9:48 AM 2024     9:23 AM 2024      8:36 AM 10/30/2024     9:23 AM 8/21/2024     9:00 AM   Pain Assessment   Pain Score NO PAIN NO PAIN NO PAIN NO PAIN 2=MINIMAL PA NO PAIN   Pain Loc     HIP           PHYSICAL EXAM:  No erythema    TOXICITY      12/4/2024     9:36 AM 11/27/2024     9:49 AM 11/20/2024     9:25 AM   Toxicity Assessment   Toxicity Assessment Breast Breast Breast   Fatigue (lethargy, malaise, asthenia) None None None   Fever (in the absence of neutropenia) None None None   Radiation Dermatitis None Faint erythema or dry desquamation None   Lymphatics Normal Normal Normal   RT - Pain due to RT None None None   Dyspnea Normal Normal Normal         IMPRESSION:  Cancer Staging   Carcinoma of upper-outer quadrant of female breast, left (HCC)  Staging form: Breast, AJCC 8th Edition  - Clinical stage from 2/2/2024: Stage IIB (cT2, cN1, cM0, G1, ER+, [place]-, HER2-) - Signed by Reza Lyles M.D. on 3/7/2024  - Pathologic stage from 10/31/2024: ypT2, pN1a - Signed by Nickolas Young M.D. on 10/31/2024      PLAN:  No change in treatment plan    Disposition:  Treatment plan reviewed. Questions answered. Continue therapy outlined.     Nickolas Young M.D.    No orders of the defined types were placed in this encounter.

## 2024-12-05 ENCOUNTER — HOSPITAL ENCOUNTER (OUTPATIENT)
Dept: RADIATION ONCOLOGY | Facility: MEDICAL CENTER | Age: 74
End: 2024-12-05
Payer: MEDICARE

## 2024-12-05 LAB
CHEMOTHERAPY INFUSION START DATE: NORMAL
CHEMOTHERAPY RECORDS: 2.67 GY
CHEMOTHERAPY RECORDS: 4005 CGY
CHEMOTHERAPY RECORDS: NORMAL
CHEMOTHERAPY RX CANCER: NORMAL
DATE 1ST CHEMO CANCER: NORMAL
RAD ONC ARIA COURSE LAST TREATMENT DATE: NORMAL
RAD ONC ARIA COURSE TREATMENT ELAPSED DAYS: NORMAL
RAD ONC ARIA REFERENCE POINT DOSAGE GIVEN TO DATE: 32.04 GY
RAD ONC ARIA REFERENCE POINT ID: NORMAL
RAD ONC ARIA REFERENCE POINT SESSION DOSAGE GIVEN: 2.67 GY

## 2024-12-05 PROCEDURE — 77412 RADIATION TX DELIVERY LVL 3: CPT | Performed by: RADIOLOGY

## 2024-12-06 ENCOUNTER — HOSPITAL ENCOUNTER (OUTPATIENT)
Dept: RADIATION ONCOLOGY | Facility: MEDICAL CENTER | Age: 74
End: 2024-12-06
Payer: MEDICARE

## 2024-12-06 LAB
CHEMOTHERAPY INFUSION START DATE: NORMAL
CHEMOTHERAPY RECORDS: 2.67 GY
CHEMOTHERAPY RECORDS: 4005 CGY
CHEMOTHERAPY RECORDS: NORMAL
CHEMOTHERAPY RX CANCER: NORMAL
DATE 1ST CHEMO CANCER: NORMAL
RAD ONC ARIA COURSE LAST TREATMENT DATE: NORMAL
RAD ONC ARIA COURSE TREATMENT ELAPSED DAYS: NORMAL
RAD ONC ARIA REFERENCE POINT DOSAGE GIVEN TO DATE: 34.71 GY
RAD ONC ARIA REFERENCE POINT ID: NORMAL
RAD ONC ARIA REFERENCE POINT SESSION DOSAGE GIVEN: 2.67 GY

## 2024-12-06 PROCEDURE — 77412 RADIATION TX DELIVERY LVL 3: CPT | Performed by: RADIOLOGY

## 2024-12-06 PROCEDURE — 77336 RADIATION PHYSICS CONSULT: CPT | Performed by: RADIOLOGY

## 2024-12-09 ENCOUNTER — HOSPITAL ENCOUNTER (OUTPATIENT)
Dept: RADIATION ONCOLOGY | Facility: MEDICAL CENTER | Age: 74
End: 2024-12-09
Payer: MEDICARE

## 2024-12-09 ENCOUNTER — TELEPHONE (OUTPATIENT)
Dept: FAMILY PLANNING/WOMEN'S HEALTH CLINIC | Facility: PHYSICIAN GROUP | Age: 74
End: 2024-12-09
Payer: MEDICARE

## 2024-12-09 LAB
CHEMOTHERAPY INFUSION START DATE: NORMAL
CHEMOTHERAPY RECORDS: 2.67 GY
CHEMOTHERAPY RECORDS: 4005 CGY
CHEMOTHERAPY RECORDS: NORMAL
CHEMOTHERAPY RX CANCER: NORMAL
DATE 1ST CHEMO CANCER: NORMAL
RAD ONC ARIA COURSE LAST TREATMENT DATE: NORMAL
RAD ONC ARIA COURSE TREATMENT ELAPSED DAYS: NORMAL
RAD ONC ARIA REFERENCE POINT DOSAGE GIVEN TO DATE: 37.38 GY
RAD ONC ARIA REFERENCE POINT ID: NORMAL
RAD ONC ARIA REFERENCE POINT SESSION DOSAGE GIVEN: 2.67 GY

## 2024-12-09 PROCEDURE — 77412 RADIATION TX DELIVERY LVL 3: CPT | Performed by: RADIOLOGY

## 2024-12-10 ENCOUNTER — HOSPITAL ENCOUNTER (OUTPATIENT)
Dept: RADIATION ONCOLOGY | Facility: MEDICAL CENTER | Age: 74
End: 2024-12-10

## 2024-12-10 LAB
CHEMOTHERAPY INFUSION START DATE: NORMAL
CHEMOTHERAPY INFUSION START DATE: NORMAL
CHEMOTHERAPY INFUSION STOP DATE: NORMAL
CHEMOTHERAPY RECORDS: 2.67 GY
CHEMOTHERAPY RECORDS: 2.67 GY
CHEMOTHERAPY RECORDS: 4005 CGY
CHEMOTHERAPY RECORDS: 4005 CGY
CHEMOTHERAPY RECORDS: NORMAL
CHEMOTHERAPY RX CANCER: NORMAL
CHEMOTHERAPY RX CANCER: NORMAL
DATE 1ST CHEMO CANCER: NORMAL
DATE 1ST CHEMO CANCER: NORMAL
RAD ONC ARIA COURSE LAST TREATMENT DATE: NORMAL
RAD ONC ARIA COURSE LAST TREATMENT DATE: NORMAL
RAD ONC ARIA COURSE TREATMENT ELAPSED DAYS: NORMAL
RAD ONC ARIA COURSE TREATMENT ELAPSED DAYS: NORMAL
RAD ONC ARIA REFERENCE POINT DOSAGE GIVEN TO DATE: 40.05 GY
RAD ONC ARIA REFERENCE POINT DOSAGE GIVEN TO DATE: 40.05 GY
RAD ONC ARIA REFERENCE POINT ID: NORMAL
RAD ONC ARIA REFERENCE POINT ID: NORMAL
RAD ONC ARIA REFERENCE POINT SESSION DOSAGE GIVEN: 2.67 GY

## 2024-12-10 PROCEDURE — 77427 RADIATION TX MANAGEMENT X5: CPT | Performed by: RADIOLOGY

## 2024-12-10 PROCEDURE — 77412 RADIATION TX DELIVERY LVL 3: CPT | Performed by: RADIOLOGY

## 2024-12-10 NOTE — RADIATION COMPLETION NOTES
END OF TREATMENT SUMMARY    Patient name:  Lawanda Pagan    Primary Physician:  Nallely Styles P.A.-C. MRN: [id number]  CSN: [id number]   Referring physician:  No ref. provider found  : 1950, 74 y.o.       TREATMENT SUMMARY:        Course First Treatment Date 2024    Course Last Treatment Date 12/10/2024   Course Elapsed Days 21 @ 12/10/2024   Course Intent Curative     Radiation Therapy Episodes       Active Episodes       Radiation Therapy: 3D CRT                   Radiation Treatments         Plan Last Treated On Elapsed Days Fractions Treated Prescribed Fraction Dose (cGy) Prescribed Total Dose (cGy)    L_BreastHT DIBH [LBreastDIBHht] 12/10/2024 21 @ 12/10/2024 15 of 15 267 4,005                  Reference Point Last Treated On Elapsed Days Most Recent Session Dose (cGy) Total Dose (cGy)    L_Breast 12/10/2024 21 @ 12/10/2024 267 4,005                                     STAGE:   Carcinoma of upper-outer quadrant of female breast, left (HCC)  Staging form: Breast, AJCC 8th Edition  - Clinical stage from 2024: Stage IIB (cT2, cN1, cM0, G1, ER+, [place]-, HER2-) - Signed by Reza Lyles M.D. on 3/7/2024  Stage prefix: Initial diagnosis  Histologic grading system: 3 grade system  - Pathologic stage from 10/31/2024: ypT2, pN1a - Signed by Nickolas Young M.D. on 10/31/2024  Stage prefix: Post-therapy       TREATMENT INDICATION:   Breast conservation therapy     CONCURRENT SYSTEMIC TREATMENT:   Endocrine therapy     RT COURSE DISCONTINUED EARLY:   No     PATIENT EXPERIENCE:       2024     9:36 AM 2024     9:49 AM 2024     9:25 AM   Toxicity Assessment   Toxicity Assessment Breast Breast Breast   Fatigue (lethargy, malaise, asthenia) None None None   Fever (in the absence of neutropenia) None None None   Radiation Dermatitis None Faint erythema or dry desquamation None   Lymphatics Normal Normal Normal   RT - Pain due to RT None None None   Dyspnea Normal  Normal Normal        FOLLOW-UP PLAN:   6 Weeks     COMMENT:          ANATOMIC TARGET SUMMARY    ANATOMIC TARGET MODALITY TECHNIQUE   Left breast high tangent utilizing deep inspiration breath-hold   External beam, photons 3D Conformal            COMMENT:         DIAGRAMS:      DOSE VOLUME HISTOGRAMS:

## 2025-01-20 ENCOUNTER — HOSPITAL ENCOUNTER (OUTPATIENT)
Dept: RADIATION ONCOLOGY | Facility: MEDICAL CENTER | Age: 75
End: 2025-01-20
Attending: RADIOLOGY
Payer: MEDICARE

## 2025-01-20 NOTE — PROGRESS NOTES
Telephone Appointment Visit   This telephone visit was initiated by the patient and they verbally consented.    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  Most notable area with some dryness around the nipple areolar complex area.  This is returning to baseline.  She continues on her aromatase inhibitor.    Labs / Images Reviewed:   None    Assessment and Plan:     I discussed with the patient a ongoing survivorship plan.  We discussed either a 1 year follow-up after the completion of radiation before going to an as-needed basis or going to an as-needed basis as she continues to follow in medical oncology.  Patient states she would see value to a 1 year follow-up and would like to be seen at that time.    Follow-up: 1 year    Total Time Spent (mins): 15    Nickolas Young M.D.

## 2025-02-04 ENCOUNTER — HOSPITAL ENCOUNTER (OUTPATIENT)
Dept: HEMATOLOGY ONCOLOGY | Facility: MEDICAL CENTER | Age: 75
End: 2025-02-04
Attending: INTERNAL MEDICINE
Payer: MEDICARE

## 2025-02-04 VITALS
DIASTOLIC BLOOD PRESSURE: 64 MMHG | BODY MASS INDEX: 32.73 KG/M2 | HEART RATE: 69 BPM | HEIGHT: 64 IN | TEMPERATURE: 97.9 F | SYSTOLIC BLOOD PRESSURE: 114 MMHG | OXYGEN SATURATION: 97 % | WEIGHT: 191.69 LBS

## 2025-02-04 DIAGNOSIS — C50.412 CARCINOMA OF UPPER-OUTER QUADRANT OF FEMALE BREAST, LEFT (HCC): ICD-10-CM

## 2025-02-04 PROCEDURE — 99213 OFFICE O/P EST LOW 20 MIN: CPT | Performed by: INTERNAL MEDICINE

## 2025-02-04 PROCEDURE — 99212 OFFICE O/P EST SF 10 MIN: CPT | Performed by: INTERNAL MEDICINE

## 2025-02-04 RX ORDER — FERROUS SULFATE 325(65) MG
TABLET ORAL
COMMUNITY

## 2025-02-04 RX ORDER — NYSTATIN 100000 U/G
1 CREAM TOPICAL 2 TIMES DAILY
Qty: 30 APPLICATION | Refills: 3 | Status: SHIPPED | OUTPATIENT
Start: 2025-02-04 | End: 2025-04-05

## 2025-02-04 RX ORDER — VITAMIN B COMPLEX
1 TABLET ORAL
COMMUNITY

## 2025-02-04 ASSESSMENT — ENCOUNTER SYMPTOMS
CARDIOVASCULAR NEGATIVE: 1
RESPIRATORY NEGATIVE: 1
MYALGIAS: 1
GASTROINTESTINAL NEGATIVE: 1
PSYCHIATRIC NEGATIVE: 1
EYES NEGATIVE: 1
CONSTITUTIONAL NEGATIVE: 1
NEUROLOGICAL NEGATIVE: 1

## 2025-02-04 ASSESSMENT — FIBROSIS 4 INDEX: FIB4 SCORE: 1.53

## 2025-02-04 ASSESSMENT — PAIN SCALES - GENERAL: PAINLEVEL_OUTOF10: 5=MODERATE PAIN

## 2025-02-04 NOTE — ADDENDUM NOTE
Encounter addended by: Mariela Valle, Med Ass't on: 2/4/2025 10:32 AM   Actions taken: Charge Capture section accepted

## 2025-02-04 NOTE — PROGRESS NOTES
Medical Oncology Followup Visit: 2/4/2025      Referring Physician: Elicia Borden MD  Primary Care:  Segundo Pruett M.D.    Diagnosis:     Chief Complaint:      History of Presenting Illness:  Lawanda Pagan is a 73 y.o. female who self detected a mass in her breast in mid 2023.  She had problems with her insurance and cannot get an to get a mammogram until January 2024.  This revealed a mass in the left upper outer quadrant measuring 3.5 x 3 cm.  There was a suspicious lymph node as well.  She underwent a ultrasound-guided biopsy on 2/13/2024 which showed invasive ductal carcinoma, grade 1, no lymph vascular invasion, ER +90%, LA negative, Ki-67 15%, HER2 2+, HER2 FISH negative.  There was metastatic carcinoma and a left axillary lymph node.  She saw Dr. Borden who noted that the patient was extremely tachycardic and short of breath.  Evaluation was initiated.  PET CT scan was negative for metastatic disease and was hypermetabolic in the breast and lymph node.  On 2/16/2024 she was admitted to the hospital when on initial cardiac evaluation she was found to have a hemoglobin of 4.8, hematocrit of 29.8 MCV of 56.6 with a white count of 3.8 and platelets 286.  Her records show that her hemoglobin was 6.2 in November 2023 and apparently nothing was done about this.  She received 4 units of packed red blood cells and improved symptomatically immediately.  She underwent an EGD and apparently a colonoscopy at the same time while in the hospital although I cannot locate the records right now.  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  It is unclear whether she was ever worked up for hereditary thrombophilia.  Also because of her insurance issues she did not have adequate monitoring of her Coumadin level.  Her INR level was slightly  elevated around the time of her admission.  Coumadin was discontinued.  Echocardiogram showed normal ejection fraction of 65%.  Of note she has an almost lifelong history of anemia and has been on iron tablets in the past but during her pregnancy and afterwards but none for the last several years.  She did not apparently need a transfusion in the past nor iron infusion but she is really been minimally monitored over the last several years.    Since discharge she has felt dramatically better and has no tachycardia, she is ambulating normal distances without difficulty.  She saw her new primary care physician 2 days ago and her hemoglobin was up to 11.4 hematocrit 43.4 MCV up to 83.5 with normals WBC and differential and a normal platelet count.  Her LDL was elevated and she was started on statin.  No family history of breast or ovarian cancer.  Genetics consult has been placed.    Interval history 4/18/2024: She started anastrozole at the beginning of March.  She is tolerating it well with some mild pains in her shoulders and arm muscles.  She does not require any pain medicine for this.  No hot flashes or night sweats.  She feels like her mass has shrunk significantly and is less tender than previously.  She is back to full activity and has no reduced exercise tolerance.    Interval history 6/13/2024: She had acute bowel incarceration of her umbilical hernia last week causing small bowel obstruction and went to the hospital where she underwent surgery.  She did very well with this and her hemoglobin remained stable in the 11-12 range after her operation.  She was also found to have diabetes mellitus and was placed on medication for this.  Blood sugars are under control now.  Her breast mass continues to shrink in her estimation.  She did miss a few doses of anastrozole while in the hospital.    Interval history 8/21/2024: She has done well over the past 2 months.  She has had no recurrent GI complaints.  She takes  iron pills twice a week.  Lab has not been checked.  She feels like her tumor is essentially stable but softer.  She has been adherent with anastrozole.    Interval history 10/30/2024: She had a follow-up mammogram and ultrasound on 9/12/2024 which showed good partial response in the breast and lymph node.  She went for partial mastectomy by Dr. Borden on 10/9/2024.  Pathology revealed invasive grade 2 ductal carcinoma with partial response to neoadjuvant therapy.  Residual tumor measured 3.2 cm in maximal dimension.  There was high-grade DCIS in the background.  Margins were clear.  1 of 3 sentinel lymph nodes showed a 9 mm metastases with no extranodal extension.  Postoperative course was unremarkable.  She has not yet seen radiation therapy.  She continues on anastrozole.    Interval history 2/4/2025: She received whole breast radiation therapy which completed in December.  She has a little soreness in the breast but otherwise feels well.  She continues on anastrozole.  She has no other new symptoms.      Past Medical History:   Diagnosis Date    Allergy     Anemia     required blood transfusion march 2024    Arthritis     fingers hips    Kumar esophagus 08/10/2015    Blood clotting disorder (HCC)     lungs bilat     Blood transfusion without reported diagnosis     Breast cancer (HCC)     Breath shortness 2014 2017 denies now    Cancer (HCC)     breast ca    Carcinoma of upper-outer quadrant of female breast, left (HCC)     Diabetes (HCC)     Elevated LFTs 01/26/2018    Fall     GERD (gastroesophageal reflux disease)     Hernia, hiatal     repaired june 2024    High cholesterol     Hyperlipidemia     Measles     Urinary incontinence        Past Surgical History:   Procedure Laterality Date    PB MASTECTOMY, PARTIAL Left 10/08/2024    Procedure: LEFT PARTIAL MASTECTOMY, LEFT SENTINEL LYMPH NODE INJECTION WITH EXCISION OF AXILLARY NODES;  Surgeon: Elicia Borden M.D.;  Location: SURGERY SAME DAY [facility];   Service: General    NODE BIOPSY SENTINEL Left 10/08/2024    Procedure: BIOPSY, LYMPH NODE, SENTINEL;  Surgeon: Elicia Borden M.D.;  Location: SURGERY SAME DAY [facility];  Service: General    AK EXPLORATORY OF ABDOMEN N/A 2024    Procedure: LAPAROTOMY, EXPLORATORY;  Surgeon: Karl Carmona M.D.;  Location: SURGERY [facility];  Service: Thoracic    AK REMOVAL OF OMENTUM N/A 2024    Procedure: OMENTECTOMY;  Surgeon: Karl Carmona M.D.;  Location: SURGERY [facility];  Service: Thoracic    UMBILICAL HERNIA REPAIR N/A 2024    Procedure: REPAIR, HERNIA, UMBILICAL;  Surgeon: Karl Carmona M.D.;  Location: SURGERY [facility];  Service: Thoracic    BOWEL RESECTION N/A 2024    Procedure: SMALL BOWEL RESECTION;  Surgeon: Karl Carmona M.D.;  Location: SURGERY [facility];  Service: Thoracic    ENDOSCOPY PROCEDURE N/A 2024    Procedure: ENDOSCOPY UPPER AND LOWER;  Surgeon: Tom Payne M.D.;  Location: SURGERY [facility];  Service: Gastroenterology    GASTROSCOPY N/A 2017    Procedure: GASTROSCOPY;  Surgeon: Stephanie Reyes M.D.;  Location: SURGERY SAME DAY [facility];  Service: Gastroenterology    OTHER ABDOMINAL SURGERY  2004    hernia repair    GYN SURGERY  1980    c sec lap tubal    TONSILLECTOMY      TUBAL COAGULATION LAPAROSCOPIC BILATERAL         Social History     Tobacco Use    Smoking status: Former     Current packs/day: 0.00     Average packs/day: 0.2 packs/day for 1 year (0.2 ttl pk-yrs)     Types: Cigarettes     Start date: 1972     Quit date: 1973     Years since quittin.4    Smokeless tobacco: Never    Tobacco comments:     Social, I never made it a habit   Vaping Use    Vaping status: Never Used   Substance Use Topics    Alcohol use: No     Comment: Stopped about 50 years ago.    Drug use: No        Family History   Problem Relation Age of Onset    Cancer Mother         Melanoma    Other Mother         parkinsons     Melanoma Mother     Cancer Father 50        stomach    Cancer Brother         unsure type, age was very young    Stroke Brother     Cancer Brother         unsure type,age. 1/2 brother    Cancer Maternal Grandmother 42        stomach       Allergies as of 02/04/2025 - Reviewed 02/04/2025   Allergen Reaction Noted    Other environmental Runny Nose 08/25/2017         Current Outpatient Medications:     Coenzyme Q10 200 MG Cap, as directed Orally, Disp: , Rfl:     Cyanocobalamin (B-12 PO), Take  by mouth., Disp: , Rfl:     vitamin D3 (CHOLECALCIFEROL) 1000 Unit (25 mcg) Tab, Take 1 Tablet by mouth every day., Disp: , Rfl:     ferrous sulfate 325 (65 Fe) MG tablet, 1 tablet Orally Three times a Week, Disp: , Rfl:     acetaminophen (TYLENOL) 325 MG Tab, Take 650 mg by mouth 2 times a day., Disp: , Rfl:     metFORMIN (GLUCOPHAGE) 500 MG Tab, Take 500 mg by mouth every morning. FOR 10/8/24, DO NOT TAKE DAY OF SURGERY, Disp: , Rfl:     anastrozole (ARIMIDEX) 1 MG Tab, Take 1 Tablet by mouth every day. (Patient taking differently: Take 1 mg by mouth every day. FOR 10/8/24, COORDINATE MEDICATION INSTRUCTIONS FROM PRESCRIBING PHYSICIAN), Disp: 90 Tablet, Rfl: 1    Blood Glucose Monitoring Suppl (BLOOD GLUCOSE SYSTEM RICHAR) Kit, 1 Each 4 Times a Day,Before Meals and at Bedtime., Disp: 1 Kit, Rfl: 0    glucose blood strip, Use one One Touch Verio strip to test blood sugar three times daily before meals., Disp: 100 Strip, Rfl: 0    Lancets, Use one One Touch Verio Flex lancet to test blood sugar three times daily before meals., Disp: 100 Each, Rfl: 0    Alcohol Swabs, Wipe site with prep pad prior to injection., Disp: 100 Each, Rfl: 0    rosuvastatin (CRESTOR) 10 MG Tab, Take 10 mg by mouth every evening. FOR 10/8/24, CONTINUE TAKING MED PRIOR TO SURGERY, Disp: , Rfl:     pantoprazole (PROTONIX) 40 MG Tablet Delayed Response, Take 1 Tablet by mouth 2 times a day. (Patient taking differently: Take 40 mg by mouth every day. FOR  "10/8/24, CONTINUE TAKING MED PRIOR TO SURGERY), Disp: 60 Tablet, Rfl: 3    Review of Systems:  Review of Systems   Constitutional: Negative.    HENT: Negative.     Eyes: Negative.    Respiratory: Negative.     Cardiovascular: Negative.    Gastrointestinal: Negative.    Genitourinary: Negative.    Musculoskeletal:  Positive for joint pain and myalgias.   Skin: Negative.    Neurological: Negative.    Endo/Heme/Allergies: Negative.    Psychiatric/Behavioral: Negative.            Physical Exam:  Vitals:    02/04/25 0948   BP: 114/64   BP Location: Right arm   Patient Position: Sitting   Pulse: 69   Temp: 36.6 °C (97.9 °F)   TempSrc: Temporal   SpO2: 97%   Weight: 87 kg (191 lb 11 oz)   Height: 1.626 m (5' 4\")       DESC; KARNOFSKY SCALE WITH ECOG EQUIVALENT: 90, Able to carry on normal activity; minor signs or symptoms of disease (ECOG equivalent 0)    DISTRESS LEVEL: mild distress    Physical Exam  Constitutional:       Appearance: Normal appearance.   HENT:      Head: Normocephalic.      Mouth/Throat:      Mouth: Mucous membranes are moist.      Pharynx: Oropharynx is clear.   Eyes:      Extraocular Movements: Extraocular movements intact.      Conjunctiva/sclera: Conjunctivae normal.      Pupils: Pupils are equal, round, and reactive to light.   Cardiovascular:      Rate and Rhythm: Normal rate and regular rhythm.      Pulses: Normal pulses.   Pulmonary:      Effort: Pulmonary effort is normal.      Breath sounds: Normal breath sounds.   Chest:      Comments: 2/4/2025: Good cosmetic result in the left breast after lumpectomy and radiation therapy with 1+ hyperpigmentation and 1+ atrophy.  No left axillary adenopathy is noted.  There is yeast infection noted under both breasts right greater than with erythema and scaling.  The right breast is normal.  8/21/2024: Left breast mass measures 2.5 cm horizontal by 3 cm vertical.  No left supraclavicular adenopathy is noted.  6/13/2024: Left breast mass at 1230 o'clock 4 cm " from the nipple now measures 3 x 3 cm and is less distinct.  No left axillary adenopathy noted  4/18/2024: 1230 o'clock mass now measures 4 cm wide by 3.5 cm tall and is softer and flatter.  No axillary adenopathy is felt.  3/7/2024: At 1230 o'clock 5 cm from the nipple is a 4.5 cm wide by 4 cm tall mass.  There is a 1 x 1 cm left axillary lymph node palpable and freely movable.  No left supraclavicular adenopathy is noted.  The right breast is without masses nipple discharge or tenderness.  Abdominal:      General: Abdomen is flat. Bowel sounds are normal.      Palpations: Abdomen is soft. There is hepatomegaly and splenomegaly. There is no mass.      Comments: Large periumbilical hernia   Musculoskeletal:         General: Normal range of motion.   Skin:     General: Skin is warm.   Neurological:      General: No focal deficit present.      Mental Status: She is alert and oriented to person, place, and time.   Psychiatric:         Mood and Affect: Mood normal.         Behavior: Behavior normal.            Labs:  Hospital Outpatient Visit on 03/05/2024   Component Date Value Ref Range Status    Sodium 03/05/2024 139  135 - 145 mmol/L Final    Potassium 03/05/2024 4.0  3.6 - 5.5 mmol/L Final    Chloride 03/05/2024 102  96 - 112 mmol/L Final    Co2 03/05/2024 22  20 - 33 mmol/L Final    Anion Gap 03/05/2024 15.0  7.0 - 16.0 Final    Glucose 03/05/2024 155 (H)  65 - 99 mg/dL Final    Bun 03/05/2024 14  8 - 22 mg/dL Final    Creatinine 03/05/2024 1.03  0.50 - 1.40 mg/dL Final    Calcium 03/05/2024 9.7  8.5 - 10.5 mg/dL Final    Correct Calcium 03/05/2024 9.5  8.5 - 10.5 mg/dL Final    AST(SGOT) 03/05/2024 53 (H)  12 - 45 U/L Final    ALT(SGPT) 03/05/2024 42  2 - 50 U/L Final    Alkaline Phosphatase 03/05/2024 189 (H)  30 - 99 U/L Final    Total Bilirubin 03/05/2024 1.2  0.1 - 1.5 mg/dL Final    Albumin 03/05/2024 4.3  3.2 - 4.9 g/dL Final    Total Protein 03/05/2024 8.7 (H)  6.0 - 8.2 g/dL Final    Globulin 03/05/2024  4.4 (H)  1.9 - 3.5 g/dL Final    A-G Ratio 03/05/2024 1.0  g/dL Final    WBC 03/05/2024 8.3  4.8 - 10.8 K/uL Final    RBC 03/05/2024 5.20  4.20 - 5.40 M/uL Final    Hemoglobin 03/05/2024 11.4 (L)  12.0 - 16.0 g/dL Final    Hematocrit 03/05/2024 43.4  37.0 - 47.0 % Final    MCV 03/05/2024 83.5  81.4 - 97.8 fL Final    MCH 03/05/2024 21.9 (L)  27.0 - 33.0 pg Final    MCHC 03/05/2024 26.3 (L)  32.2 - 35.5 g/dL Final    Please note new reference range effective 05/22/2023.    Platelet Count 03/05/2024 392  164 - 446 K/uL Final    Comment: Platelet clumping confirmed on smear review.  If a more accurate platelet  count is required, please request recollection.      Neutrophils-Polys 03/05/2024 69.00  44.00 - 72.00 % Final    Lymphocytes 03/05/2024 23.90  22.00 - 41.00 % Final    Monocytes 03/05/2024 7.10  0.00 - 13.40 % Final    Eosinophils 03/05/2024 0.00  0.00 - 6.90 % Final    Basophils 03/05/2024 0.00  0.00 - 1.80 % Final    Nucleated RBC 03/05/2024 0.00  0.00 - 0.20 /100 WBC Final    Please note new reference range effective 05/22/2023.    Neutrophils (Absolute) 03/05/2024 5.73  1.82 - 7.42 K/uL Final    Comment: Includes immature neutrophils, if present.  Please note new reference range effective 05/22/2023.      Lymphs (Absolute) 03/05/2024 1.98  1.00 - 4.80 K/uL Final    Monos (Absolute) 03/05/2024 0.59  0.00 - 0.85 K/uL Final    Eos (Absolute) 03/05/2024 0.00  0.00 - 0.51 K/uL Final    Baso (Absolute) 03/05/2024 0.00  0.00 - 0.12 K/uL Final    NRBC (Absolute) 03/05/2024 0.00  K/uL Final    Anisocytosis 03/05/2024 1+   Final    Macrocytosis 03/05/2024 1+   Final    Microcytosis 03/05/2024 2+ (A)   Final    TSH 03/05/2024 1.560  0.380 - 5.330 uIU/mL Final    Comment: The 2011 American Thyroid Association (JAZZMINE) guidelines  recommended that the interpretation of thyroid function in  pregnancy be based on trimester specific reference ranges.    1st Trimester  0.100-2.500 mIU/L  2nd Trimester  0.200-3.000 mIU/L  3rd  Trimester  0.300-3.500 mIU/L    These established reference ranges have not been validated  at [facility].      Cholesterol,Tot 03/05/2024 176  100 - 199 mg/dL Final    Triglycerides 03/05/2024 143  0 - 149 mg/dL Final    HDL 03/05/2024 30 (A)  >=40 mg/dL Final    LDL 03/05/2024 117 (H)  <100 mg/dL Final    Fasting Status 03/05/2024 Fasting   Final    GFR (CKD-EPI) 03/05/2024 56 (A)  >60 mL/min/1.73 m 2 Final    Comment: Estimated Glomerular Filtration Rate is calculated using  race neutral CKD-EPI 2021 equation per NKF-ASN recommendations.      Manual Diff Status 03/05/2024 PERFORMED   Final    Peripheral Smear Review 03/05/2024 see below   Final    Comment: Due to instrument suspect flags, further review of peripheral smear is  indicated on this patient sample. This review may or may not result in  abnormal findings.      Plt Estimation 03/05/2024 Normal   Final    RBC Morphology 03/05/2024 Present   Final    Poikilocytosis 03/05/2024 1+   Final    Schistocytes 03/05/2024 1+   Final   Admission on 02/16/2024, Discharged on 02/18/2024   Component Date Value Ref Range Status    Sodium 02/16/2024 135  135 - 145 mmol/L Final    Potassium 02/16/2024 3.4 (L)  3.6 - 5.5 mmol/L Final    Chloride 02/16/2024 100  96 - 112 mmol/L Final    Co2 02/16/2024 19 (L)  20 - 33 mmol/L Final    Anion Gap 02/16/2024 16.0  7.0 - 16.0 Final    Glucose 02/16/2024 171 (H)  65 - 99 mg/dL Final    Bun 02/16/2024 12  8 - 22 mg/dL Final    Creatinine 02/16/2024 0.84  0.50 - 1.40 mg/dL Final    Calcium 02/16/2024 9.0  8.5 - 10.5 mg/dL Final    Correct Calcium 02/16/2024 8.8  8.5 - 10.5 mg/dL Final    AST(SGOT) 02/16/2024 24  12 - 45 U/L Final    ALT(SGPT) 02/16/2024 14  2 - 50 U/L Final    Alkaline Phosphatase 02/16/2024 122 (H)  30 - 99 U/L Final    Total Bilirubin 02/16/2024 0.8  0.1 - 1.5 mg/dL Final    Albumin 02/16/2024 4.3  3.2 - 4.9 g/dL Final    Total Protein 02/16/2024 7.7  6.0 - 8.2 g/dL Final    Globulin 02/16/2024  3.4  1.9 - 3.5 g/dL Final    A-G Ratio 2024 1.3  g/dL Final    Report 2024    Preliminary                    Value:[facility].    Test Date:  2024  Pt Name:    TENA MORALES              Department: ER  MRN:        [id number]                      Room:  Gender:     Female                       Technician: [id number]  :        [id number]                   Requested By:ER TRIAGE PROTOCOL  Order #:    [id number]                    Reading MD:    Measurements  Intervals                                Axis  Rate:       86                           P:          27  NV:         172                          QRS:        8  QRSD:       88                           T:          31  QT:         388  QTc:        464    Interpretive Statements  Sinus rhythm  Low voltage, precordial leads  Compared to ECG 2024 10:18:51  Low QRS voltage now present      ABO Grouping Only 2024 A   Final    Rh Grouping Only 2024 POS   Final    Antibody Screen-Cod 2024 NEG   Final    Component R 2024    Final                    Value:R99                 Red Cells, LR       [id number]   transfused   24   20:48      Product Type 2024 R99   Final    Dispense Status 2024 transfused   Final    Unit Number (Barcoded) 2024 [id number]   Final    Product Code (Barcoded) 2024 [id number]   Final    Blood Type (Barcoded) 2024 6200   Final    Component R 2024    Final                    Value:R99                 Red Cells, LR       [id number]   transfused   24   23:01      Product Type 2024 R99   Final    Dispense Status 2024 transfused   Final    Unit Number (Barcoded) 2024 [id number]   Final    Product Code (Barcoded) 2024 U0590S72   Final    Blood Type (Barcoded) 2024 6200   Final    Component R 2024    Final                    Value:R99                 Red Cells, LR       [id number]    transfused   02/17/24   01:02      Product Type 02/16/2024 R99   Final    Dispense Status 02/16/2024 transfused   Final    Unit Number (Barcoded) 02/16/2024 [id number]   Final    Product Code (Barcoded) 02/16/2024 [id number]   Final    Blood Type (Barcoded) 02/16/2024 6200   Final    Component R 02/16/2024    Final                    Value:R99                 Red Cells, LR       [id number]   transfused   02/17/24   03:16      Product Type 02/16/2024 R99   Final    Dispense Status 02/16/2024 transfused   Final    Unit Number (Barcoded) 02/16/2024 [id number]   Final    Product Code (Barcoded) 02/16/2024 [id number]   Final    Blood Type (Barcoded) 02/16/2024 6200   Final    GFR (CKD-EPI) 02/16/2024 72  >60 mL/min/1.73 m 2 Final    Comment: Estimated Glomerular Filtration Rate is calculated using  race neutral CKD-EPI 2021 equation per NKF-ASN recommendations.      WBC 02/16/2024 3.8 (L)  4.8 - 10.8 K/uL Final    RBC 02/16/2024 3.50 (L)  4.20 - 5.40 M/uL Final    Hemoglobin 02/16/2024 4.8 (LL)  12.0 - 16.0 g/dL Final    Comment: This result has been called [place] RN [id number] by [id number] on 02/16/2024 20:30:51,  and has been read back.      Hematocrit 02/16/2024 19.8 (L)  37.0 - 47.0 % Final    MCV 02/16/2024 56.6 (L)  81.4 - 97.8 fL Final    MCH 02/16/2024 13.7 (L)  27.0 - 33.0 pg Final    MCHC 02/16/2024 24.2 (L)  32.2 - 35.5 g/dL Final    Please note new reference range effective 05/22/2023.    RDW 02/16/2024 46.5  35.9 - 50.0 fL Final    Platelet Count 02/16/2024 286  164 - 446 K/uL Final    Neutrophils-Polys 02/16/2024 57.30  44.00 - 72.00 % Final    Lymphocytes 02/16/2024 26.80  22.00 - 41.00 % Final    Monocytes 02/16/2024 14.20 (H)  0.00 - 13.40 % Final    Eosinophils 02/16/2024 0.30  0.00 - 6.90 % Final    Basophils 02/16/2024 1.10  0.00 - 1.80 % Final    Immature Granulocytes 02/16/2024 0.30  0.00 - 0.90 % Final    Nucleated RBC 02/16/2024 0.50 (H)  0.00 - 0.20 /100 WBC Final    Please note new reference range  effective 05/22/2023.    Neutrophils (Absolute) 02/16/2024 2.18  1.82 - 7.42 K/uL Final    Comment: Includes immature neutrophils, if present.  Please note new reference range effective 05/22/2023.      Lymphs (Absolute) 02/16/2024 1.02  1.00 - 4.80 K/uL Final    Monos (Absolute) 02/16/2024 0.54  0.00 - 0.85 K/uL Final    Eos (Absolute) 02/16/2024 0.01  0.00 - 0.51 K/uL Final    Baso (Absolute) 02/16/2024 0.04  0.00 - 0.12 K/uL Final    Immature Granulocytes (abs) 02/16/2024 0.01  0.00 - 0.11 K/uL Final    NRBC (Absolute) 02/16/2024 0.02  K/uL Final    Hypochromia 02/16/2024 2+ (A)   Final    Anisocytosis 02/16/2024 1+   Final    Microcytosis 02/16/2024 2+ (A)   Final    Plt Estimation 02/16/2024 Normal   Final    RBC Morphology 02/16/2024 Present   Final    Polychromia 02/16/2024 1+   Final    Poikilocytosis 02/16/2024 1+   Final    Peripheral Smear Review 02/16/2024 see below   Final    Comment: Due to instrument suspect flags, further review of peripheral smear is  indicated on this patient sample. This review may or may not result in  abnormal findings.      Comments-Diff 02/16/2024 see below   Final    Results have been verified by peripheral blood smear review.    Eject.Frac. MOD BP 02/17/2024 66.33   Final    Eject.Frac. MOD 4C 02/17/2024 63.68   Final    Eject.Frac. MOD 2C 02/17/2024 68.8   Final    NT-proBNP 02/16/2024 346 (H)  0 - 125 pg/mL Final    Sodium 02/17/2024 137  135 - 145 mmol/L Final    Potassium 02/17/2024 4.0  3.6 - 5.5 mmol/L Final    Chloride 02/17/2024 107  96 - 112 mmol/L Final    Co2 02/17/2024 19 (L)  20 - 33 mmol/L Final    Glucose 02/17/2024 105 (H)  65 - 99 mg/dL Final    Bun 02/17/2024 9  8 - 22 mg/dL Final    Creatinine 02/17/2024 0.63  0.50 - 1.40 mg/dL Final    Calcium 02/17/2024 8.3 (L)  8.5 - 10.5 mg/dL Final    Anion Gap 02/17/2024 11.0  7.0 - 16.0 Final    Hemoglobin 02/17/2024 9.3 (L)  12.0 - 16.0 g/dL Final    Hematocrit 02/17/2024 30.9 (L)  37.0 - 47.0 % Final    PT  02/17/2024 22.6 (H)  12.0 - 14.6 sec Final    INR 02/17/2024 1.96 (H)  0.87 - 1.13 Final    Comment: INR - Non-therapeutic Reference Range: 0.87-1.13  INR - Therapeutic Reference Range: 2.0-4.0      Hemoglobin 02/17/2024 9.4 (L)  12.0 - 16.0 g/dL Final    Hematocrit 02/17/2024 31.7 (L)  37.0 - 47.0 % Final    APTT 02/17/2024 32.4  24.7 - 36.0 sec Final    GFR (CKD-EPI) 02/17/2024 92  >60 mL/min/1.73 m 2 Final    Comment: Estimated Glomerular Filtration Rate is calculated using  race neutral CKD-EPI 2021 equation per NKF-ASN recommendations.      Hemoglobin 02/17/2024 10.0 (L)  12.0 - 16.0 g/dL Final    Hematocrit 02/17/2024 32.8 (L)  37.0 - 47.0 % Final    Hemoglobin 02/17/2024 9.4 (L)  12.0 - 16.0 g/dL Final    Hematocrit 02/17/2024 32.1 (L)  37.0 - 47.0 % Final    Sodium 02/18/2024 137  135 - 145 mmol/L Final    Potassium 02/18/2024 3.7  3.6 - 5.5 mmol/L Final    Chloride 02/18/2024 108  96 - 112 mmol/L Final    Co2 02/18/2024 17 (L)  20 - 33 mmol/L Final    Anion Gap 02/18/2024 12.0  7.0 - 16.0 Final    Glucose 02/18/2024 97  65 - 99 mg/dL Final    Bun 02/18/2024 5 (L)  8 - 22 mg/dL Final    Creatinine 02/18/2024 0.69  0.50 - 1.40 mg/dL Final    Calcium 02/18/2024 8.6  8.5 - 10.5 mg/dL Final    Correct Calcium 02/18/2024 9.2  8.5 - 10.5 mg/dL Final    AST(SGOT) 02/18/2024 25  12 - 45 U/L Final    ALT(SGPT) 02/18/2024 12  2 - 50 U/L Final    Alkaline Phosphatase 02/18/2024 118 (H)  30 - 99 U/L Final    Total Bilirubin 02/18/2024 1.1  0.1 - 1.5 mg/dL Final    Albumin 02/18/2024 3.3  3.2 - 4.9 g/dL Final    Total Protein 02/18/2024 7.2  6.0 - 8.2 g/dL Final    Globulin 02/18/2024 3.9 (H)  1.9 - 3.5 g/dL Final    A-G Ratio 02/18/2024 0.8  g/dL Final    Magnesium 02/18/2024 2.2  1.5 - 2.5 mg/dL Final    GFR (CKD-EPI) 02/18/2024 90  >60 mL/min/1.73 m 2 Final    Comment: Estimated Glomerular Filtration Rate is calculated using  race neutral CKD-EPI 2021 equation per NKF-ASN recommendations.      WBC 02/18/2024 5.0  4.8  - 10.8 K/uL Final    RBC 02/18/2024 4.50  4.20 - 5.40 M/uL Final    Hemoglobin 02/18/2024 9.1 (L)  12.0 - 16.0 g/dL Final    Hematocrit 02/18/2024 30.9 (L)  37.0 - 47.0 % Final    MCV 02/18/2024 68.7 (L)  81.4 - 97.8 fL Final    MCH 02/18/2024 20.2 (L)  27.0 - 33.0 pg Final    MCHC 02/18/2024 29.4 (L)  32.2 - 35.5 g/dL Final    Please note new reference range effective 05/22/2023.    RDW 02/18/2024 75.9 (H)  35.9 - 50.0 fL Final    Platelet Count 02/18/2024 215  164 - 446 K/uL Final    Reviewed by Clinical .   Hospital Outpatient Visit on 02/16/2024   Component Date Value Ref Range Status    Sodium 02/16/2024 135  135 - 145 mmol/L Final    Potassium 02/16/2024 3.7  3.6 - 5.5 mmol/L Final    Chloride 02/16/2024 101  96 - 112 mmol/L Final    Co2 02/16/2024 20  20 - 33 mmol/L Final    Glucose 02/16/2024 139 (H)  65 - 99 mg/dL Final    Bun 02/16/2024 12  8 - 22 mg/dL Final    Creatinine 02/16/2024 0.77  0.50 - 1.40 mg/dL Final    Calcium 02/16/2024 8.8  8.5 - 10.5 mg/dL Final    Anion Gap 02/16/2024 14.0  7.0 - 16.0 Final    WBC 02/16/2024 3.6 (L)  4.8 - 10.8 K/uL Final    RBC 02/16/2024 3.93 (L)  4.20 - 5.40 M/uL Final    Hemoglobin 02/16/2024 5.2 (LL)  12.0 - 16.0 g/dL Final    Hematocrit 02/16/2024 22.5 (L)  37.0 - 47.0 % Final    MCV 02/16/2024 57.3 (L)  81.4 - 97.8 fL Final    MCH 02/16/2024 13.2 (L)  27.0 - 33.0 pg Final    MCHC 02/16/2024 23.1 (L)  32.2 - 35.5 g/dL Final    Please note new reference range effective 05/22/2023.    RDW 02/16/2024 47.3  35.9 - 50.0 fL Final    Platelet Count 02/16/2024 336  164 - 446 K/uL Final    GFR (CKD-EPI) 02/16/2024 80  >60 mL/min/1.73 m 2 Final    Comment: Estimated Glomerular Filtration Rate is calculated using  race neutral CKD-EPI 2021 equation per NKF-ASN recommendations.     Office Visit on 02/16/2024   Component Date Value Ref Range Status    Report 02/16/2024    Final                    Value:[facility]    Test Date:  2024-02-16  Pt  Name:    TENA MORALES              Department: [facility]  MRN:        [id number]                      Room:  Gender:     Female                       Technician: CA  :        [id number]                   Requested By:JOSE PHAM  Order #:    [id number]                    Reading MD: Jose Pham MD    Measurements  Intervals                                Axis  Rate:       93                           P:          54  MA:         163                          QRS:        50  QRSD:       82                           T:          31  QT:         355  QTc:        442    Interpretive Statements  Sinus rhythm  Borderline repolarization abnormality  Compared to ECG 2014 11:47:20  No significant changes  Electronically Signed On 2024 17:29:27 PST by Jose Pham MD     Anticoagulation Visit on 2024   Component Date Value Ref Range Status    INR 2024 1.80 (A)  2 - 3.5 Final   Anticoagulation Visit on 2024   Component Date Value Ref Range Status    INR 2024 3.90 (A)  2 - 3.5 Final       Imaging:   All listed images below have been independently reviewed by me. I agree with the findings as summarized below:    EC-ECHOCARDIOGRAM COMPLETE W/O CONT    Result Date: 2024  Transthoracic Echo Report Echocardiography Laboratory CONCLUSIONS Normal left ventricular systolic function. The left ventricular ejection fraction is visually estimated to be 65%. The right ventricle is normal in size and systolic function. Mild tricuspid regurgitation. Estimated right ventricular systolic pressure is 38 mmHg (normal). No prior study is available for comparison. TENA MORALES Exam Date:         2024                    18:37 Exam Location:     Inpatient Priority:          Routine Ordering Physician:        HERIBERTO LOPEZ Referring Physician:       [id number] aMria Sonographer:               Shahla Adam AMANDA Age:    73     Gender:    F MRN:    [id number] :    1950 BSA:    1.95   Ht  (in):    64     Wt (lb):    198 Exam Type:     Complete Indications:     Acute MI ICD Codes:       410.9 CPT Codes:       50306 BP:   149    /   68     HR:   70 Technical Quality:       Fair MEASUREMENTS  (Male / Female) Normal Values 2D ECHO LV Diastolic Diameter PLAX        4.6 cm                4.2 - 5.9 / 3.9 - 5.3 cm LV Systolic Diameter PLAX         2.8 cm                2.1 - 4.0 cm IVS Diastolic Thickness           1 cm                  LVPW Diastolic Thickness          0.92 cm               LVOT Diameter                     2 cm                  LV Ejection Fraction MOD BP       66.3 %                >= 55  % LV Ejection Fraction MOD 4C       63.7 %                LV Ejection Fraction MOD 2C       68.8 %                IVC Diameter                      2 cm                  DOPPLER AV Peak Velocity                  1.6 m/s               AV Peak Gradient                  10.5 mmHg             AV Mean Gradient                  5.8 mmHg              LVOT Peak Velocity                1.3 m/s               AV Area Cont Eq vti               2.3 cm2               Mitral E Point Velocity           1.2 m/s               Mitral E to A Ratio               1.1                   MV Pressure Half Time             57 ms                 MV Area PHT                       3.9 cm2               MV Deceleration Time              196 ms                TV Peak E Velocity                0.32 m/s              TR Peak Velocity                  275 cm/s              MV E' Velocity                    8.5 cm/s              * Indicates values subject to auto-interpretation LV EF:        % FINDINGS Left Ventricle Normal left ventricular chamber size. Normal left ventricular wall thickness. Normal left ventricular systolic function. The left ventricular ejection fraction is visually estimated to be 65%. Normal regional wall motion. Indeterminate diastolic function. Right Ventricle The right ventricle is normal in size and systolic  function. Right Atrium The right atrium is normal in size. Normal inferior vena cava size without inspiratory collapse. Left Atrium The left atrium is normal in size. Left atrial volume index is 24 mL/sq m. Mitral Valve Structurally normal mitral valve without significant stenosis or regurgitation. Aortic Valve Structurally normal aortic valve without significant stenosis or regurgitation. Tricuspid Valve No tricuspid stenosis. Mild tricuspid regurgitation. Estimated right ventricular systolic pressure is 38 mmHg. Right atrial pressure is estimated to be 8 mmHg. Pulmonic Valve Structurally normal pulmonic valve without significant stenosis or regurgitation. Pericardium No pericardial effusion. Aorta Normal aortic root for body surface area. The ascending aorta diameter is 3.0 cm. Roland Nuno MD (Electronically Signed) Final Date:     18 February 2024                 16:41    DX-CHEST-PORTABLE (1 VIEW)    Result Date: 2/18/2024 2/18/2024 3:58 PM HISTORY/REASON FOR EXAM:  Cough. TECHNIQUE/EXAM DESCRIPTION AND NUMBER OF VIEWS: Single portable view of the chest. COMPARISON: 2/16/2024 FINDINGS: Cardiomediastinal silhouette is stable. Interstitial and hazy opacity in the left lung base, likely left lower lobe, asymmetric edema versus pneumonitis. Right lung is clear. No pleural effusion or pneumothorax. No acute osseous abnormality.     Persistent mild hazy left lower lobe opacity could be mild pneumonitis.    DX-CHEST-PORTABLE (1 VIEW)    Result Date: 2/16/2024 2/16/2024 6:21 PM HISTORY/REASON FOR EXAM:  Shortness of Breath TECHNIQUE/EXAM DESCRIPTION AND NUMBER OF VIEWS: Single portable view of the chest. COMPARISON: 7/14/2014 FINDINGS: The mediastinal and cardiac silhouette is unremarkable. The pulmonary vascularity is within normal limits. There is ill-defined linear hazy opacity in the left lower lobe. There is no significant pleural effusion. There is no visible pneumothorax. There are no acute bony abnormalities.      1.  Ill-defined linear left lobe opacity could be atelectasis or pneumonitis.    [id number] BASE TO MID-THIGH    Result Date: 2/9/2024 2/9/2024 12:22 PM HISTORY/REASON FOR EXAM:  cT3 cN1 upper outer left breast cancer TECHNIQUE/EXAM DESCRIPTION AND NUMBER OF VIEWS: PET body imaging. Initially, 10.1 mCi F-18 FDG was administered intravenously under standardized conditions. Approximately 45 minutes after FDG administration, the patient was placed in the supine position on the PET CT table. Blood glucose level was 138 mg/dL. Low dose spiral CT imaging was performed from the skull base to the mid thighs. PET imaging was then performed from the skull base to the mid thighs. CT images, PET images, and PET/CT fused images were reviewed on a PACS 3D workstation. The limited non-contrast CT data are used primarily for attenuation correction and anatomic correlation.  Evaluation of solid organs and bowel are especially limited utilizing this technique. A low dose CT was obtained of the same area without IV contrast for attenuation correction and coregistration, not for a diagnostic scan. COMPARISON: None. FINDINGS: VISUALIZED BRAIN: Normal metabolic activity. HEAD AND NECK: Normal physiologic uptake. CHEST: Background blood pool activity shows average SUV of 2.1. Hypermetabolic mass in the left breast (SUV max 13.8). Lungs show no hypermetabolic pulmonary nodules. Hypermetabolic left axillary lymph nodes (SUV max 15.7) Moderate hiatal hernia. ABDOMEN AND PELVIS: Background liver activity shows average SUV of 3.3. There is normal, uniform metabolic activity in the liver, spleen, adrenal glands, kidneys. Porcelain gallbladder. Large fat-containing periumbilical hernia There is no hypermetabolic mesenteric, retroperitoneal, iliac, or inguinal lymphadenopathy. Diffuse uptake throughout the colon. VISUALIZED MUSCULOSKELETAL SYSTEM: No hypermetabolic activity to suggest osteolytic metastases or sclerosis to suggest  osteoblastic metastases.     1. Hypermetabolic mass in the left breast (SUV max 13.8), in keeping with malignancy. 2. Hypermetabolic left axillary lymph nodes (SUV max 15.7), consistent with metastasis 3. Diffuse uptake throughout the colon associated with metformin use. Correlate clinically. 4. Moderate hiatal hernia. Large fat-containing periumbilical hernia. Porcelain gallbladder.       Pathology:      Assessment & Plan:  1.  Invasive ductal carcinoma of the left breast, grade 1, clinically stage IIb (cT2, CN I) ER positive greater than 90%, CT negative, HER2 2+ IHC FISH negative Ki-67 15%.  Workup was negative for metastatic disease.  Her tumor is close to T3 and could benefit from cytoreduction with endocrine therapy to hopefully convert her to a partial mastectomy.  Initiated anastrozole March 2024 with initial response tolerance and good response to therapy.  Now status post partial mastectomy and sentinel lymph node biopsy with residual stage IIb disease (ypT2, ypN1).  2.  Severe iron deficiency anemia likely secondary to long-term Coumadin use without monitoring INR dietary lack of iron and poor absorption, now completely resolved.  3.  Incarcerated umbilical hernia with small bowel obstruction repaired June 2024 with complications.  Resolved  4.  Yeast infection under breasts    Plan: Continue anastrozole.  We will give Mycostatin cream for her yeast infection.  I will see her back in 6 months for routine follow-up.  Reza Lyles M.D.

## 2025-02-10 DIAGNOSIS — C50.412 CARCINOMA OF UPPER-OUTER QUADRANT OF FEMALE BREAST, LEFT (HCC): ICD-10-CM

## 2025-02-11 PROCEDURE — RXMED WILLOW AMBULATORY MEDICATION CHARGE: Performed by: INTERNAL MEDICINE

## 2025-02-11 RX ORDER — ANASTROZOLE 1 MG/1
1 TABLET ORAL DAILY
Qty: 90 TABLET | Refills: 1 | Status: SHIPPED | OUTPATIENT
Start: 2025-02-11

## 2025-02-19 ENCOUNTER — PHARMACY VISIT (OUTPATIENT)
Dept: PHARMACY | Facility: MEDICAL CENTER | Age: 75
End: 2025-02-19
Payer: COMMERCIAL

## 2025-03-20 ENCOUNTER — PATIENT MESSAGE (OUTPATIENT)
Dept: HEALTH INFORMATION MANAGEMENT | Facility: OTHER | Age: 75
End: 2025-03-20

## 2025-05-08 PROCEDURE — RXMED WILLOW AMBULATORY MEDICATION CHARGE: Performed by: INTERNAL MEDICINE

## 2025-05-12 ENCOUNTER — PHARMACY VISIT (OUTPATIENT)
Dept: PHARMACY | Facility: MEDICAL CENTER | Age: 75
End: 2025-05-12
Payer: COMMERCIAL

## 2025-05-19 ENCOUNTER — OFFICE VISIT (OUTPATIENT)
Dept: SURGERY | Facility: MEDICAL CENTER | Age: 75
End: 2025-05-19
Payer: MEDICARE

## 2025-05-19 VITALS
OXYGEN SATURATION: 97 % | SYSTOLIC BLOOD PRESSURE: 146 MMHG | BODY MASS INDEX: 34.79 KG/M2 | DIASTOLIC BLOOD PRESSURE: 80 MMHG | WEIGHT: 203.8 LBS | HEIGHT: 64 IN | HEART RATE: 77 BPM | TEMPERATURE: 97.4 F

## 2025-05-19 DIAGNOSIS — N64.59 ABNORMAL BREAST EXAM: ICD-10-CM

## 2025-05-19 DIAGNOSIS — C50.412 CARCINOMA OF UPPER-OUTER QUADRANT OF FEMALE BREAST, LEFT (HCC): Primary | ICD-10-CM

## 2025-05-19 PROCEDURE — 99215 OFFICE O/P EST HI 40 MIN: CPT

## 2025-05-19 PROCEDURE — 3077F SYST BP >= 140 MM HG: CPT

## 2025-05-19 PROCEDURE — 3079F DIAST BP 80-89 MM HG: CPT

## 2025-05-19 ASSESSMENT — FIBROSIS 4 INDEX: FIB4 SCORE: 1.53

## 2025-05-19 NOTE — PROGRESS NOTES
Subjective:   5/19/2025  9:39 AM    Imaging facility:  [facility]  Primary care provider:  Nallely Styles P.A.-C.  Medical oncologist:  Dr. Reza Lyles  Radiation oncologist:  Dr. Nickolas Young    Chief Complaint:   Chief Complaint   Patient presents with    Follow-Up     6 month follow up       Interval history:  Lawanda returns for routine FU regarding left partial mastectomy, SLNB 10/8/24. She has noticed persistent dryness and intermittent itching to her left breast since completion of radiation. Denies any breast or arm swelling. She has very limited range of motion but feels it has remained consistent and she can still reach items she needs. She has also noticed a hardened area to her superior left breast, near her incision since radiation. It has remained stable in size.     She states she has been quite active with continued care of her 5 year old grandson with frequent trips to the park.     11/13/24: This friendly 74 y.o. female is scheduled for routine postoperative appointment. She denies any breast pain. Denies any nausea, vomiting, or constipation. She reports right hip and right shoulder pain that has increased since starting Anastrozole. Reports normal range of motion to her left shoulder.      She has been following up regularly with her PCP to monitor her anemia.     10/16/24: This friendly 74 y.o. female is scheduled for routine postoperative appointment.  She did great - minimal pain and no nausea.  She only took one pain pill and then has been doing fine with Tylenol since.  She has some skin irritation from her new bra in the upper inner chest.     9/18/24: This friendly 74 y.o. year old female returns to discuss surgical options after neoadjuvant endocrine therapy.  She has been very happy with the shrinkage of the mass.       Original presentation 2/24:  July, 2023, her 2 year old granddaughter elbowed her in the left breast, leaving a small bruise.  She then noticed a lump,  which gradually enlarged.  She says she had trouble getting a mammogram due to insurance issues, but eventually established with a PCP who could facilitate.  She has no nipple discharge and has never had a previous breast biopsy.  Her last mammogram was 2017.        When we first met in February, she was using a walker, and had reported worsening shortness of breath with exertion.  At that time, she was not able to walk 20 feet without needing to stop, also noting heart racing during exertion.  She met with Cardiology and was felt to be stable after transfusion, with resolution of symptoms.  She walks regularly now and takes care of her four year old grandson during the day.  She feels great and did get cleared for surgery back in March, 2024, as well. She reminds me that she lost 70 pounds in 1.5 years, and feels she was quite sedentary prior to that.      She had been on coumadin for a PE and DVT diagnosed in 2014, but this was discontinued in March, 2024    Initial presentation:   2/8/24: This very pleasant 73 y.o. year old female is kindly referred for consultation regarding LEFT breast cancer.  She recalls that around July, 2023, her 2 year old granddaughter elbowed her in the left breast, leaving a small bruise.  She then noticed a lump, which gradually enlarged.  She says she had trouble getting a mammogram due to insurance issues, but eventually established with a PCP who could facilitate.  She has no nipple discharge and has never had a previous breast biopsy.  Her last mammogram was 2017.        She uses a walker, and says that while she has had shortness of breath with exertion for some time, it seems to have worsened over the last 6 months.  She can't make it 20 feet now without needing to stop, also noting heart racing during exertion.  She worries that her breast mass is compressing her heart or lungs.  She is scheduled for an echocardiogram at the end of the month, but has no cardiologist.       She  is on coumadin for a PE and DVT diagnosed in 2014.  She held this for her breast biopsy but did not use any bridging anticoagulation such as Lovenox.    LABS:  Lab Results   Component Value Date/Time    HBA1C 5.9 (H) 10/01/2024 12:55 PM        Problem List[1]    Past Surgical History[2]    Allergies[3]    Current Outpatient Medications   Medication Sig    anastrozole (ARIMIDEX) 1 MG Tab Take 1 Tablet by mouth every day.    Coenzyme Q10 200 MG Cap as directed Orally    Cyanocobalamin (B-12 PO) Take  by mouth.    vitamin D3 (CHOLECALCIFEROL) 1000 Unit (25 mcg) Tab Take 1 Tablet by mouth every day.    ferrous sulfate 325 (65 Fe) MG tablet 1 tablet Orally Three times a Week    acetaminophen (TYLENOL) 325 MG Tab Take 650 mg by mouth 2 times a day.    metFORMIN (GLUCOPHAGE) 500 MG Tab Take 500 mg by mouth every morning. FOR 10/8/24,  DO NOT TAKE DAY OF SURGERY    Blood Glucose Monitoring Suppl (BLOOD GLUCOSE SYSTEM RICHAR) Kit 1 Each 4 Times a Day,Before Meals and at Bedtime.    glucose blood strip Use one One Touch Verio strip to test blood sugar three times daily before meals.    Lancets Use one One Touch Verio Flex lancet to test blood sugar three times daily before meals.    Alcohol Swabs Wipe site with prep pad prior to injection.    rosuvastatin (CRESTOR) 10 MG Tab Take 10 mg by mouth every evening. FOR 10/8/24,  CONTINUE TAKING MED PRIOR TO SURGERY    pantoprazole (PROTONIX) 40 MG Tablet Delayed Response Take 1 Tablet by mouth 2 times a day. (Patient taking differently: Take 40 mg by mouth every day. FOR 10/8/24,  CONTINUE TAKING MED PRIOR TO SURGERY)       Social History[4]     Social Hx - Family and Occupational[5]    Family History   Problem Relation Age of Onset    Cancer Mother         Melanoma    Other Mother         parkinsons    Melanoma Mother     Cancer Father 50        stomach    Cancer Brother         unsure type, age was very young    Stroke Brother     Cancer Brother         unsure type,age. 1/2 brother  "   Cancer Maternal Grandmother 42        stomach       OB History    Para Term  AB Living   4 4 2 2 0 0   SAB IAB Ectopic Molar Multiple Live Births   0 0 0 0 0 0   Obstetric Comments   Age of first delivery: 17   Menarche:11   LMP:50   No HRT         Review of Systems   Constitutional: Negative.    HENT:  Negative.     Eyes: Negative.    Respiratory: Negative.     Cardiovascular: Negative.    Gastrointestinal: Negative.    Endocrine: Negative.    Genitourinary: Negative.     Musculoskeletal:  Positive for arthralgias.   Skin: Negative.    Neurological:  Positive for extremity weakness.   Hematological: Negative.    Psychiatric/Behavioral: Negative.            Objective:   BP (!) 146/80 (BP Location: Right arm, Patient Position: Sitting, BP Cuff Size: Large adult)   Pulse 77   Temp 36.3 °C (97.4 °F) (Temporal)   Ht 1.626 m (5' 4\")   Wt 92.4 kg (203 lb 12.8 oz)   LMP 2000 (Approximate) Comment: Age of first period: 11, No post-menopausal HRT.  SpO2 97%   BMI 34.98 kg/m²       Physical Exam  Constitutional:       Appearance: Normal appearance.   HENT:      Head: Normocephalic.   Cardiovascular:      Rate and Rhythm: Normal rate and regular rhythm.      Pulses: Normal pulses.      Heart sounds: Normal heart sounds.   Pulmonary:      Effort: Pulmonary effort is normal.      Breath sounds: Normal breath sounds.   Chest:      Comments: Bilateral breasts examined in the upright and supine positions.  Post radiation effect to left central and superior breast.  Expected volume loss to LEFT UO quadrant noted. Right breast notably larger than LEFT. Bilateral breast fatty tissue with an approximately 5 x 4 cm round, firm area to her LEFT superior breast (12:00).  Bilateral nipples everted without expressible discharge.  No palpable cervical, supraclavicular, or axillary adenopathy bilaterally.    Surgical site: LEFT upper outer breast and axillary incisions have healed as expected.    Neurological:    "   Mental Status: She is alert and oriented to person, place, and time.   Psychiatric:         Mood and Affect: Mood normal.         Behavior: Behavior normal.      Comments: Pleasant demeanor         FUNCTIONAL ASSESSMENT  Patient responses to questions:    Have you ever had shoulder surgery or been treated for a shoulder injury that resulted in a loss of range of motion?  No     Are you unable to reach into an upper cabinet and retrieve a cup or plate?  No     Do you have any limitations in daily activities because of your shoulder?  Yes    Overhead raise test for shoulder flexion:  Below minimum:  124 degrees or less.  Referral to PT offered.   (L - 90, R - 110)  Referral to PT discussed, patient declines at this time.     Diagnosis:     1. 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 Abnormal breast exam  US-BREAST LIMITED-LEFT    MA-DIAGNOSTIC MAMMO BILAT W/TOMOSYNTHESIS W/CAD          Medical Decision Making:  Today's Assessment / Status / Plan:     ASSESSMENT:  LEFT upper outer (1:00, 4cm FN) IDC.  Clinical Stage cT3 cN1 M0.  US biopsies [facility] 2/2/24. PET showed no distant mets.  Pt found to be chronically extremely anemic upon evaluation by Cardiology with H/H of 4.8/19.8.  Responded to 4u PRBC with improvement of symptoms.  GI workup showed no bleeding site.  Felt to be a good candidate for NET.  Anastrozole since 3/24 (Dr. Lyles).  LEFT partial mastectomy, SLNB including UMESH loc node 10/8/24.  Hypofractionated XRT with high tangent without boost, from 11/19/24 to 12/10/24 ([facility]).                  PRE-NET:  Clinical Stage cT3 cN1 M0.                3.3cm on US.  Palpable mass 5cm post-biopsy.  HM COIL.    Grade 1.  LVI not seen.              ER 90%.  CT neg.              Ki-67 15%.                HER2 negative by FISH.                Metastatic to left axillary node.  UMESH, signal confirmed.                  POST-NET:  Pathologic Stage pT2 N1a.  RCB-III.                Modest clinical response:  2.5cm on US.                LEFT partial mastectomy, SLNB including UMESH loc node 10/8/24:    Residual IDC, 3.2cm, with HG DCIS (est 3cm).  MARGINS CLEAR by at least 5mm.  Previous biopsy site and clip.    Grade 2.  Focal LVI.  ER 90%.  IA neg.  Ki-67 5-10%.  HER2 negative by FISH .                            Total 1/3 nodes:  One UMESH-loc node with residual metastasis, 9mm, without MERLY.  0/2 SLN.       Last bilateral mammogram 24:  Scattered FGD.  Last prior mammo      FH:  Mother with melanoma.  No AJ heritage.  Met with [facility] 24, advised to proceed with testing.  PALB2 VUS [id number] 24 ([facility], [place]).      Menopausal/HRT status:  .  No HRT.       LIFESTYLE:  Quit smoking and quit alcohol over 50 years ago.  BMI 33.  Lost 45 pounds with a change in diet over the last year, eating more fruits and vegetables, less processed snacks.     HIstory of DVT and PE , on warfarin (managed through [facility]) since .  Worsening shortness of breath, now at 20 ft.  Requires a walker.  ECOG 2.  Echo 24, EF 65%.     Acute incarceration of small bowel within umbilical hernia, required emergency surgery .       Diabetes mellitus.  (6.7)     ECOG 0-1      DISCUSSED/PLAN:  We have discussed the importance of self breast examination and to monitor for changes in the breast such as new masses, nipple discharge, swelling, redness, skin dimpling, or other concerning changes. If these are to occur she has been advised to notify our office. We discussed that scar tissue can be normal, but enlarging lumps should be further assessed. We also discussed the difference between screening and diagnostic mammograms.     She was due for her bilateral mammogram in 2025, however given her completion of radiation therapy in 2024, she will now complete her BILATERAL diagnostic  mammogram JUNE 2025. I have discussed with JAIR Maynard and I will place this order today. Given her area of firmness to her LEFT breast after radiation, I have also added a diagnostic ultrasound to further evaluate her LEFT breast. It may be a complicated fluid collection, but it warrants further investigation. If this is benign, she has been advised to return in 6 months and continue to monitor for any changes. We may also consider including annual LEFT US to future screening mammograms. Patient has been advised to contact in the interim with any concerning changes.     Total time spent today, including personal review of outside records, face to face time (30 minutes), chart documentation, and  was 45 minutes.            [1]   Patient Active Problem List  Diagnosis    MEDICAL HOME    Symptomatic anemia    Kumar esophagus    Kumar's esophagus    Elevated LFTs    Morbid obesity with BMI of 40.0-44.9, adult (HCC)    Breast mass in female    Gastroesophageal reflux disease without esophagitis    Abnormal mammogram    Other hyperlipidemia    SOB (shortness of breath) on exertion    Carcinoma of upper-outer quadrant of female breast, left (HCC)    Abnormal findings on diagnostic imaging of heart and coronary circulation    Hypokalemia    ACP (advance care planning)    Acute cough    Ventral hernia with bowel obstruction   [2]   Past Surgical History:  Procedure Laterality Date    PB MASTECTOMY, PARTIAL Left 10/08/2024    Procedure: LEFT PARTIAL MASTECTOMY, LEFT SENTINEL LYMPH NODE INJECTION WITH EXCISION OF AXILLARY NODES;  Surgeon: Elicia Borden M.D.;  Location: SURGERY SAME DAY [facility];  Service: General    NODE BIOPSY SENTINEL Left 10/08/2024    Procedure: BIOPSY, LYMPH NODE, SENTINEL;  Surgeon: Elicia Borden M.D.;  Location: SURGERY SAME DAY [facility];  Service: General    AR EXPLORATORY OF ABDOMEN N/A 06/04/2024    Procedure: LAPAROTOMY, EXPLORATORY;  Surgeon: Karl Carmona M.D.;   Location: SURGERY [facility];  Service: Thoracic    OR REMOVAL OF OMENTUM N/A 2024    Procedure: OMENTECTOMY;  Surgeon: Karl Carmona M.D.;  Location: SURGERY [facility];  Service: Thoracic    UMBILICAL HERNIA REPAIR N/A 2024    Procedure: REPAIR, HERNIA, UMBILICAL;  Surgeon: Karl Carmona M.D.;  Location: SURGERY [facility];  Service: Thoracic    BOWEL RESECTION N/A 2024    Procedure: SMALL BOWEL RESECTION;  Surgeon: Karl Carmona M.D.;  Location: SURGERY [facility];  Service: Thoracic    ENDOSCOPY PROCEDURE N/A 2024    Procedure: ENDOSCOPY UPPER AND LOWER;  Surgeon: Tom Payne M.D.;  Location: SURGERY [facility];  Service: Gastroenterology    GASTROSCOPY N/A 2017    Procedure: GASTROSCOPY;  Surgeon: Stephanie Reyes M.D.;  Location: SURGERY SAME DAY [facility];  Service: Gastroenterology    OTHER ABDOMINAL SURGERY  2004    hernia repair    GYN SURGERY  1980    c sec lap tubal    TONSILLECTOMY      TUBAL COAGULATION LAPAROSCOPIC BILATERAL     [3]   Allergies  Allergen Reactions    Other Environmental Runny Nose     Grass and pollens   [4]   Social History  Tobacco Use    Smoking status: Former     Current packs/day: 0.00     Average packs/day: 0.2 packs/day for 1 year (0.2 ttl pk-yrs)     Types: Cigarettes     Start date: 1972     Quit date: 1973     Years since quittin.7    Smokeless tobacco: Never    Tobacco comments:     Social, I never made it a habit   Vaping Use    Vaping status: Never Used   Substance Use Topics    Alcohol use: No     Comment: Stopped about 50 years ago.    Drug use: No   [5]   Family and Occupational History  Socioeconomic History    Marital status: Single     Spouse name: Not on file    Number of children: Not on file    Years of education: Not on file    Highest education level: Not on file   Occupational History    Not on file

## 2025-05-20 ASSESSMENT — ENCOUNTER SYMPTOMS
EYES NEGATIVE: 1
HEMATOLOGIC/LYMPHATIC NEGATIVE: 1
PSYCHIATRIC NEGATIVE: 1
RESPIRATORY NEGATIVE: 1
ENDOCRINE NEGATIVE: 1
CARDIOVASCULAR NEGATIVE: 1
CONSTITUTIONAL NEGATIVE: 1
ARTHRALGIAS: 1
GASTROINTESTINAL NEGATIVE: 1

## 2025-06-18 ENCOUNTER — HOSPITAL ENCOUNTER (OUTPATIENT)
Dept: LAB | Facility: MEDICAL CENTER | Age: 75
End: 2025-06-18
Payer: MEDICARE

## 2025-06-18 LAB
ALBUMIN SERPL BCP-MCNC: 4.4 G/DL (ref 3.2–4.9)
ALBUMIN/GLOB SERPL: 1.4 G/DL
ALP SERPL-CCNC: 151 U/L (ref 30–99)
ALT SERPL-CCNC: 18 U/L (ref 2–50)
ANION GAP SERPL CALC-SCNC: 12 MMOL/L (ref 7–16)
AST SERPL-CCNC: 23 U/L (ref 12–45)
BASOPHILS # BLD AUTO: 0.9 % (ref 0–1.8)
BASOPHILS # BLD: 0.05 K/UL (ref 0–0.12)
BILIRUB SERPL-MCNC: 0.7 MG/DL (ref 0.1–1.5)
BUN SERPL-MCNC: 14 MG/DL (ref 8–22)
CALCIUM ALBUM COR SERPL-MCNC: 9.5 MG/DL (ref 8.5–10.5)
CALCIUM SERPL-MCNC: 9.8 MG/DL (ref 8.5–10.5)
CHLORIDE SERPL-SCNC: 106 MMOL/L (ref 96–112)
CHOLEST SERPL-MCNC: 123 MG/DL (ref 100–199)
CO2 SERPL-SCNC: 23 MMOL/L (ref 20–33)
CREAT SERPL-MCNC: 0.82 MG/DL (ref 0.5–1.4)
EOSINOPHIL # BLD AUTO: 0.05 K/UL (ref 0–0.51)
EOSINOPHIL NFR BLD: 0.9 % (ref 0–6.9)
ERYTHROCYTE [DISTWIDTH] IN BLOOD BY AUTOMATED COUNT: 46.8 FL (ref 35.9–50)
FASTING STATUS PATIENT QL REPORTED: NORMAL
FERRITIN SERPL-MCNC: 43 NG/ML (ref 10–291)
GFR SERPLBLD CREATININE-BSD FMLA CKD-EPI: 74 ML/MIN/1.73 M 2
GLOBULIN SER CALC-MCNC: 3.1 G/DL (ref 1.9–3.5)
GLUCOSE SERPL-MCNC: 114 MG/DL (ref 65–99)
HCT VFR BLD AUTO: 45.4 % (ref 37–47)
HDLC SERPL-MCNC: 38 MG/DL
HGB BLD-MCNC: 14.9 G/DL (ref 12–16)
IMM GRANULOCYTES # BLD AUTO: 0.01 K/UL (ref 0–0.11)
IMM GRANULOCYTES NFR BLD AUTO: 0.2 % (ref 0–0.9)
IRON SATN MFR SERPL: 24 % (ref 15–55)
IRON SERPL-MCNC: 77 UG/DL (ref 40–170)
LDLC SERPL CALC-MCNC: 59 MG/DL
LYMPHOCYTES # BLD AUTO: 1.98 K/UL (ref 1–4.8)
LYMPHOCYTES NFR BLD: 37.3 % (ref 22–41)
MCH RBC QN AUTO: 29.6 PG (ref 27–33)
MCHC RBC AUTO-ENTMCNC: 32.8 G/DL (ref 32.2–35.5)
MCV RBC AUTO: 90.1 FL (ref 81.4–97.8)
MONOCYTES # BLD AUTO: 0.44 K/UL (ref 0–0.85)
MONOCYTES NFR BLD AUTO: 8.3 % (ref 0–13.4)
NEUTROPHILS # BLD AUTO: 2.78 K/UL (ref 1.82–7.42)
NEUTROPHILS NFR BLD: 52.4 % (ref 44–72)
NRBC # BLD AUTO: 0 K/UL
NRBC BLD-RTO: 0 /100 WBC (ref 0–0.2)
PLATELET # BLD AUTO: 181 K/UL (ref 164–446)
PMV BLD AUTO: 10.1 FL (ref 9–12.9)
POTASSIUM SERPL-SCNC: 4.2 MMOL/L (ref 3.6–5.5)
PROT SERPL-MCNC: 7.5 G/DL (ref 6–8.2)
RBC # BLD AUTO: 5.04 M/UL (ref 4.2–5.4)
SODIUM SERPL-SCNC: 141 MMOL/L (ref 135–145)
TIBC SERPL-MCNC: 323 UG/DL (ref 250–450)
TRIGL SERPL-MCNC: 128 MG/DL (ref 0–149)
TSH SERPL DL<=0.005 MIU/L-ACNC: 0.92 UIU/ML (ref 0.38–5.33)
UIBC SERPL-MCNC: 246 UG/DL (ref 110–370)
WBC # BLD AUTO: 5.3 K/UL (ref 4.8–10.8)

## 2025-06-18 PROCEDURE — 36415 COLL VENOUS BLD VENIPUNCTURE: CPT

## 2025-06-18 PROCEDURE — 84443 ASSAY THYROID STIM HORMONE: CPT

## 2025-06-18 PROCEDURE — 83550 IRON BINDING TEST: CPT

## 2025-06-18 PROCEDURE — 80053 COMPREHEN METABOLIC PANEL: CPT

## 2025-06-18 PROCEDURE — 80061 LIPID PANEL: CPT

## 2025-06-18 PROCEDURE — 82728 ASSAY OF FERRITIN: CPT

## 2025-06-18 PROCEDURE — 85025 COMPLETE CBC W/AUTO DIFF WBC: CPT

## 2025-06-18 PROCEDURE — 83540 ASSAY OF IRON: CPT

## 2025-07-29 ENCOUNTER — HOSPITAL ENCOUNTER (OUTPATIENT)
Facility: MEDICAL CENTER | Age: 75
End: 2025-07-29
Payer: MEDICARE

## 2025-07-29 VITALS — HEIGHT: 64 IN | BODY MASS INDEX: 35 KG/M2 | WEIGHT: 205 LBS

## 2025-07-29 DIAGNOSIS — N64.59 ABNORMAL BREAST EXAM: ICD-10-CM

## 2025-07-29 DIAGNOSIS — C50.412 CARCINOMA OF UPPER-OUTER QUADRANT OF FEMALE BREAST, LEFT (HCC): ICD-10-CM

## 2025-07-29 PROCEDURE — 76642 ULTRASOUND BREAST LIMITED: CPT | Mod: LT

## 2025-07-29 PROCEDURE — G0279 TOMOSYNTHESIS, MAMMO: HCPCS

## 2025-07-29 RX ORDER — ANASTROZOLE 1 MG/1
1 TABLET ORAL DAILY
Qty: 90 TABLET | Refills: 1 | Status: SHIPPED | OUTPATIENT
Start: 2025-07-29

## 2025-07-29 ASSESSMENT — FIBROSIS 4 INDEX: FIB4 SCORE: 2.31

## 2025-07-30 PROCEDURE — RXMED WILLOW AMBULATORY MEDICATION CHARGE: Performed by: INTERNAL MEDICINE

## 2025-08-06 ENCOUNTER — PHARMACY VISIT (OUTPATIENT)
Dept: PHARMACY | Facility: MEDICAL CENTER | Age: 75
End: 2025-08-06
Payer: COMMERCIAL

## 2025-08-12 ENCOUNTER — HOSPITAL ENCOUNTER (OUTPATIENT)
Facility: MEDICAL CENTER | Age: 75
End: 2025-08-12
Attending: INTERNAL MEDICINE
Payer: MEDICARE

## 2025-08-12 VITALS
BODY MASS INDEX: 34.83 KG/M2 | HEIGHT: 64 IN | HEART RATE: 73 BPM | TEMPERATURE: 97.5 F | WEIGHT: 204 LBS | RESPIRATION RATE: 19 BRPM | DIASTOLIC BLOOD PRESSURE: 78 MMHG | OXYGEN SATURATION: 96 % | SYSTOLIC BLOOD PRESSURE: 116 MMHG

## 2025-08-12 DIAGNOSIS — C50.412 CARCINOMA OF UPPER-OUTER QUADRANT OF FEMALE BREAST, LEFT (HCC): Primary | ICD-10-CM

## 2025-08-12 PROCEDURE — 99212 OFFICE O/P EST SF 10 MIN: CPT | Performed by: INTERNAL MEDICINE

## 2025-08-12 PROCEDURE — 99213 OFFICE O/P EST LOW 20 MIN: CPT | Performed by: INTERNAL MEDICINE

## 2025-08-12 ASSESSMENT — FIBROSIS 4 INDEX: FIB4 SCORE: 2.31

## 2025-08-12 ASSESSMENT — ENCOUNTER SYMPTOMS
CARDIOVASCULAR NEGATIVE: 1
MYALGIAS: 1
PSYCHIATRIC NEGATIVE: 1
EYES NEGATIVE: 1
GASTROINTESTINAL NEGATIVE: 1
RESPIRATORY NEGATIVE: 1
NEUROLOGICAL NEGATIVE: 1
CONSTITUTIONAL NEGATIVE: 1

## 2025-08-12 ASSESSMENT — PAIN SCALES - GENERAL: PAINLEVEL_OUTOF10: NO PAIN

## (undated) DEVICE — ELECTRODE DUAL RETURN W/ CORD - (50/PK)

## (undated) DEVICE — SUTURE 2-0 COATED VICRYL PLUS - 12 X 18 INCH (12/BX)

## (undated) DEVICE — KIT PROCEDURE DOUBLE ENDO ONLY (5/CA)

## (undated) DEVICE — GLOVE SZ 6 BIOGEL PI MICRO - PF LF (50PR/BX 4BX/CA)

## (undated) DEVICE — SET LEADWIRE 5 LEAD BEDSIDE DISPOSABLE ECG (1SET OF 5/EA)

## (undated) DEVICE — GLOVE BIOGEL SZ 6.5 SURGICAL PF LTX (50PR/BX 4BX/CA)

## (undated) DEVICE — SODIUM CHL IRRIGATION 0.9% 1000ML (12EA/CA)

## (undated) DEVICE — NEPTUNE 4 PORT MANIFOLD - (20/PK)

## (undated) DEVICE — BAG SPONGE COUNT 10.25 X 32 - BLUE (250/CA)

## (undated) DEVICE — MASK ANESTHESIA ADULT  - (100/CA)

## (undated) DEVICE — GOWN SURGEONS X-LARGE - DISP. (30/CA)

## (undated) DEVICE — TUBE E-T HI-LO CUFF 7.5MM (10EA/PK)

## (undated) DEVICE — SPONGE GAUZESTER. 2X2 4-PL - (2/PK 50PK/BX 30BX/CS)

## (undated) DEVICE — STAPLER SKIN DISP - (6/BX 10BX/CA) VISISTAT

## (undated) DEVICE — SYRINGE 10 ML CONTROL LL (25EA/BX 4BX/CA)

## (undated) DEVICE — STAPLER 75MM LINEAR OPEN (3EA/BX)

## (undated) DEVICE — COVER CIV-FLEX TRANSDUCER - (24/BX)

## (undated) DEVICE — GLOVE BIOGEL PI INDICATOR SZ 6.5 SURGICAL PF LF - (50/BX 4BX/CA)

## (undated) DEVICE — SUTURE 3-0 VICRYL PLUS SH - 8X 18 INCH (12/BX)

## (undated) DEVICE — SUTURE 1 PDS TP-1 54 (12EA/BX)"

## (undated) DEVICE — PAD LAP STERILE 18 X 18 - (5/PK 40PK/CA)

## (undated) DEVICE — GOWN SURGICAL X-LARGE ULTRA - FILM-REINFORCED (20/CA)

## (undated) DEVICE — SUTURE 3-0 VICRYL PLUS SH - 27 INCH (36/BX)

## (undated) DEVICE — CHLORAPREP 26 ML APPLICATOR - ORANGE TINT(25/CA)

## (undated) DEVICE — TUBING CLEARLINK DUO-VENT - C-FLO (48EA/CA)

## (undated) DEVICE — SUTURE 4-0 MONOCRYL PLUS PS-2 - 27 INCH (36/BX)

## (undated) DEVICE — Device

## (undated) DEVICE — KIT ANESTHESIA W/CIRCUIT & 3/LT BAG W/FILTER (20EA/CA)

## (undated) DEVICE — GLOVE BIOGEL SZ 6 PF LATEX - (50EA/BX 4BX/CA)

## (undated) DEVICE — MASK PANORAMIC OXYGEN PRO2 (30EA/CA)

## (undated) DEVICE — BOVIE  BLADE 6 EXTENDED - (50/PK)

## (undated) DEVICE — GLOVE BIOGEL SZ 7.5 SURGICAL PF LTX - (50PR/BX 4BX/CA)

## (undated) DEVICE — CANISTER SUCTION RIGID RED 1500CC (40EA/CA)

## (undated) DEVICE — GOWN WARMING STANDARD FLEX - (30/CA)

## (undated) DEVICE — GLOVE BIOGEL PI INDICATOR SZ 7.0 SURGICAL PF LF - (50/BX 4BX/CA)

## (undated) DEVICE — FILM CASSETTE ENDO

## (undated) DEVICE — SPONGE GAUZESTER 4 X 4 4PLY - (128PK/CA)

## (undated) DEVICE — SUTURE GENERAL

## (undated) DEVICE — PLUMEPEN ULTRA 3/8 IN X 10 FT HOSE (20EA/CA)

## (undated) DEVICE — SENSOR OXIMETER ADULT SPO2 RD SET (20EA/BX)

## (undated) DEVICE — SUTURE 0 PDS-2 CTX 36 INCH - (24/BX)

## (undated) DEVICE — BLOCK BITE MAXI DENTAL RETENTION RIM (100EA/BX)

## (undated) DEVICE — DRAPE LARGE 3 QUARTER - (20/CA)

## (undated) DEVICE — COVER SHEATH PROBE FOR SAVI SCOUT (20EA/BX)

## (undated) DEVICE — CONTAINER, SPECIMEN, STERILE

## (undated) DEVICE — PACK MAJOR BASIN - (2EA/CA)

## (undated) DEVICE — HEAD HOLDER JUNIOR/ADULT

## (undated) DEVICE — FORCEP RADIAL JAW 4 STANDARD CAPACITY W/NEEDLE 240CM (40EA/BX)

## (undated) DEVICE — SUCTION INSTRUMENT YANKAUER BULBOUS TIP W/O VENT (50EA/CA)

## (undated) DEVICE — PAD MAGNETIC INSTRUMENT FOAM HOLDER (200/CA)

## (undated) DEVICE — GLOVE BIOGEL INDICATOR SZ 7.5 SURGICAL PF LTX - (50PR/BX 4BX/CA)

## (undated) DEVICE — SUTURE 0 VICRYL PLUS CTX - 36 INCH (36/BX)

## (undated) DEVICE — CLOSURE SKIN STRIP 1/2 X 4 IN - (STERI STRIP) (50/BX 4BX/CA)

## (undated) DEVICE — COVER LIGHT HANDLE ALC PLUS DISP (18EA/BX)

## (undated) DEVICE — BINDER ABDOMINAL 30X45X12IN - FITS 30-45IN WAIST 12IN HIGH

## (undated) DEVICE — TUBE CONNECTING SUCTION - CLEAR PLASTIC STERILE 72 IN (50EA/CA)

## (undated) DEVICE — MASK OXYGEN VNYL ADLT MED CONC WITH 7 FOOT TUBING - (50EA/CA)

## (undated) DEVICE — SLEEVE, VASO, THIGH, MED

## (undated) DEVICE — SPONGE XRAY 8X4 STERL. 12PL - (10EA/TY 80TY/CA)

## (undated) DEVICE — MEDICINE CUP STERILE 2 OZ - (100/CA)

## (undated) DEVICE — PORT AUXILLARY WATER (50EA/BX)

## (undated) DEVICE — SUTURE 2-0 PDS II CT-2 - (36/BX)

## (undated) DEVICE — LACTATED RINGERS INJ 1000 ML - (14EA/CA 60CA/PF)

## (undated) DEVICE — CANISTER SUCTION 3000ML MECHANICAL FILTER AUTO SHUTOFF MEDI-VAC NONSTERILE LF DISP (40EA/CA)

## (undated) DEVICE — KIT CUSTOM PROCEDURE SINGLE FOR ENDO  (15/CA)

## (undated) DEVICE — WATER IRRIGATION STERILE 1000ML (12EA/CA)

## (undated) DEVICE — DRAPE MAYO STAND - (30/CA)

## (undated) DEVICE — TOWELS CLOTH SURGICAL - (4/PK 20PK/CA)

## (undated) DEVICE — SET EXTENSION WITH 2 PORTS (48EA/CA) ***PART #2C8610 IS A SUBSTITUTE*****

## (undated) DEVICE — DRESSING LEUKOMED STERILE 11.75X4IN - (50/CA)

## (undated) DEVICE — BINDER BREAST FLORAL PINK LARGE 36-40 (1EA)"

## (undated) DEVICE — PROTECTOR ULNA NERVE - (36PR/CA)

## (undated) DEVICE — SHEET TRANSVERSE LAP - (12EA/CA)

## (undated) DEVICE — GLOVE BIOGEL INDICATOR SZ 8 SURGICAL PF LTX - (50/BX 4BX/CA)

## (undated) DEVICE — CANISTER SUCTION 3000ML MECHANICAL FILTER AUTO SHUTOFF MEDI-VAC NONSTERILE LF DISP  (40EA/CA)

## (undated) DEVICE — TOWEL STOP TIMEOUT SAFETY FLAG (40EA/CA)

## (undated) DEVICE — PACK MINOR BASIN - (2EA/CA)

## (undated) DEVICE — SOD. CHL 10CC SYRINGE PREFILL - W/10 CC (30/BX)

## (undated) DEVICE — BUTTON ENDOSCOPY DISPOSABLE

## (undated) DEVICE — MASK OXYGEN VNYL ADLT MED CONC WITH 7 FOOT TUBING  - (50EA/CA)

## (undated) DEVICE — KIT  I.V. START (100EA/CA)

## (undated) DEVICE — CANNULA O2 COMFORT SOFT EAR ADULT 7 FT TUBING (50/CA)

## (undated) DEVICE — SLEEVE VASO DVT COMPRESSION CALF MED - (10PR/CA)

## (undated) DEVICE — BITE BLOCK ADULT 60FR (100EA/CA)

## (undated) DEVICE — GLOVE BIOGEL INDICATOR SZ 6.5 SURGICAL PF LTX - (50PR/BX 4BX/CA)

## (undated) DEVICE — BLADE ELECTRODE COATED EDGE (50EA/PK)

## (undated) DEVICE — ELECTRODE 850 FOAM ADHESIVE - HYDROGEL RADIOTRNSPRNT (50/PK)

## (undated) DEVICE — SUTURE 1 PDS II PLUS TP-1 - (12PK/BX)